# Patient Record
Sex: MALE | Race: BLACK OR AFRICAN AMERICAN | Employment: OTHER | URBAN - METROPOLITAN AREA
[De-identification: names, ages, dates, MRNs, and addresses within clinical notes are randomized per-mention and may not be internally consistent; named-entity substitution may affect disease eponyms.]

---

## 2017-01-22 ENCOUNTER — TELEPHONE (OUTPATIENT)
Dept: INTERNAL MEDICINE CLINIC | Facility: CLINIC | Age: 42
End: 2017-01-22

## 2017-01-22 RX ORDER — TOBRAMYCIN AND DEXAMETHASONE 3; 1 MG/ML; MG/ML
2 SUSPENSION/ DROPS OPHTHALMIC 4 TIMES DAILY
Qty: 2.5 ML | Refills: 0 | Status: SHIPPED | OUTPATIENT
Start: 2017-01-22 | End: 2017-06-09 | Stop reason: ALTCHOICE

## 2017-01-22 NOTE — TELEPHONE ENCOUNTER
The patient called because he has developed \"pink eye\". He was prescribed TobraDex ophthalmic and sent to the patient's pharmacy. The risks, benefits and side effects of treatment  plan  were dicussed with the patient and the patient verbalized [place]

## 2017-06-03 ENCOUNTER — HOSPITAL ENCOUNTER (OUTPATIENT)
Age: 42
Discharge: HOME | End: 2017-06-03
Attending: EMERGENCY MEDICINE
Payer: COMMERCIAL

## 2017-06-03 VITALS
WEIGHT: 195 LBS | HEIGHT: 74 IN | OXYGEN SATURATION: 99 % | DIASTOLIC BLOOD PRESSURE: 80 MMHG | HEART RATE: 106 BPM | SYSTOLIC BLOOD PRESSURE: 118 MMHG | BODY MASS INDEX: 25.03 KG/M2 | RESPIRATION RATE: 16 BRPM | TEMPERATURE: 98 F

## 2017-06-03 DIAGNOSIS — L50.9 URTICARIA: Primary | ICD-10-CM

## 2017-06-03 PROCEDURE — 99213 OFFICE O/P EST LOW 20 MIN: CPT

## 2017-06-03 PROCEDURE — 99214 OFFICE O/P EST MOD 30 MIN: CPT

## 2017-06-03 RX ORDER — PREDNISONE 20 MG/1
40 TABLET ORAL DAILY
Qty: 10 TABLET | Refills: 0 | Status: SHIPPED | OUTPATIENT
Start: 2017-06-03 | End: 2017-06-08

## 2017-06-03 NOTE — ED INITIAL ASSESSMENT (HPI)
Rash on chest and stomach that started today 30 minutes ago. Took 1 benadryl at home. Itchy and burning. Unable to identify any new environmental factors. Denies any CP and SOB.

## 2017-06-03 NOTE — ED PROVIDER NOTES
Patient Seen in: [facility] [place]    History   Patient presents with:  Rash Skin Problem (integumentary)    Stated Complaint: Rash    HPI    Patient is a 22-year-old male who presents with rash that started immediately prior t every other day for the last 4-5 years. Review of Systems    Positive for stated complaint: Rash  Other systems are as noted in HPI. Constitutional and vital signs reviewed. All other systems reviewed and negative except as noted above.     P 10 tablet Refills: 0

## 2017-06-04 ENCOUNTER — HOSPITAL ENCOUNTER (EMERGENCY)
Facility: HOSPITAL | Age: 42
Discharge: HOME | End: 2017-06-04
Attending: EMERGENCY MEDICINE
Payer: COMMERCIAL

## 2017-06-04 VITALS
HEIGHT: 74 IN | WEIGHT: 195 LBS | RESPIRATION RATE: 20 BRPM | TEMPERATURE: 98 F | HEART RATE: 101 BPM | SYSTOLIC BLOOD PRESSURE: 170 MMHG | OXYGEN SATURATION: 99 % | DIASTOLIC BLOOD PRESSURE: 75 MMHG | BODY MASS INDEX: 25.03 KG/M2

## 2017-06-04 DIAGNOSIS — L23.9 ALLERGIC CONTACT DERMATITIS, UNSPECIFIED TRIGGER: Primary | ICD-10-CM

## 2017-06-04 PROCEDURE — 99283 EMERGENCY DEPT VISIT LOW MDM: CPT

## 2017-06-04 RX ORDER — HYDROXYZINE HYDROCHLORIDE 25 MG/1
TABLET, FILM COATED ORAL EVERY 4 HOURS PRN
Qty: 20 TABLET | Refills: 0 | Status: SHIPPED | OUTPATIENT
Start: 2017-06-04 | End: 2017-07-04

## 2017-06-04 NOTE — ED PROVIDER NOTES
Patient Seen in: [facility] AND [facility] Emergency Department    History   Patient presents with:   Allergic Rxn Allergies (immune)    Stated Complaint: rash allergic reaction    HPI    Patient is a 28-year-old man with a history of pancreatitis he complains of drinks or equivalent, 6 Cans of beer per week       Comment: Denies any ETOH use since discharge on                 05/11. Previously 1/2 pint of liquor                 every other day for the last 4-5 years.        Review of Systems    Positive for stated There is a red raised pruritic rash on his anterior abdomen and chest.  It does cross the midline. There is no vesicles. Psychiatric: Normal mood and affect. Behavior is normal. Judgment and thought content normal.   Nursing note and vitals reviewed.

## 2017-06-06 ENCOUNTER — TELEPHONE (OUTPATIENT)
Dept: INTERNAL MEDICINE CLINIC | Facility: CLINIC | Age: 42
End: 2017-06-06

## 2017-06-06 ENCOUNTER — APPOINTMENT (OUTPATIENT)
Dept: CT IMAGING | Facility: HOSPITAL | Age: 42
End: 2017-06-06
Attending: EMERGENCY MEDICINE
Payer: COMMERCIAL

## 2017-06-06 PROCEDURE — 74177 CT ABD & PELVIS W/CONTRAST: CPT | Performed by: EMERGENCY MEDICINE

## 2017-06-06 NOTE — TELEPHONE ENCOUNTER
Spouse would like to inform Dr. Gerri Fuller that pt is currently in [address] [place] [address] and she cancelled appt for pt for today.

## 2017-06-06 NOTE — ED PROVIDER NOTES
Patient Seen in: [facility] AND [facility] Emergency Department    History   Patient presents with:  Abdomen/Flank Pain (GI/)    Stated Complaint: abdominal pain     HPI    43year old male, with a history of alcohol induced chronic pancreatitis, known pancrea by mouth every morning before breakfast.         Family History   Problem Relation Age of Onset   • Hypertension Mother    • Hypertension Father          Smoking Status: Current Every Day Smoker        Packs/Day: 0.50  Years: 20        Types: Cigarettes no drainage rash crosses the midline) noted. Nursing note and vitals reviewed.            ED Course     Labs Reviewed   BASIC METABOLIC PANEL (8) - Abnormal; Notable for the following:     Calculated Osmolality 297 (*)     GFR, Non- 54 (*) immediate care center on Friday and it did not help.   He is requesting a topical medication  He will be given Garima Tristan for follow-up and understands to avoid ETOH- will contact Dr Duarte Matias tomorrow for fu      Disposition and Plan     Clinical Impression:

## 2017-06-09 ENCOUNTER — OFFICE VISIT (OUTPATIENT)
Dept: INTERNAL MEDICINE CLINIC | Facility: CLINIC | Age: 42
End: 2017-06-09

## 2017-06-09 VITALS
BODY MASS INDEX: 25.67 KG/M2 | HEIGHT: 74 IN | WEIGHT: 200 LBS | SYSTOLIC BLOOD PRESSURE: 99 MMHG | DIASTOLIC BLOOD PRESSURE: 68 MMHG | HEART RATE: 101 BPM

## 2017-06-09 DIAGNOSIS — L50.9 URTICARIA: Primary | ICD-10-CM

## 2017-06-09 PROCEDURE — 99214 OFFICE O/P EST MOD 30 MIN: CPT | Performed by: INTERNAL MEDICINE

## 2017-06-09 PROCEDURE — 99212 OFFICE O/P EST SF 10 MIN: CPT | Performed by: INTERNAL MEDICINE

## 2017-06-09 RX ORDER — CLOBETASOL PROPIONATE 0.05 MG/G
GEL TOPICAL
Qty: 30 G | Refills: 2 | Status: SHIPPED | OUTPATIENT
Start: 2017-06-09 | End: 2017-08-08

## 2017-06-09 NOTE — PROGRESS NOTES
HPI:    Patient ID: Jenny Huber is a 43year old male. 6/4/17 [address] [place] [address] ED for allergic contact dermatitis. Discharged with hydroxyzine 25 MG, prednisone oral steroid and triamcinolone acetonide 0.1% external ointment.  Pt took his dog for a walk at the fo Previously 1/2 pint of liquor                 every other day for the last 4-5 years. Current Outpatient Prescriptions:  Clobetasol Propionate 0.05 % External Gel Apply to affected area twice a day.  Disp: 30 g Rfl: 2   HYDROcodone-acetaminoph clobetasol propionate 0.05 % external gel. - Follow up with dermatology. No orders of the defined types were placed in this encounter.        Meds This Visit:  Signed Prescriptions Disp Refills    Clobetasol Propionate 0.05 % External Gel 30 g 2      S

## 2017-07-06 ENCOUNTER — APPOINTMENT (OUTPATIENT)
Dept: CT IMAGING | Facility: HOSPITAL | Age: 42
End: 2017-07-06
Attending: EMERGENCY MEDICINE
Payer: COMMERCIAL

## 2017-07-06 ENCOUNTER — APPOINTMENT (OUTPATIENT)
Dept: GENERAL RADIOLOGY | Facility: HOSPITAL | Age: 42
End: 2017-07-06
Payer: COMMERCIAL

## 2017-07-06 ENCOUNTER — HOSPITAL ENCOUNTER (EMERGENCY)
Facility: HOSPITAL | Age: 42
Discharge: HOME | End: 2017-07-06
Payer: COMMERCIAL

## 2017-07-06 VITALS
HEIGHT: 75 IN | HEART RATE: 88 BPM | TEMPERATURE: 97 F | BODY MASS INDEX: 24.87 KG/M2 | WEIGHT: 200 LBS | SYSTOLIC BLOOD PRESSURE: 133 MMHG | OXYGEN SATURATION: 100 % | RESPIRATION RATE: 14 BRPM | DIASTOLIC BLOOD PRESSURE: 78 MMHG

## 2017-07-06 DIAGNOSIS — R10.10 UPPER ABDOMINAL PAIN: Primary | ICD-10-CM

## 2017-07-06 LAB
ALBUMIN SERPL BCP-MCNC: 4 G/DL (ref 3.5–4.8)
ALP SERPL-CCNC: 45 U/L (ref 32–100)
ALT SERPL-CCNC: 30 U/L (ref 17–63)
ANION GAP SERPL CALC-SCNC: 10 MMOL/L (ref 0–18)
AST SERPL-CCNC: 44 U/L (ref 15–41)
BASOPHILS # BLD: 0 K/UL (ref 0–0.2)
BASOPHILS NFR BLD: 1 %
BILIRUB DIRECT SERPL-MCNC: 0.2 MG/DL (ref 0–0.2)
BILIRUB SERPL-MCNC: 1 MG/DL (ref 0.3–1.2)
BUN SERPL-MCNC: 17 MG/DL (ref 8–20)
BUN/CREAT SERPL: 15 (ref 10–20)
CALCIUM SERPL-MCNC: 9.4 MG/DL (ref 8.5–10.5)
CHLORIDE SERPL-SCNC: 102 MMOL/L (ref 95–110)
CO2 SERPL-SCNC: 26 MMOL/L (ref 22–32)
CREAT SERPL-MCNC: 1.13 MG/DL (ref 0.5–1.5)
EOSINOPHIL # BLD: 0.1 K/UL (ref 0–0.7)
EOSINOPHIL NFR BLD: 2 %
ERYTHROCYTE [DISTWIDTH] IN BLOOD BY AUTOMATED COUNT: 14.2 % (ref 11–15)
GLUCOSE SERPL-MCNC: 98 MG/DL (ref 70–99)
HCT VFR BLD AUTO: 41.1 % (ref 41–52)
HGB BLD-MCNC: 14.3 G/DL (ref 13.5–17.5)
LIPASE SERPL-CCNC: 21 U/L (ref 22–51)
LYMPHOCYTES # BLD: 2 K/UL (ref 1–4)
LYMPHOCYTES NFR BLD: 40 %
MCH RBC QN AUTO: 35.3 PG (ref 27–32)
MCHC RBC AUTO-ENTMCNC: 34.7 G/DL (ref 32–37)
MCV RBC AUTO: 101.8 FL (ref 80–100)
MONOCYTES # BLD: 0.4 K/UL (ref 0–1)
MONOCYTES NFR BLD: 8 %
NEUTROPHILS # BLD AUTO: 2.5 K/UL (ref 1.8–7.7)
NEUTROPHILS NFR BLD: 50 %
OSMOLALITY UR CALC.SUM OF ELEC: 288 MOSM/KG (ref 275–295)
PLATELET # BLD AUTO: 292 K/UL (ref 140–400)
PMV BLD AUTO: 6.8 FL (ref 7.4–10.3)
POTASSIUM SERPL-SCNC: 4.5 MMOL/L (ref 3.3–5.1)
PROT SERPL-MCNC: 6.7 G/DL (ref 5.9–8.4)
RBC # BLD AUTO: 4.04 M/UL (ref 4.5–5.9)
SODIUM SERPL-SCNC: 138 MMOL/L (ref 136–144)
TROPONIN I SERPL-MCNC: 0 NG/ML (ref ?–0.03)
WBC # BLD AUTO: 4.9 K/UL (ref 4–11)

## 2017-07-06 PROCEDURE — 84484 ASSAY OF TROPONIN QUANT: CPT

## 2017-07-06 PROCEDURE — 96361 HYDRATE IV INFUSION ADD-ON: CPT

## 2017-07-06 PROCEDURE — 96376 TX/PRO/DX INJ SAME DRUG ADON: CPT

## 2017-07-06 PROCEDURE — 85025 COMPLETE CBC W/AUTO DIFF WBC: CPT

## 2017-07-06 PROCEDURE — 96375 TX/PRO/DX INJ NEW DRUG ADDON: CPT

## 2017-07-06 PROCEDURE — 93005 ELECTROCARDIOGRAM TRACING: CPT

## 2017-07-06 PROCEDURE — 93010 ELECTROCARDIOGRAM REPORT: CPT | Performed by: INTERNAL MEDICINE

## 2017-07-06 PROCEDURE — 83690 ASSAY OF LIPASE: CPT | Performed by: EMERGENCY MEDICINE

## 2017-07-06 PROCEDURE — 80076 HEPATIC FUNCTION PANEL: CPT | Performed by: EMERGENCY MEDICINE

## 2017-07-06 PROCEDURE — 71020 XR CHEST PA + LAT CHEST (CPT=71020): CPT

## 2017-07-06 PROCEDURE — 80048 BASIC METABOLIC PNL TOTAL CA: CPT

## 2017-07-06 PROCEDURE — 96374 THER/PROPH/DIAG INJ IV PUSH: CPT

## 2017-07-06 PROCEDURE — 74160 CT ABDOMEN W/CONTRAST: CPT | Performed by: EMERGENCY MEDICINE

## 2017-07-06 PROCEDURE — 71260 CT THORAX DX C+: CPT | Performed by: EMERGENCY MEDICINE

## 2017-07-06 PROCEDURE — 99285 EMERGENCY DEPT VISIT HI MDM: CPT

## 2017-07-06 RX ORDER — ONDANSETRON 2 MG/ML
4 INJECTION INTRAMUSCULAR; INTRAVENOUS ONCE
Status: COMPLETED | OUTPATIENT
Start: 2017-07-06 | End: 2017-07-06

## 2017-07-06 RX ORDER — HYDROCODONE BITARTRATE AND ACETAMINOPHEN 5; 325 MG/1; MG/1
1 TABLET ORAL EVERY 8 HOURS PRN
Qty: 9 TABLET | Refills: 0 | Status: SHIPPED | OUTPATIENT
Start: 2017-07-06 | End: 2017-07-09

## 2017-07-06 RX ORDER — HYDROMORPHONE HYDROCHLORIDE 1 MG/ML
0.5 INJECTION, SOLUTION INTRAMUSCULAR; INTRAVENOUS; SUBCUTANEOUS ONCE
Status: COMPLETED | OUTPATIENT
Start: 2017-07-06 | End: 2017-07-06

## 2017-07-06 NOTE — ED NOTES
Jose Daniel assumed from triage alert and interactive. Ambulates with steady gait. States acute onset epigastric burning this am. Hector SOB/diaphoresis/nausea/vomiting.  Respirations even nonlabored

## 2017-07-06 NOTE — ED NOTES
Discharged home with plan to follow up with PCP/GI as indicated. Alert and interactive. Hemodynamically stable. Agrees with pain management plan.  Ambulates to exit with steady gait

## 2017-07-06 NOTE — ED PROVIDER NOTES
Signout taken with dispo pending labs/CT - same nonacute and as noted below. Re-examination with soft/nonperitonitic abdomen. Patient evaluated by [address] [place] [address] Dr. Claribel Mark and in agreement for DC home with close PCP/GI followup.     Ct Chest+abdomen (all C pneumothorax CHEST WALL: Normal.  No axillary mass or enlarged adenopathy. LIVER:   14 x 11 mm cyst or hamartoma lateral segment left hepatic lobe image 140 series 3. Subcentimeter cyst or hamartoma right hepatic lobe image 144 series 3.  Smaller area low possibility of reflux with bronchospasm and chest pain is raised. Correlate clinically. 4. Scattered hepatic nodules appear probable small cyst or hamartomas as seen on prior study. 5. Lobular lung nodule left upper lobe as seen on prior studies.  Probable Total 9.4 8.5 - 10.5 mg/dL   BUN/CREA Ratio 15.0 10.0 - 20.0   Anion Gap 10 0 - 18 mmol/L   Calculated Osmolality 288 275 - 295 mOsm/kg   GFR, Non-African American >60 >=60   GFR, -American >60 >=60   -TROPONIN I, 0 HOUR   Collection Time: 07/06/17

## 2017-07-13 NOTE — ED PROVIDER NOTES
Patient Seen in: [facility] AND [facility] Emergency Department    History   Patient presents with:  Chest Pain Angina (cardiovascular)    Stated Complaint: Chest and lower back pain    HPI    Pt presents to ED with upper abdominal pain which radiates into chest except as noted above. PSFH elements reviewed from today and agreed except as otherwise stated in HPI.     Physical Exam   ED Triage Vitals [07/06/17 1240]  BP: 152/89  Pulse: 109  Resp: 20  Temp: (!) 97 °F (36.1 °C)  Temp src: Temporal  SpO2: 100 %  O2 ---------                               -----------         ------                     CBC W/ DIFFERENTIAL[[id number]]          Abnormal            Final result                 Please view results for these tests on the individual orders.    RAINBOW DRAW B

## 2017-07-18 ENCOUNTER — TELEPHONE (OUTPATIENT)
Dept: INTERNAL MEDICINE CLINIC | Facility: CLINIC | Age: 42
End: 2017-07-18

## 2017-07-18 NOTE — TELEPHONE ENCOUNTER
Actions Requested: requesting hydrocodone and zofran  Problem: abdominal pain  Onset and Timing: today  Associated Symptoms: no other symptoms. Aggravating by:    Alleviated by:   Triage Note: Spouse called indicated that patient went to [facility] ER today

## 2017-07-19 PROBLEM — R73.9 HYPERGLYCEMIA: Status: ACTIVE | Noted: 2017-07-19

## 2017-07-19 PROBLEM — R10.9 INTRACTABLE ABDOMINAL PAIN: Status: ACTIVE | Noted: 2017-07-19

## 2017-07-19 PROBLEM — E83.42 HYPOMAGNESEMIA: Status: ACTIVE | Noted: 2017-07-19

## 2017-07-19 PROBLEM — R11.2 NAUSEA AND VOMITING IN ADULT: Status: ACTIVE | Noted: 2017-07-19

## 2017-07-19 PROBLEM — E87.6 HYPOKALEMIA: Status: ACTIVE | Noted: 2017-07-19

## 2017-07-19 RX ORDER — ONDANSETRON 4 MG/1
4 TABLET, FILM COATED ORAL EVERY 8 HOURS PRN
Qty: 30 TABLET | Refills: 0 | Status: SHIPPED | OUTPATIENT
Start: 2017-07-19 | End: 2017-07-20

## 2017-07-19 RX ORDER — HYDROCODONE BITARTRATE AND ACETAMINOPHEN 5; 325 MG/1; MG/1
1 TABLET ORAL EVERY 8 HOURS PRN
Qty: 45 TABLET | Refills: 0 | Status: SHIPPED | OUTPATIENT
Start: 2017-07-19 | End: 2017-07-20

## 2017-07-19 NOTE — H&P
[facility] Patient Status:  Emergency    2/3/1975 MRN [id number]   Location [address] Attending Mamadou [facility] Day # 0 PCP Ed Hennessy MD     Date: Informant Patient Reported? Taking? Clobetasol Propionate 0.05 % External Gel   No No   Sig: Apply to affected area twice a day.    PROPRANOLOL HCL 10 MG Oral Tab   No No   Sig: TAKE 1 TABLET BY MOUTH TWICE A DAY   Pantoprazole Sodium 40 MG Oral Tab EC Speech:  Oriented, speech clear and coherent. Psychiatric:  Cooperative, appropriate mood & affect.       Laboratory Data:     Lab Results  Component Value Date   WBC 8.3 07/18/2017   HGB 13.9 07/18/2017   HCT 40.3 07/18/2017    07/18/2017   ROCHELLE

## 2017-07-19 NOTE — PROGRESS NOTES
[facility]    Progress Note    [facility] Patient Status:  Inpatient    2/3/1975 MRN [id number]   Location [facility] 4W/SW/SE Attending Ac Monteiro MD   Hosp Day # 0 PCP Zev Treadwell MD       Subjective:   Samantha Melo mg, Intravenous, Q2H PRN **OR** HYDROmorphone HCl PF (DILAUDID) 1 MG/ML injection 1 mg, 1 mg, Intravenous, Q2H PRN  •  potassium chloride 40 mEq in sodium chloride 0.9 % 250 mL IVPB, 40 mEq, Intravenous, Once    Assessment and Plan:     Acute recurrent pan

## 2017-07-19 NOTE — TELEPHONE ENCOUNTER
Prescriptions for this patient has been printed at [facility]. I notify the patient that him or his spouse can come to [place] to  prescriptions.

## 2017-07-19 NOTE — ED NOTES
Patient holding abd in pain. Patient asking for pain medication. Patient stated that he was seen at [facility] ED this morning and given pepcid, dilaudid, and zofran for pancreatitis and then discharged.  Patient did not bring the discharge papers from [facility]

## 2017-07-19 NOTE — TELEPHONE ENCOUNTER
Advised Spouse of Dr. Molly Anaya note. Spouse verbalized understanding and stated that patient was in so much pain yesterday that went to [address] [place] [address] yesterday and was admitted.        Date:  7/18/2017  Date of Admission:  7/18/2017  Reason for Admission      Codes

## 2017-07-19 NOTE — ED INITIAL ASSESSMENT (HPI)
Pt at work- started having pain yesterday to abd- \"I think my pancreas is inflammed. \" Went to [facility] and stated he was sent home this morning.

## 2017-07-19 NOTE — PLAN OF CARE
DISCHARGE PLANNING    • Discharge to home or other facility with appropriate resources Not Progressing        PAIN - ADULT    • Verbalizes/displays adequate comfort level or patient's stated pain goal Not Progressing        Pt alert and oriented, received

## 2017-07-19 NOTE — ED PROVIDER NOTES
Patient Seen in: [facility] AND [facility] Emergency Department    History   Patient presents with:  Abdomen/Flank Pain (GI/)    Stated Complaint: inflammed pancreas    HPI    55-year-old male presents for complaint of pancreatitis.   He was seen at [facility] on               05/11. Previously 1/2 pint of liquor every               other day for the last 4-5 years. Review of Systems   Constitutional: Negative. HENT: Negative. Eyes: Negative. Respiratory: Negative. Cardiovascular: Negative. Psychiatric: He has a normal mood and affect. His speech is normal and behavior is normal.   Nursing note and vitals reviewed.             ED Course     Labs Reviewed   BASIC METABOLIC PANEL (8) - Abnormal; Notable for the following:        Result Value Impression:  Alcohol-induced chronic pancreatitis ([facility] [address]  (primary encounter diagnosis)  Intractable abdominal pain  Nausea and vomiting in adult  Hypokalemia  Hypomagnesemia    Disposition:  Admit    Follow-up:  No follow-up provider specified.     Tomás Javier

## 2017-07-21 ENCOUNTER — OFFICE VISIT (OUTPATIENT)
Dept: INTERNAL MEDICINE CLINIC | Facility: CLINIC | Age: 42
End: 2017-07-21

## 2017-07-21 VITALS
WEIGHT: 201 LBS | HEART RATE: 102 BPM | DIASTOLIC BLOOD PRESSURE: 87 MMHG | BODY MASS INDEX: 25.8 KG/M2 | HEIGHT: 74 IN | SYSTOLIC BLOOD PRESSURE: 121 MMHG

## 2017-07-21 DIAGNOSIS — K85.90 ACUTE ON CHRONIC PANCREATITIS (HCC): Primary | ICD-10-CM

## 2017-07-21 DIAGNOSIS — F10.10 ALCOHOL ABUSE: ICD-10-CM

## 2017-07-21 DIAGNOSIS — K86.1 ACUTE ON CHRONIC PANCREATITIS (HCC): Primary | ICD-10-CM

## 2017-07-21 PROCEDURE — 99214 OFFICE O/P EST MOD 30 MIN: CPT | Performed by: INTERNAL MEDICINE

## 2017-07-21 PROCEDURE — 99212 OFFICE O/P EST SF 10 MIN: CPT | Performed by: INTERNAL MEDICINE

## 2017-07-21 NOTE — PROGRESS NOTES
HPI:    Patient ID: Elder Maravilla is a 43year old male. HPI     Chronic Pancreatitis  Pt presents to clinic today for ER follow up. Pt presented to [address] [place] [address] ED on 7/18/2017 for alcohol induced chronic pancreatitis.  He complained of severe epigastric pain acid 1 MG Oral Tab Take 1 tablet (1 mg total) by mouth daily. Disp: 30 tablet Rfl: 0   Thiamine HCl 100 MG Oral Tab Take 1 tablet (100 mg total) by mouth daily.  Disp: 30 tablet Rfl: 0   Clobetasol Propionate 0.05 % External Gel Apply to affected area twice abuse  -Pt has hx of chronic alcohol abuse. No orders of the defined types were placed in this encounter.       Meds This Visit:  No prescriptions requested or ordered in this encounter    Imaging & Referrals:  None       ID#[id number]  By signing my name Kimber Jack

## 2017-07-22 ENCOUNTER — HOSPITAL ENCOUNTER (INPATIENT)
Facility: HOSPITAL | Age: 42
LOS: 3 days | Discharge: HOME | DRG: 439 | End: 2017-07-25
Attending: EMERGENCY MEDICINE | Admitting: HOSPITALIST
Payer: COMMERCIAL

## 2017-07-22 DIAGNOSIS — R10.10 UPPER ABDOMINAL PAIN: Primary | ICD-10-CM

## 2017-07-22 DIAGNOSIS — K85.90 ACUTE PANCREATITIS, UNSPECIFIED COMPLICATION STATUS, UNSPECIFIED PANCREATITIS TYPE: ICD-10-CM

## 2017-07-22 LAB
ALBUMIN SERPL BCP-MCNC: 3.7 G/DL (ref 3.5–4.8)
ALBUMIN/GLOB SERPL: 1.3 {RATIO} (ref 1–2)
ALP SERPL-CCNC: 44 U/L (ref 32–100)
ALT SERPL-CCNC: 18 U/L (ref 17–63)
ANION GAP SERPL CALC-SCNC: 7 MMOL/L (ref 0–18)
AST SERPL-CCNC: 21 U/L (ref 15–41)
BACTERIA UR QL AUTO: NEGATIVE /HPF
BASOPHILS # BLD: 0 K/UL (ref 0–0.2)
BASOPHILS NFR BLD: 0 %
BILIRUB SERPL-MCNC: 0.5 MG/DL (ref 0.3–1.2)
BILIRUB UR QL: NEGATIVE
BUN SERPL-MCNC: 11 MG/DL (ref 8–20)
BUN/CREAT SERPL: 9 (ref 10–20)
CALCIUM SERPL-MCNC: 8.9 MG/DL (ref 8.5–10.5)
CHLORIDE SERPL-SCNC: 103 MMOL/L (ref 95–110)
CLARITY UR: CLEAR
CO2 SERPL-SCNC: 28 MMOL/L (ref 22–32)
COLOR UR: YELLOW
CREAT SERPL-MCNC: 1.22 MG/DL (ref 0.5–1.5)
EOSINOPHIL # BLD: 0 K/UL (ref 0–0.7)
EOSINOPHIL NFR BLD: 1 %
ERYTHROCYTE [DISTWIDTH] IN BLOOD BY AUTOMATED COUNT: 13.7 % (ref 11–15)
ETHANOL SERPL-MCNC: 0 MG/DL
GLOBULIN PLAS-MCNC: 2.8 G/DL (ref 2.5–3.7)
GLUCOSE SERPL-MCNC: 117 MG/DL (ref 70–99)
GLUCOSE UR-MCNC: NEGATIVE MG/DL
HCT VFR BLD AUTO: 39.1 % (ref 41–52)
HGB BLD-MCNC: 13.6 G/DL (ref 13.5–17.5)
HGB UR QL STRIP.AUTO: NEGATIVE
KETONES UR-MCNC: NEGATIVE MG/DL
LEUKOCYTE ESTERASE UR QL STRIP.AUTO: NEGATIVE
LIPASE SERPL-CCNC: 1324 U/L (ref 22–51)
LYMPHOCYTES # BLD: 1.5 K/UL (ref 1–4)
LYMPHOCYTES NFR BLD: 18 %
MCH RBC QN AUTO: 35.1 PG (ref 27–32)
MCHC RBC AUTO-ENTMCNC: 34.8 G/DL (ref 32–37)
MCV RBC AUTO: 100.8 FL (ref 80–100)
MONOCYTES # BLD: 0.7 K/UL (ref 0–1)
MONOCYTES NFR BLD: 9 %
NEUTROPHILS # BLD AUTO: 5.8 K/UL (ref 1.8–7.7)
NEUTROPHILS NFR BLD: 72 %
NITRITE UR QL STRIP.AUTO: NEGATIVE
OSMOLALITY UR CALC.SUM OF ELEC: 286 MOSM/KG (ref 275–295)
PH UR: 7 [PH] (ref 5–8)
PLATELET # BLD AUTO: 275 K/UL (ref 140–400)
PMV BLD AUTO: 6.9 FL (ref 7.4–10.3)
POTASSIUM SERPL-SCNC: 3.6 MMOL/L (ref 3.3–5.1)
PROT SERPL-MCNC: 6.5 G/DL (ref 5.9–8.4)
PROT UR-MCNC: NEGATIVE MG/DL
RBC # BLD AUTO: 3.88 M/UL (ref 4.5–5.9)
RBC #/AREA URNS AUTO: 0 /HPF
SODIUM SERPL-SCNC: 138 MMOL/L (ref 136–144)
SP GR UR STRIP: 1.01 (ref 1–1.03)
UROBILINOGEN UR STRIP-ACNC: <2
VIT C UR-MCNC: NEGATIVE MG/DL
WBC # BLD AUTO: 8.1 K/UL (ref 4–11)
WBC #/AREA URNS AUTO: <1 /HPF

## 2017-07-22 PROCEDURE — 99223 1ST HOSP IP/OBS HIGH 75: CPT | Performed by: HOSPITALIST

## 2017-07-22 RX ORDER — HEPARIN SODIUM 5000 [USP'U]/ML
5000 INJECTION, SOLUTION INTRAVENOUS; SUBCUTANEOUS EVERY 8 HOURS SCHEDULED
Status: DISCONTINUED | OUTPATIENT
Start: 2017-07-22 | End: 2017-07-25

## 2017-07-22 RX ORDER — DIPHENHYDRAMINE HYDROCHLORIDE 50 MG/ML
25 INJECTION INTRAMUSCULAR; INTRAVENOUS EVERY 4 HOURS PRN
Status: DISCONTINUED | OUTPATIENT
Start: 2017-07-22 | End: 2017-07-25

## 2017-07-22 RX ORDER — HYDROMORPHONE HYDROCHLORIDE 1 MG/ML
1 INJECTION, SOLUTION INTRAMUSCULAR; INTRAVENOUS; SUBCUTANEOUS ONCE
Status: COMPLETED | OUTPATIENT
Start: 2017-07-22 | End: 2017-07-22

## 2017-07-22 RX ORDER — ONDANSETRON 2 MG/ML
4 INJECTION INTRAMUSCULAR; INTRAVENOUS ONCE
Status: COMPLETED | OUTPATIENT
Start: 2017-07-22 | End: 2017-07-22

## 2017-07-22 RX ORDER — SODIUM CHLORIDE 9 MG/ML
INJECTION, SOLUTION INTRAVENOUS
Status: DISPENSED
Start: 2017-07-22 | End: 2017-07-23

## 2017-07-22 RX ORDER — SODIUM CHLORIDE 0.9 % (FLUSH) 0.9 %
3 SYRINGE (ML) INJECTION AS NEEDED
Status: DISCONTINUED | OUTPATIENT
Start: 2017-07-22 | End: 2017-07-25

## 2017-07-22 RX ORDER — SODIUM CHLORIDE 9 MG/ML
INJECTION, SOLUTION INTRAVENOUS CONTINUOUS
Status: DISCONTINUED | OUTPATIENT
Start: 2017-07-22 | End: 2017-07-22

## 2017-07-22 RX ORDER — SODIUM CHLORIDE, SODIUM LACTATE, POTASSIUM CHLORIDE, CALCIUM CHLORIDE 600; 310; 30; 20 MG/100ML; MG/100ML; MG/100ML; MG/100ML
INJECTION, SOLUTION INTRAVENOUS CONTINUOUS
Status: DISCONTINUED | OUTPATIENT
Start: 2017-07-22 | End: 2017-07-24

## 2017-07-22 RX ORDER — SODIUM CHLORIDE 9 MG/ML
INJECTION, SOLUTION INTRAVENOUS
Status: COMPLETED
Start: 2017-07-22 | End: 2017-07-22

## 2017-07-22 RX ORDER — METOCLOPRAMIDE HYDROCHLORIDE 5 MG/ML
10 INJECTION INTRAMUSCULAR; INTRAVENOUS ONCE
Status: COMPLETED | OUTPATIENT
Start: 2017-07-22 | End: 2017-07-22

## 2017-07-22 RX ORDER — HYDROMORPHONE HYDROCHLORIDE 1 MG/ML
0.5 INJECTION, SOLUTION INTRAMUSCULAR; INTRAVENOUS; SUBCUTANEOUS ONCE
Status: COMPLETED | OUTPATIENT
Start: 2017-07-22 | End: 2017-07-22

## 2017-07-22 RX ORDER — DIPHENHYDRAMINE HYDROCHLORIDE 50 MG/ML
25 INJECTION INTRAMUSCULAR; INTRAVENOUS ONCE
Status: COMPLETED | OUTPATIENT
Start: 2017-07-22 | End: 2017-07-22

## 2017-07-22 RX ORDER — ONDANSETRON 2 MG/ML
4 INJECTION INTRAMUSCULAR; INTRAVENOUS EVERY 6 HOURS PRN
Status: DISCONTINUED | OUTPATIENT
Start: 2017-07-22 | End: 2017-07-25

## 2017-07-22 RX ORDER — ACETAMINOPHEN 325 MG/1
650 TABLET ORAL EVERY 6 HOURS PRN
Status: DISCONTINUED | OUTPATIENT
Start: 2017-07-22 | End: 2017-07-25

## 2017-07-22 RX ORDER — HYDROMORPHONE HYDROCHLORIDE 1 MG/ML
INJECTION, SOLUTION INTRAMUSCULAR; INTRAVENOUS; SUBCUTANEOUS
Status: COMPLETED
Start: 2017-07-22 | End: 2017-07-22

## 2017-07-22 RX ORDER — HYDROMORPHONE HYDROCHLORIDE 1 MG/ML
1 INJECTION, SOLUTION INTRAMUSCULAR; INTRAVENOUS; SUBCUTANEOUS EVERY 2 HOUR PRN
Status: DISCONTINUED | OUTPATIENT
Start: 2017-07-22 | End: 2017-07-22

## 2017-07-22 RX ORDER — SODIUM CHLORIDE 9 MG/ML
INJECTION, SOLUTION INTRAVENOUS ONCE
Status: COMPLETED | OUTPATIENT
Start: 2017-07-22 | End: 2017-07-22

## 2017-07-22 RX ORDER — HYDROMORPHONE HYDROCHLORIDE 1 MG/ML
1 INJECTION, SOLUTION INTRAMUSCULAR; INTRAVENOUS; SUBCUTANEOUS
Status: DISCONTINUED | OUTPATIENT
Start: 2017-07-22 | End: 2017-07-25

## 2017-07-22 NOTE — CONSULTS
Gastroenterology consultation note    Reason for consultation:  Abdominal pain, recurrent pancreatitis      History of present illness: The patient is a 43year old male who is seen at the bedside today along with his wife.   The patient has a long-standin days before that. The patient unfortunately continues to smoke as well.         Past Medical History:   Diagnosis Date   • Alcohol abuse    • Back problem    • High blood pressure    • Microcytosis    • Pancreatic cyst    • Pancreatitis    • Pancreatitis, Hypertension Mother    • Hypertension Father        A comprehensive 10 point review of systems was completed. Pertinent positives and negatives noted in the the HPI.         Physical examination:  GEN:  Pleasant well-developed well-nourished who appears qu CT images of the chest and abdomen were obtained with intravenous contrast material.  Automated exposure control for dose reduction was used. Adjustment of the mA and/or kV was done based on the patient's size.  Use of iterative reconstruction Duodenum unremarkable. PANCREAS:                Masslike lesion at the pancreatic head, image 185 series 3 measuring approximately 27 x 23 mm. Mild dilatation pancreatic duct.  Pancreas otherwise, unremarkable  ADRENALS:                No mass or enlargem thickening also stable. No new lung findings or mass. 6. Bilateral mosaic attenuation consistent with air trapping and small airways disease. 7. Areas of wall thickening and luminal narrowing involving the colon which probably reflect underdistention.  Ar morning.

## 2017-07-22 NOTE — H&P
[facility] Patient Status:  Inpatient    2/3/1975 MRN [id number]   Location [facility] 4W/SW/SE Attending Nelda Love, [facility] Day # 0 PCP Radha Garcia MD     Date:  2017  Date Allergies/Medications: Allergies: No Known Allergies    Prescriptions Prior to Admission:  folic acid 1 MG Oral Tab Take 1 tablet (1 mg total) by mouth daily. Thiamine HCl 100 MG Oral Tab Take 1 tablet (100 mg total) by mouth daily.    PROPRANOLOL H Mucosa normal. No drainage or sinus tenderness.   Throat: lips, mucosa, and tongue normal; teeth and gums normal  Neck: no adenopathy, no carotid bruit, no JVD, supple, symmetrical, trachea midline and thyroid not enlarged, symmetric, no tenderness/mass/nod ivf's, dilaudid pca, hold on repeat imaging. This was done in July 6. Pt also with h/o pseudocyst that was biopsied in the past.   - cont LR at 200 per hour   - cont PCA   - cont zofran prn   - refrain from ETOH     2.  ETOH abuse.   - SW consult   - cont f

## 2017-07-22 NOTE — SPIRITUAL CARE NOTE
Patient's mother and patient's wife were present in room. They seemed very worried about patient due to patients intense pain.

## 2017-07-22 NOTE — PROGRESS NOTES
7:10 reexamined patient abd pain 3/10. Reviewed results discussed dispo, patient would like to try and go home. He has long hx of recurrent pancreatitis.   Will give additional IVF and pain meds and re-eval.  Family at bedside, included in disposition erica

## 2017-07-22 NOTE — SPIRITUAL CARE NOTE
Patient was in a lot of pain, patient requested prayers. Patient's mother and his wife were present.

## 2017-07-22 NOTE — ED INITIAL ASSESSMENT (HPI)
Pt presents to the ED for the c/o epigastric pain and lower back pain.  States he has a hx of pancreatitis

## 2017-07-22 NOTE — ED PROVIDER NOTES
Patient Seen in: [facility] AND [facility] Emergency Department    History   Patient presents with:  Abdomen/Flank Pain (GI/)    Stated Complaint: \"inflamed pancreatitis\"     HPI    42 yo M with PMH alcoholic/chronic/recurrent pancreatitis c/b pancreatic pseud DAY   Pantoprazole Sodium 40 MG Oral Tab EC,  Take 40 mg by mouth every morning before breakfast.         Family History   Problem Relation Age of Onset   • Hypertension Mother    • Hypertension Father        Smoking status: Current Every Day Smoker Abnormal; Notable for the following:        Result Value    Glucose 117 (*)     BUN/CREA Ratio 9.0 (*)     All other components within normal limits   CBC W/ DIFFERENTIAL - Abnormal; Notable for the following:     RBC 3.88 (*)     HCT 39.1 (*)     .

## 2017-07-23 LAB
ALBUMIN SERPL BCP-MCNC: 3.5 G/DL (ref 3.5–4.8)
ALBUMIN/GLOB SERPL: 1.4 {RATIO} (ref 1–2)
ALP SERPL-CCNC: 43 U/L (ref 32–100)
ALT SERPL-CCNC: 15 U/L (ref 17–63)
ANION GAP SERPL CALC-SCNC: 6 MMOL/L (ref 0–18)
AST SERPL-CCNC: 17 U/L (ref 15–41)
BASOPHILS # BLD: 0 K/UL (ref 0–0.2)
BASOPHILS NFR BLD: 0 %
BILIRUB SERPL-MCNC: 0.4 MG/DL (ref 0.3–1.2)
BUN SERPL-MCNC: 6 MG/DL (ref 8–20)
BUN/CREAT SERPL: 6.1 (ref 10–20)
CALCIUM SERPL-MCNC: 8.8 MG/DL (ref 8.5–10.5)
CHLORIDE SERPL-SCNC: 107 MMOL/L (ref 95–110)
CO2 SERPL-SCNC: 26 MMOL/L (ref 22–32)
CREAT SERPL-MCNC: 0.99 MG/DL (ref 0.5–1.5)
EOSINOPHIL # BLD: 0 K/UL (ref 0–0.7)
EOSINOPHIL NFR BLD: 1 %
ERYTHROCYTE [DISTWIDTH] IN BLOOD BY AUTOMATED COUNT: 14.1 % (ref 11–15)
GLOBULIN PLAS-MCNC: 2.5 G/DL (ref 2.5–3.7)
GLUCOSE SERPL-MCNC: 112 MG/DL (ref 70–99)
HCT VFR BLD AUTO: 37.2 % (ref 41–52)
HGB BLD-MCNC: 12.7 G/DL (ref 13.5–17.5)
LIPASE SERPL-CCNC: 137 U/L (ref 22–51)
LYMPHOCYTES # BLD: 1.3 K/UL (ref 1–4)
LYMPHOCYTES NFR BLD: 33 %
MAGNESIUM SERPL-MCNC: 2 MG/DL (ref 1.8–2.5)
MCH RBC QN AUTO: 34.8 PG (ref 27–32)
MCHC RBC AUTO-ENTMCNC: 34 G/DL (ref 32–37)
MCV RBC AUTO: 102.2 FL (ref 80–100)
MONOCYTES # BLD: 0.5 K/UL (ref 0–1)
MONOCYTES NFR BLD: 13 %
NEUTROPHILS # BLD AUTO: 2.1 K/UL (ref 1.8–7.7)
NEUTROPHILS NFR BLD: 53 %
OSMOLALITY UR CALC.SUM OF ELEC: 286 MOSM/KG (ref 275–295)
PLATELET # BLD AUTO: 251 K/UL (ref 140–400)
PMV BLD AUTO: 7 FL (ref 7.4–10.3)
POTASSIUM SERPL-SCNC: 4.1 MMOL/L (ref 3.3–5.1)
POTASSIUM SERPL-SCNC: 4.1 MMOL/L (ref 3.3–5.1)
PROT SERPL-MCNC: 6 G/DL (ref 5.9–8.4)
RBC # BLD AUTO: 3.64 M/UL (ref 4.5–5.9)
SODIUM SERPL-SCNC: 139 MMOL/L (ref 136–144)
WBC # BLD AUTO: 4 K/UL (ref 4–11)

## 2017-07-23 PROCEDURE — 99233 SBSQ HOSP IP/OBS HIGH 50: CPT | Performed by: HOSPITALIST

## 2017-07-23 RX ORDER — PROPRANOLOL HYDROCHLORIDE 10 MG/1
10 TABLET ORAL 2 TIMES DAILY
Status: DISCONTINUED | OUTPATIENT
Start: 2017-07-23 | End: 2017-07-25

## 2017-07-23 NOTE — PROGRESS NOTES
[facility]    Progress Note    Tg Daniel Patient Status:  Inpatient    2/3/1975 MRN [id number]   Location [facility] 4W/SW/SE Attending Fifi Lawrence MD   Hosp Day # 1 PCP Caridad Gallego MD       Subjective:   Paris Simmons Electronically signed on 07/22/2017 at 14:18 by Bela Olvera. Assessment and Plan:       1 Abd pain acute 2/2 pancreatitis acute in nature. - apprec GI consult. Cont ivf's, dilaudid pca, hold on repeat imaging. This was done in July 6.  Pt also with arabella

## 2017-07-23 NOTE — PROGRESS NOTES
[facility]     Gastroenterology Progress Note    Kathleen Mary Patient Status:  Inpatient    2/3/1975 MRN [id number]   Location [facility] 4W/SW/SE Attending Park Draper MD   Hosp Day # 1 PCP Adi Carranza MD       A regular rate and rhythm   Lung- Clear to auscultation bilaterally  Abdomen-Non-distended. Bowel sounds are present. There is mild tenderness to palpation in the mid abdomen without peritoneal signs. There are no masses appreciated.   Liver and spleen are

## 2017-07-24 LAB
ANION GAP SERPL CALC-SCNC: 4 MMOL/L (ref 0–18)
BASOPHILS # BLD: 0 K/UL (ref 0–0.2)
BASOPHILS NFR BLD: 0 %
BUN SERPL-MCNC: 6 MG/DL (ref 8–20)
BUN/CREAT SERPL: 5.3 (ref 10–20)
CALCIUM SERPL-MCNC: 9.1 MG/DL (ref 8.5–10.5)
CHLORIDE SERPL-SCNC: 102 MMOL/L (ref 95–110)
CO2 SERPL-SCNC: 32 MMOL/L (ref 22–32)
CREAT SERPL-MCNC: 1.14 MG/DL (ref 0.5–1.5)
EOSINOPHIL # BLD: 0.1 K/UL (ref 0–0.7)
EOSINOPHIL NFR BLD: 1 %
ERYTHROCYTE [DISTWIDTH] IN BLOOD BY AUTOMATED COUNT: 13.7 % (ref 11–15)
GLUCOSE SERPL-MCNC: 105 MG/DL (ref 70–99)
HCT VFR BLD AUTO: 35.9 % (ref 41–52)
HGB BLD-MCNC: 12.4 G/DL (ref 13.5–17.5)
LIPASE SERPL-CCNC: 87 U/L (ref 22–51)
LYMPHOCYTES # BLD: 1.5 K/UL (ref 1–4)
LYMPHOCYTES NFR BLD: 36 %
MCH RBC QN AUTO: 35.1 PG (ref 27–32)
MCHC RBC AUTO-ENTMCNC: 34.6 G/DL (ref 32–37)
MCV RBC AUTO: 101.4 FL (ref 80–100)
MONOCYTES # BLD: 0.5 K/UL (ref 0–1)
MONOCYTES NFR BLD: 12 %
NEUTROPHILS # BLD AUTO: 2.1 K/UL (ref 1.8–7.7)
NEUTROPHILS NFR BLD: 51 %
OSMOLALITY UR CALC.SUM OF ELEC: 284 MOSM/KG (ref 275–295)
PLATELET # BLD AUTO: 276 K/UL (ref 140–400)
PMV BLD AUTO: 7 FL (ref 7.4–10.3)
POTASSIUM SERPL-SCNC: 4.8 MMOL/L (ref 3.3–5.1)
RBC # BLD AUTO: 3.54 M/UL (ref 4.5–5.9)
SODIUM SERPL-SCNC: 138 MMOL/L (ref 136–144)
WBC # BLD AUTO: 4.1 K/UL (ref 4–11)

## 2017-07-24 PROCEDURE — 99239 HOSP IP/OBS DSCHRG MGMT >30: CPT | Performed by: HOSPITALIST

## 2017-07-24 RX ORDER — LISINOPRIL 5 MG/1
5 TABLET ORAL DAILY
Qty: 30 TABLET | Refills: 0 | Status: SHIPPED | OUTPATIENT
Start: 2017-07-24 | End: 2017-09-15

## 2017-07-24 RX ORDER — LISINOPRIL 5 MG/1
5 TABLET ORAL DAILY
Status: DISCONTINUED | OUTPATIENT
Start: 2017-07-24 | End: 2017-07-25

## 2017-07-24 RX ORDER — SODIUM CHLORIDE, SODIUM LACTATE, POTASSIUM CHLORIDE, CALCIUM CHLORIDE 600; 310; 30; 20 MG/100ML; MG/100ML; MG/100ML; MG/100ML
INJECTION, SOLUTION INTRAVENOUS CONTINUOUS
Status: DISCONTINUED | OUTPATIENT
Start: 2017-07-24 | End: 2017-07-25

## 2017-07-24 NOTE — DISCHARGE SUMMARY
[facility]    Discharge Summary    Kirti Stevens Patient Status:  Inpatient    2/3/1975 MRN [id number]   Location [facility] 4W/SW/SE Attending Steven Landaverde, [address] [facility] Day # 2 PCP Cari Mehta MD     Date of Admission:  Huong Bloom is a(n) 43year old male. Pt is a 44 yo male with h/o ETOH abuse who presents with abdominal pain. He was recently discharged from [facility] after an admit at [facility] where he was diagnosed with pancreatitis.  His lipase was in the 1000' 60 tablet  Refills:  0     Thiamine HCl 100 MG Tabs      Take 1 tablet (100 mg total) by mouth daily.    Quantity:  30 tablet  Refills:  0     triamcinolone acetonide 0.1 % Oint  Commonly known as:  KENALOG      Apply 1 Application topically 2 (two) times

## 2017-07-25 ENCOUNTER — TELEPHONE (OUTPATIENT)
Dept: GASTROENTEROLOGY | Facility: CLINIC | Age: 42
End: 2017-07-25

## 2017-07-25 VITALS
WEIGHT: 203.5 LBS | HEART RATE: 71 BPM | BODY MASS INDEX: 25.3 KG/M2 | TEMPERATURE: 99 F | SYSTOLIC BLOOD PRESSURE: 161 MMHG | HEIGHT: 75 IN | DIASTOLIC BLOOD PRESSURE: 92 MMHG | RESPIRATION RATE: 16 BRPM | OXYGEN SATURATION: 100 %

## 2017-07-25 LAB
ANION GAP SERPL CALC-SCNC: 7 MMOL/L (ref 0–18)
BASOPHILS # BLD: 0 K/UL (ref 0–0.2)
BASOPHILS NFR BLD: 1 %
BUN SERPL-MCNC: 5 MG/DL (ref 8–20)
BUN/CREAT SERPL: 4.2 (ref 10–20)
CALCIUM SERPL-MCNC: 9.5 MG/DL (ref 8.5–10.5)
CHLORIDE SERPL-SCNC: 97 MMOL/L (ref 95–110)
CO2 SERPL-SCNC: 32 MMOL/L (ref 22–32)
CREAT SERPL-MCNC: 1.19 MG/DL (ref 0.5–1.5)
EOSINOPHIL # BLD: 0 K/UL (ref 0–0.7)
EOSINOPHIL NFR BLD: 1 %
ERYTHROCYTE [DISTWIDTH] IN BLOOD BY AUTOMATED COUNT: 13.8 % (ref 11–15)
GLUCOSE SERPL-MCNC: 104 MG/DL (ref 70–99)
HCT VFR BLD AUTO: 38.3 % (ref 41–52)
HGB BLD-MCNC: 13.2 G/DL (ref 13.5–17.5)
LIPASE SERPL-CCNC: 47 U/L (ref 22–51)
LYMPHOCYTES # BLD: 1.5 K/UL (ref 1–4)
LYMPHOCYTES NFR BLD: 37 %
MCH RBC QN AUTO: 34.9 PG (ref 27–32)
MCHC RBC AUTO-ENTMCNC: 34.5 G/DL (ref 32–37)
MCV RBC AUTO: 101.3 FL (ref 80–100)
MONOCYTES # BLD: 0.4 K/UL (ref 0–1)
MONOCYTES NFR BLD: 10 %
NEUTROPHILS # BLD AUTO: 2.1 K/UL (ref 1.8–7.7)
NEUTROPHILS NFR BLD: 51 %
OSMOLALITY UR CALC.SUM OF ELEC: 280 MOSM/KG (ref 275–295)
PLATELET # BLD AUTO: 301 K/UL (ref 140–400)
PMV BLD AUTO: 7.1 FL (ref 7.4–10.3)
POTASSIUM SERPL-SCNC: 4.1 MMOL/L (ref 3.3–5.1)
RBC # BLD AUTO: 3.78 M/UL (ref 4.5–5.9)
SODIUM SERPL-SCNC: 136 MMOL/L (ref 136–144)
WBC # BLD AUTO: 4 K/UL (ref 4–11)

## 2017-07-25 PROCEDURE — 99232 SBSQ HOSP IP/OBS MODERATE 35: CPT | Performed by: PHYSICIAN ASSISTANT

## 2017-07-25 NOTE — PLAN OF CARE
Problem: PAIN - ADULT  Goal: Verbalizes/displays adequate comfort level or patient's stated pain goal  INTERVENTIONS:  - Encourage pt to monitor pain and request assistance  - Assess pain using appropriate pain scale  - Administer analgesics based on type coordinating discharge planning if the patient needs post-hospital services based on physician/LIP order or complex needs related to functional status, cognitive ability or social support system   Outcome: Progressing      Problem: Patient/Family Goals  Go

## 2017-07-25 NOTE — PLAN OF CARE
DISCHARGE PLANNING    • Discharge to home or other facility with appropriate resources Progressing        PAIN - ADULT    • Verbalizes/displays adequate comfort level or patient's stated pain goal Not Progressing        Patient Centered Care    • Patient p

## 2017-07-25 NOTE — PROGRESS NOTES
[facility]     Gastroenterology Progress Note    [facility] Patient Status:  Inpatient    2/3/1975 MRN [id number]   Location [facility] 4W/SW/SE Attending Gillian Kurtz, [address] [facility] Day # 3 PCP Zev Treadwell MD --> 137 --> 87 --> normalized today. #Recurrent acute on chronic pancreatitis: likely 2/2 continued ETOH and tobacco use with possible relation to pseudocyst. Reassuring lipase down-trending with a benign physical exam today.    #ETOH use  #Tobacco use

## 2017-07-25 NOTE — PLAN OF CARE
Patient Centered Care    • Patient preferences are identified and integrated in the patient's plan of care Adequate for Discharge        Patient/Family Goals    • Patient/Family Long Term Goal Adequate for Discharge    • Patient/Family Short Term Goal Adeq

## 2017-07-25 NOTE — DIETARY NOTE
ADULT NUTRITION INITIAL ASSESSMENT    Pt is at moderate nutrition risk. Pt does not meet malnutrition criteria.       RECOMMENDATIONS TO MD: CPM     NUTRITION DIAGNOSIS/PROBLEM:  Inadequate protein energy intake related to decreased ability to consume suff 92.3 kg (203 lb 8 oz)  07/21/17 : 91.2 kg (201 lb)  07/19/17 : 91.5 kg (201 lb 11.2 oz)  07/06/17 : 90.7 kg (200 lb)  06/09/17 : 90.7 kg (200 lb)  06/06/17 : 88.5 kg (195 lb)  06/04/17 : 88.5 kg (195 lb)  06/03/17 : 88.5 kg (195 lb)  11/16/16 : 89.2 kg (19

## 2017-07-25 NOTE — TELEPHONE ENCOUNTER
Please reserve 8:00 AM or 8:30 AM appointment on 8/8 for this patient for f/u of pancreatitis. Please the patient tomorrow with these times - he is following up with Dr. Lenore Carrera this Friday, I believe. He should be d/c from the hospital today/tonight.

## 2017-07-26 ENCOUNTER — TELEPHONE (OUTPATIENT)
Dept: FAMILY MEDICINE CLINIC | Facility: CLINIC | Age: 42
End: 2017-07-26

## 2017-07-26 ENCOUNTER — TELEPHONE (OUTPATIENT)
Dept: INTERNAL MEDICINE UNIT | Facility: HOSPITAL | Age: 42
End: 2017-07-26

## 2017-07-26 NOTE — TELEPHONE ENCOUNTER
Pt did go in for a ER follow up visit on 7/21 with Dr. Eagle All - although, it looks like pt went back to the ER on 7/22. .does he need a f/u ER visit again?

## 2017-07-26 NOTE — TELEPHONE ENCOUNTER
Pt discharged from [facility] on 7/25/17 . Please call to schedule follow up with Primary Care Physician.    Thanks

## 2017-07-26 NOTE — TELEPHONE ENCOUNTER
Pt did go in for a ER follow up visit on 7/21 with Dr. Kyaw Soto - although, it looks like pt went back to the ER on 7/22. .does he need a f/u ER visit again?

## 2017-07-26 NOTE — TELEPHONE ENCOUNTER
Pt wife is calling state pt want to know if he can have a return back to work note for Monday   Requesting a call back

## 2017-07-26 NOTE — TELEPHONE ENCOUNTER
EG pls advise. Patient would like to be seen today for an ER follow up. Only remaining slots are MD Approval.  Next opening is Friday. [id number] Carmel Moreno to book in MD only or wait until Friday?

## 2017-07-26 NOTE — TELEPHONE ENCOUNTER
Thank you for looking into that! Pt contacted and is wondering if it is at all possible to be added onto the schedule for today with Dr. Leonila Almonte? Pt states he can be there anytime today and within 20 minutes, pt only wanting to see Dr. Leonila Almonte.   Pt was informed f

## 2017-07-28 ENCOUNTER — OFFICE VISIT (OUTPATIENT)
Dept: INTERNAL MEDICINE CLINIC | Facility: CLINIC | Age: 42
End: 2017-07-28

## 2017-07-28 VITALS
WEIGHT: 195.63 LBS | DIASTOLIC BLOOD PRESSURE: 73 MMHG | HEART RATE: 123 BPM | SYSTOLIC BLOOD PRESSURE: 111 MMHG | TEMPERATURE: 99 F | BODY MASS INDEX: 24 KG/M2

## 2017-07-28 DIAGNOSIS — K86.1 CHRONIC PANCREATITIS, UNSPECIFIED PANCREATITIS TYPE (HCC): Primary | ICD-10-CM

## 2017-07-28 PROCEDURE — 99212 OFFICE O/P EST SF 10 MIN: CPT | Performed by: INTERNAL MEDICINE

## 2017-07-28 PROCEDURE — 99214 OFFICE O/P EST MOD 30 MIN: CPT | Performed by: INTERNAL MEDICINE

## 2017-07-28 NOTE — PROGRESS NOTES
HPI:    Patient ID: Venessa Rivers is a 43year old male. HPI   Pancreatitis  Patient presents to the clinic for a follow up after he was discharged with Upper abdominal pain and Acute pancreatitis.  His problem list included:  Acute pancreatitis,   Alc Outpatient Prescriptions:  lisinopril 5 MG Oral Tab Take 1 tablet (5 mg total) by mouth daily. Disp: 30 tablet Rfl: 0   Ondansetron HCl (ZOFRAN) 4 mg tablet Take 1 tablet (4 mg total) by mouth every 8 (eight) hours as needed for Nausea.  Disp: 30 tablet Rfl pancreatitis, unspecified pancreatitis type [facility]  (primary encounter diagnosis)  - Patient presented to the clinic for a follow up after his   - On exam, patient was unremarkable  - Referral for  GASTRO - INTERNAL    No orders of the defined types were pl

## 2017-08-01 NOTE — TELEPHONE ENCOUNTER
Pt contacted and he accepted appt with [facility] on 8/8/17 @ 08:30am, directions provided to the [facility], and told to arrive 15 mins earlier.

## 2017-08-03 NOTE — DISCHARGE SUMMARY
[facility]    Discharge Summary    Krystian Goes Patient Status:  Inpatient    2/3/1975 MRN [id number]   Location [facility] 4W/SW/SE Attending No att. providers found   Hosp Day # 1 PCP Merly Corrales MD           Date of Admi every 8 (eight) hours as needed for Nausea. Quantity:  30 tablet  Refills:  0     Thiamine HCl 100 MG Tabs      Take 1 tablet (100 mg total) by mouth daily.    Quantity:  30 tablet  Refills:  0        CONTINUE taking these medications      Instructions Pr

## 2017-08-08 ENCOUNTER — TELEPHONE (OUTPATIENT)
Dept: GASTROENTEROLOGY | Facility: CLINIC | Age: 42
End: 2017-08-08

## 2017-08-08 ENCOUNTER — APPOINTMENT (OUTPATIENT)
Dept: LAB | Facility: HOSPITAL | Age: 42
End: 2017-08-08
Attending: PHYSICIAN ASSISTANT
Payer: COMMERCIAL

## 2017-08-08 ENCOUNTER — OFFICE VISIT (OUTPATIENT)
Dept: GASTROENTEROLOGY | Facility: CLINIC | Age: 42
End: 2017-08-08

## 2017-08-08 VITALS
SYSTOLIC BLOOD PRESSURE: 125 MMHG | WEIGHT: 196 LBS | BODY MASS INDEX: 25.15 KG/M2 | DIASTOLIC BLOOD PRESSURE: 87 MMHG | HEART RATE: 105 BPM | HEIGHT: 74 IN

## 2017-08-08 DIAGNOSIS — Z87.19 HISTORY OF ACUTE PANCREATITIS: Primary | ICD-10-CM

## 2017-08-08 DIAGNOSIS — Z87.19 HISTORY OF ACUTE PANCREATITIS: ICD-10-CM

## 2017-08-08 DIAGNOSIS — K86.9 PANCREATIC LESION: ICD-10-CM

## 2017-08-08 LAB — LIPASE SERPL-CCNC: 22 U/L (ref 22–51)

## 2017-08-08 PROCEDURE — 36415 COLL VENOUS BLD VENIPUNCTURE: CPT

## 2017-08-08 PROCEDURE — 83690 ASSAY OF LIPASE: CPT

## 2017-08-08 PROCEDURE — 99214 OFFICE O/P EST MOD 30 MIN: CPT | Performed by: PHYSICIAN ASSISTANT

## 2017-08-08 PROCEDURE — 99212 OFFICE O/P EST SF 10 MIN: CPT | Performed by: PHYSICIAN ASSISTANT

## 2017-08-08 RX ORDER — HYDROCODONE BITARTRATE AND ACETAMINOPHEN 5; 325 MG/1; MG/1
TABLET ORAL
Refills: 0 | COMMUNITY
Start: 2017-07-27 | End: 2017-09-15 | Stop reason: ALTCHOICE

## 2017-08-08 NOTE — PATIENT INSTRUCTIONS
1. Lab done today (lipase)    2. Schedule an endoscopic ultrasound (EUS) with Dr. Michelle Baldwin with MAC sedation.  - Continue all medications for the procedure. 3. No alcohol. 4. Stop smoking.

## 2017-08-08 NOTE — TELEPHONE ENCOUNTER
Discussed normal lipase with patient - he will follow up with PCP for back pain. [place]  I advised that although normal, if it continues to pain him and he feels as if his pancreatitis is insidious in onset, I can repeat the Lipas

## 2017-08-08 NOTE — TELEPHONE ENCOUNTER
Scheduled for:  EUS [id number]  Provider Name: Dr. Charmaine Garcia  Date:  8/14/17  Location:  [facility]  Sedation:  MAC  Time:  6760 (pt is aware to arrive at 1130)   Prep:  Nothing to eat after midnight and nothing to drink after 0800 the day of the procedure  Meds/Aller

## 2017-08-08 NOTE — PROCEDURES
[facility] Follow-up Visit    Karla presently   - No NSAIDs/no daily ASA  - Lives with family   - Occupation:     History, Medications, Allergies, ROS:      Past Medical History:   Diagnosis Date   • Alcohol abuse    • Back problem    • High blood pressure    • Microcytosis    • Pancreat vision   RESPIRATORY:  negative for  shortness of breath  CARDIOVASCULAR:  negative for  chest pain  GASTROINTESTINAL:  see HPI  GENITOURINARY:  negative for dysuria and hematuria   SKIN:  negative for  rash  ENDOCRINE:  negative for cold intolerance and h Reports at least 2 dozen episodes of pancreatitis in the last 3-4 years. No ETOH since July 2017 per patient, still smoking 0.5 ppd. Defers ETOH counseling or tobacco cessation counseling presently.      Doing well since d/c, however recurrent back pain tod Visit:    Orders Placed This Encounter      Lipase    Meds This Visit:    No prescriptions requested or ordered in this encounter       Imaging & Referrals:  None     CC: Dr. Dorie Licona.  Triny Fajardo PA-C  8/8/2017

## 2017-08-10 ENCOUNTER — TELEPHONE (OUTPATIENT)
Dept: GASTROENTEROLOGY | Facility: CLINIC | Age: 42
End: 2017-08-10

## 2017-08-10 ENCOUNTER — TELEPHONE (OUTPATIENT)
Dept: ADMINISTRATIVE | Age: 42
End: 2017-08-10

## 2017-08-10 NOTE — TELEPHONE ENCOUNTER
GI RNs/schedulers -can we please a one-week reminder to check to see if Dr. Susan Alonso has been credentialed with the insurance that this patient currently has. Let us go ahead and cancel his procedure for now.   We will keep the orders for when Dr. Julieta Palomares

## 2017-08-10 NOTE — TELEPHONE ENCOUNTER
Quinn Spence for pt and spouse pending in [place]. Pt is requesting 1-2 days per month of intermittent time off for 6 months for pancreatitis. Spouse is also requesting 1-2 days per month to take pt to office visits and care for pt.  Do you

## 2017-08-10 NOTE — TELEPHONE ENCOUNTER
Dr. Luis Montenegro I schedule this patient with you for an EUS on 8/14 but since this patient has [employer] PPO I am unable to schedule this with you at this time.      Please advise if you would like for me to reschedule this procedure until you become eligible or i

## 2017-08-11 NOTE — TELEPHONE ENCOUNTER
I called this patient LMTCB to reschedule procedure in the next 2-3 weeks per Dr. Charlie Hernandez request. Please transfer to [facility] in GI

## 2017-08-11 NOTE — TELEPHONE ENCOUNTER
This procedure was canceled. I notified Tyler Kunz in Endo to CANCEL today. I contacted the patient and rescheduled. Please see TE 8/10/17 under Dr. Chandni Sorensen.

## 2017-08-11 NOTE — TELEPHONE ENCOUNTER
Rescheduled for:  EUS 96539  Provider Name: Dr. Hector Manuel  Date:    From-8/14/17  To-8/28/17  Location:  [facility]  Sedation:  MAC  Time:  6194 (pt is aware to arrive at 1130)   Prep:  Nothing to eat after midnight and nothing to drink after 0800 the day of the

## 2017-08-14 ENCOUNTER — TELEPHONE (OUTPATIENT)
Dept: ADMINISTRATIVE | Age: 42
End: 2017-08-14

## 2017-08-14 NOTE — TELEPHONE ENCOUNTER
Good morning Dr. Cem Londono pending in [place] for Pt and spouse. May we increase their intermittent time off to 1-4 days a month for flare-ups due to the pancreatitis and for office visits? Please advise.     Thank you,  Leonila Mike

## 2017-08-15 NOTE — TELEPHONE ENCOUNTER
Since this CT was at the beginning of July and I still am not sure when I'm going to be able to schedule [employer]BS, it is fine to have him schedule with another provider. You can see if Dr. Tyson Benites in [facility] soon for EUS.     Thanks,    Dr. Luis Montenegro

## 2017-08-16 NOTE — TELEPHONE ENCOUNTER
GI/RN--    Please see below notes from [address] to continue with referral to another physician to preform EUS. Thank you. He is schedule on 8/28/17 with Ettie Board that has not been canceled as of yet. Thank you.

## 2017-08-16 NOTE — TELEPHONE ENCOUNTER
Please let me know if I need to generate a referral for the patient or contact the provider(s) to assist in this patient being scheduled for an EUS.  I am unsure if the patient will first need a consult with the provider or if they will be able to directly

## 2017-08-17 RX ORDER — FOLIC ACID 1 MG/1
TABLET ORAL
Qty: 30 TABLET | Refills: 2 | Status: SHIPPED | OUTPATIENT
Start: 2017-08-17 | End: 2018-05-16 | Stop reason: ALTCHOICE

## 2017-08-17 NOTE — TELEPHONE ENCOUNTER
Sarah, please let me know if patient needs EUS yet. Prior Te notes procedure with Susan Alonso cancelled. We are happy to assist and see patient if needed.

## 2017-08-17 NOTE — TELEPHONE ENCOUNTER
Contacted Dr. Kervin Garsia office and routed message through [facility]. Please assist pt in scheduling EUS w/ Dr. Brian Cassette for hx of pancreatitis and prancreatic lesion (see message from Dr. Pierre Torres below). Thank you.

## 2017-08-23 RX ORDER — LISINOPRIL 5 MG/1
TABLET ORAL
Qty: 30 TABLET | Refills: 0 | OUTPATIENT
Start: 2017-08-23

## 2017-08-23 NOTE — PAYOR COMM NOTE
--------------  DISCHARGE REVIEW    Payor: [facility]  Subscriber #:  [id number]  Authorization Number: N/A    Admit date: 7/19/17  Admit time:  0107  Discharge Date: 7/20/2017  5:57 PM     Admitting Physician: Carly Saavedra MD  Attending P for signs of withdrawal  Counseled on cessation     Hypokalemia  Improving  On electrolyte protocol     Hypomagnesemia  Improving      Tobacco abuse  Patient counseled encouraged to quit, offered nicotine patch     Prophylaxis  Subcutaneous heparin       I 8/3/2017 11:46 AM   Attribution Key     RZ. 1 - Elsa Arnold MD on 8/3/2017 11:44 AM   RZ. 2 - Elsa Arnold MD on 8/3/2017 11:45 AM                    REVIEWER COMMENTS--------------  ADMISSION REVIEW     Payor: [address] PPO  Subscriber ELECTROCARDIOGRAM, COMPLETE      Comment: scanned to media tab  7/2016: UPPER GI ENDOSCOPY,BIOPSY    Medications :   Clobetasol Propionate 0.05 % External Gel,  Apply to affected area twice a day.    triamcinolone acetonide 0.1 % External Ointment,  Apply 1 190.5 cm (6' 3\")   Wt 90.7 kg   SpO2 100%   BMI 25.00 kg/m²          Physical Exam   Constitutional: He is oriented to person, place, and time. He appears well-developed and well-nourished. HENT:   Head: Normocephalic and atraumatic.    Eyes: EOM are nor -----------         ------                     CBC W/ DIFFERENTIAL[[id number]]          Abnormal            Final result                 Please view results for these tests on the individual orders.    RAINBOW DRAW BLUE   RAINBOW TANESHA Status:  Emergency    2/3/1975 MRN [id number]   Location [address] [facility] Attending Venkata Raymond[address] Day # 0 PCP Lenore Rouse MD     Date:  2017  Date of Admission:  2017    Chief Complaint:  Patient pres Clobetasol Propionate 0.05 % External Gel   No No   Sig: Apply to affected area twice a day.    PROPRANOLOL HCL 10 MG Oral Tab   No No   Sig: TAKE 1 TABLET BY MOUTH TWICE A DAY   Pantoprazole Sodium 40 MG Oral Tab EC   Yes No   Sig: Take 40 mg by mouth ev clear and coherent. Psychiatric:  Cooperative, appropriate mood & affect.       Laboratory Data:     Lab Results  Component Value Date   WBC 8.3 07/18/2017   HGB 13.9 07/18/2017   HCT 40.3 07/18/2017    07/18/2017   CREATSERUM 1.24 07/18/2017   BUN on 7/19/2017 12:00 AM   SA. 3 - Abebe Trevino MD on 7/19/2017  5:39 AM   SA. 4 - Abebe Trevino MD on 7/19/2017 12:08 AM                  MEDICATIONS ADMINISTERED IN LAST 1 DAY:      REVIEWER COMMENTS:     OTHER:

## 2017-08-23 NOTE — PAYOR COMM NOTE
--------------  DISCHARGE REVIEW    Payor: [address] [facility]  Subscriber #:  [id number]  Authorization Number: N/A    Admit date: 7/22/17  Admit time:  0705  Discharge Date: 7/25/2017  5:50 PM     Admitting Physician: DO Lance Palafox Nausea and vomiting in adult     Hypokalemia     Hypomagnesemia     Upper abdominal pain     Acute pancreatitis, unspecified complication status, unspecified pancreatitis type      Reason for Admission: abd pain    Physical Exam:   General appearance: aler known as:  TEMOVATE      Apply to affected area twice a day. Quantity:  30 g  Refills:  2     folic acid 1 MG Tabs  Commonly known as:  FOLVITE      Take 1 tablet (1 mg total) by mouth daily.    Quantity:  30 tablet  Refills:  0     Ondansetron HCl 4 mg t (none) Author Type:  Physician    Filed:  7/22/2017  6:50 AM Date of Service:  7/22/2017  5:21 AM Status:  Signed    :  Duarte Appiah MD (Physician)         Patient Seen in: [facility] Emergency Department    History   Patient presents with: day.   triamcinolone acetonide 0.1 % External Ointment,  Apply 1 Application topically 2 (two) times daily.    PROPRANOLOL HCL 10 MG Oral Tab,  TAKE 1 TABLET BY MOUTH TWICE A DAY   Pantoprazole Sodium 40 MG Oral Tab EC,  Take 40 mg by mouth every morning be distally with 5/5 strength. Skin: Skin is warm. Psychiatric: Cooperative. Nursing note and vitals reviewed. ED Course[AA. 1]     Labs Reviewed   COMP METABOLIC PANEL (14) - Abnormal; Notable for the following:        Result Value    Glucose 117 ( Prescribed:  Current Discharge Medication List[AA. 1]            Signed by Gladys Christy MD on 7/22/2017  6:50 AM   Attribution Key     AA. 1 - Gladys Christy MD on 7/22/2017  5:21 AM   AA. 2 - Gladys Christy MD on 7/22/2017  5:42 AM   AA. 3 - Dunia Kennedy hypertension      Past Surgical History:  12/26/2013: ELECTROCARDIOGRAM, COMPLETE      Comment: scanned to media tab  7/2016: UPPER GI ENDOSCOPY,BIOPSY  Family History   Problem Relation Age of Onset   • Hypertension Mother    • Hypertension Father      So except for back pain  Neurological: negative for dizziness and headaches  Behavioral/Psych: negative for depression  Allergic/Immunologic: negative    Physical Exam:   Vital Signs:  Blood pressure 150/88, pulse 102, temperature 98.4 °F (36.9 °C), temperatu TSH 1.44 02/27/2013   LIP 1,324 (H) 07/22/2017   DDIMER 0.22 08/06/2012   MG 1.7 (L) 07/20/2017   PHOS 4.3 11/05/2016   TROP 0.00 07/06/2017   ETOH 0 07/22/2017           Ekg 12-lead    Result Date: 7/22/2017  ECG Report  Interpretation  ----------------

## 2017-08-24 ENCOUNTER — TELEPHONE (OUTPATIENT)
Dept: GASTROENTEROLOGY | Facility: CLINIC | Age: 42
End: 2017-08-24

## 2017-08-24 DIAGNOSIS — Z87.19 HISTORY OF PANCREATITIS: Primary | ICD-10-CM

## 2017-08-24 DIAGNOSIS — K86.9 PANCREATIC LESION: ICD-10-CM

## 2017-08-24 NOTE — TELEPHONE ENCOUNTER
You 1 minute ago (3:25 PM)      Discussed with Tammy, I can see him in clinic next week and will schedule for EUS the week RA returns. Orders placed. Please let me know if any issues.

## 2017-08-24 NOTE — TELEPHONE ENCOUNTER
Closed in error    See below, patients wife is calling. Also, when I spoke with this patient he was stating he was having a pancreatic issues and may possibly need to go to the ED if he can not see someone.

## 2017-08-24 NOTE — TELEPHONE ENCOUNTER
Pt states he is having a pancreatitis flare up and is requesting to speak to RN or PB.  Please Call Thank You

## 2017-08-24 NOTE — TELEPHONE ENCOUNTER
Noted, [place] will cancel the pt's procedure with Dr. Wilfredo Costello. Capo Martinez, please assist with scheduling patient with Dr. Kait Flores, thank you!

## 2017-08-24 NOTE — TELEPHONE ENCOUNTER
Per Dr. Anjana Paul cancel Monday appointment - please have patient seen by Dr. Dominga Tang. Thank you Piter Cummings.

## 2017-08-24 NOTE — TELEPHONE ENCOUNTER
Dr. Beba Garcia:      ---Please see notation below and please advise.  Thank you.       TE 8/15/17 9:03 AM   Note      Since this CT was at the beginning of July and I still am not sure when I'm going to be able to schedule [employer]BS, it is fine to have him schedule

## 2017-08-24 NOTE — TELEPHONE ENCOUNTER
Discussed with Tammy, I can see him in clinic next week and will schedule for EUS the week RA returns. Orders placed.

## 2017-08-24 NOTE — TELEPHONE ENCOUNTER
TRANSFER TO RN!!    LMTCB- Left message if having severe pain, fever, chills- he should present to ED

## 2017-08-24 NOTE — TELEPHONE ENCOUNTER
Spoke with pt   Scheduled for OV and procedure  At OV please give pt schedule sheet and prep instructions   Future Appointments  Date Time Provider Lizandro Mack   8/28/2017 12:30 PM CARLITOS VALLE ECWMOGIPROC None   8/31/2017 8:40 AM Bridget Singh

## 2017-08-24 NOTE — TELEPHONE ENCOUNTER
Please assist pt in scheduling EUS w/ Dr. Ladan Ramirez for hx of pancreatitis and prancreatic lesion (see message from Dr. Brad Garces below). Thank you.

## 2017-08-24 NOTE — TELEPHONE ENCOUNTER
CANCEL for:  EUS [id number]    Provider Name: Dr. Sharyle Flatness  Date:  8/28/17  Location:  [facility]  Sedation:  MAC  Time:  1230  Prep:  Nothing to eat after midnight and nothing to drink after 0800 the day of the procedure  Meds/Allergies Reconciled?:    Diagnosis wit

## 2017-08-24 NOTE — TELEPHONE ENCOUNTER
Pt wife contacted and explained in detail the reason the test had to be rescheduled. I apologized for the inconvenience, and informed her that we have already reached out to Dr. Aramis Allen office to arrange the EUS for the pt.  I also reviewed that

## 2017-08-24 NOTE — TELEPHONE ENCOUNTER
Pts wife calling and would like to know if their is another physician that can take his ins? *Also asking what is the name of the procedure?  Pls call at:[phone] (work)

## 2017-08-24 NOTE — TELEPHONE ENCOUNTER
I spoke with the patient,  And he says his pain has persisted, still 4-5 ut of 10, similar to pain from prior episodes of pancreatitis. I advised him to go to the ED for evaluation.

## 2017-08-24 NOTE — TELEPHONE ENCOUNTER
Routed to hospital on-call MD    Pt contacted and he states that he began to have lower left side back pain, similar to his pancreatic episodes in the past.     Pain rated 4-5/10 and nauseous.   He took 2 zofran tablets 5 mins ago and the nausea is subsidin

## 2017-08-31 RX ORDER — LISINOPRIL 5 MG/1
TABLET ORAL
Qty: 30 TABLET | Refills: 0 | OUTPATIENT
Start: 2017-08-31

## 2017-09-13 ENCOUNTER — HOSPITAL ENCOUNTER (OUTPATIENT)
Facility: HOSPITAL | Age: 42
Setting detail: HOSPITAL OUTPATIENT SURGERY
Discharge: HOME | End: 2017-09-13
Attending: INTERNAL MEDICINE | Admitting: INTERNAL MEDICINE
Payer: COMMERCIAL

## 2017-09-13 ENCOUNTER — ANESTHESIA EVENT (OUTPATIENT)
Dept: ENDOSCOPY | Facility: HOSPITAL | Age: 42
End: 2017-09-13
Payer: COMMERCIAL

## 2017-09-13 ENCOUNTER — SURGERY (OUTPATIENT)
Age: 42
End: 2017-09-13

## 2017-09-13 ENCOUNTER — TELEPHONE (OUTPATIENT)
Dept: OTHER | Age: 42
End: 2017-09-13

## 2017-09-13 ENCOUNTER — ANESTHESIA (OUTPATIENT)
Dept: ENDOSCOPY | Facility: HOSPITAL | Age: 42
End: 2017-09-13
Payer: COMMERCIAL

## 2017-09-13 DIAGNOSIS — K86.9 PANCREATIC LESION: ICD-10-CM

## 2017-09-13 DIAGNOSIS — Z87.19 HISTORY OF PANCREATITIS: ICD-10-CM

## 2017-09-13 PROCEDURE — 88172 CYTP DX EVAL FNA 1ST EA SITE: CPT | Performed by: INTERNAL MEDICINE

## 2017-09-13 PROCEDURE — 0F9G3ZX DRAINAGE OF PANCREAS, PERCUTANEOUS APPROACH, DIAGNOSTIC: ICD-10-PCS | Performed by: INTERNAL MEDICINE

## 2017-09-13 PROCEDURE — 88305 TISSUE EXAM BY PATHOLOGIST: CPT | Performed by: INTERNAL MEDICINE

## 2017-09-13 PROCEDURE — 88173 CYTOPATH EVAL FNA REPORT: CPT | Performed by: INTERNAL MEDICINE

## 2017-09-13 RX ORDER — LIDOCAINE HYDROCHLORIDE 10 MG/ML
INJECTION, SOLUTION EPIDURAL; INFILTRATION; INTRACAUDAL; PERINEURAL AS NEEDED
Status: DISCONTINUED | OUTPATIENT
Start: 2017-09-13 | End: 2017-09-13 | Stop reason: SURG

## 2017-09-13 RX ORDER — NALOXONE HYDROCHLORIDE 0.4 MG/ML
80 INJECTION, SOLUTION INTRAMUSCULAR; INTRAVENOUS; SUBCUTANEOUS AS NEEDED
Status: DISCONTINUED | OUTPATIENT
Start: 2017-09-13 | End: 2017-09-13

## 2017-09-13 RX ORDER — SODIUM CHLORIDE, SODIUM LACTATE, POTASSIUM CHLORIDE, CALCIUM CHLORIDE 600; 310; 30; 20 MG/100ML; MG/100ML; MG/100ML; MG/100ML
INJECTION, SOLUTION INTRAVENOUS CONTINUOUS PRN
Status: DISCONTINUED | OUTPATIENT
Start: 2017-09-13 | End: 2017-09-13 | Stop reason: SURG

## 2017-09-13 RX ORDER — ONDANSETRON 4 MG/1
4 TABLET, FILM COATED ORAL EVERY 8 HOURS PRN
COMMUNITY
End: 2018-11-02

## 2017-09-13 RX ORDER — SODIUM CHLORIDE, SODIUM LACTATE, POTASSIUM CHLORIDE, CALCIUM CHLORIDE 600; 310; 30; 20 MG/100ML; MG/100ML; MG/100ML; MG/100ML
INJECTION, SOLUTION INTRAVENOUS CONTINUOUS
Status: DISCONTINUED | OUTPATIENT
Start: 2017-09-13 | End: 2017-09-13

## 2017-09-13 RX ADMIN — SODIUM CHLORIDE, SODIUM LACTATE, POTASSIUM CHLORIDE, CALCIUM CHLORIDE: 600; 310; 30; 20 INJECTION, SOLUTION INTRAVENOUS at 14:52:00

## 2017-09-13 RX ADMIN — SODIUM CHLORIDE, SODIUM LACTATE, POTASSIUM CHLORIDE, CALCIUM CHLORIDE: 600; 310; 30; 20 INJECTION, SOLUTION INTRAVENOUS at 14:33:00

## 2017-09-13 RX ADMIN — LIDOCAINE HYDROCHLORIDE 100 MG: 10 INJECTION, SOLUTION EPIDURAL; INFILTRATION; INTRACAUDAL; PERINEURAL at 14:33:00

## 2017-09-13 RX ADMIN — SODIUM CHLORIDE, SODIUM LACTATE, POTASSIUM CHLORIDE, CALCIUM CHLORIDE: 600; 310; 30; 20 INJECTION, SOLUTION INTRAVENOUS at 14:57:00

## 2017-09-13 NOTE — TELEPHONE ENCOUNTER
Pt's wife stts pt had endoscopy procedure today by DR. Munoz to aspirate a cyst from his pancreas. Pt was told to contact PCP for pain medication, Pain level 5. I advised if pain worsens he needs to go to the ER. Wife agreed.

## 2017-09-13 NOTE — ANESTHESIA PREPROCEDURE EVALUATION
Anesthesia PreOp Note    HPI:     Manuel Mosley is a 43year old male who presents for preoperative consultation requested by: Alberto Sainz MD    Date of Surgery: 9/13/2017    Procedure(s):  ENDOSCOPIC ULTRASOUND (EUS)  Indication: History of pancrea lisinopril 5 MG Oral Tab Take 1 tablet (5 mg total) by mouth daily. Disp: 30 tablet Rfl: 0 Taking   Thiamine HCl 100 MG Oral Tab Take 1 tablet (100 mg total) by mouth daily.  Disp: 30 tablet Rfl: 0 Taking   triamcinolone acetonide 0.1 % External Ointment Ap BUN 5 (L) 07/25/2017   CREATSERUM 1.19 07/25/2017    (H) 07/25/2017   CA 9.5 07/25/2017          Vital Signs: Body mass index is 23.75 kg/m². height is 1.905 m (6' 3\") and weight is 86.2 kg (190 lb).     09/13/17  1300   Weight: 86.2 kg (190 lb)

## 2017-09-13 NOTE — BRIEF OP NOTE
Pre-Operative Diagnosis: pancreatitis      Post-Operative Diagnosis: chronic pancreatitis      Procedure Performed:   Procedure(s):  ENDOSCOPIC ULTRASOUND WITH FINE NEEDLE ASPIRATION    Surgeon(s) and Role:     Stuart Woods MD - Primary    Assista

## 2017-09-13 NOTE — ANESTHESIA POSTPROCEDURE EVALUATION
Patient: Elder Maravilla    Procedure Summary     Date:  09/13/17 Room / Location:  [address] [place] [address] ENDOSCOPY 01 / [address] [place] [address] ENDOSCOPY    Anesthesia Start:  6936 Anesthesia Stop:  5392    Procedure:  ENDOSCOPIC ULTRASOUND (EUS) (N/A ) Diagnosis:       History of pancreatiti

## 2017-09-14 ENCOUNTER — TELEPHONE (OUTPATIENT)
Dept: INTERNAL MEDICINE CLINIC | Facility: CLINIC | Age: 42
End: 2017-09-14

## 2017-09-14 VITALS
SYSTOLIC BLOOD PRESSURE: 122 MMHG | BODY MASS INDEX: 23.62 KG/M2 | RESPIRATION RATE: 16 BRPM | HEIGHT: 75 IN | OXYGEN SATURATION: 99 % | HEART RATE: 77 BPM | DIASTOLIC BLOOD PRESSURE: 73 MMHG | WEIGHT: 190 LBS

## 2017-09-14 RX ORDER — TRAMADOL HYDROCHLORIDE 50 MG/1
50 TABLET ORAL EVERY 8 HOURS PRN
Qty: 30 TABLET | Refills: 0 | Status: SHIPPED | OUTPATIENT
Start: 2017-09-14 | End: 2018-05-16 | Stop reason: ALTCHOICE

## 2017-09-14 NOTE — TELEPHONE ENCOUNTER
I called the patient and told him that I would be in the office this afternoon up to 7:00 PM.  And he can stop to  medication or his wife for pain medication if needed. The risks, benefits and side effects of treatment  plan  were dicussed with the traci

## 2017-09-14 NOTE — OPERATIVE REPORT
[facility]    PATIENT'S NAME: Benedicto Velez   ATTENDING PHYSICIAN: Alfredo Plascencia MD   OPERATING PHYSICIAN: Alfredo Plascencia MD   PATIENT ACCOUNT#:   [id number]    LOCATION:  [address] 4 [facility] 10  MEDICAL RECORD #:   [id number] hyperechoic strands, lobularity, and a slightly prominent pancreatic duct. The duct becomes more dilated towards the body and neck of the pancreas, measuring approximately 5 mm in maximum diameter.   In the head of the pancreas, once again the chronic panc

## 2017-09-15 ENCOUNTER — OFFICE VISIT (OUTPATIENT)
Dept: INTERNAL MEDICINE CLINIC | Facility: CLINIC | Age: 42
End: 2017-09-15

## 2017-09-15 VITALS
TEMPERATURE: 98 F | BODY MASS INDEX: 25 KG/M2 | WEIGHT: 196.81 LBS | SYSTOLIC BLOOD PRESSURE: 152 MMHG | DIASTOLIC BLOOD PRESSURE: 88 MMHG

## 2017-09-15 DIAGNOSIS — I10 ESSENTIAL HYPERTENSION: ICD-10-CM

## 2017-09-15 DIAGNOSIS — K86.1 CHRONIC PANCREATITIS, UNSPECIFIED PANCREATITIS TYPE (HCC): Primary | ICD-10-CM

## 2017-09-15 PROCEDURE — 99212 OFFICE O/P EST SF 10 MIN: CPT | Performed by: INTERNAL MEDICINE

## 2017-09-15 PROCEDURE — 99214 OFFICE O/P EST MOD 30 MIN: CPT | Performed by: INTERNAL MEDICINE

## 2017-09-15 RX ORDER — LISINOPRIL 5 MG/1
5 TABLET ORAL DAILY
Qty: 30 TABLET | Refills: 6 | Status: SHIPPED | OUTPATIENT
Start: 2017-09-15 | End: 2018-09-18 | Stop reason: ALTCHOICE

## 2017-09-15 NOTE — PROGRESS NOTES
HPI:    Patient ID: Jenny Huber is a 43year old male. Chronic pancreatitis  history of acute pancreatitis secondary to alcohol abuse. He had a pseudocyst, which was aspirated in July of last year. The patient continued to drink.   His last drink was Rfl: 0   Ondansetron HCl (ZOFRAN) 4 mg tablet Take 4 mg by mouth every 8 (eight) hours as needed for Nausea.  Disp:  Rfl:    FOLIC ACID 1 MG Oral Tab TAKE 1 TABLET BY MOUTH DAILY Disp: 30 tablet Rfl: 2   lisinopril 5 MG Oral Tab Take 1 tablet (5 mg total) b lisinopril 5 MG   with significant improvement and was instructed to continue medication as prescribed.    (K86.1) Chronic pancreatitis, unspecified pancreatitis type ([facility]  (primary encounter diagnosis)  Pt arrives today for his follow visit from the ER.

## 2017-10-25 ENCOUNTER — TELEPHONE (OUTPATIENT)
Dept: OTHER | Age: 42
End: 2017-10-25

## 2017-10-25 ENCOUNTER — APPOINTMENT (OUTPATIENT)
Dept: CT IMAGING | Facility: HOSPITAL | Age: 42
End: 2017-10-25
Attending: EMERGENCY MEDICINE
Payer: COMMERCIAL

## 2017-10-25 PROCEDURE — 74177 CT ABD & PELVIS W/CONTRAST: CPT | Performed by: EMERGENCY MEDICINE

## 2017-10-25 NOTE — ED INITIAL ASSESSMENT (HPI)
Pt c/o ruq abdominal pain since 0400 today. Nausea/vomiting. Hx pancreatitis. Pt states it feels like pancreatitis. Denies fever.

## 2017-10-25 NOTE — TELEPHONE ENCOUNTER
AGREE IWTH ADVIS3 PT NEEDS TO BE SEEN  BY ANY DOCTOR AT THE  CLINIC  BEFORE NAY TX  CAN BE PRESCRIBED, IF SYMPTOM ACUTE SHOULD BE  EVLAUTED  IN ER , PLEASE TRY TO SPEAK TO PT NOT WIFE, PANCREATITIS  CAN BE LIFE  THREATENING , THIS IS  YOUNG MEN WE  SHOULD

## 2017-10-25 NOTE — TELEPHONE ENCOUNTER
Pt's wife called requesting Pain med for her  pancreatitis flare up-advised Dr Duarte Matias no longer practice at [facility]-Offered appt to be evaluated  Pt Declined/decided to go to ER -S/S abdominal  pain 6/10,nauseated No vomiting    Advised Rita Stewart

## 2017-10-26 NOTE — TELEPHONE ENCOUNTER
Pt's wife returned the call, pt is at work, she is on hippa. Pt's name and  confirmed. She states that the pt went to ER, was given medication. He still has pain but is feeling better.  Advised f/u appt, she agrees but states that with EG no longer with

## 2017-10-30 NOTE — ED PROVIDER NOTES
Patient Seen in: [facility] Emergency Department    History   Patient presents with:  Abdomen/Flank Pain (GI/)    Stated Complaint: possible pancreatitis     HPI    43year old male with pmh alcohol abuse, htn, pancreatitis, htn who presents wit PSFH elements reviewed from today and agreed except as otherwise stated in HPI.     Physical Exam   ED Triage Vitals  BP: 144/91 [10/25/17 1004]  Pulse: 98 [10/25/17 1004]  Resp: 18 [10/25/17 1004]  Temp: 97.9 °F (36.6 °C) [10/25/17 1004]  Temp src: Oral [1 CBC W/ DIFFERENTIAL - Abnormal; Notable for the following:     RBC 4.30 (*)     .3 (*)     MCH 34.3 (*)     MPV 6.5 (*)     All other components within normal limits   HEPATIC FUNCTION PANEL (7) - Normal   ETHYL ALCOHOL - Normal   CBC WITH DIFFERENT CONCLUSION:  1. Nonspecific mass, pseudomass, or pseudocyst of the uncinate process of the pancreas, not significantly changed from previous. This may represent sequela of chronic pancreatitis, but is not well characterized.  Consider followup nonemergent M

## 2017-12-17 ENCOUNTER — APPOINTMENT (OUTPATIENT)
Dept: GENERAL RADIOLOGY | Facility: HOSPITAL | Age: 42
End: 2017-12-17
Attending: EMERGENCY MEDICINE
Payer: COMMERCIAL

## 2017-12-17 ENCOUNTER — HOSPITAL ENCOUNTER (EMERGENCY)
Facility: HOSPITAL | Age: 42
Discharge: HOME | End: 2017-12-17
Attending: EMERGENCY MEDICINE
Payer: COMMERCIAL

## 2017-12-17 VITALS
SYSTOLIC BLOOD PRESSURE: 153 MMHG | HEIGHT: 75 IN | RESPIRATION RATE: 18 BRPM | DIASTOLIC BLOOD PRESSURE: 104 MMHG | OXYGEN SATURATION: 99 % | TEMPERATURE: 99 F | HEART RATE: 108 BPM | WEIGHT: 193 LBS | BODY MASS INDEX: 24 KG/M2

## 2017-12-17 DIAGNOSIS — R10.13 ABDOMINAL PAIN, EPIGASTRIC: Primary | ICD-10-CM

## 2017-12-17 PROCEDURE — 71020 XR CHEST PA + LAT CHEST (CPT=71020): CPT | Performed by: EMERGENCY MEDICINE

## 2017-12-17 PROCEDURE — 96374 THER/PROPH/DIAG INJ IV PUSH: CPT

## 2017-12-17 PROCEDURE — 80076 HEPATIC FUNCTION PANEL: CPT | Performed by: EMERGENCY MEDICINE

## 2017-12-17 PROCEDURE — 83690 ASSAY OF LIPASE: CPT | Performed by: EMERGENCY MEDICINE

## 2017-12-17 PROCEDURE — 99285 EMERGENCY DEPT VISIT HI MDM: CPT

## 2017-12-17 PROCEDURE — 93005 ELECTROCARDIOGRAM TRACING: CPT

## 2017-12-17 PROCEDURE — 96376 TX/PRO/DX INJ SAME DRUG ADON: CPT

## 2017-12-17 PROCEDURE — 80048 BASIC METABOLIC PNL TOTAL CA: CPT | Performed by: EMERGENCY MEDICINE

## 2017-12-17 PROCEDURE — 93010 ELECTROCARDIOGRAM REPORT: CPT | Performed by: EMERGENCY MEDICINE

## 2017-12-17 PROCEDURE — 84484 ASSAY OF TROPONIN QUANT: CPT | Performed by: EMERGENCY MEDICINE

## 2017-12-17 PROCEDURE — 85025 COMPLETE CBC W/AUTO DIFF WBC: CPT | Performed by: EMERGENCY MEDICINE

## 2017-12-17 PROCEDURE — 96361 HYDRATE IV INFUSION ADD-ON: CPT

## 2017-12-17 PROCEDURE — 96375 TX/PRO/DX INJ NEW DRUG ADDON: CPT

## 2017-12-17 RX ORDER — ONDANSETRON 4 MG/1
4 TABLET, ORALLY DISINTEGRATING ORAL EVERY 4 HOURS PRN
Qty: 10 TABLET | Refills: 0 | Status: SHIPPED | OUTPATIENT
Start: 2017-12-17 | End: 2017-12-24

## 2017-12-17 RX ORDER — MORPHINE SULFATE 4 MG/ML
INJECTION, SOLUTION INTRAMUSCULAR; INTRAVENOUS
Status: COMPLETED
Start: 2017-12-17 | End: 2017-12-17

## 2017-12-17 RX ORDER — HYDROCODONE BITARTRATE AND ACETAMINOPHEN 5; 325 MG/1; MG/1
1-2 TABLET ORAL EVERY 4 HOURS PRN
Qty: 20 TABLET | Refills: 0 | Status: SHIPPED | OUTPATIENT
Start: 2017-12-17 | End: 2017-12-24

## 2017-12-17 RX ORDER — MAGNESIUM HYDROXIDE/ALUMINUM HYDROXICE/SIMETHICONE 120; 1200; 1200 MG/30ML; MG/30ML; MG/30ML
30 SUSPENSION ORAL ONCE
Status: COMPLETED | OUTPATIENT
Start: 2017-12-17 | End: 2017-12-17

## 2017-12-17 RX ORDER — DIPHENHYDRAMINE HYDROCHLORIDE 50 MG/ML
INJECTION INTRAMUSCULAR; INTRAVENOUS
Status: COMPLETED
Start: 2017-12-17 | End: 2017-12-17

## 2017-12-17 RX ORDER — ONDANSETRON 2 MG/ML
4 INJECTION INTRAMUSCULAR; INTRAVENOUS ONCE
Status: COMPLETED | OUTPATIENT
Start: 2017-12-17 | End: 2017-12-17

## 2017-12-17 RX ORDER — ONDANSETRON 2 MG/ML
INJECTION INTRAMUSCULAR; INTRAVENOUS
Status: COMPLETED
Start: 2017-12-17 | End: 2017-12-17

## 2017-12-17 RX ORDER — DIPHENHYDRAMINE HYDROCHLORIDE 50 MG/ML
25 INJECTION INTRAMUSCULAR; INTRAVENOUS ONCE
Status: COMPLETED | OUTPATIENT
Start: 2017-12-17 | End: 2017-12-17

## 2017-12-17 RX ORDER — HYDROCODONE BITARTRATE AND ACETAMINOPHEN 5; 325 MG/1; MG/1
2 TABLET ORAL ONCE
Status: COMPLETED | OUTPATIENT
Start: 2017-12-17 | End: 2017-12-17

## 2017-12-17 RX ORDER — MORPHINE SULFATE 4 MG/ML
4 INJECTION, SOLUTION INTRAMUSCULAR; INTRAVENOUS ONCE
Status: COMPLETED | OUTPATIENT
Start: 2017-12-17 | End: 2017-12-17

## 2017-12-17 RX ORDER — MORPHINE SULFATE 4 MG/ML
8 INJECTION, SOLUTION INTRAMUSCULAR; INTRAVENOUS ONCE
Status: COMPLETED | OUTPATIENT
Start: 2017-12-17 | End: 2017-12-17

## 2017-12-17 NOTE — ED NOTES
Pt on the pulse ox and still at 10/10 pain morphine was verified and dose is 8mg by dr Shannon Aguilar.  Pt is 100% on room at and 99 pulse at this time

## 2017-12-17 NOTE — ED PROVIDER NOTES
Patient Seen in: [facility] Emergency Department    History   Patient presents with:  Abdomen/Flank Pain (GI/)    Stated Complaint: pancreatitis    HPI    The patient is a 49-year-old male with a history of alcoholic pancreatitis who presents wi kg/m²         Physical Exam   Constitutional: He is oriented to person, place, and time. He appears well-developed and well-nourished. HENT:   Head: Normocephalic and atraumatic.    Mouth/Throat: Oropharynx is clear and moist.   Eyes: Conjunctivae and EOM Abnormality         Status                     ---------                               -----------         ------                     CBC W/ DIFFERENTIAL[[id number]]          Abnormal            Final result                 Please view results for the (four) hours as needed for Pain. Qty: 20 tablet Refills: 0    ondansetron 4 MG Oral Tablet Dispersible  Take 1 tablet (4 mg total) by mouth every 4 (four) hours as needed for Nausea.   Qty: 10 tablet Refills: 0

## 2017-12-21 ENCOUNTER — LAB ENCOUNTER (OUTPATIENT)
Dept: LAB | Age: 42
End: 2017-12-21
Attending: INTERNAL MEDICINE
Payer: COMMERCIAL

## 2017-12-21 DIAGNOSIS — K86.0 ALCOHOL-INDUCED CHRONIC PANCREATITIS (HCC): ICD-10-CM

## 2017-12-21 PROCEDURE — 85025 COMPLETE CBC W/AUTO DIFF WBC: CPT

## 2017-12-21 PROCEDURE — 83690 ASSAY OF LIPASE: CPT

## 2017-12-21 PROCEDURE — 82150 ASSAY OF AMYLASE: CPT

## 2017-12-21 PROCEDURE — 80053 COMPREHEN METABOLIC PANEL: CPT

## 2017-12-21 PROCEDURE — 36415 COLL VENOUS BLD VENIPUNCTURE: CPT

## 2017-12-21 NOTE — PROGRESS NOTES
HPI:    Patient ID: Vicky Carrasco is a 43year old male. HPI     Epigastric Abdominal Pain  Pt presents to the clinic for a follow-up after recent visit to [address] [place] [address] ED on 12/17/2017. He was discharged with epigastric abdominal pain.  He was discharged on HY media tab  7/2016: UPPER GI ENDOSCOPY,BIOPSY   Family History   Problem Relation Age of Onset   • Hypertension Mother    • Hypertension Father       Smoking status: Current Every Day Smoker                                                   Packs/day: 0.50 are normal.   Neck: Normal range of motion. Cardiovascular: Normal rate, regular rhythm, normal heart sounds and intact distal pulses. Exam reveals no gallop and no friction rub. No murmur heard.   Pulmonary/Chest: Effort normal and breath sounds norm tenderness.  -Please see pertinent results from [facility] ED visit above  -Ordered COMP METABOLIC PANEL (14), AMYLASE, LIPASE, CBC WITH DIFFERENTIAL WITH PLATELET  -f/u in 6 weeks for continued patient care    (F51.01) Primary insomnia  -Acute  -Prescribed TraZO

## 2018-05-16 ENCOUNTER — OFFICE VISIT (OUTPATIENT)
Dept: INTERNAL MEDICINE CLINIC | Facility: CLINIC | Age: 43
End: 2018-05-16

## 2018-05-16 ENCOUNTER — APPOINTMENT (OUTPATIENT)
Dept: LAB | Age: 43
End: 2018-05-16
Attending: INTERNAL MEDICINE
Payer: COMMERCIAL

## 2018-05-16 VITALS
HEART RATE: 90 BPM | DIASTOLIC BLOOD PRESSURE: 86 MMHG | SYSTOLIC BLOOD PRESSURE: 136 MMHG | BODY MASS INDEX: 26 KG/M2 | WEIGHT: 202.88 LBS

## 2018-05-16 DIAGNOSIS — M54.32 SCIATICA OF LEFT SIDE: Primary | ICD-10-CM

## 2018-05-16 DIAGNOSIS — N52.9 ERECTILE DYSFUNCTION, UNSPECIFIED ERECTILE DYSFUNCTION TYPE: ICD-10-CM

## 2018-05-16 DIAGNOSIS — Z12.5 PROSTATE CANCER SCREENING: ICD-10-CM

## 2018-05-16 DIAGNOSIS — I10 ESSENTIAL HYPERTENSION: ICD-10-CM

## 2018-05-16 PROBLEM — K85.90 ACUTE PANCREATITIS, UNSPECIFIED COMPLICATION STATUS, UNSPECIFIED PANCREATITIS TYPE: Status: RESOLVED | Noted: 2017-07-22 | Resolved: 2018-05-16

## 2018-05-16 PROBLEM — E87.6 HYPOKALEMIA: Status: RESOLVED | Noted: 2017-07-19 | Resolved: 2018-05-16

## 2018-05-16 PROBLEM — K85.90 ACUTE PANCREATITIS, UNSPECIFIED COMPLICATION STATUS, UNSPECIFIED PANCREATITIS TYPE (HCC): Status: RESOLVED | Noted: 2017-07-22 | Resolved: 2018-05-16

## 2018-05-16 PROBLEM — R73.9 HYPERGLYCEMIA: Status: RESOLVED | Noted: 2017-07-19 | Resolved: 2018-05-16

## 2018-05-16 PROBLEM — E83.42 HYPOMAGNESEMIA: Status: RESOLVED | Noted: 2017-07-19 | Resolved: 2018-05-16

## 2018-05-16 PROBLEM — R11.2 NAUSEA AND VOMITING IN ADULT: Status: RESOLVED | Noted: 2017-07-19 | Resolved: 2018-05-16

## 2018-05-16 PROCEDURE — 99212 OFFICE O/P EST SF 10 MIN: CPT | Performed by: INTERNAL MEDICINE

## 2018-05-16 PROCEDURE — 99214 OFFICE O/P EST MOD 30 MIN: CPT | Performed by: INTERNAL MEDICINE

## 2018-05-16 PROCEDURE — 36415 COLL VENOUS BLD VENIPUNCTURE: CPT

## 2018-05-16 RX ORDER — CYCLOBENZAPRINE HCL 10 MG
10 TABLET ORAL NIGHTLY
Qty: 14 TABLET | Refills: 0 | Status: SHIPPED | OUTPATIENT
Start: 2018-05-16 | End: 2018-09-18 | Stop reason: ALTCHOICE

## 2018-05-16 RX ORDER — SILDENAFIL 50 MG/1
50 TABLET, FILM COATED ORAL
Qty: 8 TABLET | Refills: 0 | Status: SHIPPED | OUTPATIENT
Start: 2018-05-16 | End: 2018-06-04

## 2018-05-16 NOTE — PROGRESS NOTES
HPI:    Patient ID: Elias Garrett is a 37year old male. Low Back Pain   This is a new problem. The current episode started more than 1 month ago. The problem occurs constantly. The problem is unchanged. The pain is present in the lumbar spine.  The qu Negative for tingling, syncope, weakness and numbness. Current Outpatient Prescriptions:  TraZODone HCl 50 MG Oral Tab Take 1 tablet (50 mg total) by mouth nightly.  Disp: 90 tablet Rfl: 1   lisinopril 5 MG Oral Tab Take 1 tablet (5 mg total) by Rectal exam shows no fissure, no mass, no tenderness, anal tone normal and guaiac negative stool. Prostate is enlarged. Prostate is not tender. Right testis shows no mass, no swelling and no tenderness. Right testis is descended.  Left testis shows no mass, We will check psa . No orders of the defined types were placed in this encounter.       Meds This Visit:  No prescriptions requested or ordered in this encounter    Imaging & Referrals:  None       YQ#[id number]

## 2018-06-05 NOTE — TELEPHONE ENCOUNTER
please advise as does not meet RN protocol for refill criteria    viagra LR 5/16/18      for Genitourinary Medications:  Refill Protocol Appointment Criteria  · Appointment scheduled in the past 6 months or in the next 3 months  Recent Outpatient Visits

## 2018-06-06 RX ORDER — SILDENAFIL 50 MG/1
50 TABLET, FILM COATED ORAL
Qty: 8 TABLET | Refills: 0 | Status: SHIPPED | OUTPATIENT
Start: 2018-06-06 | End: 2018-08-07

## 2018-07-14 ENCOUNTER — HOSPITAL ENCOUNTER (EMERGENCY)
Facility: HOSPITAL | Age: 43
Discharge: HOME | End: 2018-07-14
Attending: EMERGENCY MEDICINE
Payer: COMMERCIAL

## 2018-07-14 VITALS
WEIGHT: 202.81 LBS | TEMPERATURE: 97 F | BODY MASS INDEX: 26 KG/M2 | HEART RATE: 106 BPM | SYSTOLIC BLOOD PRESSURE: 154 MMHG | RESPIRATION RATE: 20 BRPM | DIASTOLIC BLOOD PRESSURE: 77 MMHG | OXYGEN SATURATION: 100 %

## 2018-07-14 DIAGNOSIS — K04.7 DENTAL INFECTION: Primary | ICD-10-CM

## 2018-07-14 LAB — GLUCOSE BLD-MCNC: 90 MG/DL (ref 65–99)

## 2018-07-14 PROCEDURE — 82962 GLUCOSE BLOOD TEST: CPT

## 2018-07-14 PROCEDURE — 99283 EMERGENCY DEPT VISIT LOW MDM: CPT

## 2018-07-14 RX ORDER — HYDROCODONE BITARTRATE AND ACETAMINOPHEN 5; 325 MG/1; MG/1
1-2 TABLET ORAL EVERY 4 HOURS PRN
Qty: 10 TABLET | Refills: 0 | Status: SHIPPED | OUTPATIENT
Start: 2018-07-14 | End: 2018-07-21

## 2018-07-14 RX ORDER — AMOXICILLIN 500 MG/1
500 TABLET, FILM COATED ORAL 3 TIMES DAILY
Qty: 30 TABLET | Refills: 0 | Status: SHIPPED | OUTPATIENT
Start: 2018-07-14 | End: 2018-07-24

## 2018-07-15 NOTE — ED PROVIDER NOTES
Patient Seen in: [facility] Emergency Department    History   Patient presents with:  Cellulitis (integumentary, infectious)    Stated Complaint: facial swelling    HPI    26-year-old male comes in the hospital with a complaint of having swelling by 26.04 kg/m²         Physical Exam    HEENT : NCAT, EOMI, PEERL, throat clear, neck supple, no JVD, trachea midline, No LAD, edema is noted to the right upper cheek with tenderness on the right gingival margin without pointing or obvious abscess noted with

## 2018-08-07 RX ORDER — SILDENAFIL 50 MG/1
50 TABLET, FILM COATED ORAL
Qty: 8 TABLET | Refills: 0 | Status: SHIPPED | OUTPATIENT
Start: 2018-08-07 | End: 2018-09-09

## 2018-08-08 NOTE — TELEPHONE ENCOUNTER
Refill Protocol Appointment Criteria  · Appointment scheduled in the past 12 months or in the next 3 months  Recent Outpatient Visits            2 months ago Sciatica of left side    Edis Johnson MD    Office

## 2018-09-11 RX ORDER — SILDENAFIL 50 MG/1
50 TABLET, FILM COATED ORAL
Qty: 8 TABLET | Refills: 0 | Status: SHIPPED | OUTPATIENT
Start: 2018-09-11 | End: 2018-11-16

## 2018-09-11 NOTE — TELEPHONE ENCOUNTER
Please advise in regards to refill request. Thank You    Refill Protocol Appointment Criteria  · Appointment scheduled in the past 12 months or in the next 3 months  Recent Outpatient Visits            3 months ago Sciatica of left side    [facility],

## 2018-09-18 ENCOUNTER — NURSE ONLY (OUTPATIENT)
Dept: INTERNAL MEDICINE CLINIC | Facility: CLINIC | Age: 43
End: 2018-09-18
Payer: COMMERCIAL

## 2018-09-18 VITALS
TEMPERATURE: 97 F | WEIGHT: 206 LBS | DIASTOLIC BLOOD PRESSURE: 77 MMHG | SYSTOLIC BLOOD PRESSURE: 132 MMHG | HEIGHT: 75 IN | RESPIRATION RATE: 12 BRPM | HEART RATE: 112 BPM | BODY MASS INDEX: 25.61 KG/M2

## 2018-09-18 DIAGNOSIS — Z23 NEED FOR VACCINATION: Primary | ICD-10-CM

## 2018-09-18 PROCEDURE — 90471 IMMUNIZATION ADMIN: CPT | Performed by: INTERNAL MEDICINE

## 2018-09-18 PROCEDURE — 90686 IIV4 VACC NO PRSV 0.5 ML IM: CPT | Performed by: INTERNAL MEDICINE

## 2018-10-31 ENCOUNTER — HOSPITAL ENCOUNTER (EMERGENCY)
Facility: HOSPITAL | Age: 43
Discharge: HOME | End: 2018-10-31
Attending: EMERGENCY MEDICINE
Payer: COMMERCIAL

## 2018-10-31 VITALS
RESPIRATION RATE: 15 BRPM | SYSTOLIC BLOOD PRESSURE: 115 MMHG | TEMPERATURE: 98 F | DIASTOLIC BLOOD PRESSURE: 80 MMHG | WEIGHT: 200 LBS | BODY MASS INDEX: 24.87 KG/M2 | HEART RATE: 92 BPM | OXYGEN SATURATION: 99 % | HEIGHT: 75 IN

## 2018-10-31 DIAGNOSIS — R10.13 EPIGASTRIC PAIN: Primary | ICD-10-CM

## 2018-10-31 PROCEDURE — 96375 TX/PRO/DX INJ NEW DRUG ADDON: CPT

## 2018-10-31 PROCEDURE — 96374 THER/PROPH/DIAG INJ IV PUSH: CPT

## 2018-10-31 PROCEDURE — 85025 COMPLETE CBC W/AUTO DIFF WBC: CPT | Performed by: EMERGENCY MEDICINE

## 2018-10-31 PROCEDURE — 80048 BASIC METABOLIC PNL TOTAL CA: CPT | Performed by: EMERGENCY MEDICINE

## 2018-10-31 PROCEDURE — 83690 ASSAY OF LIPASE: CPT | Performed by: EMERGENCY MEDICINE

## 2018-10-31 PROCEDURE — 99284 EMERGENCY DEPT VISIT MOD MDM: CPT

## 2018-10-31 PROCEDURE — 80320 DRUG SCREEN QUANTALCOHOLS: CPT | Performed by: EMERGENCY MEDICINE

## 2018-10-31 PROCEDURE — C9113 INJ PANTOPRAZOLE SODIUM, VIA: HCPCS | Performed by: EMERGENCY MEDICINE

## 2018-10-31 PROCEDURE — A4216 STERILE WATER/SALINE, 10 ML: HCPCS | Performed by: EMERGENCY MEDICINE

## 2018-10-31 PROCEDURE — 96361 HYDRATE IV INFUSION ADD-ON: CPT

## 2018-10-31 PROCEDURE — 80076 HEPATIC FUNCTION PANEL: CPT | Performed by: EMERGENCY MEDICINE

## 2018-10-31 RX ORDER — TRAMADOL HYDROCHLORIDE 50 MG/1
50 TABLET ORAL EVERY 8 HOURS PRN
Qty: 15 TABLET | Refills: 0 | Status: SHIPPED | OUTPATIENT
Start: 2018-10-31 | End: 2018-11-05

## 2018-10-31 RX ORDER — ONDANSETRON 2 MG/ML
4 INJECTION INTRAMUSCULAR; INTRAVENOUS ONCE
Status: COMPLETED | OUTPATIENT
Start: 2018-10-31 | End: 2018-10-31

## 2018-10-31 RX ORDER — METOCLOPRAMIDE HYDROCHLORIDE 5 MG/ML
10 INJECTION INTRAMUSCULAR; INTRAVENOUS ONCE
Status: COMPLETED | OUTPATIENT
Start: 2018-10-31 | End: 2018-10-31

## 2018-10-31 RX ORDER — MORPHINE SULFATE 4 MG/ML
2 INJECTION, SOLUTION INTRAMUSCULAR; INTRAVENOUS ONCE
Status: COMPLETED | OUTPATIENT
Start: 2018-10-31 | End: 2018-10-31

## 2018-10-31 RX ORDER — TRAMADOL HYDROCHLORIDE 50 MG/1
50 TABLET ORAL ONCE
Status: COMPLETED | OUTPATIENT
Start: 2018-10-31 | End: 2018-10-31

## 2018-10-31 RX ORDER — PANTOPRAZOLE SODIUM 40 MG/1
40 TABLET, DELAYED RELEASE ORAL DAILY
Qty: 30 TABLET | Refills: 0 | Status: SHIPPED | OUTPATIENT
Start: 2018-10-31 | End: 2018-11-02

## 2018-10-31 RX ORDER — ONDANSETRON 4 MG/1
4 TABLET, ORALLY DISINTEGRATING ORAL EVERY 8 HOURS PRN
Qty: 15 TABLET | Refills: 0 | Status: SHIPPED | OUTPATIENT
Start: 2018-10-31 | End: 2018-11-05

## 2018-11-01 NOTE — ED NOTES
Pt provided with discharge instructions. Verbalized understanding for plan of care at home and follow up. All questions/concerns addressed prior to discharge. Iv removed, catheter intact.  Pt ambulated out of er with steady gait

## 2018-11-01 NOTE — ED INITIAL ASSESSMENT (HPI)
Patient presents with \"indigestion and abdominal pain\" since this afternoon. Patient states he \"has food poisoning or pancreatitis flare up. \"

## 2018-11-01 NOTE — ED NOTES
Pt c/o epigastric pain and vomiting x10hr. Pt states that he does have hx of pancreatitis. Pt denies diarrhea, fevers. Pt also noted some slight sob. pts work of breathing is easy and unlabored, skin color is appropriate. Will continue to monitor.

## 2018-11-01 NOTE — ED PROVIDER NOTES
Patient Seen in: [facility] AND [facility] Emergency Department    History   Patient presents with:  Abdomen/Flank Pain (GI/)  Nausea/Vomiting/Diarrhea (gastrointestinal)    Stated Complaint: Abdo pain     HPI    36 yo M with PMH HTN, alcohol abuse c/b alcoholic Types: Cigarettes      Smokeless tobacco: Never Used    Alcohol use: No      Alcohol/week: 3.6 oz      Types: 6 Cans of beer per week      Comment: Denies any ETOH use since discharge on 05/11.  Previously 1/2 pint of liquor every other day for the last 4-5 within normal limits   HEPATIC FUNCTION PANEL (7) - Normal   LIPASE - Normal   ETHYL ALCOHOL - Normal   CBC WITH DIFFERENTIAL WITH PLATELET    Narrative: The following orders were created for panel order CBC WITH DIFFERENTIAL WITH PLATELET.   Procedure MG Oral Tab EC  Take 1 tablet (40 mg total) by mouth daily. , Print Script, Disp-30 tablet, R-0    ondansetron 4 MG Oral Tablet Dispersible  Take 1 tablet (4 mg total) by mouth every 8 (eight) hours as needed for Nausea., Normal, Disp-15 tablet, R-0    !! -

## 2018-11-02 ENCOUNTER — APPOINTMENT (OUTPATIENT)
Dept: CT IMAGING | Facility: HOSPITAL | Age: 43
End: 2018-11-02
Attending: PHYSICIAN ASSISTANT
Payer: COMMERCIAL

## 2018-11-02 ENCOUNTER — APPOINTMENT (OUTPATIENT)
Dept: GENERAL RADIOLOGY | Facility: HOSPITAL | Age: 43
End: 2018-11-02
Attending: PHYSICIAN ASSISTANT
Payer: COMMERCIAL

## 2018-11-02 ENCOUNTER — HOSPITAL ENCOUNTER (EMERGENCY)
Facility: HOSPITAL | Age: 43
Discharge: LEFT BEFORE DISCHARGE | End: 2018-11-02
Attending: PHYSICIAN ASSISTANT
Payer: COMMERCIAL

## 2018-11-02 VITALS
OXYGEN SATURATION: 99 % | SYSTOLIC BLOOD PRESSURE: 151 MMHG | DIASTOLIC BLOOD PRESSURE: 88 MMHG | HEART RATE: 92 BPM | BODY MASS INDEX: 25 KG/M2 | WEIGHT: 199.94 LBS | RESPIRATION RATE: 11 BRPM | TEMPERATURE: 98 F

## 2018-11-02 DIAGNOSIS — K85.90 ACUTE ON CHRONIC PANCREATITIS (HCC): Primary | ICD-10-CM

## 2018-11-02 DIAGNOSIS — K86.1 ACUTE ON CHRONIC PANCREATITIS (HCC): Primary | ICD-10-CM

## 2018-11-02 PROCEDURE — 74177 CT ABD & PELVIS W/CONTRAST: CPT | Performed by: PHYSICIAN ASSISTANT

## 2018-11-02 PROCEDURE — 71046 X-RAY EXAM CHEST 2 VIEWS: CPT | Performed by: PHYSICIAN ASSISTANT

## 2018-11-02 PROCEDURE — 99222 1ST HOSP IP/OBS MODERATE 55: CPT | Performed by: HOSPITALIST

## 2018-11-02 RX ORDER — ONDANSETRON 2 MG/ML
4 INJECTION INTRAMUSCULAR; INTRAVENOUS EVERY 6 HOURS PRN
Status: CANCELLED | OUTPATIENT
Start: 2018-11-02

## 2018-11-02 RX ORDER — MORPHINE SULFATE 4 MG/ML
1 INJECTION, SOLUTION INTRAMUSCULAR; INTRAVENOUS EVERY 2 HOUR PRN
Status: CANCELLED | OUTPATIENT
Start: 2018-11-02

## 2018-11-02 RX ORDER — MORPHINE SULFATE 4 MG/ML
2 INJECTION, SOLUTION INTRAMUSCULAR; INTRAVENOUS ONCE
Status: COMPLETED | OUTPATIENT
Start: 2018-11-02 | End: 2018-11-02

## 2018-11-02 RX ORDER — MORPHINE SULFATE 4 MG/ML
2 INJECTION, SOLUTION INTRAMUSCULAR; INTRAVENOUS EVERY 2 HOUR PRN
Status: CANCELLED | OUTPATIENT
Start: 2018-11-02

## 2018-11-02 RX ORDER — SODIUM CHLORIDE, SODIUM LACTATE, POTASSIUM CHLORIDE, CALCIUM CHLORIDE 600; 310; 30; 20 MG/100ML; MG/100ML; MG/100ML; MG/100ML
INJECTION, SOLUTION INTRAVENOUS CONTINUOUS
Status: CANCELLED | OUTPATIENT
Start: 2018-11-02

## 2018-11-02 RX ORDER — MORPHINE SULFATE 4 MG/ML
4 INJECTION, SOLUTION INTRAMUSCULAR; INTRAVENOUS EVERY 2 HOUR PRN
Status: CANCELLED | OUTPATIENT
Start: 2018-11-02

## 2018-11-02 RX ORDER — ONDANSETRON 2 MG/ML
4 INJECTION INTRAMUSCULAR; INTRAVENOUS ONCE
Status: COMPLETED | OUTPATIENT
Start: 2018-11-02 | End: 2018-11-02

## 2018-11-02 RX ORDER — SODIUM CHLORIDE 0.9 % (FLUSH) 0.9 %
3 SYRINGE (ML) INJECTION AS NEEDED
Status: CANCELLED | OUTPATIENT
Start: 2018-11-02

## 2018-11-02 RX ORDER — ONDANSETRON 4 MG/1
4 TABLET, ORALLY DISINTEGRATING ORAL EVERY 6 HOURS PRN
Qty: 10 TABLET | Refills: 0 | Status: SHIPPED | OUTPATIENT
Start: 2018-11-02 | End: 2018-11-05

## 2018-11-02 RX ORDER — NICOTINE 21 MG/24HR
1 PATCH, TRANSDERMAL 24 HOURS TRANSDERMAL DAILY
Status: CANCELLED | OUTPATIENT
Start: 2018-11-02

## 2018-11-02 NOTE — ED PROVIDER NOTES
Patient Seen in: [facility] AND [facility] Emergency Department    History   Patient presents with:  Abdomen/Flank Pain (GI/)    Stated Complaint: abdominal pain    HPI    Gordo Kelly is a 37year old male who presents with chief complaint of upper abdomin needed for Nausea. SILDENAFIL CITRATE 50 MG Oral Tab,  TAKE 1 TABLET (50 MG TOTAL) BY MOUTH DAILY AS NEEDED FOR ERECTILE DYSFUNCTION.    Pantoprazole Sodium 40 MG Oral Tab EC,  Take 40 mg by mouth every morning before breakfast.         Family History   P or rhonchi. There is no stridor. Air entry is equal.  Cardiovascular: Regular rate and rhythm, no murmurs, gallops, rubs. Capillary refill is brisk. No extremity edema. Gastrointestinal: Positive tenderness to palpation in epigastrium.   Abdomen soft, LAVENDER TALL (BNP)     EKG    Rate, intervals and axes as noted on EKG Report.   Rate: 89  Rhythm: Sinus Rhythm - Short GA syndrome  Reading: No STEMI           MDM             Radiology Interpretation:  Xr Chest Pa + Lat Chest (cpt=71046)    Result Date: and patient seen by Dr. Pawan Carmona. Patient case discussed with and patient seen by Dr. Bharath Ugarte. Okay to admit. Patient case discussed with Dr. Alivia David at 2113. Will serve as consult.     The patient was informed of their elevated blood pressure reading these medications    !! ondansetron 4 MG Oral Tablet Dispersible  Take 1 tablet (4 mg total) by mouth every 6 (six) hours as needed for Nausea. Qty: 10 tablet Refills: 0    !! - Potential duplicate medications found. Please discuss with provider.

## 2018-11-02 NOTE — ED INITIAL ASSESSMENT (HPI)
Patient here with c/o luq abdominal pain radiating into chest and left upper back.  States he has hx of pancreatitis and was seen a few days ago for the same but labs were normal.

## 2018-11-02 NOTE — H&P
[facility]    PATIENT'S NAME: Benedicto Velez   ATTENDING PHYSICIAN: Kamarn Trotter MD   PATIENT ACCOUNT#:   [id number]    LOCATION:  [place] [address]  MEDICAL RECORD #:   [id number]       YOB: 1975  ADMISSION DATE:       11/02/2 back.  The patient said food bothers him. He had not eaten in 2 days. Nausea and vomiting. Other 12-point review of systems negative. PHYSICAL EXAMINATION:    GENERAL:  Alert. Oriented to time, place, and person. Moderate distress.   VITAL SIGNS:

## 2018-11-16 RX ORDER — SILDENAFIL 50 MG/1
50 TABLET, FILM COATED ORAL
Qty: 8 TABLET | Refills: 0 | Status: SHIPPED | OUTPATIENT
Start: 2018-11-16 | End: 2019-01-09

## 2018-11-17 NOTE — TELEPHONE ENCOUNTER
Refill Protocol Appointment Criteria  · Appointment scheduled in the past 12 months or in the next 3 months  Recent Outpatient Visits            1 month ago Need for vaccination    [address]    Nurse Only    6 months ago [facility]

## 2019-01-09 RX ORDER — SILDENAFIL 50 MG/1
50 TABLET, FILM COATED ORAL
Qty: 8 TABLET | Refills: 0 | Status: SHIPPED | OUTPATIENT
Start: 2019-01-09 | End: 2019-02-23

## 2019-02-24 RX ORDER — SILDENAFIL 50 MG/1
50 TABLET, FILM COATED ORAL
Qty: 8 TABLET | Refills: 0 | Status: SHIPPED | OUTPATIENT
Start: 2019-02-24 | End: 2019-04-01

## 2019-04-01 NOTE — TELEPHONE ENCOUNTER
Please advise in regards to refill request. Thank You  Refill Protocol Appointment Criteria  · Appointment scheduled in the past 12 months or in the next 3 months  Recent Outpatient Visits            6 months ago Need for vaccination    [address] [facility]

## 2019-04-02 RX ORDER — SILDENAFIL 50 MG/1
50 TABLET, FILM COATED ORAL
Qty: 8 TABLET | Refills: 0 | Status: SHIPPED | OUTPATIENT
Start: 2019-04-02 | End: 2019-05-13

## 2019-04-22 NOTE — TELEPHONE ENCOUNTER
Medication pending for review. If approved needs to be called in or faxed by on-site staff.     Last RX: 10/31/18  LOV: 5/16/18

## 2019-04-23 RX ORDER — TRAMADOL HYDROCHLORIDE 50 MG/1
50 TABLET ORAL EVERY 8 HOURS PRN
Qty: 90 TABLET | Refills: 0 | OUTPATIENT
Start: 2019-04-23 | End: 2019-04-28

## 2019-04-23 NOTE — TELEPHONE ENCOUNTER
Wife calling and was inform of Dr. Bulmaro Marie message below. Wife asked that I call pt and inform him he needs a Ov.  Pt was called and he will see  today at 1 pm.Thanks

## 2019-04-23 NOTE — ED NOTES
/93 WHITNEY Cuenca notified  Pt states his pain is unrelieved rating 8/10. WHITNEY Gauthier notified with new orders to administer dilaudid IV. Ag Vallejo

## 2019-04-23 NOTE — PROGRESS NOTES
HPI:    Patient ID: Zackary Gu is a 40year old male. Abdominal Pain   This is a recurrent (pt admitted at [place] [address] for acute on chronic pancreatitits after drinkiing alcohol) problem.  The current episode started in the past 7 days (4 d appearance. He does not appear ill. No distress. HENT:   Right Ear: External ear normal.   Left Ear: External ear normal.   Nose: Nose normal.   Mouth/Throat: Oropharynx is clear and moist. No oropharyngeal exudate.    Eyes: Conjunctivae are normal. Right so.         No orders of the defined types were placed in this encounter.       Meds This Visit:  Requested Prescriptions      No prescriptions requested or ordered in this encounter       Imaging & Referrals:  None       VX#[id number]

## 2019-04-23 NOTE — TELEPHONE ENCOUNTER
Spoke with wife Erica Pierce ( and FABY verified) to f/u on Tramadol request.    Please respond to pool: EM IM ADO LPN/CMA    Office staff, please phone in once Rx approved

## 2019-05-13 RX ORDER — SILDENAFIL 50 MG/1
50 TABLET, FILM COATED ORAL
Qty: 8 TABLET | Refills: 0 | Status: SHIPPED | OUTPATIENT
Start: 2019-05-13 | End: 2019-08-01

## 2019-08-01 RX ORDER — SILDENAFIL 50 MG/1
50 TABLET, FILM COATED ORAL
Qty: 8 TABLET | Refills: 0 | Status: SHIPPED | OUTPATIENT
Start: 2019-08-01 | End: 2019-09-21

## 2019-08-02 NOTE — TELEPHONE ENCOUNTER
Refill passed per [facility], [facility] protocol.   Refill Protocol Appointment Criteria  · Appointment scheduled in the past 12 months or in the next 3 months  Recent Outpatient Visits            3 months ago [facility]    Francisco

## 2019-09-22 RX ORDER — SILDENAFIL 50 MG/1
TABLET, FILM COATED ORAL
Qty: 8 TABLET | Refills: 0 | Status: SHIPPED | OUTPATIENT
Start: 2019-09-22 | End: 2019-11-14

## 2019-09-22 NOTE — TELEPHONE ENCOUNTER
Refill passed per [facility], [facility] protocol.     Requested Prescriptions   Pending Prescriptions Disp Refills   • SILDENAFIL CITRATE 50 MG Oral Tab [Pharmacy Med Name: SILDENAFIL 50 MG TABLET] 8 tablet 0     Sig: TAKE 1 TABLET BY MOUTH DAILY AS NEEDED FOR ER

## 2019-10-15 ENCOUNTER — OFFICE VISIT (OUTPATIENT)
Dept: INTERNAL MEDICINE CLINIC | Facility: CLINIC | Age: 44
End: 2019-10-15
Payer: COMMERCIAL

## 2019-10-15 ENCOUNTER — NURSE TRIAGE (OUTPATIENT)
Dept: INTERNAL MEDICINE CLINIC | Facility: CLINIC | Age: 44
End: 2019-10-15

## 2019-10-15 VITALS
TEMPERATURE: 98 F | BODY MASS INDEX: 25 KG/M2 | WEIGHT: 200 LBS | SYSTOLIC BLOOD PRESSURE: 134 MMHG | DIASTOLIC BLOOD PRESSURE: 80 MMHG | HEART RATE: 106 BPM

## 2019-10-15 DIAGNOSIS — Z87.19 HISTORY OF PANCREATITIS: ICD-10-CM

## 2019-10-15 DIAGNOSIS — R11.0 NAUSEA: Primary | ICD-10-CM

## 2019-10-15 PROCEDURE — 99214 OFFICE O/P EST MOD 30 MIN: CPT | Performed by: INTERNAL MEDICINE

## 2019-10-15 RX ORDER — ONDANSETRON 4 MG/1
4 TABLET, ORALLY DISINTEGRATING ORAL EVERY 8 HOURS PRN
Qty: 20 TABLET | Refills: 0 | Status: SHIPPED | OUTPATIENT
Start: 2019-10-15 | End: 2020-04-17

## 2019-10-15 RX ORDER — PANTOPRAZOLE SODIUM 40 MG/1
40 TABLET, DELAYED RELEASE ORAL
Qty: 30 TABLET | Refills: 0 | Status: SHIPPED | OUTPATIENT
Start: 2019-10-15 | End: 2019-11-06

## 2019-10-15 NOTE — TELEPHONE ENCOUNTER
Action Requested: Summary for Provider     []  Critical Lab, Recommendations Needed  [x] Need Additional Advice  [x]   FYI    []   Need Orders  [x] Need Medications Sent to Pharmacy  []  Other     SUMMARY: Patient calling stating his stomach is upset follo

## 2019-10-15 NOTE — PROGRESS NOTES
HPI:    Patient ID: Larrie Kayser is a 40year old male. Nausea   This is a new problem. The current episode started yesterday. The problem occurs intermittently. The problem has been waxing and waning. Associated symptoms include nausea.  Pertinent ne and moist. No oropharyngeal exudate. Eyes: Conjunctivae are normal. Right eye exhibits no discharge. Left eye exhibits no discharge. No scleral icterus. Neck: Normal range of motion. Neck supple. No JVD present. No thyromegaly present.    Cardiovascular

## 2019-10-15 NOTE — TELEPHONE ENCOUNTER
Spoke with pt and MD message below given. Pt verb understanding  Pt agrees to schedule OV today with Dr Markel Vance at 3:30 pm.  Pt added to schedule.

## 2019-11-07 RX ORDER — PANTOPRAZOLE SODIUM 40 MG/1
TABLET, DELAYED RELEASE ORAL
Qty: 90 TABLET | Refills: 1 | Status: SHIPPED | OUTPATIENT
Start: 2019-11-07 | End: 2019-12-10

## 2019-11-08 NOTE — TELEPHONE ENCOUNTER
Refill passed per [facility], [facility] protocol.   Refill Protocol Appointment Criteria  · Appointment scheduled in the past 12 months or in the next 3 months  Recent Outpatient Visits            3 weeks ago Nausea    [address], [address] [facility]

## 2019-11-15 RX ORDER — SILDENAFIL 50 MG/1
TABLET, FILM COATED ORAL
Qty: 8 TABLET | Refills: 1 | Status: SHIPPED | OUTPATIENT
Start: 2019-11-15 | End: 2020-02-26

## 2019-11-15 NOTE — TELEPHONE ENCOUNTER
Refill passed per [facility], [facility] protocol.     Refill Protocol Appointment Criteria  · Appointment scheduled in the past 12 months or in the next 3 months  Recent Outpatient Visits            1 month ago Nausea    [facility], [facility], [address], [address] [place]

## 2019-12-07 RX ORDER — PANTOPRAZOLE SODIUM 40 MG/1
TABLET, DELAYED RELEASE ORAL
Qty: 90 TABLET | Refills: 1 | OUTPATIENT
Start: 2019-12-07

## 2019-12-10 RX ORDER — PANTOPRAZOLE SODIUM 40 MG/1
TABLET, DELAYED RELEASE ORAL
Qty: 90 TABLET | Refills: 1 | Status: SHIPPED
Start: 2019-12-10 | End: 2020-04-01

## 2019-12-11 NOTE — PROGRESS NOTES
HPI:    Patient ID: Larrie Kayser is a 40year old male. Patient presented today for follow-up post hospital follow-up. He was admitted last week at [facility] for another episode of acute pancreatitis.   He has had history of recurren improvement. There are no compliance problems. There is no history of angina, kidney disease, CAD/MI, CVA, heart failure or PVD. There is no history of chronic renal disease or a hypertension causing med.        Review of Systems   Constitutional: Negative moist. No oropharyngeal exudate. Eyes: Pupils are equal, round, and reactive to light. Conjunctivae and EOM are normal. Right eye exhibits no discharge. Left eye exhibits no discharge. No scleral icterus. Neck: Normal range of motion. Neck supple.  No J He was instructed to go to ER if he ever gets any chest pains. Now, we will hold off treatment for his ED until cardiac evaluation is done and completed.  (R00.2) Palpitations  Plan: ELECTROCARDIOGRAM, COMPLETE, CARDIO - INTERNAL        See above.     (J33

## 2020-01-12 RX ORDER — TRAMADOL HYDROCHLORIDE 50 MG/1
50 TABLET ORAL EVERY 6 HOURS PRN
Qty: 30 TABLET | Refills: 0 | OUTPATIENT
Start: 2020-01-12

## 2020-02-02 ENCOUNTER — TELEPHONE (OUTPATIENT)
Dept: INTERNAL MEDICINE CLINIC | Facility: CLINIC | Age: 45
End: 2020-02-02

## 2020-02-03 NOTE — TELEPHONE ENCOUNTER
Pls call pt; I Had advised him to see cardiologist last month for abnormal ekg.  Did he already see cardiologist?

## 2020-02-26 RX ORDER — SILDENAFIL 50 MG/1
TABLET, FILM COATED ORAL
Qty: 8 TABLET | Refills: 1 | Status: SHIPPED | OUTPATIENT
Start: 2020-02-26 | End: 2020-06-26

## 2020-02-27 NOTE — TELEPHONE ENCOUNTER
Refill passed per [facility], [facility] protocol.   Refill Protocol Appointment Criteria  · Appointment scheduled in the past 12 months or in the next 3 months  Recent Outpatient Visits            1 month ago Chronic calcific pancreatitis ([facility]    Arnav Pickard

## 2020-03-06 ENCOUNTER — OFFICE VISIT (OUTPATIENT)
Dept: CARDIOLOGY CLINIC | Facility: CLINIC | Age: 45
End: 2020-03-06
Payer: COMMERCIAL

## 2020-03-06 VITALS
RESPIRATION RATE: 18 BRPM | WEIGHT: 198 LBS | BODY MASS INDEX: 24.62 KG/M2 | DIASTOLIC BLOOD PRESSURE: 82 MMHG | HEART RATE: 96 BPM | SYSTOLIC BLOOD PRESSURE: 123 MMHG | HEIGHT: 75 IN

## 2020-03-06 DIAGNOSIS — R94.31 ABNORMAL ELECTROCARDIOGRAM (ECG) (EKG): ICD-10-CM

## 2020-03-06 DIAGNOSIS — I10 ESSENTIAL HYPERTENSION: Primary | ICD-10-CM

## 2020-03-06 DIAGNOSIS — R07.9 CHEST PAIN AT REST: ICD-10-CM

## 2020-03-06 PROCEDURE — 99204 OFFICE O/P NEW MOD 45 MIN: CPT | Performed by: INTERNAL MEDICINE

## 2020-03-06 NOTE — PATIENT INSTRUCTIONS
Continue current medications. 2D echocardiogram and nuclear stress test within the next few days. Follow-up with me in 4 to 6 weeks or sooner if needed.

## 2020-03-06 NOTE — PROGRESS NOTES
Aristides Bronson is a 39year old male. HPI:   Patient is here for a follow new patient appointment. He was recently in hospital at [facility] with episode of alcohol induced pancreatitis. An EKG done at that time showed ST depression in lateral leads. 10        Types: Cigarettes      Smokeless tobacco: Never Used    Alcohol use:  Yes      Alcohol/week: 6.0 standard drinks      Types: 6 Cans of beer per week      Comment: SOCIALLY    Drug use: Yes      Frequency: 1.0 times per week      Types: Cannabis

## 2020-03-17 ENCOUNTER — HOSPITAL ENCOUNTER (OUTPATIENT)
Dept: CV DIAGNOSTICS | Facility: HOSPITAL | Age: 45
Discharge: HOME | End: 2020-03-17
Attending: INTERNAL MEDICINE
Payer: COMMERCIAL

## 2020-03-17 ENCOUNTER — HOSPITAL ENCOUNTER (OUTPATIENT)
Dept: NUCLEAR MEDICINE | Facility: HOSPITAL | Age: 45
Discharge: HOME | End: 2020-03-17
Attending: INTERNAL MEDICINE
Payer: COMMERCIAL

## 2020-03-17 DIAGNOSIS — I10 ESSENTIAL HYPERTENSION: ICD-10-CM

## 2020-03-17 DIAGNOSIS — R94.31 ABNORMAL ELECTROCARDIOGRAM (ECG) (EKG): ICD-10-CM

## 2020-03-17 PROCEDURE — 93018 CV STRESS TEST I&R ONLY: CPT | Performed by: INTERNAL MEDICINE

## 2020-03-17 PROCEDURE — 93016 CV STRESS TEST SUPVJ ONLY: CPT | Performed by: INTERNAL MEDICINE

## 2020-03-17 PROCEDURE — 78452 HT MUSCLE IMAGE SPECT MULT: CPT | Performed by: INTERNAL MEDICINE

## 2020-03-17 PROCEDURE — 93017 CV STRESS TEST TRACING ONLY: CPT | Performed by: INTERNAL MEDICINE

## 2020-03-17 NOTE — IMAGING NOTE
DR Zhang De La Rosa is involved in a cardiac cath. Intervention at present and informed me to give the nuc. read to the nuclear radiologist. Will inform Dr Kim Leon of results when available.

## 2020-03-17 NOTE — IMAGING NOTE
Nuc. Results in chart per Dr Kael Abrams. [facility] APN stated pt can be discharged and to follow up with Dr Ashleigh Rodriguez. IV d/c'd. No complaints of chest discomfort.

## 2020-03-17 NOTE — IMAGING NOTE
Wet read requested due to additional ST changes from baseline EKG. Pt denied any c/o chest pain. IV patent at [facility] rate and will hold pt in holding area until nuclear results are ready. Will inform Dr Nicole Mandujano of nuclear results when available.

## 2020-03-19 ENCOUNTER — TELEPHONE (OUTPATIENT)
Dept: CARDIOLOGY CLINIC | Facility: CLINIC | Age: 45
End: 2020-03-19

## 2020-03-19 NOTE — TELEPHONE ENCOUNTER
Patient returned call and states he still has some daily chest pain at rest. Comes and goes no SOB or diaphoresis noted. Will review for f/u timing as prior recommendation was if asymptomatic.

## 2020-03-19 NOTE — TELEPHONE ENCOUNTER
Carlos Moulton, APRN  [facility] Clinical Staff             Stress test showed normal perfusion images but significant ST changes, make sure he is not having CP if ok then f/u as scheduled in April

## 2020-03-20 ENCOUNTER — TELEPHONE (OUTPATIENT)
Dept: CARDIOLOGY CLINIC | Facility: CLINIC | Age: 45
End: 2020-03-20

## 2020-03-20 DIAGNOSIS — I10 ESSENTIAL HYPERTENSION: Primary | ICD-10-CM

## 2020-03-20 DIAGNOSIS — R94.31 ABNORMAL ELECTROCARDIOGRAM (ECG) (EKG): ICD-10-CM

## 2020-03-20 RX ORDER — METOPROLOL TARTRATE 50 MG/1
50 TABLET, FILM COATED ORAL 2 TIMES DAILY
Qty: 2 TABLET | Refills: 0 | Status: SHIPPED | OUTPATIENT
Start: 2020-03-20 | End: 2020-03-21

## 2020-03-20 NOTE — TELEPHONE ENCOUNTER
Discussed the results of the stress test with the patient. He continues to have chest pain which is worse with stress. His stress test showed normal nuclear portion but abnormal EKG.   Since he continues to have chest pain would like to proceed with CT co

## 2020-04-02 RX ORDER — ZOLPIDEM TARTRATE 5 MG/1
5 TABLET ORAL NIGHTLY PRN
Qty: 15 TABLET | Refills: 0 | Status: SHIPPED | OUTPATIENT
Start: 2020-04-02 | End: 2020-04-30

## 2020-04-02 RX ORDER — TRAMADOL HYDROCHLORIDE 50 MG/1
50 TABLET ORAL EVERY 6 HOURS PRN
Qty: 30 TABLET | Refills: 0 | OUTPATIENT
Start: 2020-04-02

## 2020-04-02 RX ORDER — PANTOPRAZOLE SODIUM 40 MG/1
40 TABLET, DELAYED RELEASE ORAL
Qty: 90 TABLET | Refills: 0 | Status: SHIPPED | OUTPATIENT
Start: 2020-04-02 | End: 2020-05-28

## 2020-04-13 ENCOUNTER — HOSPITAL ENCOUNTER (OUTPATIENT)
Dept: CT IMAGING | Facility: HOSPITAL | Age: 45
Discharge: HOME | End: 2020-04-13
Attending: INTERNAL MEDICINE
Payer: COMMERCIAL

## 2020-04-13 VITALS
SYSTOLIC BLOOD PRESSURE: 138 MMHG | HEART RATE: 66 BPM | DIASTOLIC BLOOD PRESSURE: 85 MMHG | OXYGEN SATURATION: 99 % | RESPIRATION RATE: 12 BRPM

## 2020-04-13 DIAGNOSIS — I10 ESSENTIAL HYPERTENSION: ICD-10-CM

## 2020-04-13 DIAGNOSIS — R94.31 ABNORMAL ELECTROCARDIOGRAM (ECG) (EKG): ICD-10-CM

## 2020-04-13 PROCEDURE — 82565 ASSAY OF CREATININE: CPT

## 2020-04-13 PROCEDURE — 75574 CT ANGIO HRT W/3D IMAGE: CPT | Performed by: INTERNAL MEDICINE

## 2020-04-13 RX ORDER — NITROGLYCERIN 0.4 MG/1
TABLET SUBLINGUAL
Status: COMPLETED
Start: 2020-04-13 | End: 2020-04-13

## 2020-04-13 RX ORDER — METOPROLOL TARTRATE 5 MG/5ML
INJECTION INTRAVENOUS
Status: COMPLETED
Start: 2020-04-13 | End: 2020-04-13

## 2020-04-13 RX ORDER — DILTIAZEM HYDROCHLORIDE 5 MG/ML
INJECTION INTRAVENOUS
Status: COMPLETED
Start: 2020-04-13 | End: 2020-04-13

## 2020-04-13 RX ORDER — DILTIAZEM HYDROCHLORIDE 5 MG/ML
INJECTION INTRAVENOUS
Status: DISCONTINUED
Start: 2020-04-13 | End: 2020-04-14

## 2020-04-13 RX ADMIN — DILTIAZEM HYDROCHLORIDE 5 MG: 5 INJECTION INTRAVENOUS at 15:35:00

## 2020-04-13 RX ADMIN — DILTIAZEM HYDROCHLORIDE 5 MG: 5 INJECTION INTRAVENOUS at 14:58:00

## 2020-04-13 RX ADMIN — DILTIAZEM HYDROCHLORIDE 5 MG: 5 INJECTION INTRAVENOUS at 15:12:00

## 2020-04-13 RX ADMIN — DILTIAZEM HYDROCHLORIDE 5 MG: 5 INJECTION INTRAVENOUS at 15:21:00

## 2020-04-13 RX ADMIN — DILTIAZEM HYDROCHLORIDE 5 MG: 5 INJECTION INTRAVENOUS at 14:53:00

## 2020-04-13 RX ADMIN — METOPROLOL TARTRATE 5 MG: 5 INJECTION INTRAVENOUS at 14:22:00

## 2020-04-13 RX ADMIN — METOPROLOL TARTRATE 5 MG: 5 INJECTION INTRAVENOUS at 14:27:00

## 2020-04-13 RX ADMIN — METOPROLOL TARTRATE 5 MG: 5 INJECTION INTRAVENOUS at 14:16:00

## 2020-04-13 RX ADMIN — METOPROLOL TARTRATE 5 MG: 5 INJECTION INTRAVENOUS at 14:32:00

## 2020-04-13 RX ADMIN — DILTIAZEM HYDROCHLORIDE 5 MG: 5 INJECTION INTRAVENOUS at 14:37:00

## 2020-04-13 RX ADMIN — DILTIAZEM HYDROCHLORIDE 5 MG: 5 INJECTION INTRAVENOUS at 14:48:00

## 2020-04-13 RX ADMIN — DILTIAZEM HYDROCHLORIDE 5 MG: 5 INJECTION INTRAVENOUS at 15:28:00

## 2020-04-13 RX ADMIN — DILTIAZEM HYDROCHLORIDE 5 MG: 5 INJECTION INTRAVENOUS at 14:42:00

## 2020-04-13 RX ADMIN — NITROGLYCERIN 0.4 MG: 0.4 TABLET SUBLINGUAL at 15:43:00

## 2020-04-13 NOTE — IMAGING NOTE
Received pt to CT 4 GE at 1410. Pt verified name, , and allergies. Pt denies use of long acting nitrates, levitra, cialis, viagra, and imdur in past 4 days. Heart rate control protocol exhausted. Call placed to Dr. Eliu Briseno.  Per Dr. Clovis Bass

## 2020-04-16 ENCOUNTER — TELEPHONE (OUTPATIENT)
Dept: INTERNAL MEDICINE CLINIC | Facility: CLINIC | Age: 45
End: 2020-04-16

## 2020-04-16 DIAGNOSIS — R91.1 PULMONARY NODULE: Primary | ICD-10-CM

## 2020-04-16 NOTE — TELEPHONE ENCOUNTER
pls call pt; he had ct scan orderd by cardiologist. There was incidental finding of lung nodule on the left upper lobe about  2.1cm in size; radiologist thinks this is granuloma which is basically as scar however I do want an opinion from our pulmonologist

## 2020-04-17 RX ORDER — TRAMADOL HYDROCHLORIDE 50 MG/1
50 TABLET ORAL EVERY 6 HOURS PRN
Qty: 20 TABLET | Refills: 0 | Status: SHIPPED | OUTPATIENT
Start: 2020-04-17 | End: 2020-04-30

## 2020-04-17 RX ORDER — ONDANSETRON 4 MG/1
4 TABLET, ORALLY DISINTEGRATING ORAL EVERY 8 HOURS PRN
Qty: 20 TABLET | Refills: 0 | Status: SHIPPED | OUTPATIENT
Start: 2020-04-17 | End: 2020-09-15

## 2020-04-17 NOTE — TELEPHONE ENCOUNTER
I had reviewed his chart; I dont see any colonoscopy before. His tests done before were all upper endoscopy (for stomach/doudenum) and also for pancreas.   Since he is already 38 y/o and has blood in stools, he needs to ffup with his  gastro Dr Sheyla Rolon and

## 2020-04-17 NOTE — TELEPHONE ENCOUNTER
Spoke to patient. He states that the Tramadol is used for the flare-ups of his pancreatitis that he occasionally has. He states that due to virus outbreak, he is trying to stay out of the ER if he has a pancreatitis flare-up because he is concerned about fito the virus if he goes to the hospital. 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

## 2020-04-17 NOTE — TELEPHONE ENCOUNTER
Patient was called and given Dr. Fifi Conroy note, recommendation and plan of care.  Patient verbalized understanding and agreed to plan of care    Patient will follow up with Dr. Rosa Santoyo

## 2020-04-17 NOTE — TELEPHONE ENCOUNTER
Triage RN, Please call patient with Dr. Mireles Body abnormal CT scan results related to nodule found. Thank you.

## 2020-04-17 NOTE — TELEPHONE ENCOUNTER
Spoke to patient about results from provider. Patient verbalized understanding, agreed to plan of care and states he saw results on MyChart. Dr. Franklin Ramirez office number, [phone], given to patient.      Patient then mentioned that he had a bowel movement

## 2020-05-01 ENCOUNTER — TELEPHONE (OUTPATIENT)
Dept: INTERNAL MEDICINE CLINIC | Facility: CLINIC | Age: 45
End: 2020-05-01

## 2020-05-01 PROBLEM — R07.9 CHEST PAIN AT REST: Status: RESOLVED | Noted: 2020-03-06 | Resolved: 2020-05-01

## 2020-05-01 RX ORDER — TRAMADOL HYDROCHLORIDE 50 MG/1
50 TABLET ORAL EVERY 6 HOURS PRN
Qty: 20 TABLET | Refills: 0 | Status: SHIPPED | OUTPATIENT
Start: 2020-05-01 | End: 2020-05-28

## 2020-05-01 RX ORDER — ZOLPIDEM TARTRATE 5 MG/1
5 TABLET ORAL NIGHTLY PRN
Qty: 15 TABLET | Refills: 0 | Status: SHIPPED | OUTPATIENT
Start: 2020-05-01 | End: 2020-05-28

## 2020-05-01 NOTE — TELEPHONE ENCOUNTER
Pt called back and was informed of Dr. Shawanda Velasquez message below and he wanted to see Robby Singlteon today. Appt was made for today.   No fever cough or been exposed to anyone that he know has tested postive for covid -19    Future Appointments   Date Time Pr

## 2020-05-01 NOTE — PROGRESS NOTES
HPI:    Patient ID: Ten Vicente is a 39year old male. Abdominal Pain   This is a recurrent (pt wiht hx of calcific chronic pancreatitiis) problem. The current episode started more than 1 year ago.  The onset quality is gradual. The problem occurs in Medication Sig Dispense Refill   • zolpidem 5 MG Oral Tab Take 1 tablet (5 mg total) by mouth nightly as needed for Sleep. 15 tablet 0   • traMADol HCl 50 MG Oral Tab Take 1 tablet (50 mg total) by mouth every 6 (six) hours as needed for Pain.  20 tablet motion. General: No edema. Lymphadenopathy:     He has no cervical adenopathy. Neurological: He is alert and oriented to person, place, and time. Skin: Skin is warm and dry. No rash noted. He is not diaphoretic.               ASSESSMENT/PLAN:

## 2020-05-01 NOTE — TELEPHONE ENCOUNTER
Patient wife calling to check status  of med refills from yesterday for Zolpiem and Tramadol     Pends are listed as pending

## 2020-05-01 NOTE — TELEPHONE ENCOUNTER
miryam aguirre lpt; needs ffup ov in clinic  For continuation of refill for his tramadol. I did refill meds as requested for now.

## 2020-05-29 RX ORDER — TRAMADOL HYDROCHLORIDE 50 MG/1
50 TABLET ORAL EVERY 6 HOURS PRN
Qty: 20 TABLET | Refills: 0 | Status: SHIPPED | OUTPATIENT
Start: 2020-05-29 | End: 2020-07-06

## 2020-05-29 RX ORDER — LISINOPRIL 10 MG/1
10 TABLET ORAL
Qty: 90 TABLET | Refills: 1 | OUTPATIENT
Start: 2020-05-29

## 2020-05-29 RX ORDER — TRAMADOL HYDROCHLORIDE 50 MG/1
50 TABLET ORAL EVERY 6 HOURS PRN
Qty: 20 TABLET | Refills: 0 | OUTPATIENT
Start: 2020-05-29

## 2020-05-29 RX ORDER — ZOLPIDEM TARTRATE 5 MG/1
5 TABLET ORAL NIGHTLY PRN
Qty: 15 TABLET | Refills: 0 | OUTPATIENT
Start: 2020-05-29

## 2020-05-29 RX ORDER — PANTOPRAZOLE SODIUM 40 MG/1
40 TABLET, DELAYED RELEASE ORAL
Qty: 90 TABLET | Refills: 0 | Status: SHIPPED | OUTPATIENT
Start: 2020-05-29 | End: 2020-08-11

## 2020-05-29 RX ORDER — LISINOPRIL 10 MG/1
10 TABLET ORAL
Qty: 90 TABLET | Refills: 1 | Status: SHIPPED | OUTPATIENT
Start: 2020-05-29 | End: 2020-06-03

## 2020-05-29 RX ORDER — ZOLPIDEM TARTRATE 5 MG/1
5 TABLET ORAL NIGHTLY PRN
Qty: 15 TABLET | Refills: 0 | Status: SHIPPED | OUTPATIENT
Start: 2020-05-29 | End: 2020-06-26

## 2020-06-04 RX ORDER — LISINOPRIL 10 MG/1
10 TABLET ORAL
Qty: 90 TABLET | Refills: 1 | Status: SHIPPED | OUTPATIENT
Start: 2020-06-04 | End: 2020-07-13

## 2020-06-23 ENCOUNTER — HOSPITAL ENCOUNTER (OUTPATIENT)
Dept: CV DIAGNOSTICS | Age: 45
Discharge: HOME | End: 2020-06-23
Attending: INTERNAL MEDICINE
Payer: COMMERCIAL

## 2020-06-23 DIAGNOSIS — I10 ESSENTIAL HYPERTENSION: ICD-10-CM

## 2020-06-23 DIAGNOSIS — R94.31 ABNORMAL ELECTROCARDIOGRAM (ECG) (EKG): ICD-10-CM

## 2020-06-23 PROCEDURE — 93306 TTE W/DOPPLER COMPLETE: CPT | Performed by: INTERNAL MEDICINE

## 2020-06-26 RX ORDER — TRAMADOL HYDROCHLORIDE 50 MG/1
50 TABLET ORAL EVERY 6 HOURS PRN
Qty: 20 TABLET | Refills: 0 | Status: CANCELLED | OUTPATIENT
Start: 2020-06-26

## 2020-07-01 RX ORDER — SILDENAFIL 50 MG/1
50 TABLET, FILM COATED ORAL
Qty: 8 TABLET | Refills: 1 | OUTPATIENT
Start: 2020-07-01

## 2020-07-01 RX ORDER — TRAMADOL HYDROCHLORIDE 50 MG/1
50 TABLET ORAL EVERY 6 HOURS PRN
Qty: 20 TABLET | Refills: 0 | OUTPATIENT
Start: 2020-07-01

## 2020-07-01 RX ORDER — ZOLPIDEM TARTRATE 5 MG/1
5 TABLET ORAL NIGHTLY PRN
Qty: 15 TABLET | Refills: 0 | OUTPATIENT
Start: 2020-07-01

## 2020-07-01 RX ORDER — ZOLPIDEM TARTRATE 5 MG/1
5 TABLET ORAL NIGHTLY PRN
Qty: 15 TABLET | Refills: 0 | Status: SHIPPED | OUTPATIENT
Start: 2020-07-01 | End: 2020-07-22

## 2020-07-01 RX ORDER — SILDENAFIL 50 MG/1
50 TABLET, FILM COATED ORAL
Qty: 8 TABLET | Refills: 1 | Status: SHIPPED | OUTPATIENT
Start: 2020-07-01 | End: 2021-01-21

## 2020-07-01 NOTE — TELEPHONE ENCOUNTER
pls call; I had advised  Him 2 mos ago to see pulmonologist for pulmonary nodule incidentally found on his CTA of chest done by cardiologist. I had referred him to DR Jaimie Grande and I dont see any consult. pls tell pt he needs to do this consultation. Also he had been getting norco pain med from other doctors as seen in [facility] narcotic prescription site. I cant refill his tramadol anymore.  I did refill his ambien and sildenafil

## 2020-07-02 NOTE — TELEPHONE ENCOUNTER
Dr. Abraham Suh pt states he was at [facility] in [facility] about 2-3 weeks ago for his pancreatitis and was given pain meds there. Informed him that his tramadol was not filled and he understood. records requested.

## 2020-07-06 ENCOUNTER — TELEPHONE (OUTPATIENT)
Dept: INTERNAL MEDICINE CLINIC | Facility: CLINIC | Age: 45
End: 2020-07-06

## 2020-07-07 RX ORDER — TRAMADOL HYDROCHLORIDE 50 MG/1
50 TABLET ORAL EVERY 6 HOURS PRN
Qty: 20 TABLET | Refills: 0 | Status: SHIPPED | OUTPATIENT
Start: 2020-07-07 | End: 2020-07-21

## 2020-07-09 ENCOUNTER — TELEPHONE (OUTPATIENT)
Dept: CARDIOLOGY CLINIC | Facility: CLINIC | Age: 45
End: 2020-07-09

## 2020-07-09 NOTE — TELEPHONE ENCOUNTER
Notes recorded by Martin Colorado RN on 7/9/2020 at 2:29 PM CDT  S/w beth, Identifiers given, relayed echo was review and in stable.  Discussed bp control and he is able to check his bp and send us some reading.  See TE 7/9.  ------    Notes recorded

## 2020-07-13 RX ORDER — LISINOPRIL 10 MG/1
TABLET ORAL
Qty: 90 TABLET | Refills: 1 | Status: SHIPPED | OUTPATIENT
Start: 2020-07-13 | End: 2020-08-22

## 2020-07-21 NOTE — TELEPHONE ENCOUNTER
Patient wife  calling again , asking about status of refill as noted below     Med pended for your review / approval      Thank you

## 2020-07-22 RX ORDER — TRAMADOL HYDROCHLORIDE 50 MG/1
50 TABLET ORAL EVERY 6 HOURS PRN
Qty: 20 TABLET | Refills: 0 | Status: SHIPPED | OUTPATIENT
Start: 2020-07-22 | End: 2020-08-11

## 2020-07-22 RX ORDER — ZOLPIDEM TARTRATE 5 MG/1
5 TABLET ORAL NIGHTLY PRN
Qty: 15 TABLET | Refills: 0 | Status: SHIPPED | OUTPATIENT
Start: 2020-07-22 | End: 2020-08-11

## 2020-08-12 RX ORDER — TRAMADOL HYDROCHLORIDE 50 MG/1
50 TABLET ORAL EVERY 6 HOURS PRN
Qty: 20 TABLET | Refills: 0 | Status: SHIPPED | OUTPATIENT
Start: 2020-08-12 | End: 2020-08-25

## 2020-08-12 RX ORDER — ZOLPIDEM TARTRATE 5 MG/1
5 TABLET ORAL NIGHTLY PRN
Qty: 15 TABLET | Refills: 0 | Status: SHIPPED | OUTPATIENT
Start: 2020-08-12 | End: 2020-08-29 | Stop reason: ALTCHOICE

## 2020-08-12 RX ORDER — PANTOPRAZOLE SODIUM 40 MG/1
40 TABLET, DELAYED RELEASE ORAL
Qty: 90 TABLET | Refills: 0 | Status: SHIPPED | OUTPATIENT
Start: 2020-08-12 | End: 2020-08-17

## 2020-08-17 ENCOUNTER — OFFICE VISIT (OUTPATIENT)
Dept: INTERNAL MEDICINE CLINIC | Facility: CLINIC | Age: 45
End: 2020-08-17
Payer: COMMERCIAL

## 2020-08-17 VITALS
WEIGHT: 185 LBS | HEIGHT: 74 IN | SYSTOLIC BLOOD PRESSURE: 120 MMHG | DIASTOLIC BLOOD PRESSURE: 71 MMHG | TEMPERATURE: 98 F | BODY MASS INDEX: 23.74 KG/M2 | HEART RATE: 96 BPM

## 2020-08-17 DIAGNOSIS — Z00.00 ANNUAL PHYSICAL EXAM: Primary | ICD-10-CM

## 2020-08-17 DIAGNOSIS — K86.0 ALCOHOL-INDUCED CHRONIC PANCREATITIS (HCC): ICD-10-CM

## 2020-08-17 DIAGNOSIS — Z12.5 PROSTATE CANCER SCREENING: ICD-10-CM

## 2020-08-17 DIAGNOSIS — R91.1 PULMONARY NODULE: ICD-10-CM

## 2020-08-17 DIAGNOSIS — I10 ESSENTIAL HYPERTENSION: ICD-10-CM

## 2020-08-17 PROCEDURE — 3008F BODY MASS INDEX DOCD: CPT | Performed by: INTERNAL MEDICINE

## 2020-08-17 PROCEDURE — 3078F DIAST BP <80 MM HG: CPT | Performed by: INTERNAL MEDICINE

## 2020-08-17 PROCEDURE — 3074F SYST BP LT 130 MM HG: CPT | Performed by: INTERNAL MEDICINE

## 2020-08-17 PROCEDURE — 99396 PREV VISIT EST AGE 40-64: CPT | Performed by: INTERNAL MEDICINE

## 2020-08-17 RX ORDER — PANCRELIPASE 10000; 34000; 55000 [USP'U]/1; [USP'U]/1; [USP'U]/1
1 CAPSULE, DELAYED RELEASE ORAL 3 TIMES DAILY
COMMUNITY
Start: 2020-06-17 | End: 2020-08-28

## 2020-08-17 RX ORDER — PANTOPRAZOLE SODIUM 40 MG/1
40 TABLET, DELAYED RELEASE ORAL
Qty: 90 TABLET | Refills: 1 | Status: SHIPPED | OUTPATIENT
Start: 2020-08-17 | End: 2020-12-28

## 2020-08-17 NOTE — PROGRESS NOTES
HPI:    Patient ID: Patricia Modi is a 39year old male. Patient presents today for his annual physical.      Review of Systems   Constitutional: Negative. HENT: Positive for congestion. Eyes: Negative. Respiratory: Negative.           No hemo Conjunctivae and EOM are normal. Right eye exhibits no discharge. Left eye exhibits no discharge. No scleral icterus. Neck: Normal range of motion. Neck supple. No JVD present. No thyromegaly present.    Cardiovascular: Normal rate, regular rhythm, normal ff his pancreatic cyst and was told then to get his CD of last ct scan done in [facility] so he can review. I told pt to do so and ffp again with his GI.  Abstinence from alcohol again advised.     (Z12.5) Prostate cancer screening  Plan: check ps

## 2020-08-20 ENCOUNTER — HOSPITAL ENCOUNTER (EMERGENCY)
Facility: HOSPITAL | Age: 45
Discharge: HOME | End: 2020-08-20
Attending: EMERGENCY MEDICINE
Payer: COMMERCIAL

## 2020-08-20 ENCOUNTER — APPOINTMENT (OUTPATIENT)
Dept: CT IMAGING | Facility: HOSPITAL | Age: 45
End: 2020-08-20
Attending: EMERGENCY MEDICINE
Payer: COMMERCIAL

## 2020-08-20 VITALS
DIASTOLIC BLOOD PRESSURE: 90 MMHG | HEIGHT: 75 IN | RESPIRATION RATE: 18 BRPM | SYSTOLIC BLOOD PRESSURE: 189 MMHG | OXYGEN SATURATION: 98 % | HEART RATE: 78 BPM | BODY MASS INDEX: 23.5 KG/M2 | WEIGHT: 189 LBS | TEMPERATURE: 98 F

## 2020-08-20 DIAGNOSIS — K86.1 CHRONIC PANCREATITIS, UNSPECIFIED PANCREATITIS TYPE (HCC): Primary | ICD-10-CM

## 2020-08-20 LAB
ALBUMIN SERPL-MCNC: 3.9 G/DL (ref 3.4–5)
ALP LIVER SERPL-CCNC: 64 U/L (ref 45–117)
ALT SERPL-CCNC: 17 U/L (ref 16–61)
ANION GAP SERPL CALC-SCNC: 4 MMOL/L (ref 0–18)
AST SERPL-CCNC: 8 U/L (ref 15–37)
BASOPHILS # BLD AUTO: 0.03 X10(3) UL (ref 0–0.2)
BASOPHILS NFR BLD AUTO: 0.6 %
BILIRUB DIRECT SERPL-MCNC: 0.1 MG/DL (ref 0–0.2)
BILIRUB SERPL-MCNC: 0.3 MG/DL (ref 0.1–2)
BILIRUB UR QL: NEGATIVE
BUN BLD-MCNC: 12 MG/DL (ref 7–18)
BUN/CREAT SERPL: 9.6 (ref 10–20)
CALCIUM BLD-MCNC: 9.2 MG/DL (ref 8.5–10.1)
CHLORIDE SERPL-SCNC: 104 MMOL/L (ref 98–112)
CLARITY UR: CLEAR
CO2 SERPL-SCNC: 32 MMOL/L (ref 21–32)
COLOR UR: YELLOW
CREAT BLD-MCNC: 1.25 MG/DL (ref 0.7–1.3)
DEPRECATED RDW RBC AUTO: 44.9 FL (ref 35.1–46.3)
EOSINOPHIL # BLD AUTO: 0.04 X10(3) UL (ref 0–0.7)
EOSINOPHIL NFR BLD AUTO: 0.8 %
ERYTHROCYTE [DISTWIDTH] IN BLOOD BY AUTOMATED COUNT: 12.6 % (ref 11–15)
GLUCOSE BLD-MCNC: 93 MG/DL (ref 70–99)
GLUCOSE UR-MCNC: NEGATIVE MG/DL
HCT VFR BLD AUTO: 40.7 % (ref 39–53)
HGB BLD-MCNC: 14.4 G/DL (ref 13–17.5)
HGB UR QL STRIP.AUTO: NEGATIVE
IMM GRANULOCYTES # BLD AUTO: 0.01 X10(3) UL (ref 0–1)
IMM GRANULOCYTES NFR BLD: 0.2 %
KETONES UR-MCNC: NEGATIVE MG/DL
LEUKOCYTE ESTERASE UR QL STRIP.AUTO: NEGATIVE
LIPASE SERPL-CCNC: 145 U/L (ref 73–393)
LYMPHOCYTES # BLD AUTO: 1.91 X10(3) UL (ref 1–4)
LYMPHOCYTES NFR BLD AUTO: 37.7 %
M PROTEIN MFR SERPL ELPH: 7.3 G/DL (ref 6.4–8.2)
MCH RBC QN AUTO: 34.5 PG (ref 26–34)
MCHC RBC AUTO-ENTMCNC: 35.4 G/DL (ref 31–37)
MCV RBC AUTO: 97.6 FL (ref 80–100)
MONOCYTES # BLD AUTO: 0.35 X10(3) UL (ref 0.1–1)
MONOCYTES NFR BLD AUTO: 6.9 %
NEUTROPHILS # BLD AUTO: 2.72 X10 (3) UL (ref 1.5–7.7)
NEUTROPHILS # BLD AUTO: 2.72 X10(3) UL (ref 1.5–7.7)
NEUTROPHILS NFR BLD AUTO: 53.8 %
NITRITE UR QL STRIP.AUTO: NEGATIVE
OSMOLALITY SERPL CALC.SUM OF ELEC: 289 MOSM/KG (ref 275–295)
PH UR: 6 [PH] (ref 5–8)
PLATELET # BLD AUTO: 355 10(3)UL (ref 150–450)
POTASSIUM SERPL-SCNC: 3.1 MMOL/L (ref 3.5–5.1)
PROT UR-MCNC: NEGATIVE MG/DL
RBC # BLD AUTO: 4.17 X10(6)UL (ref 4.3–5.7)
SODIUM SERPL-SCNC: 140 MMOL/L (ref 136–145)
SP GR UR STRIP: 1.01 (ref 1–1.03)
UROBILINOGEN UR STRIP-ACNC: <2
WBC # BLD AUTO: 5.1 X10(3) UL (ref 4–11)

## 2020-08-20 PROCEDURE — 80076 HEPATIC FUNCTION PANEL: CPT | Performed by: EMERGENCY MEDICINE

## 2020-08-20 PROCEDURE — 85025 COMPLETE CBC W/AUTO DIFF WBC: CPT | Performed by: EMERGENCY MEDICINE

## 2020-08-20 PROCEDURE — 96375 TX/PRO/DX INJ NEW DRUG ADDON: CPT

## 2020-08-20 PROCEDURE — 96361 HYDRATE IV INFUSION ADD-ON: CPT

## 2020-08-20 PROCEDURE — 83690 ASSAY OF LIPASE: CPT | Performed by: EMERGENCY MEDICINE

## 2020-08-20 PROCEDURE — 74177 CT ABD & PELVIS W/CONTRAST: CPT | Performed by: EMERGENCY MEDICINE

## 2020-08-20 PROCEDURE — 99284 EMERGENCY DEPT VISIT MOD MDM: CPT

## 2020-08-20 PROCEDURE — 96374 THER/PROPH/DIAG INJ IV PUSH: CPT

## 2020-08-20 PROCEDURE — 80048 BASIC METABOLIC PNL TOTAL CA: CPT | Performed by: EMERGENCY MEDICINE

## 2020-08-20 PROCEDURE — 81003 URINALYSIS AUTO W/O SCOPE: CPT | Performed by: EMERGENCY MEDICINE

## 2020-08-20 RX ORDER — HYDROCODONE BITARTRATE AND ACETAMINOPHEN 5; 325 MG/1; MG/1
1-2 TABLET ORAL EVERY 6 HOURS PRN
Qty: 15 TABLET | Refills: 0 | Status: SHIPPED | OUTPATIENT
Start: 2020-08-20 | End: 2020-08-29

## 2020-08-20 RX ORDER — ONDANSETRON 2 MG/ML
4 INJECTION INTRAMUSCULAR; INTRAVENOUS ONCE
Status: COMPLETED | OUTPATIENT
Start: 2020-08-20 | End: 2020-08-20

## 2020-08-20 RX ORDER — MORPHINE SULFATE 4 MG/ML
8 INJECTION, SOLUTION INTRAMUSCULAR; INTRAVENOUS ONCE
Status: COMPLETED | OUTPATIENT
Start: 2020-08-20 | End: 2020-08-20

## 2020-08-20 RX ORDER — MORPHINE SULFATE 4 MG/ML
4 INJECTION, SOLUTION INTRAMUSCULAR; INTRAVENOUS ONCE
Status: COMPLETED | OUTPATIENT
Start: 2020-08-20 | End: 2020-08-20

## 2020-08-21 NOTE — ED PROVIDER NOTES
Patient Seen in: [facility] AND [facility] Emergency Department    History   Patient presents with:  Abdomen/Flank Pain      HPI    The patient presents to the ED complaining of upper abdominal pain for the past 5 days consistent with past pancreatitis symptoms. beer per week        Comment: SOCIALLY      Drug use: Yes        Frequency: 1.0 times per week        Types: Cannabis        Comment: occasionaly    Other Topics      Concerns:        Caffeine Concern: No      ROS  Pertinent Positives: Abdominal pain  All Nursing note and vitals reviewed.       ED Course        Labs Reviewed   BASIC METABOLIC PANEL (8) - Abnormal; Notable for the following components:       Result Value    Potassium 3.1 (*)     BUN/CREA Ratio 9.6 (*)     All other components within normal acute to subacute necrotizing pancreatitis. 2. Uncomplicated colonic diverticulosis. 3. Stable chronic osteonecrosis in the left femoral head. No associated articular surface collapse for arthritic changes. 4. Small hepatic cysts.     Dictated by (CST): Br time with prompt follow-up on Monday for reevaluation. Patient understands return to ED if worse. Additional verbal instructions and return precautions were discussed with the patient and/or caregiver.       Condition upon leaving the department: Stable

## 2020-08-22 RX ORDER — LISINOPRIL 10 MG/1
TABLET ORAL
Qty: 30 TABLET | Refills: 5 | Status: SHIPPED | OUTPATIENT
Start: 2020-08-22 | End: 2020-08-29 | Stop reason: DRUGHIGH

## 2020-08-24 ENCOUNTER — LAB ENCOUNTER (OUTPATIENT)
Dept: LAB | Age: 45
End: 2020-08-24
Attending: INTERNAL MEDICINE
Payer: COMMERCIAL

## 2020-08-24 DIAGNOSIS — Z00.00 ANNUAL PHYSICAL EXAM: ICD-10-CM

## 2020-08-24 LAB
ALBUMIN SERPL-MCNC: 3.5 G/DL (ref 3.4–5)
ALBUMIN/GLOB SERPL: 1.1 {RATIO} (ref 1–2)
ALP LIVER SERPL-CCNC: 53 U/L (ref 45–117)
ALT SERPL-CCNC: 15 U/L (ref 16–61)
ANION GAP SERPL CALC-SCNC: 3 MMOL/L (ref 0–18)
AST SERPL-CCNC: 6 U/L (ref 15–37)
BASOPHILS # BLD AUTO: 0.01 X10(3) UL (ref 0–0.2)
BASOPHILS NFR BLD AUTO: 0.2 %
BILIRUB SERPL-MCNC: 0.2 MG/DL (ref 0.1–2)
BUN BLD-MCNC: 15 MG/DL (ref 7–18)
BUN/CREAT SERPL: 12.4 (ref 10–20)
CALCIUM BLD-MCNC: 9.2 MG/DL (ref 8.5–10.1)
CHLORIDE SERPL-SCNC: 109 MMOL/L (ref 98–112)
CHOLEST SMN-MCNC: 193 MG/DL (ref ?–200)
CO2 SERPL-SCNC: 31 MMOL/L (ref 21–32)
COMPLEXED PSA SERPL-MCNC: 3.96 NG/ML (ref ?–4)
CREAT BLD-MCNC: 1.21 MG/DL (ref 0.7–1.3)
DEPRECATED RDW RBC AUTO: 48.4 FL (ref 35.1–46.3)
EOSINOPHIL # BLD AUTO: 0.06 X10(3) UL (ref 0–0.7)
EOSINOPHIL NFR BLD AUTO: 1.5 %
ERYTHROCYTE [DISTWIDTH] IN BLOOD BY AUTOMATED COUNT: 13.1 % (ref 11–15)
GLOBULIN PLAS-MCNC: 3.1 G/DL (ref 2.8–4.4)
GLUCOSE BLD-MCNC: 119 MG/DL (ref 70–99)
HCT VFR BLD AUTO: 38.1 % (ref 39–53)
HDLC SERPL-MCNC: 48 MG/DL (ref 40–59)
HGB BLD-MCNC: 12.9 G/DL (ref 13–17.5)
IMM GRANULOCYTES # BLD AUTO: 0.01 X10(3) UL (ref 0–1)
IMM GRANULOCYTES NFR BLD: 0.2 %
LDLC SERPL CALC-MCNC: 129 MG/DL (ref ?–100)
LYMPHOCYTES # BLD AUTO: 1.8 X10(3) UL (ref 1–4)
LYMPHOCYTES NFR BLD AUTO: 44 %
M PROTEIN MFR SERPL ELPH: 6.6 G/DL (ref 6.4–8.2)
MCH RBC QN AUTO: 33.9 PG (ref 26–34)
MCHC RBC AUTO-ENTMCNC: 33.9 G/DL (ref 31–37)
MCV RBC AUTO: 100 FL (ref 80–100)
MONOCYTES # BLD AUTO: 0.33 X10(3) UL (ref 0.1–1)
MONOCYTES NFR BLD AUTO: 8.1 %
NEUTROPHILS # BLD AUTO: 1.88 X10 (3) UL (ref 1.5–7.7)
NEUTROPHILS # BLD AUTO: 1.88 X10(3) UL (ref 1.5–7.7)
NEUTROPHILS NFR BLD AUTO: 46 %
NONHDLC SERPL-MCNC: 145 MG/DL (ref ?–130)
OSMOLALITY SERPL CALC.SUM OF ELEC: 298 MOSM/KG (ref 275–295)
PATIENT FASTING Y/N/NP: YES
PATIENT FASTING Y/N/NP: YES
PLATELET # BLD AUTO: 315 10(3)UL (ref 150–450)
POTASSIUM SERPL-SCNC: 4.2 MMOL/L (ref 3.5–5.1)
RBC # BLD AUTO: 3.81 X10(6)UL (ref 4.3–5.7)
SODIUM SERPL-SCNC: 143 MMOL/L (ref 136–145)
TRIGL SERPL-MCNC: 82 MG/DL (ref 30–149)
VLDLC SERPL CALC-MCNC: 16 MG/DL (ref 0–30)
WBC # BLD AUTO: 4.1 X10(3) UL (ref 4–11)

## 2020-08-24 PROCEDURE — 80053 COMPREHEN METABOLIC PANEL: CPT

## 2020-08-24 PROCEDURE — 80061 LIPID PANEL: CPT

## 2020-08-24 PROCEDURE — 36415 COLL VENOUS BLD VENIPUNCTURE: CPT

## 2020-08-24 PROCEDURE — 85025 COMPLETE CBC W/AUTO DIFF WBC: CPT

## 2020-08-25 ENCOUNTER — HOSPITAL ENCOUNTER (EMERGENCY)
Facility: HOSPITAL | Age: 45
Discharge: LEFT BEFORE DISCHARGE | End: 2020-08-25
Attending: EMERGENCY MEDICINE
Payer: COMMERCIAL

## 2020-08-25 VITALS
WEIGHT: 189 LBS | OXYGEN SATURATION: 100 % | RESPIRATION RATE: 20 BRPM | TEMPERATURE: 98 F | BODY MASS INDEX: 23.5 KG/M2 | DIASTOLIC BLOOD PRESSURE: 101 MMHG | HEART RATE: 74 BPM | HEIGHT: 75 IN | SYSTOLIC BLOOD PRESSURE: 176 MMHG

## 2020-08-25 DIAGNOSIS — K85.91 ACUTE NECROTIZING PANCREATITIS: Primary | ICD-10-CM

## 2020-08-25 LAB
ALBUMIN SERPL-MCNC: 3.4 G/DL (ref 3.4–5)
ALP LIVER SERPL-CCNC: 54 U/L (ref 45–117)
ALT SERPL-CCNC: 16 U/L (ref 16–61)
ANION GAP SERPL CALC-SCNC: 2 MMOL/L (ref 0–18)
AST SERPL-CCNC: 8 U/L (ref 15–37)
BASOPHILS # BLD AUTO: 0.03 X10(3) UL (ref 0–0.2)
BASOPHILS NFR BLD AUTO: 0.7 %
BILIRUB DIRECT SERPL-MCNC: <0.1 MG/DL (ref 0–0.2)
BILIRUB SERPL-MCNC: 0.3 MG/DL (ref 0.1–2)
BILIRUB UR QL: NEGATIVE
BUN BLD-MCNC: 15 MG/DL (ref 7–18)
BUN/CREAT SERPL: 13.8 (ref 10–20)
CALCIUM BLD-MCNC: 8.8 MG/DL (ref 8.5–10.1)
CHLORIDE SERPL-SCNC: 108 MMOL/L (ref 98–112)
CLARITY UR: CLEAR
CO2 SERPL-SCNC: 31 MMOL/L (ref 21–32)
COLOR UR: YELLOW
CREAT BLD-MCNC: 1.09 MG/DL (ref 0.7–1.3)
DEPRECATED RDW RBC AUTO: 47.1 FL (ref 35.1–46.3)
EOSINOPHIL # BLD AUTO: 0.07 X10(3) UL (ref 0–0.7)
EOSINOPHIL NFR BLD AUTO: 1.6 %
ERYTHROCYTE [DISTWIDTH] IN BLOOD BY AUTOMATED COUNT: 13 % (ref 11–15)
GLUCOSE BLD-MCNC: 98 MG/DL (ref 70–99)
GLUCOSE UR-MCNC: NEGATIVE MG/DL
HCT VFR BLD AUTO: 36.8 % (ref 39–53)
HGB BLD-MCNC: 12.7 G/DL (ref 13–17.5)
HGB UR QL STRIP.AUTO: NEGATIVE
IMM GRANULOCYTES # BLD AUTO: 0.01 X10(3) UL (ref 0–1)
IMM GRANULOCYTES NFR BLD: 0.2 %
KETONES UR-MCNC: NEGATIVE MG/DL
LEUKOCYTE ESTERASE UR QL STRIP.AUTO: NEGATIVE
LIPASE SERPL-CCNC: 210 U/L (ref 73–393)
LYMPHOCYTES # BLD AUTO: 1.86 X10(3) UL (ref 1–4)
LYMPHOCYTES NFR BLD AUTO: 42.2 %
M PROTEIN MFR SERPL ELPH: 6.6 G/DL (ref 6.4–8.2)
MCH RBC QN AUTO: 34.2 PG (ref 26–34)
MCHC RBC AUTO-ENTMCNC: 34.5 G/DL (ref 31–37)
MCV RBC AUTO: 99.2 FL (ref 80–100)
MONOCYTES # BLD AUTO: 0.31 X10(3) UL (ref 0.1–1)
MONOCYTES NFR BLD AUTO: 7 %
NEUTROPHILS # BLD AUTO: 2.13 X10 (3) UL (ref 1.5–7.7)
NEUTROPHILS # BLD AUTO: 2.13 X10(3) UL (ref 1.5–7.7)
NEUTROPHILS NFR BLD AUTO: 48.3 %
NITRITE UR QL STRIP.AUTO: NEGATIVE
OSMOLALITY SERPL CALC.SUM OF ELEC: 293 MOSM/KG (ref 275–295)
PH UR: 7 [PH] (ref 5–8)
PLATELET # BLD AUTO: 284 10(3)UL (ref 150–450)
POTASSIUM SERPL-SCNC: 3.9 MMOL/L (ref 3.5–5.1)
PROT UR-MCNC: NEGATIVE MG/DL
RBC # BLD AUTO: 3.71 X10(6)UL (ref 4.3–5.7)
SARS-COV-2 RNA RESP QL NAA+PROBE: NOT DETECTED
SODIUM SERPL-SCNC: 141 MMOL/L (ref 136–145)
SP GR UR STRIP: 1.01 (ref 1–1.03)
UROBILINOGEN UR STRIP-ACNC: <2
WBC # BLD AUTO: 4.4 X10(3) UL (ref 4–11)

## 2020-08-25 PROCEDURE — 83690 ASSAY OF LIPASE: CPT | Performed by: EMERGENCY MEDICINE

## 2020-08-25 PROCEDURE — 85025 COMPLETE CBC W/AUTO DIFF WBC: CPT | Performed by: EMERGENCY MEDICINE

## 2020-08-25 PROCEDURE — 80048 BASIC METABOLIC PNL TOTAL CA: CPT | Performed by: EMERGENCY MEDICINE

## 2020-08-25 PROCEDURE — 99284 EMERGENCY DEPT VISIT MOD MDM: CPT

## 2020-08-25 PROCEDURE — 80076 HEPATIC FUNCTION PANEL: CPT | Performed by: EMERGENCY MEDICINE

## 2020-08-25 PROCEDURE — 81003 URINALYSIS AUTO W/O SCOPE: CPT | Performed by: EMERGENCY MEDICINE

## 2020-08-25 PROCEDURE — 96374 THER/PROPH/DIAG INJ IV PUSH: CPT

## 2020-08-25 PROCEDURE — 96361 HYDRATE IV INFUSION ADD-ON: CPT

## 2020-08-25 PROCEDURE — 96375 TX/PRO/DX INJ NEW DRUG ADDON: CPT

## 2020-08-25 RX ORDER — SODIUM CHLORIDE 0.9 % (FLUSH) 0.9 %
3 SYRINGE (ML) INJECTION AS NEEDED
Status: CANCELLED | OUTPATIENT
Start: 2020-08-25

## 2020-08-25 RX ORDER — SODIUM CHLORIDE, SODIUM LACTATE, POTASSIUM CHLORIDE, CALCIUM CHLORIDE 600; 310; 30; 20 MG/100ML; MG/100ML; MG/100ML; MG/100ML
INJECTION, SOLUTION INTRAVENOUS CONTINUOUS
Status: CANCELLED | OUTPATIENT
Start: 2020-08-25

## 2020-08-25 RX ORDER — MORPHINE SULFATE 4 MG/ML
2 INJECTION, SOLUTION INTRAMUSCULAR; INTRAVENOUS EVERY 2 HOUR PRN
Status: CANCELLED | OUTPATIENT
Start: 2020-08-25

## 2020-08-25 RX ORDER — ONDANSETRON 2 MG/ML
4 INJECTION INTRAMUSCULAR; INTRAVENOUS ONCE
Status: COMPLETED | OUTPATIENT
Start: 2020-08-25 | End: 2020-08-25

## 2020-08-25 RX ORDER — METOCLOPRAMIDE HYDROCHLORIDE 5 MG/ML
10 INJECTION INTRAMUSCULAR; INTRAVENOUS EVERY 8 HOURS PRN
Status: CANCELLED | OUTPATIENT
Start: 2020-08-25

## 2020-08-25 RX ORDER — HYDRALAZINE HYDROCHLORIDE 20 MG/ML
20 INJECTION INTRAMUSCULAR; INTRAVENOUS EVERY 6 HOURS PRN
Status: CANCELLED | OUTPATIENT
Start: 2020-08-25

## 2020-08-25 RX ORDER — ONDANSETRON 2 MG/ML
4 INJECTION INTRAMUSCULAR; INTRAVENOUS EVERY 6 HOURS PRN
Status: CANCELLED | OUTPATIENT
Start: 2020-08-25

## 2020-08-25 RX ORDER — MORPHINE SULFATE 4 MG/ML
4 INJECTION, SOLUTION INTRAMUSCULAR; INTRAVENOUS ONCE
Status: COMPLETED | OUTPATIENT
Start: 2020-08-25 | End: 2020-08-25

## 2020-08-25 RX ORDER — MORPHINE SULFATE 4 MG/ML
4 INJECTION, SOLUTION INTRAMUSCULAR; INTRAVENOUS EVERY 2 HOUR PRN
Status: CANCELLED | OUTPATIENT
Start: 2020-08-25

## 2020-08-25 RX ORDER — MORPHINE SULFATE 4 MG/ML
1 INJECTION, SOLUTION INTRAMUSCULAR; INTRAVENOUS EVERY 2 HOUR PRN
Status: CANCELLED | OUTPATIENT
Start: 2020-08-25

## 2020-08-25 NOTE — ED NOTES
Patient has made the decision to leave the emergency department against medical advice. Dr Mariam Saldaña aware and spoke with patient and wife at bedside. Patient and his wife are aware of risks and advised that he may return for further evaluation or treatment.

## 2020-08-25 NOTE — ED NOTES
ER MD Jaime Potts aware of patient's blood pressure, no new orders. Pt ambulatory in room with steady gait.

## 2020-08-25 NOTE — TELEPHONE ENCOUNTER
Spouse calling to check on refill request; informed of 3 business day refill policy. Mentions pt was seen in ER earlier today for pain but they did not send a refill of tramadol. Asking if Dr Daron Mccall can refill ASAP.     Last Rx= 8/12/20 #20

## 2020-08-25 NOTE — ED NOTES
Orders for admission, patient is aware of plan and ready to go upstairs. Any questions, please call ED RN Susannah Vleasco  at extension [phone]. Awaiting bed and RN assignment.

## 2020-08-25 NOTE — H&P
[facility]    PATIENT'S NAME: Lee Ann Gary   ATTENDING PHYSICIAN: Florin Muhammad MD   PATIENT ACCOUNT#:   [id number]    LOCATION:  [place] [address]  MEDICAL RECORD #:   [id number]       YOB: 1975  ADMISSION DATE:       08/25/2 HISTORY:  History of alcohol abuse but he has not had a drink since March 2020 per his report. He denies any drug use. He smokes 1 pack a day. Lives with his family. REVIEW OF SYSTEMS:  Epigastric pain radiating to the back.   The patient said pain ha MD  d: 08/25/2020 11:47:13  t: 08/25/2020 11:55:23  [facility] [id number]  TONY/

## 2020-08-25 NOTE — ED INITIAL ASSESSMENT (HPI)
Pt came back to ED for nausea and mid abdominal pain related to his chronic pancreatitis. Reports having CT scan done here last week. Denies V/D, fever. RR even and nonlabored, speaking in full sentences, ambulatory with steady gait.  Took zofran PTA this m

## 2020-08-25 NOTE — ED PROVIDER NOTES
Patient Seen in: [facility] AND [facility] Emergency Department      History   Patient presents with:  Abdomen/Flank Pain    Stated Complaint: pancreatitis    HPI    Patient presents the emergency department complaining of severe upper abdominal pain consistent above.    Physical Exam     ED Triage Vitals [08/25/20 1001]   BP (!) 166/91   Pulse 82   Resp 16   Temp 98.3 °F (36.8 °C)   Temp src Oral   SpO2 99 %   O2 Device None (Room air)       Current:BP (!) 176/101   Pulse 74   Temp 98.3 °F (36.8 °C) (Oral)   Res Narrative: The following orders were created for panel order CBC WITH DIFFERENTIAL WITH PLATELET.   Procedure                               Abnormality         Status                     ---------                               -----------         ------ [id number] units Oral Cap DR Particles  Take 1 capsule (10,000 Units total) by mouth 3 (three) times daily with meals. Qty: 90 capsule Refills: 0    !! - Potential duplicate medications found. Please discuss with provider.

## 2020-08-26 RX ORDER — TRAMADOL HYDROCHLORIDE 50 MG/1
50 TABLET ORAL EVERY 6 HOURS PRN
Qty: 20 TABLET | Refills: 0 | Status: SHIPPED | OUTPATIENT
Start: 2020-08-26 | End: 2020-10-23 | Stop reason: ALTCHOICE

## 2020-08-26 RX ORDER — TRAMADOL HYDROCHLORIDE 50 MG/1
50 TABLET ORAL EVERY 6 HOURS PRN
Qty: 20 TABLET | Refills: 0 | OUTPATIENT
Start: 2020-08-26

## 2020-08-29 ENCOUNTER — OFFICE VISIT (OUTPATIENT)
Dept: FAMILY MEDICINE CLINIC | Facility: CLINIC | Age: 45
End: 2020-08-29
Payer: COMMERCIAL

## 2020-08-29 ENCOUNTER — TELEPHONE (OUTPATIENT)
Dept: INTERNAL MEDICINE CLINIC | Facility: CLINIC | Age: 45
End: 2020-08-29

## 2020-08-29 VITALS
BODY MASS INDEX: 22.91 KG/M2 | DIASTOLIC BLOOD PRESSURE: 82 MMHG | HEIGHT: 74 IN | RESPIRATION RATE: 18 BRPM | HEART RATE: 77 BPM | SYSTOLIC BLOOD PRESSURE: 160 MMHG | WEIGHT: 178.5 LBS

## 2020-08-29 DIAGNOSIS — K85.91 ACUTE NECROTIZING PANCREATITIS: Primary | ICD-10-CM

## 2020-08-29 PROCEDURE — 3008F BODY MASS INDEX DOCD: CPT | Performed by: PHYSICIAN ASSISTANT

## 2020-08-29 PROCEDURE — 99213 OFFICE O/P EST LOW 20 MIN: CPT | Performed by: PHYSICIAN ASSISTANT

## 2020-08-29 PROCEDURE — 3079F DIAST BP 80-89 MM HG: CPT | Performed by: PHYSICIAN ASSISTANT

## 2020-08-29 PROCEDURE — 3077F SYST BP >= 140 MM HG: CPT | Performed by: PHYSICIAN ASSISTANT

## 2020-08-29 RX ORDER — HYDROCODONE BITARTRATE AND ACETAMINOPHEN 5; 325 MG/1; MG/1
1-2 TABLET ORAL EVERY 6 HOURS PRN
Qty: 30 TABLET | Refills: 0 | Status: SHIPPED | OUTPATIENT
Start: 2020-08-29 | End: 2020-10-16

## 2020-08-29 RX ORDER — LISINOPRIL 40 MG/1
40 TABLET ORAL DAILY
Qty: 90 TABLET | Refills: 1 | COMMUNITY
Start: 2020-08-29 | End: 2020-10-06

## 2020-08-29 NOTE — ASSESSMENT & PLAN NOTE
Start Norco 5/325 mg PO Q6 as needed for pain. Side effects discussed with patient.  Advise patient follow up with GI.

## 2020-08-29 NOTE — TELEPHONE ENCOUNTER
Patient calling with complaint of abdominal pain above umbilicus that radiates to his back. Pain rated an 8/10. Patient was in the emergency room on 8/25/20 for acute necrotizing pancreatitis.    Patient seen Dr. Meghan Howell yesterday per wife and will need

## 2020-08-29 NOTE — PROGRESS NOTES
HPI:   HPI  39year-old male is here in the office for follow up post ER due to acute pancreatitis. Patient complaints of epigastric pain radiates to the back, the pain is at a severity of 8/10.  He has been taking Tramadol  4-6 tablets per day without impr High blood pressure    • Microcytosis    • Pancreatic cyst    • Pancreatitis    • Pancreatitis, alcoholic, acute    • Pulmonary nodule 2013   • Tobacco use disorder    • Unspecified essential hypertension        Past Surgical History:     Past Surgical His file        Active member of club or organization: Not on file        Attends meetings of clubs or organizations: Not on file        Relationship status: Not on file      Intimate partner violence:        Fear of current or ex partner: Not on file        E External ear normal.   Left Ear: External ear normal.   Nose: Nose normal.   Eyes: Conjunctivae are normal. Right eye exhibits no discharge. Left eye exhibits no discharge.    Cardiovascular: Normal rate, regular rhythm, S1 normal, S2 normal and normal hear

## 2020-09-05 NOTE — ED PROVIDER NOTES
Patient Seen in: [facility] AND [facility] Emergency Department      History   Patient presents with:  Abdomen/Flank Pain    Stated Complaint: pain r/t pancreas, sent by PCP    HPI    66-year-old male with past medical history significant for chronic pancreatiti All other systems reviewed and negative except as noted above.     Physical Exam     ED Triage Vitals [09/05/20 1300]   /88   Pulse 91   Resp 18   Temp 98.4 °F (36.9 °C)   Temp src Oral   SpO2 100 %   O2 Device None (Room air)       Current: LIPASE   RAINBOW DRAW BLUE   RAINBOW DRAW LAVENDER   RAINBOW DRAW LIGHT GREEN   RAINBOW DRAW GOLD                   MDM     Will sign out to the afternoon shift to follow-up results of diagnostic work-up and disposition patient.               Disposition

## 2020-09-05 NOTE — ED INITIAL ASSESSMENT (HPI)
Pt c/o mid abdominal pain starting today. Hx of pancreatitis, pt states it feels the same as before.

## 2020-09-05 NOTE — ED PROVIDER NOTES
Patient blood work within normal limits. Patient states that he will follow-up with his scheduled appointment on September 18 for the gastric surgeon at [facility]. Patient given GI cocktail prior to discharge.   Patient given a prescription for N

## 2020-09-06 NOTE — CM/SW NOTE
Pt's wife Kathy Davenport calling states patient is scheduled for surgery on 9/18 with [facility] he was referred to by Dr. Isela Colon.  Wife states patient continues to have pain and is wanting ERCM to assist them in getting sooner appointment with Keegan YOU - advised

## 2020-09-07 ENCOUNTER — TELEPHONE (OUTPATIENT)
Dept: INTERNAL MEDICINE CLINIC | Facility: CLINIC | Age: 45
End: 2020-09-07

## 2020-09-07 DIAGNOSIS — R97.20 PSA ELEVATION: Primary | ICD-10-CM

## 2020-09-09 NOTE — PROGRESS NOTES
HPI:    Patient ID: Coby Farrell is a 39year old male. Abdominal Pain   This is a chronic (pt here today for ffup regarding abdominal pain due to his chronic alcoholic pancreatitis) problem. The current episode started more than 1 month ago.  The ons lisinopril 40 MG Oral Tab Take 1 tablet (40 mg total) by mouth daily. 90 tablet 1   • pancrelipase, Lip-Prot-Amyl, (ZENPEP) 05125-50554 units Oral Cap DR Particles Take 1 capsule (10,000 Units total) by mouth 3 (three) times daily with meals.  90 capsule 0 rales.   Abdominal: Soft. Normal appearance and bowel sounds are normal. He exhibits no distension and no mass. There is tenderness in the epigastric area.  There is no rigidity, no rebound, no guarding, no tenderness at McBurney's point and negative Rowdy December

## 2020-09-16 RX ORDER — ONDANSETRON 4 MG/1
4 TABLET, ORALLY DISINTEGRATING ORAL EVERY 8 HOURS PRN
Qty: 20 TABLET | Refills: 0 | Status: SHIPPED | OUTPATIENT
Start: 2020-09-16 | End: 2020-10-19

## 2020-09-16 RX ORDER — HYDROCODONE BITARTRATE AND ACETAMINOPHEN 10; 325 MG/1; MG/1
1 TABLET ORAL EVERY 6 HOURS PRN
Qty: 20 TABLET | Refills: 0 | Status: SHIPPED | OUTPATIENT
Start: 2020-09-16 | End: 2020-09-21

## 2020-09-17 RX ORDER — HYDROCODONE BITARTRATE AND ACETAMINOPHEN 10; 325 MG/1; MG/1
1 TABLET ORAL EVERY 6 HOURS PRN
Qty: 20 TABLET | Refills: 0 | OUTPATIENT
Start: 2020-09-17

## 2020-09-17 RX ORDER — ONDANSETRON 4 MG/1
4 TABLET, ORALLY DISINTEGRATING ORAL EVERY 8 HOURS PRN
Qty: 20 TABLET | Refills: 0 | OUTPATIENT
Start: 2020-09-17

## 2020-09-22 RX ORDER — HYDROCODONE BITARTRATE AND ACETAMINOPHEN 10; 325 MG/1; MG/1
1 TABLET ORAL EVERY 6 HOURS PRN
Qty: 20 TABLET | Refills: 0 | OUTPATIENT
Start: 2020-09-22

## 2020-09-22 RX ORDER — HYDROCODONE BITARTRATE AND ACETAMINOPHEN 10; 325 MG/1; MG/1
1 TABLET ORAL EVERY 6 HOURS PRN
Qty: 20 TABLET | Refills: 0 | Status: SHIPPED | OUTPATIENT
Start: 2020-09-22 | End: 2020-10-01

## 2020-09-22 NOTE — TELEPHONE ENCOUNTER
Spoke with the patient's wife who reports the patient is requesting a refill on the hydrocodone. Wife is requesting enough supply to cover the patient until Thursday 9/24/20. Wife reports the patient will be having the \"Traci Procedure\" on his pancreas at [facility] and it will be performed by a Dr. Yariel Olson. Message routed to provider for review.

## 2020-09-22 NOTE — TELEPHONE ENCOUNTER
Spoke with pt's wife, NARINDER verified. Pt's wife wants to f/u on pt rx ref for norco.  pls advise, thanks in advance.

## 2020-10-02 RX ORDER — HYDROCODONE BITARTRATE AND ACETAMINOPHEN 5; 325 MG/1; MG/1
1-2 TABLET ORAL EVERY 6 HOURS PRN
Qty: 30 TABLET | Refills: 0 | OUTPATIENT
Start: 2020-10-02

## 2020-10-03 RX ORDER — TRAMADOL HYDROCHLORIDE 50 MG/1
50 TABLET ORAL EVERY 6 HOURS PRN
Qty: 20 TABLET | Refills: 0 | OUTPATIENT
Start: 2020-10-03

## 2020-10-03 RX ORDER — HYDROCODONE BITARTRATE AND ACETAMINOPHEN 10; 325 MG/1; MG/1
1 TABLET ORAL EVERY 6 HOURS PRN
Qty: 20 TABLET | Refills: 0 | Status: SHIPPED | OUTPATIENT
Start: 2020-10-03 | End: 2020-10-23 | Stop reason: DRUGHIGH

## 2020-10-03 RX ORDER — ZOLPIDEM TARTRATE 10 MG/1
10 TABLET ORAL NIGHTLY PRN
Qty: 30 TABLET | Refills: 0 | Status: SHIPPED | OUTPATIENT
Start: 2020-10-03 | End: 2020-10-23 | Stop reason: ALTCHOICE

## 2020-10-03 RX ORDER — TRAMADOL HYDROCHLORIDE 50 MG/1
50 TABLET ORAL EVERY 6 HOURS PRN
Qty: 20 TABLET | Refills: 0 | Status: CANCELLED | OUTPATIENT
Start: 2020-10-03

## 2020-10-03 RX ORDER — HYDROCODONE BITARTRATE AND ACETAMINOPHEN 10; 325 MG/1; MG/1
1 TABLET ORAL EVERY 6 HOURS PRN
Qty: 20 TABLET | Refills: 0 | Status: CANCELLED | OUTPATIENT
Start: 2020-10-03

## 2020-10-03 RX ORDER — ZOLPIDEM TARTRATE 10 MG/1
10 TABLET ORAL NIGHTLY PRN
Qty: 30 TABLET | Refills: 0 | Status: CANCELLED | OUTPATIENT
Start: 2020-10-03 | End: 2021-09-28

## 2020-10-03 RX ORDER — ZOLPIDEM TARTRATE 10 MG/1
10 TABLET ORAL NIGHTLY PRN
Qty: 30 TABLET | Refills: 0 | OUTPATIENT
Start: 2020-10-03 | End: 2021-09-28

## 2020-10-03 NOTE — TELEPHONE ENCOUNTER
pls call pt; he was supposed to see pancreas surgeon last 2 weeeks for definitive surgery for his chronic pancreatitis/cyst. Did he see the specialist and when is the surgery?  If not, will need to refer him to pain clnic for chronic pain management   Didn'

## 2020-10-05 RX ORDER — TRAMADOL HYDROCHLORIDE 50 MG/1
50 TABLET ORAL EVERY 6 HOURS PRN
Qty: 20 TABLET | Refills: 0 | OUTPATIENT
Start: 2020-10-05

## 2020-10-05 RX ORDER — HYDROCODONE BITARTRATE AND ACETAMINOPHEN 5; 325 MG/1; MG/1
1-2 TABLET ORAL EVERY 6 HOURS PRN
Qty: 30 TABLET | Refills: 0 | OUTPATIENT
Start: 2020-10-05

## 2020-10-05 RX ORDER — ZOLPIDEM TARTRATE 10 MG/1
10 TABLET ORAL NIGHTLY PRN
Qty: 30 TABLET | Refills: 0 | OUTPATIENT
Start: 2020-10-05 | End: 2021-09-30

## 2020-10-05 RX ORDER — HYDROCODONE BITARTRATE AND ACETAMINOPHEN 10; 325 MG/1; MG/1
1 TABLET ORAL EVERY 6 HOURS PRN
Qty: 20 TABLET | Refills: 0 | OUTPATIENT
Start: 2020-10-05

## 2020-10-06 NOTE — TELEPHONE ENCOUNTER
norco and zoldipem was just refilled on 10/3/20   I denied tramadol since this was already stopped before and cant take both norco and tramadol

## 2020-10-06 NOTE — TELEPHONE ENCOUNTER
The patient was informed with understanding. He had the surgery already on 9/26/2020 and will have the staples out this Friday.   He also has an appointment to see you today 10/6/2020

## 2020-10-12 NOTE — PROGRESS NOTES
HPI:    Patient ID: Elder Maravilla is a 39year old male. Patient present today for posthospital follow up.  He was admitted at [facility]  For Natalie and Y lateral pancreaticojejunostomy with pancreatic head resection for chronic pancreatitis/pancreatic cyst 9/8/2020 ) 30 tablet 0   • traMADol HCl 50 MG Oral Tab Take 1 tablet (50 mg total) by mouth every 6 (six) hours as needed for Pain.  (Patient not taking: Reported on 9/8/2020 ) 20 tablet 0   • Sildenafil Citrate 50 MG Oral Tab Take 1 tablet (50 mg total) by tablet (40 mg total) by mouth daily.        Imaging & Referrals:  None       JY#[id number]

## 2020-10-17 RX ORDER — HYDROCODONE BITARTRATE AND ACETAMINOPHEN 5; 325 MG/1; MG/1
1 TABLET ORAL EVERY 6 HOURS PRN
Qty: 20 TABLET | Refills: 0 | Status: SHIPPED | OUTPATIENT
Start: 2020-10-17 | End: 2020-10-30

## 2020-10-20 RX ORDER — ONDANSETRON 4 MG/1
4 TABLET, ORALLY DISINTEGRATING ORAL EVERY 8 HOURS PRN
Qty: 20 TABLET | Refills: 0 | Status: SHIPPED | OUTPATIENT
Start: 2020-10-20 | End: 2021-03-24

## 2020-10-22 ENCOUNTER — NURSE TRIAGE (OUTPATIENT)
Dept: INTERNAL MEDICINE CLINIC | Facility: CLINIC | Age: 45
End: 2020-10-22

## 2020-10-22 ENCOUNTER — PATIENT MESSAGE (OUTPATIENT)
Dept: INTERNAL MEDICINE CLINIC | Facility: CLINIC | Age: 45
End: 2020-10-22

## 2020-10-23 ENCOUNTER — OFFICE VISIT (OUTPATIENT)
Dept: FAMILY MEDICINE CLINIC | Facility: CLINIC | Age: 45
End: 2020-10-23
Payer: COMMERCIAL

## 2020-10-23 VITALS
HEART RATE: 93 BPM | HEIGHT: 74 IN | SYSTOLIC BLOOD PRESSURE: 144 MMHG | BODY MASS INDEX: 21.69 KG/M2 | DIASTOLIC BLOOD PRESSURE: 78 MMHG | WEIGHT: 169 LBS

## 2020-10-23 DIAGNOSIS — M62.830 SPASM OF BACK MUSCLES: Primary | ICD-10-CM

## 2020-10-23 DIAGNOSIS — R10.10 PAIN OF UPPER ABDOMEN: ICD-10-CM

## 2020-10-23 PROCEDURE — 3078F DIAST BP <80 MM HG: CPT | Performed by: PHYSICIAN ASSISTANT

## 2020-10-23 PROCEDURE — 99213 OFFICE O/P EST LOW 20 MIN: CPT | Performed by: PHYSICIAN ASSISTANT

## 2020-10-23 PROCEDURE — 3008F BODY MASS INDEX DOCD: CPT | Performed by: PHYSICIAN ASSISTANT

## 2020-10-23 PROCEDURE — 3077F SYST BP >= 140 MM HG: CPT | Performed by: PHYSICIAN ASSISTANT

## 2020-10-23 RX ORDER — NAPROXEN 500 MG/1
500 TABLET ORAL 2 TIMES DAILY WITH MEALS
Qty: 60 TABLET | Refills: 0 | Status: SHIPPED | OUTPATIENT
Start: 2020-10-23 | End: 2021-01-10 | Stop reason: ALTCHOICE

## 2020-10-23 RX ORDER — SIMETHICONE 80 MG
80 TABLET,CHEWABLE ORAL 4 TIMES DAILY PRN
COMMUNITY
Start: 2020-09-29 | End: 2021-02-16 | Stop reason: ALTCHOICE

## 2020-10-23 RX ORDER — SENNA PLUS 8.6 MG/1
8.6 TABLET ORAL 2 TIMES DAILY
COMMUNITY
Start: 2020-09-29 | End: 2021-08-02 | Stop reason: ALTCHOICE

## 2020-10-23 RX ORDER — CYCLOBENZAPRINE HCL 10 MG
10 TABLET ORAL 3 TIMES DAILY
Qty: 30 TABLET | Refills: 0 | Status: SHIPPED | OUTPATIENT
Start: 2020-10-23 | End: 2020-11-12

## 2020-10-23 RX ORDER — PANCRELIPASE LIPASE, PANCRELIPASE PROTEASE, PANCRELIPASE AMYLASE 20000; 63000; 84000 [USP'U]/1; [USP'U]/1; [USP'U]/1
CAPSULE, DELAYED RELEASE ORAL
COMMUNITY
Start: 2020-09-29 | End: 2021-02-18

## 2020-10-23 RX ORDER — PSEUDOEPHEDRINE HCL 30 MG
100 TABLET ORAL 2 TIMES DAILY
COMMUNITY
Start: 2020-09-29 | End: 2021-02-16 | Stop reason: ALTCHOICE

## 2020-10-23 NOTE — TELEPHONE ENCOUNTER
From: Jono Davis  To: Nani Meza MD  Sent: 10/22/2020 7:46 PM CDT  Subject: Other    I would like to schedule an appointment for 10/23/2020 anytime after 11am. The online system will only give me the next available date of 10/28.     This is

## 2020-10-23 NOTE — TELEPHONE ENCOUNTER
Scheduled today 1 pm [facility] OV. Advised wear mask, at door screen, care partner policy; he verbalized understanding. Back pain middle, on/off spasm rated 7-8 since Sept pancreas surgery.   Reason for Disposition  • Age > 48 and no history of pr

## 2020-10-27 ENCOUNTER — OFFICE VISIT (OUTPATIENT)
Dept: SURGERY | Facility: CLINIC | Age: 45
End: 2020-10-27
Payer: COMMERCIAL

## 2020-10-27 VITALS
TEMPERATURE: 98 F | BODY MASS INDEX: 21.69 KG/M2 | HEART RATE: 112 BPM | HEIGHT: 74 IN | WEIGHT: 169 LBS | DIASTOLIC BLOOD PRESSURE: 80 MMHG | SYSTOLIC BLOOD PRESSURE: 147 MMHG | RESPIRATION RATE: 16 BRPM

## 2020-10-27 DIAGNOSIS — N40.1 BENIGN PROSTATIC HYPERPLASIA WITH LOWER URINARY TRACT SYMPTOMS, SYMPTOM DETAILS UNSPECIFIED: ICD-10-CM

## 2020-10-27 DIAGNOSIS — R97.20 ELEVATED PSA: Primary | ICD-10-CM

## 2020-10-27 PROCEDURE — 3008F BODY MASS INDEX DOCD: CPT | Performed by: UROLOGY

## 2020-10-27 PROCEDURE — 3077F SYST BP >= 140 MM HG: CPT | Performed by: UROLOGY

## 2020-10-27 PROCEDURE — 99244 OFF/OP CNSLTJ NEW/EST MOD 40: CPT | Performed by: UROLOGY

## 2020-10-27 PROCEDURE — 3079F DIAST BP 80-89 MM HG: CPT | Performed by: UROLOGY

## 2020-10-27 NOTE — PROGRESS NOTES
SUBJECTIVE:  Tammi Nascimento is a 39year old male who presents for a consultation at the request of, and a copy of this note will be sent to, Dr. Roscoe Milton, for evaluation of  benign prostatic hyperplasia and elevated PSA.  He states that the pr tightness, wheezing, cough, shortness of breath,  sputum production,chest pain or pleurisy. CARDIOVASCULAR:  Negative for pain or chest discomfort, dizziness or fainting, palpitations, leg swelling, nocturia, or claudication.   GASTROINTESTINAL:  Negative patient and wife who accompanies him today. Recommended a phone visit to be done in 1 month with a repeat PSA 1 to 2 days prior to the visit.   The patient was told and he clearly understands that if the PSA continues to be elevated at that time a prostate

## 2020-10-30 DIAGNOSIS — M62.830 SPASM OF BACK MUSCLES: ICD-10-CM

## 2020-10-31 RX ORDER — HYDROCODONE BITARTRATE AND ACETAMINOPHEN 5; 325 MG/1; MG/1
1 TABLET ORAL EVERY 6 HOURS PRN
Qty: 20 TABLET | Refills: 0 | Status: SHIPPED | OUTPATIENT
Start: 2020-10-31 | End: 2020-11-10

## 2020-11-05 ENCOUNTER — TELEPHONE (OUTPATIENT)
Dept: SURGERY | Facility: CLINIC | Age: 45
End: 2020-11-05

## 2020-11-05 DIAGNOSIS — R97.20 ELEVATED PSA: Primary | ICD-10-CM

## 2020-11-05 NOTE — TELEPHONE ENCOUNTER
Spouse calling states needs order for blood work for pt please advise     Asking for call once in system

## 2020-11-09 NOTE — TELEPHONE ENCOUNTER
Patient had office visit on 10/27/20 and Dr Samira Bloom asked that he have another psa done and schedule a virtual visit after to discuss

## 2020-11-11 ENCOUNTER — OFFICE VISIT (OUTPATIENT)
Dept: PAIN CLINIC | Facility: CLINIC | Age: 45
End: 2020-11-11
Payer: COMMERCIAL

## 2020-11-11 ENCOUNTER — TELEPHONE (OUTPATIENT)
Dept: PAIN CLINIC | Facility: CLINIC | Age: 45
End: 2020-11-11

## 2020-11-11 VITALS
BODY MASS INDEX: 21 KG/M2 | SYSTOLIC BLOOD PRESSURE: 150 MMHG | HEART RATE: 100 BPM | DIASTOLIC BLOOD PRESSURE: 80 MMHG | WEIGHT: 163 LBS

## 2020-11-11 DIAGNOSIS — G89.29 CHRONIC SCAPULAR PAIN: Primary | ICD-10-CM

## 2020-11-11 DIAGNOSIS — M89.8X1 CHRONIC SCAPULAR PAIN: Primary | ICD-10-CM

## 2020-11-11 DIAGNOSIS — K86.0 ALCOHOL-INDUCED CHRONIC PANCREATITIS (HCC): ICD-10-CM

## 2020-11-11 PROCEDURE — 3079F DIAST BP 80-89 MM HG: CPT | Performed by: ANESTHESIOLOGY

## 2020-11-11 PROCEDURE — 3077F SYST BP >= 140 MM HG: CPT | Performed by: ANESTHESIOLOGY

## 2020-11-11 PROCEDURE — 99072 ADDL SUPL MATRL&STAF TM PHE: CPT | Performed by: ANESTHESIOLOGY

## 2020-11-11 PROCEDURE — 99203 OFFICE O/P NEW LOW 30 MIN: CPT | Performed by: ANESTHESIOLOGY

## 2020-11-11 RX ORDER — HYDROCODONE BITARTRATE AND ACETAMINOPHEN 5; 325 MG/1; MG/1
1 TABLET ORAL EVERY 6 HOURS PRN
Qty: 20 TABLET | Refills: 0 | Status: SHIPPED | OUTPATIENT
Start: 2020-11-11 | End: 2020-11-19

## 2020-11-11 RX ORDER — HYDROCODONE BITARTRATE AND ACETAMINOPHEN 5; 325 MG/1; MG/1
1 TABLET ORAL EVERY 6 HOURS PRN
Qty: 20 TABLET | Refills: 0 | OUTPATIENT
Start: 2020-11-11

## 2020-11-11 NOTE — H&P
Name: Manuel Mosley   : 2/3/1975   DOS: 2020     Chief complaint: Right scapular pain    History of present illness:  Manuel Mosley is a 39year old male with a history of chronic pancreatitis due to alcohol, who recently had a lateral pancrea 8 (eight) hours as needed for Nausea. 20 tablet 0   • cyclobenzaprine 10 MG Oral Tab Take 10 mg by mouth. • pregabalin 75 MG Oral Cap Take 75 mg by mouth 2 (two) times daily. • lisinopril 40 MG Oral Tab Take 1 tablet (40 mg total) by mouth daily.  9 lymphadenopathy.    Motor Examination:    (R)   (L)  Deltoid:       5    5  Biceps:       5    5  Triceps:      5    5  Wrist Extension:     5    5  Wrist Flexsion:                 5    5  Finger Extension:     5    5  Finger Flexsion:      5    5    Cardio news.  We also discussed pain medications. His primary complaint today is along the scapular border, and disrupted sleep. He wakes up to 3 times per evening to urinate. This is not a pain issue but of frequent urination issue.   Advised him to follow-up

## 2020-11-11 NOTE — TELEPHONE ENCOUNTER
Pt continues to take norco for pain inspite of his pancreatic surgery for his chronic pancreatitis. Would recommend ffup with GI Dr Laura Perea and also referral to [facility] pain clinic for chronic pain management.

## 2020-11-11 NOTE — TELEPHONE ENCOUNTER
Medical clearance needed- No    Implanted cardiac device/SCS/PNS: NA    Pt seen in OV today by Dr. Ocampo Blinks and recommended for scapular TPI (X 1). Please begin PA process for procedure(s).      Laterality: Right  Level(s): scapula    Pt informed callback will

## 2020-11-11 NOTE — PROGRESS NOTES
HPI:    Patient ID: Gray Walton is a 39year old male.     HPI    Review of Systems         Current Outpatient Medications   Medication Sig Dispense Refill   • HYDROcodone-acetaminophen 5-325 MG Oral Tab Take 1 tablet by mouth every 6 (six) hours as nee Date Pain Began: 10/1/20           Work Related:   No        Receiving Work Comp/Disability:   No    Numeric Rating Scale:  Pain at Present:  7                                                                                                            (No P

## 2020-11-11 NOTE — PROGRESS NOTES
Patient here to be evaluated for pain in upper abdomen and and upper right side of back going on for the past one month. Patient had pancreatic surgery in Sep and has been having pain since then.

## 2020-11-12 NOTE — TELEPHONE ENCOUNTER
Prior authorization request completed for: Right Scapular TPI in Office   Authorization #No Prior Authorization/Nor Pre Determination is Required   -No Exclusions or Restrictions   Spoke with Amelia Enriquez at [facility] [place] [address]  Reference #:[id number]

## 2020-11-13 NOTE — TELEPHONE ENCOUNTER
Spoke with Malika Islaselor, pts wife, advised of insurance approval to proceed with injections and is agreeable to scheduling. Patient does not need to hold any medications prior to procedure.  Torey verbalized understanding of instructions, no further needs at Ibuprofen (Motrin, Advil, Vicoprofen), Naproxen (Naprosyn, Aleve), Piroxcam (Feldene), Meloxicam (Mobic)--(Cervical procedures will require 3 days), Oxaprozin (Daypro), Diclofenac (Voltaren), Indomethacin (Indocin), Etodolac (Lodine), Nabumetone (Relafen

## 2020-11-14 DIAGNOSIS — M62.830 SPASM OF BACK MUSCLES: ICD-10-CM

## 2020-11-14 RX ORDER — NAPROXEN 500 MG/1
TABLET ORAL
Qty: 60 TABLET | Refills: 0 | OUTPATIENT
Start: 2020-11-14

## 2020-11-17 RX ORDER — HYDROCODONE BITARTRATE AND ACETAMINOPHEN 5; 325 MG/1; MG/1
1 TABLET ORAL EVERY 6 HOURS PRN
Qty: 20 TABLET | Refills: 0 | Status: CANCELLED | OUTPATIENT
Start: 2020-11-17

## 2020-11-18 RX ORDER — HYDROCODONE BITARTRATE AND ACETAMINOPHEN 5; 325 MG/1; MG/1
1 TABLET ORAL EVERY 6 HOURS PRN
Qty: 20 TABLET | Refills: 0 | Status: CANCELLED | OUTPATIENT
Start: 2020-11-18

## 2020-11-19 ENCOUNTER — TELEPHONE (OUTPATIENT)
Dept: INTERNAL MEDICINE CLINIC | Facility: CLINIC | Age: 45
End: 2020-11-19

## 2020-11-19 RX ORDER — HYDROCODONE BITARTRATE AND ACETAMINOPHEN 5; 325 MG/1; MG/1
1 TABLET ORAL EVERY 6 HOURS PRN
Qty: 20 TABLET | Refills: 0 | Status: SHIPPED | OUTPATIENT
Start: 2020-11-19 | End: 2020-12-09

## 2020-11-19 NOTE — TELEPHONE ENCOUNTER
Spouse calling, present with patient, reports patient is currently on Norco for back pain and spasms. Patient is currently seeing the pain specialist, has appt with them on Mon.  Patient has run out of his Norco and is requesting refill for few tabs until h

## 2020-11-20 RX ORDER — CYCLOBENZAPRINE HCL 10 MG
10 TABLET ORAL 3 TIMES DAILY
Qty: 30 TABLET | Refills: 0 | OUTPATIENT
Start: 2020-11-20 | End: 2020-12-10

## 2020-11-23 ENCOUNTER — OFFICE VISIT (OUTPATIENT)
Dept: PAIN CLINIC | Facility: CLINIC | Age: 45
End: 2020-11-23
Payer: COMMERCIAL

## 2020-11-23 VITALS
HEART RATE: 64 BPM | SYSTOLIC BLOOD PRESSURE: 128 MMHG | RESPIRATION RATE: 16 BRPM | HEIGHT: 75 IN | DIASTOLIC BLOOD PRESSURE: 84 MMHG | BODY MASS INDEX: 21.76 KG/M2 | WEIGHT: 175 LBS

## 2020-11-23 DIAGNOSIS — G89.29 CHRONIC SCAPULAR PAIN: Primary | ICD-10-CM

## 2020-11-23 DIAGNOSIS — M89.8X1 CHRONIC SCAPULAR PAIN: Primary | ICD-10-CM

## 2020-11-23 PROCEDURE — 3008F BODY MASS INDEX DOCD: CPT | Performed by: ANESTHESIOLOGY

## 2020-11-23 PROCEDURE — 3079F DIAST BP 80-89 MM HG: CPT | Performed by: ANESTHESIOLOGY

## 2020-11-23 PROCEDURE — 3074F SYST BP LT 130 MM HG: CPT | Performed by: ANESTHESIOLOGY

## 2020-11-23 PROCEDURE — S0020 INJECTION, BUPIVICAINE HYDRO: HCPCS | Performed by: ANESTHESIOLOGY

## 2020-11-23 PROCEDURE — 20552 NJX 1/MLT TRIGGER POINT 1/2: CPT | Performed by: ANESTHESIOLOGY

## 2020-11-23 RX ORDER — BUPIVACAINE HYDROCHLORIDE 5 MG/ML
9 INJECTION, SOLUTION EPIDURAL; INTRACAUDAL ONCE
Status: COMPLETED | OUTPATIENT
Start: 2020-11-23 | End: 2020-11-23

## 2020-11-23 RX ORDER — METHYLPREDNISOLONE ACETATE 40 MG/ML
40 INJECTION, SUSPENSION INTRA-ARTICULAR; INTRALESIONAL; INTRAMUSCULAR; SOFT TISSUE ONCE
Status: COMPLETED | OUTPATIENT
Start: 2020-11-23 | End: 2020-11-23

## 2020-11-23 NOTE — PROGRESS NOTES
Timeout completed prior to procedure @ 0900. Participants present for timeout:  Dr. Jena Mead, and patient.

## 2020-11-23 NOTE — PROCEDURES
Date of procedure: November 23, 2020    Preop diagnosis: Chronic scapular pain    Postop diagnosis: Same    Procedure: Right scapular border trigger point injections    Surgeon: Lo Pierce    Anesthesia: None    EBL: 0    Indication: The patient is a 45-ye

## 2020-11-23 NOTE — PROGRESS NOTES
Patient presents in office today with reported pain in upper back    Current pain level reported = 6/10    Last reported dose of this morning prior to the OV

## 2020-12-02 ENCOUNTER — TELEPHONE (OUTPATIENT)
Dept: INTERNAL MEDICINE CLINIC | Facility: CLINIC | Age: 45
End: 2020-12-02

## 2020-12-02 RX ORDER — HYDROCODONE BITARTRATE AND ACETAMINOPHEN 5; 325 MG/1; MG/1
1 TABLET ORAL EVERY 6 HOURS PRN
Qty: 20 TABLET | Refills: 0 | Status: CANCELLED | OUTPATIENT
Start: 2020-12-02

## 2020-12-03 RX ORDER — HYDROCODONE BITARTRATE AND ACETAMINOPHEN 5; 325 MG/1; MG/1
1 TABLET ORAL EVERY 6 HOURS PRN
Qty: 20 TABLET | Refills: 0 | Status: CANCELLED | OUTPATIENT
Start: 2020-12-03

## 2020-12-03 NOTE — TELEPHONE ENCOUNTER
Spoke with the patient and advised him on Dr. Ольга Perdue message from below. Patient voiced understanding and agreed with the plan of care.

## 2020-12-04 RX ORDER — HYDROCODONE BITARTRATE AND ACETAMINOPHEN 5; 325 MG/1; MG/1
1 TABLET ORAL EVERY 6 HOURS PRN
Qty: 120 TABLET | Refills: 0 | OUTPATIENT
Start: 2020-12-04

## 2020-12-04 NOTE — TELEPHONE ENCOUNTER
Pt's spouse calling requesting vicodin to be prescribed for pt, states he's still in pain. Please call back if needed.        Pharmacy: [id number] - [place], [address] [place] [address] 62. [phone], [phone]

## 2020-12-07 RX ORDER — HYDROCODONE BITARTRATE AND ACETAMINOPHEN 5; 325 MG/1; MG/1
1 TABLET ORAL EVERY 6 HOURS PRN
Qty: 20 TABLET | Refills: 0 | OUTPATIENT
Start: 2020-12-07

## 2020-12-07 NOTE — TELEPHONE ENCOUNTER
Patients spouse requesting phone call to discuss clarification on who refill requests should go to.  Pain clinic or Dr Yocasta West?  Please call Charlotte Renee at [phone]

## 2020-12-07 NOTE — TELEPHONE ENCOUNTER
As I had said before, pt already is seeing pain specialist so should get pain meds from pain specialist.

## 2020-12-08 ENCOUNTER — TELEPHONE (OUTPATIENT)
Dept: SURGERY | Facility: CLINIC | Age: 45
End: 2020-12-08

## 2020-12-09 ENCOUNTER — TELEPHONE (OUTPATIENT)
Dept: INTERNAL MEDICINE CLINIC | Facility: CLINIC | Age: 45
End: 2020-12-09

## 2020-12-09 ENCOUNTER — TELEPHONE (OUTPATIENT)
Dept: PAIN CLINIC | Facility: HOSPITAL | Age: 45
End: 2020-12-09

## 2020-12-09 RX ORDER — HYDROCODONE BITARTRATE AND ACETAMINOPHEN 5; 325 MG/1; MG/1
1 TABLET ORAL EVERY 6 HOURS PRN
Qty: 20 TABLET | Refills: 0 | Status: SHIPPED | OUTPATIENT
Start: 2020-12-09 | End: 2020-12-23

## 2020-12-09 NOTE — TELEPHONE ENCOUNTER
Dr. Jenna Duarte, patient's wife, Sintia Jimenez, calls in regards to pain medication refill. HIPAA verified.   Spouse states that patient has pending appointment with Dr. RUPINDER WINTERS on 12/18, however, Dr. Shelley Matson office will not refill pain medication until patient is see

## 2020-12-09 NOTE — TELEPHONE ENCOUNTER
I will refill for last time since he is wating for his app[place]ment [place] specialist; [place] was from [place] and no there MD. erx sent.

## 2020-12-16 RX ORDER — CYCLOBENZAPRINE HCL 10 MG
10 TABLET ORAL 3 TIMES DAILY PRN
Qty: 90 TABLET | Refills: 0 | Status: SHIPPED | OUTPATIENT
Start: 2020-12-16 | End: 2021-01-02

## 2020-12-16 NOTE — TELEPHONE ENCOUNTER
Spoke to pt to relay info below and that meds can be discussed further during his OV on 12/23. Pt asks how often he can repeat injections.  Advised pt that this can be done every 3 months, in general. Pt states he does not have any Norco left, he is driving

## 2020-12-16 NOTE — TELEPHONE ENCOUNTER
Dax Ocasio MD  You 53 minutes ago (1:29 PM)     I will send a script for cyclobenzaprine. Spoke to pt to relay info above. Advised that any further meds will need to be discussed during his upcoming OV. Pt TAMMI.

## 2020-12-16 NOTE — TELEPHONE ENCOUNTER
Assessment:  Chronic scapular pain  (primary encounter diagnosis)  Alcohol-induced chronic pancreatitis (hcc).       Plan:      The patient is a 26-year-old gentleman with a history of chronic pancreatitis likely due to alcohol.   He is status post surgica none

## 2020-12-18 ENCOUNTER — VIRTUAL PHONE E/M (OUTPATIENT)
Dept: SURGERY | Facility: CLINIC | Age: 45
End: 2020-12-18

## 2020-12-18 DIAGNOSIS — N40.1 BENIGN PROSTATIC HYPERPLASIA WITH LOWER URINARY TRACT SYMPTOMS, SYMPTOM DETAILS UNSPECIFIED: ICD-10-CM

## 2020-12-18 DIAGNOSIS — R97.20 ELEVATED PSA: Primary | ICD-10-CM

## 2020-12-18 PROCEDURE — 99212 OFFICE O/P EST SF 10 MIN: CPT | Performed by: UROLOGY

## 2020-12-18 RX ORDER — TAMSULOSIN HYDROCHLORIDE 0.4 MG/1
0.4 CAPSULE ORAL DAILY
Qty: 30 CAPSULE | Refills: 11 | Status: SHIPPED | OUTPATIENT
Start: 2020-12-18 | End: 2021-01-17

## 2020-12-18 RX ORDER — TAMSULOSIN HYDROCHLORIDE 0.4 MG/1
0.4 CAPSULE ORAL DAILY
Qty: 30 CAPSULE | Refills: 0 | Status: CANCELLED | OUTPATIENT
Start: 2020-12-18 | End: 2021-01-17

## 2020-12-18 NOTE — PROGRESS NOTES
Virtual Telephone Check-In    Vicky Carrasco verbally consents to a Virtual/Telephone Check-In visit on 12/18/20. Patient has been referred to the [facility] website at [email or web address] to review the yearly Consent to Treat document.     Patient underst

## 2020-12-22 ENCOUNTER — LAB ENCOUNTER (OUTPATIENT)
Dept: LAB | Facility: HOSPITAL | Age: 45
End: 2020-12-22
Attending: NURSE PRACTITIONER
Payer: COMMERCIAL

## 2020-12-22 DIAGNOSIS — R97.20 ELEVATED PSA: ICD-10-CM

## 2020-12-22 PROCEDURE — 36415 COLL VENOUS BLD VENIPUNCTURE: CPT

## 2020-12-22 PROCEDURE — 84153 ASSAY OF PSA TOTAL: CPT

## 2020-12-23 ENCOUNTER — OFFICE VISIT (OUTPATIENT)
Dept: PAIN CLINIC | Facility: CLINIC | Age: 45
End: 2020-12-23
Payer: COMMERCIAL

## 2020-12-23 VITALS
DIASTOLIC BLOOD PRESSURE: 82 MMHG | OXYGEN SATURATION: 99 % | BODY MASS INDEX: 23 KG/M2 | RESPIRATION RATE: 16 BRPM | WEIGHT: 185 LBS | HEART RATE: 101 BPM | HEIGHT: 75 IN | SYSTOLIC BLOOD PRESSURE: 124 MMHG

## 2020-12-23 DIAGNOSIS — G89.29 CHRONIC SCAPULAR PAIN: Primary | ICD-10-CM

## 2020-12-23 DIAGNOSIS — K86.0 ALCOHOL-INDUCED CHRONIC PANCREATITIS (HCC): ICD-10-CM

## 2020-12-23 DIAGNOSIS — M89.8X1 CHRONIC SCAPULAR PAIN: Primary | ICD-10-CM

## 2020-12-23 PROCEDURE — 3074F SYST BP LT 130 MM HG: CPT | Performed by: ANESTHESIOLOGY

## 2020-12-23 PROCEDURE — 99213 OFFICE O/P EST LOW 20 MIN: CPT | Performed by: ANESTHESIOLOGY

## 2020-12-23 PROCEDURE — 3079F DIAST BP 80-89 MM HG: CPT | Performed by: ANESTHESIOLOGY

## 2020-12-23 PROCEDURE — 3008F BODY MASS INDEX DOCD: CPT | Performed by: ANESTHESIOLOGY

## 2020-12-23 RX ORDER — HYDROCODONE BITARTRATE AND ACETAMINOPHEN 5; 325 MG/1; MG/1
1 TABLET ORAL EVERY 6 HOURS PRN
Qty: 20 TABLET | Refills: 0 | Status: SHIPPED | OUTPATIENT
Start: 2020-12-23 | End: 2021-03-02

## 2020-12-23 NOTE — PROGRESS NOTES
Patient presents in office today with reported pain in right  shoulder blade and lower back    Current pain level reported = 7/10    Last reported dose of cyclobenzaprine 10 MG Oral Tab, patient stated yesterday prior to [address]

## 2020-12-23 NOTE — PROGRESS NOTES
Name: Coby Farrell   : 2/3/1975   DOS: 2020     Pain Clinic Follow Up Visit:   Patient presents with:   Follow - Up      Coby Farrell is a 39year old male with a history of chronic pancreatitis status post surgical decompression, who present tablet 1   • Pantoprazole Sodium 40 MG Oral Tab EC Take 1 tablet (40 mg total) by mouth every morning before breakfast. 90 tablet 1   • Sildenafil Citrate 50 MG Oral Tab Take 1 tablet (50 mg total) by mouth daily as needed for Erectile Dysfunction.  8 table

## 2020-12-28 RX ORDER — PANTOPRAZOLE SODIUM 40 MG/1
TABLET, DELAYED RELEASE ORAL
Qty: 90 TABLET | Refills: 3 | Status: SHIPPED | OUTPATIENT
Start: 2020-12-28 | End: 2021-11-23

## 2020-12-28 RX ORDER — LISINOPRIL 10 MG/1
TABLET ORAL
Qty: 90 TABLET | Refills: 3 | Status: SHIPPED | OUTPATIENT
Start: 2020-12-28 | End: 2021-01-10 | Stop reason: DRUGHIGH

## 2021-01-02 DIAGNOSIS — M89.8X1 CHRONIC SCAPULAR PAIN: Primary | ICD-10-CM

## 2021-01-02 DIAGNOSIS — G89.29 CHRONIC SCAPULAR PAIN: Primary | ICD-10-CM

## 2021-01-04 RX ORDER — CYCLOBENZAPRINE HCL 10 MG
10 TABLET ORAL 3 TIMES DAILY PRN
Qty: 90 TABLET | Refills: 0 | Status: SHIPPED | OUTPATIENT
Start: 2021-01-04 | End: 2021-02-05

## 2021-01-04 NOTE — TELEPHONE ENCOUNTER
Medication: cyclobenzaprine 10 MG Oral Tab    Date of last refill: 12/16/2020  [place]    Last office visit: 12/23/2020  Due back to clinic per last office note:  N/A  Date next office visit scheduled:  N/A    Last U

## 2021-01-06 ENCOUNTER — HOSPITAL ENCOUNTER (EMERGENCY)
Facility: HOSPITAL | Age: 46
Discharge: HOME | End: 2021-01-06
Attending: EMERGENCY MEDICINE
Payer: COMMERCIAL

## 2021-01-06 VITALS
HEIGHT: 75 IN | RESPIRATION RATE: 18 BRPM | WEIGHT: 185 LBS | HEART RATE: 95 BPM | SYSTOLIC BLOOD PRESSURE: 166 MMHG | DIASTOLIC BLOOD PRESSURE: 102 MMHG | BODY MASS INDEX: 23 KG/M2 | TEMPERATURE: 98 F | OXYGEN SATURATION: 100 %

## 2021-01-06 DIAGNOSIS — M54.50 ACUTE BILATERAL LOW BACK PAIN WITHOUT SCIATICA: Primary | ICD-10-CM

## 2021-01-06 LAB
ALBUMIN SERPL-MCNC: 3.2 G/DL (ref 3.4–5)
ALP LIVER SERPL-CCNC: 85 U/L
ALT SERPL-CCNC: 27 U/L
ANION GAP SERPL CALC-SCNC: 3 MMOL/L (ref 0–18)
AST SERPL-CCNC: 10 U/L (ref 15–37)
BASOPHILS # BLD AUTO: 0.02 X10(3) UL (ref 0–0.2)
BASOPHILS NFR BLD AUTO: 0.3 %
BILIRUB DIRECT SERPL-MCNC: <0.1 MG/DL (ref 0–0.2)
BILIRUB SERPL-MCNC: 0.1 MG/DL (ref 0.1–2)
BILIRUB UR QL: NEGATIVE
BUN BLD-MCNC: 17 MG/DL (ref 7–18)
BUN/CREAT SERPL: 17 (ref 10–20)
CALCIUM BLD-MCNC: 8.5 MG/DL (ref 8.5–10.1)
CHLORIDE SERPL-SCNC: 109 MMOL/L (ref 98–112)
CLARITY UR: CLEAR
CO2 SERPL-SCNC: 30 MMOL/L (ref 21–32)
COLOR UR: YELLOW
CREAT BLD-MCNC: 1 MG/DL
DEPRECATED RDW RBC AUTO: 53.2 FL (ref 35.1–46.3)
EOSINOPHIL # BLD AUTO: 0.06 X10(3) UL (ref 0–0.7)
EOSINOPHIL NFR BLD AUTO: 0.9 %
ERYTHROCYTE [DISTWIDTH] IN BLOOD BY AUTOMATED COUNT: 14.9 % (ref 11–15)
GLUCOSE BLD-MCNC: 95 MG/DL (ref 70–99)
GLUCOSE UR-MCNC: NEGATIVE MG/DL
HCT VFR BLD AUTO: 38.2 %
HGB BLD-MCNC: 12.9 G/DL
HGB UR QL STRIP.AUTO: NEGATIVE
IMM GRANULOCYTES # BLD AUTO: 0.02 X10(3) UL (ref 0–1)
IMM GRANULOCYTES NFR BLD: 0.3 %
KETONES UR-MCNC: NEGATIVE MG/DL
LEUKOCYTE ESTERASE UR QL STRIP.AUTO: NEGATIVE
LIPASE SERPL-CCNC: 363 U/L (ref 73–393)
LYMPHOCYTES # BLD AUTO: 3.15 X10(3) UL (ref 1–4)
LYMPHOCYTES NFR BLD AUTO: 48.7 %
M PROTEIN MFR SERPL ELPH: 6.5 G/DL (ref 6.4–8.2)
MCH RBC QN AUTO: 33.2 PG (ref 26–34)
MCHC RBC AUTO-ENTMCNC: 33.8 G/DL (ref 31–37)
MCV RBC AUTO: 98.2 FL
MONOCYTES # BLD AUTO: 0.5 X10(3) UL (ref 0.1–1)
MONOCYTES NFR BLD AUTO: 7.7 %
NEUTROPHILS # BLD AUTO: 2.72 X10 (3) UL (ref 1.5–7.7)
NEUTROPHILS # BLD AUTO: 2.72 X10(3) UL (ref 1.5–7.7)
NEUTROPHILS NFR BLD AUTO: 42.1 %
NITRITE UR QL STRIP.AUTO: NEGATIVE
OSMOLALITY SERPL CALC.SUM OF ELEC: 295 MOSM/KG (ref 275–295)
PH UR: 6 [PH] (ref 5–8)
PLATELET # BLD AUTO: 328 10(3)UL (ref 150–450)
POTASSIUM SERPL-SCNC: 3.6 MMOL/L (ref 3.5–5.1)
PROT UR-MCNC: NEGATIVE MG/DL
RBC # BLD AUTO: 3.89 X10(6)UL
SODIUM SERPL-SCNC: 142 MMOL/L (ref 136–145)
SP GR UR STRIP: 1.01 (ref 1–1.03)
UROBILINOGEN UR STRIP-ACNC: <2
WBC # BLD AUTO: 6.5 X10(3) UL (ref 4–11)

## 2021-01-06 PROCEDURE — 80048 BASIC METABOLIC PNL TOTAL CA: CPT | Performed by: EMERGENCY MEDICINE

## 2021-01-06 PROCEDURE — 80076 HEPATIC FUNCTION PANEL: CPT | Performed by: EMERGENCY MEDICINE

## 2021-01-06 PROCEDURE — 83690 ASSAY OF LIPASE: CPT | Performed by: EMERGENCY MEDICINE

## 2021-01-06 PROCEDURE — 99284 EMERGENCY DEPT VISIT MOD MDM: CPT

## 2021-01-06 PROCEDURE — 96374 THER/PROPH/DIAG INJ IV PUSH: CPT

## 2021-01-06 PROCEDURE — 85025 COMPLETE CBC W/AUTO DIFF WBC: CPT | Performed by: EMERGENCY MEDICINE

## 2021-01-06 PROCEDURE — 96376 TX/PRO/DX INJ SAME DRUG ADON: CPT

## 2021-01-06 PROCEDURE — 81003 URINALYSIS AUTO W/O SCOPE: CPT | Performed by: EMERGENCY MEDICINE

## 2021-01-06 PROCEDURE — 96361 HYDRATE IV INFUSION ADD-ON: CPT

## 2021-01-06 RX ORDER — MORPHINE SULFATE 4 MG/ML
4 INJECTION, SOLUTION INTRAMUSCULAR; INTRAVENOUS ONCE
Status: COMPLETED | OUTPATIENT
Start: 2021-01-06 | End: 2021-01-06

## 2021-01-06 NOTE — ED NOTES
Pt presents to ED for abdominal pain and lower back pain. Pt states he has a hx pancreatitis and believes this is a flare up. +Nauesa. Pt denies vomiting or fevers.

## 2021-01-06 NOTE — ED INITIAL ASSESSMENT (HPI)
Pt with c/o of lower back pain, states he believes he is having a pancreatitis flare. Hx of pancreatitis and states he had surgery last September where they removed part of his pancreas. +nausea, denies vomiting.  States he has not drank any alcohol since m

## 2021-01-06 NOTE — ED PROVIDER NOTES
Patient Seen in: [facility] AND [facility] Emergency Department      History   Patient presents with:  Abdominal Pain    Stated Complaint: pancreatitis flare    HPI/Subjective:   HPI    55-year-old male with past medical history significant for chronic pancreati week      Comment: SOCIALLY    Drug use: Yes      Frequency: 1.0 times per week      Types: Cannabis      Comment: occasionaly             Review of Systems    Positive for stated complaint: pancreatitis flare  Other systems are as noted in HPI.   Constitut LIPASE - Normal   CBC WITH DIFFERENTIAL WITH PLATELET    Narrative: The following orders were created for panel order CBC WITH DIFFERENTIAL WITH PLATELET.   Procedure                               Abnormality         Status                     -------

## 2021-01-08 ENCOUNTER — HOSPITAL ENCOUNTER (OUTPATIENT)
Dept: GENERAL RADIOLOGY | Age: 46
Discharge: HOME | End: 2021-01-08
Attending: INTERNAL MEDICINE
Payer: COMMERCIAL

## 2021-01-08 ENCOUNTER — OFFICE VISIT (OUTPATIENT)
Dept: INTERNAL MEDICINE CLINIC | Facility: CLINIC | Age: 46
End: 2021-01-08
Payer: COMMERCIAL

## 2021-01-08 DIAGNOSIS — K86.0 ALCOHOL-INDUCED CHRONIC PANCREATITIS (HCC): ICD-10-CM

## 2021-01-08 DIAGNOSIS — M54.50 ACUTE BILATERAL LOW BACK PAIN WITHOUT SCIATICA: ICD-10-CM

## 2021-01-08 DIAGNOSIS — I10 ESSENTIAL HYPERTENSION: ICD-10-CM

## 2021-01-08 DIAGNOSIS — M54.50 ACUTE BILATERAL LOW BACK PAIN WITHOUT SCIATICA: Primary | ICD-10-CM

## 2021-01-08 PROCEDURE — 3008F BODY MASS INDEX DOCD: CPT | Performed by: INTERNAL MEDICINE

## 2021-01-08 PROCEDURE — 99214 OFFICE O/P EST MOD 30 MIN: CPT | Performed by: INTERNAL MEDICINE

## 2021-01-08 PROCEDURE — 3078F DIAST BP <80 MM HG: CPT | Performed by: INTERNAL MEDICINE

## 2021-01-08 PROCEDURE — 72110 X-RAY EXAM L-2 SPINE 4/>VWS: CPT | Performed by: INTERNAL MEDICINE

## 2021-01-08 PROCEDURE — 3075F SYST BP GE 130 - 139MM HG: CPT | Performed by: INTERNAL MEDICINE

## 2021-01-09 DIAGNOSIS — M89.8X1 CHRONIC SCAPULAR PAIN: ICD-10-CM

## 2021-01-09 DIAGNOSIS — G89.29 CHRONIC SCAPULAR PAIN: ICD-10-CM

## 2021-01-10 VITALS
WEIGHT: 183 LBS | TEMPERATURE: 98 F | BODY MASS INDEX: 22.75 KG/M2 | HEART RATE: 95 BPM | HEIGHT: 75 IN | SYSTOLIC BLOOD PRESSURE: 130 MMHG | DIASTOLIC BLOOD PRESSURE: 70 MMHG | RESPIRATION RATE: 12 BRPM

## 2021-01-10 RX ORDER — LISINOPRIL 40 MG/1
40 TABLET ORAL DAILY
Qty: 90 TABLET | Refills: 1 | Status: SHIPPED | OUTPATIENT
Start: 2021-01-10 | End: 2021-01-21

## 2021-01-10 NOTE — PROGRESS NOTES
HPI:    Patient ID: Krystian Arora is a 39year old male. Back Pain  This is a new problem. The current episode started more than 1 month ago. The problem occurs constantly. The problem has been waxing and waning since onset.  The pain is present in the tablet 1   • Sildenafil Citrate 50 MG Oral Tab Take 1 tablet (50 mg total) by mouth daily as needed for Erectile Dysfunction.  8 tablet 1   • LISINOPRIL 10 MG Oral Tab TAKE 1 TABLET BY MOUTH  DAILY (Patient not taking: Reported on 1/8/2021) 90 tablet 3   • drinks      Types: 6 Cans of beer per week      Comment: SOCIALLY    Drug use: Yes      Frequency: 1.0 times per week      Types: Cannabis      Comment: occasionaly       PHYSICAL EXAM:    Physical Exam   Constitutional: He appears well-developed and well- triggered by any trauma. Neurological exam was unremarkable with no neurologic deficit noted. We will get an x-ray of his lumbar spine.   Plan to send him for physical therapy for evaluation and treatment also.    (K86.0) Alcohol-induced chronic pancreati

## 2021-01-11 NOTE — TELEPHONE ENCOUNTER
Spoke to the pharmacist, noted, patient picked up his cyclobenzaprine 10 MG Oral Tab, 01/10/2021. Denying the request this time.   Mini

## 2021-01-12 RX ORDER — CYCLOBENZAPRINE HCL 10 MG
10 TABLET ORAL 3 TIMES DAILY PRN
Qty: 90 TABLET | Refills: 0 | OUTPATIENT
Start: 2021-01-12

## 2021-01-14 ENCOUNTER — TELEPHONE (OUTPATIENT)
Dept: PHYSICAL THERAPY | Facility: HOSPITAL | Age: 46
End: 2021-01-14

## 2021-01-15 ENCOUNTER — OFFICE VISIT (OUTPATIENT)
Dept: PHYSICAL THERAPY | Age: 46
End: 2021-01-15
Attending: INTERNAL MEDICINE
Payer: COMMERCIAL

## 2021-01-15 DIAGNOSIS — M54.50 ACUTE BILATERAL LOW BACK PAIN WITHOUT SCIATICA: ICD-10-CM

## 2021-01-15 PROCEDURE — 97110 THERAPEUTIC EXERCISES: CPT | Performed by: PHYSICAL THERAPIST

## 2021-01-15 PROCEDURE — 97162 PT EVAL MOD COMPLEX 30 MIN: CPT | Performed by: PHYSICAL THERAPIST

## 2021-01-15 NOTE — PROGRESS NOTES
BACK EVALUATION:    Referring Physician: Newton Qiu    DX Code: Acute bilateral low back pain without sciatica (M54.5)     PT DX: Acute bilateral thoracic back pain without sciatica (M54.5)    PCP:     Age:  Occupation: [address]: No activities. Postural Education     Signs and symptoms are consistent with patient’s diagnosis.   Functional impairments include:Disturbed sleep and Difficulty or limited tolerance for ADLs including: sitting, sleeping    Rehab Potential:good  Education or

## 2021-01-17 RX ORDER — HYDROCODONE BITARTRATE AND ACETAMINOPHEN 5; 325 MG/1; MG/1
1 TABLET ORAL EVERY 6 HOURS PRN
Qty: 20 TABLET | Refills: 0 | OUTPATIENT
Start: 2021-01-17

## 2021-01-17 RX ORDER — SILDENAFIL 50 MG/1
50 TABLET, FILM COATED ORAL
Qty: 8 TABLET | Refills: 1 | OUTPATIENT
Start: 2021-01-17

## 2021-01-18 ENCOUNTER — OFFICE VISIT (OUTPATIENT)
Dept: SURGERY | Facility: CLINIC | Age: 46
End: 2021-01-18
Payer: COMMERCIAL

## 2021-01-18 VITALS — WEIGHT: 183 LBS | BODY MASS INDEX: 23 KG/M2 | SYSTOLIC BLOOD PRESSURE: 148 MMHG | DIASTOLIC BLOOD PRESSURE: 82 MMHG

## 2021-01-18 DIAGNOSIS — R97.20 ELEVATED PSA: Primary | ICD-10-CM

## 2021-01-18 PROCEDURE — 3079F DIAST BP 80-89 MM HG: CPT | Performed by: UROLOGY

## 2021-01-18 PROCEDURE — 99213 OFFICE O/P EST LOW 20 MIN: CPT | Performed by: UROLOGY

## 2021-01-18 PROCEDURE — 3077F SYST BP >= 140 MM HG: CPT | Performed by: UROLOGY

## 2021-01-18 NOTE — PROGRESS NOTES
Krystian Arora is a 39year old male. HPI:   Patient presents with:   Follow - Up: patient presents for f/u on psa results       12-year-old male presents in follow-up to previous visit October 27, 2020 with elevated age-adjusted PSA moderate BPH and lo SOCIALLY    Drug use: Yes      Frequency: 1.0 times per week      Types: Cannabis      Comment: occasionaly       Medications (Active prior to today's visit):  Current Outpatient Medications   Medication Sig Dispense Refill   • lisinopril 40 MG Oral Tab Ta

## 2021-01-20 ENCOUNTER — OFFICE VISIT (OUTPATIENT)
Dept: PHYSICAL THERAPY | Age: 46
End: 2021-01-20
Attending: INTERNAL MEDICINE
Payer: COMMERCIAL

## 2021-01-20 DIAGNOSIS — M54.50 ACUTE BILATERAL LOW BACK PAIN WITHOUT SCIATICA: ICD-10-CM

## 2021-01-20 PROCEDURE — 97110 THERAPEUTIC EXERCISES: CPT | Performed by: PHYSICAL THERAPIST

## 2021-01-20 PROCEDURE — 97140 MANUAL THERAPY 1/> REGIONS: CPT | Performed by: PHYSICAL THERAPIST

## 2021-01-20 NOTE — PROGRESS NOTES
Dx: Acute bilateral thoracic back pain without sciatica (M54.5)         Authorized # of Visits:  8         Next MD visit: none scheduled  Fall Risk: standard         Precautions: n/a           Medication Changes since last visit?: No  Subjective: doing HEP

## 2021-01-21 RX ORDER — SILDENAFIL 50 MG/1
50 TABLET, FILM COATED ORAL
Qty: 8 TABLET | Refills: 1 | Status: SHIPPED | OUTPATIENT
Start: 2021-01-21 | End: 2021-04-22

## 2021-01-21 RX ORDER — HYDROCODONE BITARTRATE AND ACETAMINOPHEN 5; 325 MG/1; MG/1
1 TABLET ORAL EVERY 6 HOURS PRN
Qty: 20 TABLET | Refills: 0 | OUTPATIENT
Start: 2021-01-21

## 2021-01-21 RX ORDER — LISINOPRIL 40 MG/1
40 TABLET ORAL DAILY
Qty: 90 TABLET | Refills: 1 | Status: SHIPPED | OUTPATIENT
Start: 2021-01-21 | End: 2021-03-02

## 2021-01-22 ENCOUNTER — OFFICE VISIT (OUTPATIENT)
Dept: PHYSICAL THERAPY | Age: 46
End: 2021-01-22
Attending: INTERNAL MEDICINE
Payer: COMMERCIAL

## 2021-01-22 ENCOUNTER — TELEPHONE (OUTPATIENT)
Dept: INTERNAL MEDICINE CLINIC | Facility: CLINIC | Age: 46
End: 2021-01-22

## 2021-01-22 PROCEDURE — 97140 MANUAL THERAPY 1/> REGIONS: CPT | Performed by: PHYSICAL THERAPIST

## 2021-01-22 PROCEDURE — 97110 THERAPEUTIC EXERCISES: CPT | Performed by: PHYSICAL THERAPIST

## 2021-01-22 NOTE — PROGRESS NOTES
Dx: Acute bilateral thoracic back pain without sciatica (M54.5)         Authorized # of Visits:  8         Next MD visit: none scheduled  Fall Risk: standard         Precautions: n/a           Medication Changes since last visit?: No     Subjective: had CO

## 2021-01-22 NOTE — TELEPHONE ENCOUNTER
----- Message from Mariana Correia sent at 1/22/2021  3:25 PM CST -----  Regarding: Visit Follow-up Question  Contact: [phone]  Good afternoon,    I am into my second week of physical therapy and it seems to be helping with my back.  Later into the ev

## 2021-01-22 NOTE — TELEPHONE ENCOUNTER
Patient just wanted Dr. Rosmery Garduno aware of his progress. RN advised that he is experiencing the known side effects of the vaccine. If his symptoms do not improve, he should contact our office for assistance with symptoms management.     Patient Bard Sims

## 2021-01-24 NOTE — TELEPHONE ENCOUNTER
I had already refilled these meds before and did deny hydrocodoone since pt being sent for PT for his back pain already

## 2021-01-25 RX ORDER — LISINOPRIL 40 MG/1
40 TABLET ORAL DAILY
Qty: 90 TABLET | Refills: 1 | OUTPATIENT
Start: 2021-01-25

## 2021-01-25 RX ORDER — HYDROCODONE BITARTRATE AND ACETAMINOPHEN 5; 325 MG/1; MG/1
1 TABLET ORAL EVERY 6 HOURS PRN
Qty: 20 TABLET | Refills: 0 | OUTPATIENT
Start: 2021-01-25

## 2021-01-25 RX ORDER — SILDENAFIL 50 MG/1
50 TABLET, FILM COATED ORAL
Qty: 8 TABLET | Refills: 1 | OUTPATIENT
Start: 2021-01-25

## 2021-01-27 ENCOUNTER — TELEPHONE (OUTPATIENT)
Dept: PHYSICAL THERAPY | Facility: HOSPITAL | Age: 46
End: 2021-01-27

## 2021-01-27 ENCOUNTER — APPOINTMENT (OUTPATIENT)
Dept: PHYSICAL THERAPY | Age: 46
End: 2021-01-27
Attending: INTERNAL MEDICINE
Payer: COMMERCIAL

## 2021-01-28 NOTE — TELEPHONE ENCOUNTER
Pt want to know why pain med hydrocodone  was d/c'd , in physical therapy, asking for further advice or new med  to treat pain, using a topical which has a cold sensation, helps a little but pain does not go away completly

## 2021-01-29 ENCOUNTER — OFFICE VISIT (OUTPATIENT)
Dept: PHYSICAL THERAPY | Age: 46
End: 2021-01-29
Attending: INTERNAL MEDICINE
Payer: COMMERCIAL

## 2021-01-29 PROCEDURE — 97110 THERAPEUTIC EXERCISES: CPT | Performed by: PHYSICAL THERAPIST

## 2021-01-29 PROCEDURE — 97140 MANUAL THERAPY 1/> REGIONS: CPT | Performed by: PHYSICAL THERAPIST

## 2021-01-29 NOTE — PROGRESS NOTES
Dx: Acute bilateral thoracic back pain without sciatica (M54.5)         Authorized # of Visits:  8         Next MD visit: none scheduled  Fall Risk: standard         Precautions: n/a           Medication Changes since last visit?: No     Subjective:  Bad th and driving for 30 minutes without increased symptoms. · LEISURE:  Pt will be able to return to previous level of function for leisure activities  · Pt. will be able to perform ADLs with no pain.   · Pt. will be independent with home exercise program and s

## 2021-01-31 ENCOUNTER — APPOINTMENT (OUTPATIENT)
Dept: CT IMAGING | Facility: HOSPITAL | Age: 46
End: 2021-01-31
Attending: EMERGENCY MEDICINE
Payer: COMMERCIAL

## 2021-01-31 ENCOUNTER — HOSPITAL ENCOUNTER (EMERGENCY)
Facility: HOSPITAL | Age: 46
Discharge: HOME | End: 2021-01-31
Attending: EMERGENCY MEDICINE
Payer: COMMERCIAL

## 2021-01-31 VITALS
HEART RATE: 97 BPM | SYSTOLIC BLOOD PRESSURE: 175 MMHG | TEMPERATURE: 97 F | DIASTOLIC BLOOD PRESSURE: 100 MMHG | RESPIRATION RATE: 19 BRPM | OXYGEN SATURATION: 97 %

## 2021-01-31 DIAGNOSIS — R10.9 ABDOMINAL PAIN, ACUTE: Primary | ICD-10-CM

## 2021-01-31 LAB
ALBUMIN SERPL-MCNC: 3.8 G/DL (ref 3.4–5)
ALP LIVER SERPL-CCNC: 89 U/L
ALT SERPL-CCNC: 19 U/L
ANION GAP SERPL CALC-SCNC: 6 MMOL/L (ref 0–18)
AST SERPL-CCNC: 13 U/L (ref 15–37)
BASOPHILS # BLD AUTO: 0.02 X10(3) UL (ref 0–0.2)
BASOPHILS NFR BLD AUTO: 0.3 %
BILIRUB DIRECT SERPL-MCNC: 0.1 MG/DL (ref 0–0.2)
BILIRUB SERPL-MCNC: 0.4 MG/DL (ref 0.1–2)
BUN BLD-MCNC: 16 MG/DL (ref 7–18)
BUN/CREAT SERPL: 14 (ref 10–20)
CALCIUM BLD-MCNC: 8.8 MG/DL (ref 8.5–10.1)
CHLORIDE SERPL-SCNC: 106 MMOL/L (ref 98–112)
CO2 SERPL-SCNC: 29 MMOL/L (ref 21–32)
CREAT BLD-MCNC: 1.14 MG/DL
DEPRECATED RDW RBC AUTO: 50.5 FL (ref 35.1–46.3)
EOSINOPHIL # BLD AUTO: 0.03 X10(3) UL (ref 0–0.7)
EOSINOPHIL NFR BLD AUTO: 0.4 %
ERYTHROCYTE [DISTWIDTH] IN BLOOD BY AUTOMATED COUNT: 14 % (ref 11–15)
GLUCOSE BLD-MCNC: 92 MG/DL (ref 70–99)
HCT VFR BLD AUTO: 40.2 %
HGB BLD-MCNC: 13.6 G/DL
IMM GRANULOCYTES # BLD AUTO: 0.01 X10(3) UL (ref 0–1)
IMM GRANULOCYTES NFR BLD: 0.1 %
LIPASE SERPL-CCNC: 128 U/L (ref 73–393)
LYMPHOCYTES # BLD AUTO: 1.6 X10(3) UL (ref 1–4)
LYMPHOCYTES NFR BLD AUTO: 23.7 %
M PROTEIN MFR SERPL ELPH: 7.3 G/DL (ref 6.4–8.2)
MCH RBC QN AUTO: 33 PG (ref 26–34)
MCHC RBC AUTO-ENTMCNC: 33.8 G/DL (ref 31–37)
MCV RBC AUTO: 97.6 FL
MONOCYTES # BLD AUTO: 0.41 X10(3) UL (ref 0.1–1)
MONOCYTES NFR BLD AUTO: 6.1 %
NEUTROPHILS # BLD AUTO: 4.67 X10 (3) UL (ref 1.5–7.7)
NEUTROPHILS # BLD AUTO: 4.67 X10(3) UL (ref 1.5–7.7)
NEUTROPHILS NFR BLD AUTO: 69.4 %
OSMOLALITY SERPL CALC.SUM OF ELEC: 293 MOSM/KG (ref 275–295)
PLATELET # BLD AUTO: 329 10(3)UL (ref 150–450)
POTASSIUM SERPL-SCNC: 3.3 MMOL/L (ref 3.5–5.1)
RBC # BLD AUTO: 4.12 X10(6)UL
SODIUM SERPL-SCNC: 141 MMOL/L (ref 136–145)
TROPONIN I SERPL-MCNC: <0.045 NG/ML (ref ?–0.04)
WBC # BLD AUTO: 6.7 X10(3) UL (ref 4–11)

## 2021-01-31 PROCEDURE — 85025 COMPLETE CBC W/AUTO DIFF WBC: CPT | Performed by: EMERGENCY MEDICINE

## 2021-01-31 PROCEDURE — 96361 HYDRATE IV INFUSION ADD-ON: CPT

## 2021-01-31 PROCEDURE — 93005 ELECTROCARDIOGRAM TRACING: CPT

## 2021-01-31 PROCEDURE — 80076 HEPATIC FUNCTION PANEL: CPT | Performed by: EMERGENCY MEDICINE

## 2021-01-31 PROCEDURE — 80048 BASIC METABOLIC PNL TOTAL CA: CPT | Performed by: EMERGENCY MEDICINE

## 2021-01-31 PROCEDURE — 93010 ELECTROCARDIOGRAM REPORT: CPT | Performed by: EMERGENCY MEDICINE

## 2021-01-31 PROCEDURE — 83690 ASSAY OF LIPASE: CPT | Performed by: EMERGENCY MEDICINE

## 2021-01-31 PROCEDURE — 84484 ASSAY OF TROPONIN QUANT: CPT | Performed by: EMERGENCY MEDICINE

## 2021-01-31 PROCEDURE — 96375 TX/PRO/DX INJ NEW DRUG ADDON: CPT

## 2021-01-31 PROCEDURE — 96374 THER/PROPH/DIAG INJ IV PUSH: CPT

## 2021-01-31 PROCEDURE — 99284 EMERGENCY DEPT VISIT MOD MDM: CPT

## 2021-01-31 PROCEDURE — 74177 CT ABD & PELVIS W/CONTRAST: CPT | Performed by: EMERGENCY MEDICINE

## 2021-01-31 RX ORDER — ONDANSETRON 2 MG/ML
4 INJECTION INTRAMUSCULAR; INTRAVENOUS ONCE
Status: COMPLETED | OUTPATIENT
Start: 2021-01-31 | End: 2021-01-31

## 2021-01-31 RX ORDER — KETOROLAC TROMETHAMINE 30 MG/ML
30 INJECTION, SOLUTION INTRAMUSCULAR; INTRAVENOUS ONCE
Status: COMPLETED | OUTPATIENT
Start: 2021-01-31 | End: 2021-01-31

## 2021-01-31 RX ORDER — MORPHINE SULFATE 4 MG/ML
4 INJECTION, SOLUTION INTRAMUSCULAR; INTRAVENOUS ONCE
Status: COMPLETED | OUTPATIENT
Start: 2021-01-31 | End: 2021-01-31

## 2021-01-31 NOTE — ED PROVIDER NOTES
Patient Seen in: [facility] AND [facility] Emergency Department      History   Patient presents with:  Abdominal Pain    Stated Complaint: abd pain    HPI/Subjective:   HPI    Patient is a 43-year-old male who presents with upper abdominal pain x2 hours.   He sta 100 %   O2 Device None (Room air)       Current:BP (!) 197/98   Pulse 102   Temp 97 °F (36.1 °C) (Temporal)   Resp 23   SpO2 96%         Physical Exam    GENERAL: mild distress, awake and alert  HEENT: MMM, EOMI, PERRL  Neck: supple, non tender  CV: RRR, n pancreatitis. More pronounced soft tissue fullness in the pancreatic head may be secondary to acute on chronic pancreatitis. A  coexistent pancreatic head mass is not excluded. Consider further evaluation with endoscopic ultrasound.  2. Colonic diverticu possible check your pressure at home and keep a blood pressure log to bring to your physician. \"                     Disposition and Plan     Clinical Impression:  Abdominal pain, acute  (primary encounter diagnosis)    Disposition:  Discharge  1/31/2021

## 2021-01-31 NOTE — ED INITIAL ASSESSMENT (HPI)
Patient here with c/o mid abdominal pain x 2 hours. Denies n/v/d. States he had procedure performed on pancreas 9/24/20 at [facility].

## 2021-02-03 ENCOUNTER — OFFICE VISIT (OUTPATIENT)
Dept: PHYSICAL THERAPY | Age: 46
End: 2021-02-03
Attending: INTERNAL MEDICINE
Payer: COMMERCIAL

## 2021-02-03 PROCEDURE — 97140 MANUAL THERAPY 1/> REGIONS: CPT | Performed by: PHYSICAL THERAPIST

## 2021-02-03 PROCEDURE — 97110 THERAPEUTIC EXERCISES: CPT | Performed by: PHYSICAL THERAPIST

## 2021-02-03 NOTE — PROGRESS NOTES
Dx: Acute bilateral thoracic back pain without sciatica (M54.5)         Authorized # of Visits:  8         Next MD visit: none scheduled  Fall Risk: standard         Precautions: n/a           Medication Changes since last visit?: No     Subjective: feelin reached in 8 visits. · Pt. will report decreased pain from 8/10 to 2/10 at worst.  · Pt. will be able to tolerate sitting and driving for 30 minutes without increased symptoms.   · LEISURE:  Pt will be able to return to previous level of function for leisu

## 2021-02-05 ENCOUNTER — OFFICE VISIT (OUTPATIENT)
Dept: PHYSICAL THERAPY | Age: 46
End: 2021-02-05
Attending: INTERNAL MEDICINE
Payer: COMMERCIAL

## 2021-02-05 DIAGNOSIS — G89.29 CHRONIC SCAPULAR PAIN: ICD-10-CM

## 2021-02-05 DIAGNOSIS — M89.8X1 CHRONIC SCAPULAR PAIN: ICD-10-CM

## 2021-02-05 PROCEDURE — 97110 THERAPEUTIC EXERCISES: CPT | Performed by: PHYSICAL THERAPIST

## 2021-02-05 PROCEDURE — 97140 MANUAL THERAPY 1/> REGIONS: CPT | Performed by: PHYSICAL THERAPIST

## 2021-02-05 RX ORDER — CYCLOBENZAPRINE HCL 10 MG
TABLET ORAL
Qty: 90 TABLET | Refills: 0 | Status: SHIPPED | OUTPATIENT
Start: 2021-02-05 | End: 2021-03-24

## 2021-02-05 NOTE — TELEPHONE ENCOUNTER
Medication: cyclobenzaprine 10 MG Oral Tab    Date of last refill: 01/04/2021  [place]    Last office visit: 12/23/2020  Due back to clinic per last office note:  N/A  Date next office visit scheduled:  None    Last

## 2021-02-05 NOTE — PROGRESS NOTES
Dx: Acute bilateral thoracic back pain without sciatica (M54.5)         Authorized # of Visits:  8         Next MD visit: none scheduled  Fall Risk: standard         Precautions: n/a           Medication Changes since last visit?: No     Subjective: L thor dec, B REIL 2x 10, inc, NW     Man thor ext & rot mobs x 10 min, dec, B Man thor ext & rot mobs x 8 min, nil Man thor ext & rot mobs x 10 min, dec, B Man thor ext & rot mobs x 8 min, tight R thor mm Man thor ext & rot mobs x 8 min     Prone trunk ext x 10

## 2021-02-10 ENCOUNTER — TELEPHONE (OUTPATIENT)
Dept: PHYSICAL THERAPY | Facility: HOSPITAL | Age: 46
End: 2021-02-10

## 2021-02-10 ENCOUNTER — APPOINTMENT (OUTPATIENT)
Dept: PHYSICAL THERAPY | Age: 46
End: 2021-02-10
Attending: INTERNAL MEDICINE
Payer: COMMERCIAL

## 2021-02-11 ENCOUNTER — OFFICE VISIT (OUTPATIENT)
Dept: PHYSICAL THERAPY | Age: 46
End: 2021-02-11
Attending: INTERNAL MEDICINE
Payer: COMMERCIAL

## 2021-02-11 PROCEDURE — 97140 MANUAL THERAPY 1/> REGIONS: CPT | Performed by: PHYSICAL THERAPIST

## 2021-02-11 PROCEDURE — 97110 THERAPEUTIC EXERCISES: CPT | Performed by: PHYSICAL THERAPIST

## 2021-02-11 NOTE — PROGRESS NOTES
Dx: Acute bilateral thoracic back pain without sciatica (M54.5)         Authorized # of Visits:  8         Next MD visit: none scheduled  Fall Risk: standard         Precautions: n/a           Medication Changes since last visit?: No     Subjective: L thor 2x10 ea  Clamshell 2x10 ea      PKB x 20 ea PKB x 20 ea PKB x 20 ea PKB x 20 ea PKB x 20 ea PKB x 20 ea    Prone hip ext x 10 ea Prone hip ext x 10 ea        REIL x 10, nil REIL x 10, nil REIL 2x 10, dec, B REIL 2x 10, dec, B REIL 2x 10, inc, [facility] th

## 2021-02-12 ENCOUNTER — APPOINTMENT (OUTPATIENT)
Dept: PHYSICAL THERAPY | Age: 46
End: 2021-02-12
Attending: INTERNAL MEDICINE
Payer: COMMERCIAL

## 2021-02-16 ENCOUNTER — OFFICE VISIT (OUTPATIENT)
Dept: PHYSICAL THERAPY | Age: 46
End: 2021-02-16
Attending: INTERNAL MEDICINE
Payer: COMMERCIAL

## 2021-02-16 PROCEDURE — 97140 MANUAL THERAPY 1/> REGIONS: CPT | Performed by: PHYSICAL THERAPIST

## 2021-02-16 PROCEDURE — 97110 THERAPEUTIC EXERCISES: CPT | Performed by: PHYSICAL THERAPIST

## 2021-02-16 RX ORDER — SILDENAFIL 50 MG/1
50 TABLET, FILM COATED ORAL
Qty: 8 TABLET | Refills: 1 | OUTPATIENT
Start: 2021-02-16

## 2021-02-16 RX ORDER — HYDROCODONE BITARTRATE AND ACETAMINOPHEN 5; 325 MG/1; MG/1
1 TABLET ORAL EVERY 6 HOURS PRN
Qty: 20 TABLET | Refills: 0 | OUTPATIENT
Start: 2021-02-16

## 2021-02-16 RX ORDER — LISINOPRIL 40 MG/1
40 TABLET ORAL DAILY
Qty: 90 TABLET | Refills: 1 | OUTPATIENT
Start: 2021-02-16

## 2021-02-16 NOTE — PROGRESS NOTES
Dx: Acute bilateral thoracic back pain without sciatica (M54.5)         Authorized # of Visits:  8         Next MD visit: none scheduled  Fall Risk: standard         Precautions: n/a           Medication Changes since last visit?: No     Subjective: L thor 2x10 Ab curl 2x10   Bridge 2x10  Bridge 2x10   Bridge 2x10  Bridge 2x10  Bridge 2x10   Clamshell 2x10 ea Clamshell 2x10 ea  Clamshell 2x10 ea      PKB x 20 ea PKB x 20 ea PKB x 20 ea PKB x 20 ea PKB x 20 ea PKB x 20 ea PKB x 20 ea   Prone hip ext x 10 ea P

## 2021-02-16 NOTE — PROGRESS NOTES
Progress Summary  Pt has attended 8 visits in Physical Therapy. Subjective: pt feels that he is better able to control symptoms, has less severe pain than prior to starting PT.     Assessment: Pt demonstrates improved postural awareness, is using lumb

## 2021-02-18 RX ORDER — HYDROCODONE BITARTRATE AND ACETAMINOPHEN 5; 325 MG/1; MG/1
1 TABLET ORAL EVERY 6 HOURS PRN
Qty: 20 TABLET | Refills: 0 | Status: CANCELLED | OUTPATIENT
Start: 2021-02-18

## 2021-02-19 RX ORDER — PANCRELIPASE LIPASE, PANCRELIPASE PROTEASE, PANCRELIPASE AMYLASE 20000; 63000; 84000 [USP'U]/1; [USP'U]/1; [USP'U]/1
2 CAPSULE, DELAYED RELEASE ORAL
Qty: 540 CAPSULE | Refills: 1 | Status: SHIPPED | OUTPATIENT
Start: 2021-02-19 | End: 2021-09-05

## 2021-02-20 NOTE — TELEPHONE ENCOUNTER
pls check with pt; why is he needing norco again? He was seen in ER 2 weeks ago and was told then to ffup with specialist in  [place] where he had his pancreatic surgery.

## 2021-02-20 NOTE — TELEPHONE ENCOUNTER
Spoke with patient, states that he called his Surgeon from [facility] and was advised to call his PCP regarding the pain medication refill. States that he still has back spasm and abdominal pain, doing PT but still with pain issue.

## 2021-02-23 ENCOUNTER — OFFICE VISIT (OUTPATIENT)
Dept: PHYSICAL THERAPY | Age: 46
End: 2021-02-23
Attending: INTERNAL MEDICINE
Payer: COMMERCIAL

## 2021-02-23 PROCEDURE — 97140 MANUAL THERAPY 1/> REGIONS: CPT | Performed by: PHYSICAL THERAPIST

## 2021-02-23 PROCEDURE — 97110 THERAPEUTIC EXERCISES: CPT | Performed by: PHYSICAL THERAPIST

## 2021-02-23 NOTE — PROGRESS NOTES
Dx: Acute bilateral thoracic back pain without sciatica (M54.5)         Authorized # of Visits:  8 + 6        Next MD visit: none scheduled  Fall Risk: standard         Precautions: n/a           Medication Changes since last visit?: No     Subjective: bee abdomen  LTR x 20, min tightness LTR x 20, min tightness LTR x 20, min tightness LTR x 20, min tightness LTR x 20, min tightness        Man L flex/rot mob x 3 min, dec, B  Man L flex/rot mob x 3 min, dec, B -        Ab curl 1x10 Ab curl 2x10 Ab curl 2x10 A per MD order.     Skilled Services: TE, MT    Charges: TE2, MT1       Total Timed Treatment: 47 min  Total Treatment Time: 50 min

## 2021-02-25 ENCOUNTER — APPOINTMENT (OUTPATIENT)
Dept: PHYSICAL THERAPY | Age: 46
End: 2021-02-25
Attending: INTERNAL MEDICINE
Payer: COMMERCIAL

## 2021-02-25 ENCOUNTER — TELEPHONE (OUTPATIENT)
Dept: PHYSICAL THERAPY | Facility: HOSPITAL | Age: 46
End: 2021-02-25

## 2021-03-02 ENCOUNTER — OFFICE VISIT (OUTPATIENT)
Dept: PHYSICAL THERAPY | Age: 46
End: 2021-03-02
Attending: INTERNAL MEDICINE
Payer: COMMERCIAL

## 2021-03-02 PROCEDURE — 97140 MANUAL THERAPY 1/> REGIONS: CPT | Performed by: PHYSICAL THERAPIST

## 2021-03-02 PROCEDURE — 97110 THERAPEUTIC EXERCISES: CPT | Performed by: PHYSICAL THERAPIST

## 2021-03-02 RX ORDER — HYDROCODONE BITARTRATE AND ACETAMINOPHEN 5; 325 MG/1; MG/1
1 TABLET ORAL EVERY 6 HOURS PRN
Qty: 20 TABLET | Refills: 0 | Status: CANCELLED | OUTPATIENT
Start: 2021-03-02

## 2021-03-02 RX ORDER — LISINOPRIL 40 MG/1
40 TABLET ORAL DAILY
Qty: 90 TABLET | Refills: 1 | Status: SHIPPED | OUTPATIENT
Start: 2021-03-02 | End: 2021-12-10

## 2021-03-02 NOTE — PROGRESS NOTES
Dx: Acute bilateral thoracic back pain without sciatica (M54.5)         Authorized # of Visits:  8 + 6        Next MD visit: none scheduled  Fall Risk: standard         Precautions: n/a           Medication Changes since last visit?: No     Subjective: bee distraction mob x 2 min, dec, B - Man distraction mob x 2 min, dec, B Man distraction mob x 2 min, dec, B Man distraction mob x 2 min, dec, B Man distraction mob x 2 min, dec, B    LTR x 30, pulls on abdomen LTR x 20, pulls on abdomen  LTR x 20, min tightn level of function for leisure activities - partially met, walking dog as weather allows. · Pt. will be able to perform ADLs with no pain. - progressing  · Pt. will be independent with home exercise program and self management.  - progressing    Plan: pt to

## 2021-03-02 NOTE — TELEPHONE ENCOUNTER
Patient advised refills for Lisinopril 40mg available at [facility]. Requesting refill for Norco. Duplicate msg, see feedPackhart msg 3/2/21.

## 2021-03-04 ENCOUNTER — TELEPHONE (OUTPATIENT)
Dept: PHYSICAL THERAPY | Facility: HOSPITAL | Age: 46
End: 2021-03-04

## 2021-03-04 ENCOUNTER — APPOINTMENT (OUTPATIENT)
Dept: PHYSICAL THERAPY | Age: 46
End: 2021-03-04
Attending: INTERNAL MEDICINE
Payer: COMMERCIAL

## 2021-03-09 ENCOUNTER — OFFICE VISIT (OUTPATIENT)
Dept: PHYSICAL THERAPY | Age: 46
End: 2021-03-09
Attending: INTERNAL MEDICINE
Payer: COMMERCIAL

## 2021-03-09 PROCEDURE — 97140 MANUAL THERAPY 1/> REGIONS: CPT | Performed by: PHYSICAL THERAPIST

## 2021-03-09 PROCEDURE — 97110 THERAPEUTIC EXERCISES: CPT | Performed by: PHYSICAL THERAPIST

## 2021-03-09 NOTE — PROGRESS NOTES
Dx: Acute bilateral thoracic back pain without sciatica (M54.5)         Authorized # of Visits:  8 + 6        Next MD visit: none scheduled  Fall Risk: standard         Precautions: n/a           Medication Changes since last visit?: No     Subjective: bee Bridge 2x10  Bridge 2x10 Bridge 2x10 Bridge 2x10 Bridge 2x10     Clamshell 2x10 ea           PKB x 20 ea PKB x 20 ea PKB x 20 ea PKB x 20 ea PKB x 20 ea PKB x 20 ea PKB x 20 ea        Prone hip ext 2x 10 ea  Prone hip ext 2x 10 ea Prone hip ext 2x 10 ea

## 2021-03-11 ENCOUNTER — OFFICE VISIT (OUTPATIENT)
Dept: PHYSICAL THERAPY | Age: 46
End: 2021-03-11
Attending: INTERNAL MEDICINE
Payer: COMMERCIAL

## 2021-03-11 PROCEDURE — 97140 MANUAL THERAPY 1/> REGIONS: CPT | Performed by: PHYSICAL THERAPIST

## 2021-03-11 PROCEDURE — 97110 THERAPEUTIC EXERCISES: CPT | Performed by: PHYSICAL THERAPIST

## 2021-03-11 NOTE — PROGRESS NOTES
Dx: Acute bilateral thoracic back pain without sciatica (M54.5)         Authorized # of Visits:  8 + 6        Next MD visit: none scheduled  Fall Risk: standard         Precautions: n/a           Medication Changes since last visit?: No     Subjective: fel flex/rot mob x 3 min, dec, B  Man L flex/rot mob x 3 min, dec, B -  Supine bridge x 20 Supine bridge x 20     Ab curl 1x10 Ab curl 2x10 Ab curl 2x10 Ab curl 2x10 Ab curl 2x10 Ab curl 2x10 Ab curl 2x10    Bridge 2x10  Bridge 2x10  Bridge 2x10 Bridge 2x10 Br 42 min  Total Treatment Time: 47 min

## 2021-03-24 DIAGNOSIS — G89.29 CHRONIC SCAPULAR PAIN: ICD-10-CM

## 2021-03-24 DIAGNOSIS — M89.8X1 CHRONIC SCAPULAR PAIN: ICD-10-CM

## 2021-03-24 RX ORDER — CYCLOBENZAPRINE HCL 10 MG
10 TABLET ORAL 3 TIMES DAILY PRN
Qty: 90 TABLET | Refills: 0 | Status: SHIPPED | OUTPATIENT
Start: 2021-03-24 | End: 2021-04-22

## 2021-03-24 RX ORDER — ONDANSETRON 4 MG/1
4 TABLET, ORALLY DISINTEGRATING ORAL EVERY 8 HOURS PRN
Qty: 20 TABLET | Refills: 0 | Status: SHIPPED | OUTPATIENT
Start: 2021-03-24 | End: 2021-07-19

## 2021-03-24 NOTE — TELEPHONE ENCOUNTER
Medication:   CYCLOBENZAPRINE 10 MG Oral Tab 90 tablet         Date of last refill: 2/5/21      Last office visit: 12/23/20  Due back to clinic per last office note:  na  Date next office visit scheduled:  none          Last OV note recommendation:   JOSE

## 2021-04-22 DIAGNOSIS — M89.8X1 CHRONIC SCAPULAR PAIN: ICD-10-CM

## 2021-04-22 DIAGNOSIS — G89.29 CHRONIC SCAPULAR PAIN: ICD-10-CM

## 2021-04-22 RX ORDER — CYCLOBENZAPRINE HCL 10 MG
10 TABLET ORAL 3 TIMES DAILY PRN
Qty: 90 TABLET | Refills: 0 | Status: SHIPPED | OUTPATIENT
Start: 2021-04-22 | End: 2021-08-02 | Stop reason: ALTCHOICE

## 2021-04-22 NOTE — TELEPHONE ENCOUNTER
Medication: cyclobenzaprine 10 MG Oral Tab    Date of last refill: 03/24/21    Last office visit: 12/23/20  Due back to clinic per last office note:  NA  Date next office visit scheduled:  none      Last OV note recommendation:   ASSESSMENT AND PLAN:   Chr

## 2021-04-23 RX ORDER — SILDENAFIL 50 MG/1
50 TABLET, FILM COATED ORAL
Qty: 8 TABLET | Refills: 1 | Status: SHIPPED | OUTPATIENT
Start: 2021-04-23 | End: 2021-08-28

## 2021-04-28 NOTE — LETTER
[address] [address]   Date:   8/2/2021     Name:   Charu Haddad    YOB: 1975   MRN:   [id number]       WHERE IS YOUR PAIN NOW? Tolu the areas on your body where you feel the described sensations.   Us
FAMILY HISTORY:  FH: heart disease, Mother; ?MI @age 57

## 2021-05-05 ENCOUNTER — APPOINTMENT (OUTPATIENT)
Dept: GENERAL RADIOLOGY | Facility: HOSPITAL | Age: 46
End: 2021-05-05
Attending: EMERGENCY MEDICINE
Payer: COMMERCIAL

## 2021-05-05 ENCOUNTER — HOSPITAL ENCOUNTER (EMERGENCY)
Facility: HOSPITAL | Age: 46
Discharge: HOME | End: 2021-05-05
Attending: EMERGENCY MEDICINE
Payer: COMMERCIAL

## 2021-05-05 VITALS
HEART RATE: 98 BPM | RESPIRATION RATE: 13 BRPM | SYSTOLIC BLOOD PRESSURE: 165 MMHG | TEMPERATURE: 98 F | DIASTOLIC BLOOD PRESSURE: 98 MMHG | WEIGHT: 190 LBS | OXYGEN SATURATION: 100 % | BODY MASS INDEX: 24 KG/M2

## 2021-05-05 DIAGNOSIS — R07.9 ACUTE CHEST PAIN: Primary | ICD-10-CM

## 2021-05-05 PROCEDURE — 84484 ASSAY OF TROPONIN QUANT: CPT | Performed by: EMERGENCY MEDICINE

## 2021-05-05 PROCEDURE — 71045 X-RAY EXAM CHEST 1 VIEW: CPT | Performed by: EMERGENCY MEDICINE

## 2021-05-05 PROCEDURE — 96374 THER/PROPH/DIAG INJ IV PUSH: CPT

## 2021-05-05 PROCEDURE — 80076 HEPATIC FUNCTION PANEL: CPT | Performed by: EMERGENCY MEDICINE

## 2021-05-05 PROCEDURE — 83690 ASSAY OF LIPASE: CPT | Performed by: EMERGENCY MEDICINE

## 2021-05-05 PROCEDURE — 99285 EMERGENCY DEPT VISIT HI MDM: CPT

## 2021-05-05 PROCEDURE — 93010 ELECTROCARDIOGRAM REPORT: CPT | Performed by: EMERGENCY MEDICINE

## 2021-05-05 PROCEDURE — 83735 ASSAY OF MAGNESIUM: CPT | Performed by: EMERGENCY MEDICINE

## 2021-05-05 PROCEDURE — 80048 BASIC METABOLIC PNL TOTAL CA: CPT | Performed by: EMERGENCY MEDICINE

## 2021-05-05 PROCEDURE — 93005 ELECTROCARDIOGRAM TRACING: CPT

## 2021-05-05 PROCEDURE — 85379 FIBRIN DEGRADATION QUANT: CPT | Performed by: EMERGENCY MEDICINE

## 2021-05-05 PROCEDURE — 85025 COMPLETE CBC W/AUTO DIFF WBC: CPT | Performed by: EMERGENCY MEDICINE

## 2021-05-05 RX ORDER — MORPHINE SULFATE 4 MG/ML
4 INJECTION, SOLUTION INTRAMUSCULAR; INTRAVENOUS ONCE
Status: COMPLETED | OUTPATIENT
Start: 2021-05-05 | End: 2021-05-05

## 2021-05-05 NOTE — CM/SW NOTE
Patient to have outpatient nuclear stress test in am.    Nuclear stress test department will call patient in am for the time on 5/6    Patient informed

## 2021-05-05 NOTE — ED PROVIDER NOTES
Patient Seen in: [facility] AND [facility] Emergency Department      History   Patient presents with:  Chest Pain Angina    Stated Complaint: cp sob    HPI/Subjective:   HPI    68-year-old male presents for evaluation for chest pain.   Pain is a tightness sensati stated complaint: cp sob  Other systems are as noted in HPI. Constitutional and vital signs reviewed. All other systems reviewed and negative except as noted above.     Physical Exam     ED Triage Vitals   BP 05/05/21 1133 (!) 167/82   Pulse 05/05/21 Course     Labs Reviewed   BASIC METABOLIC PANEL (8) - Abnormal; Notable for the following components:       Result Value    Calculated Osmolality 296 (*)     All other components within normal limits   HEPATIC FUNCTION PANEL (7) - Abnormal; Notable for th MDM    CXR unremarkable. CBC and BMP are unremarkable. Zero and two hour troponin are within normal limits. Zero and two hour EKGs are without acute ischemic changes. Dimer normal, I do not suspect any PE. Abdominal exam is benign.   Lipase normal.  S 5/05/2021 at 12:34 PM            I personally reviewed all radiology images. Medical Record Review: I personally reviewed available prior medical records for any recent pertinent discharge summaries, testing, and procedures, and reviewed those reports.

## 2021-05-06 ENCOUNTER — HOSPITAL ENCOUNTER (OUTPATIENT)
Dept: NUCLEAR MEDICINE | Facility: HOSPITAL | Age: 46
Discharge: HOME | End: 2021-05-06
Attending: EMERGENCY MEDICINE
Payer: COMMERCIAL

## 2021-05-06 ENCOUNTER — HOSPITAL ENCOUNTER (OUTPATIENT)
Dept: CV DIAGNOSTICS | Facility: HOSPITAL | Age: 46
Discharge: HOME | End: 2021-05-06
Attending: EMERGENCY MEDICINE
Payer: COMMERCIAL

## 2021-05-06 DIAGNOSIS — R07.9 ACUTE CHEST PAIN: ICD-10-CM

## 2021-05-06 PROCEDURE — 93016 CV STRESS TEST SUPVJ ONLY: CPT | Performed by: EMERGENCY MEDICINE

## 2021-05-06 PROCEDURE — 93017 CV STRESS TEST TRACING ONLY: CPT | Performed by: EMERGENCY MEDICINE

## 2021-05-06 PROCEDURE — 93018 CV STRESS TEST I&R ONLY: CPT | Performed by: EMERGENCY MEDICINE

## 2021-05-06 PROCEDURE — 78452 HT MUSCLE IMAGE SPECT MULT: CPT | Performed by: EMERGENCY MEDICINE

## 2021-05-11 RX ORDER — HYDROCODONE BITARTRATE AND ACETAMINOPHEN 5; 325 MG/1; MG/1
1 TABLET ORAL EVERY 6 HOURS PRN
Qty: 20 TABLET | Refills: 0 | Status: CANCELLED | OUTPATIENT
Start: 2021-05-11

## 2021-05-13 RX ORDER — HYDROCODONE BITARTRATE AND ACETAMINOPHEN 5; 325 MG/1; MG/1
1 TABLET ORAL EVERY 6 HOURS PRN
Qty: 20 TABLET | Refills: 0 | OUTPATIENT
Start: 2021-05-13

## 2021-05-19 RX ORDER — HYDROCODONE BITARTRATE AND ACETAMINOPHEN 5; 325 MG/1; MG/1
1 TABLET ORAL EVERY 6 HOURS PRN
Qty: 20 TABLET | Refills: 0 | OUTPATIENT
Start: 2021-05-19

## 2021-05-22 RX ORDER — HYDROCODONE BITARTRATE AND ACETAMINOPHEN 5; 325 MG/1; MG/1
1 TABLET ORAL EVERY 6 HOURS PRN
Qty: 20 TABLET | Refills: 0 | Status: CANCELLED | OUTPATIENT
Start: 2021-05-22

## 2021-05-28 ENCOUNTER — HOSPITAL ENCOUNTER (OUTPATIENT)
Dept: GENERAL RADIOLOGY | Age: 46
Discharge: HOME | End: 2021-05-28
Attending: INTERNAL MEDICINE
Payer: COMMERCIAL

## 2021-05-28 ENCOUNTER — OFFICE VISIT (OUTPATIENT)
Dept: INTERNAL MEDICINE CLINIC | Facility: CLINIC | Age: 46
End: 2021-05-28
Payer: COMMERCIAL

## 2021-05-28 VITALS
HEIGHT: 75 IN | TEMPERATURE: 96 F | BODY MASS INDEX: 22.38 KG/M2 | DIASTOLIC BLOOD PRESSURE: 76 MMHG | SYSTOLIC BLOOD PRESSURE: 126 MMHG | RESPIRATION RATE: 12 BRPM | WEIGHT: 180 LBS | HEART RATE: 95 BPM

## 2021-05-28 DIAGNOSIS — M54.6 CHRONIC MIDLINE THORACIC BACK PAIN: ICD-10-CM

## 2021-05-28 DIAGNOSIS — M54.50 CHRONIC MIDLINE LOW BACK PAIN WITHOUT SCIATICA: ICD-10-CM

## 2021-05-28 DIAGNOSIS — G89.29 CHRONIC MIDLINE THORACIC BACK PAIN: ICD-10-CM

## 2021-05-28 DIAGNOSIS — G89.29 CHRONIC MIDLINE THORACIC BACK PAIN: Primary | ICD-10-CM

## 2021-05-28 DIAGNOSIS — M54.6 CHRONIC MIDLINE THORACIC BACK PAIN: Primary | ICD-10-CM

## 2021-05-28 DIAGNOSIS — G89.29 CHRONIC MIDLINE LOW BACK PAIN WITHOUT SCIATICA: ICD-10-CM

## 2021-05-28 PROCEDURE — 99214 OFFICE O/P EST MOD 30 MIN: CPT | Performed by: INTERNAL MEDICINE

## 2021-05-28 PROCEDURE — 72070 X-RAY EXAM THORAC SPINE 2VWS: CPT | Performed by: INTERNAL MEDICINE

## 2021-05-28 PROCEDURE — 3078F DIAST BP <80 MM HG: CPT | Performed by: INTERNAL MEDICINE

## 2021-05-28 PROCEDURE — 3008F BODY MASS INDEX DOCD: CPT | Performed by: INTERNAL MEDICINE

## 2021-05-28 PROCEDURE — 72072 X-RAY EXAM THORAC SPINE 3VWS: CPT | Performed by: INTERNAL MEDICINE

## 2021-05-28 PROCEDURE — 3074F SYST BP LT 130 MM HG: CPT | Performed by: INTERNAL MEDICINE

## 2021-05-28 RX ORDER — TAMSULOSIN HYDROCHLORIDE 0.4 MG/1
0.4 CAPSULE ORAL
COMMUNITY
Start: 2021-04-28 | End: 2021-12-23

## 2021-05-28 RX ORDER — HYDROCODONE BITARTRATE AND ACETAMINOPHEN 5; 325 MG/1; MG/1
1 TABLET ORAL EVERY 8 HOURS PRN
Qty: 20 TABLET | Refills: 0 | Status: SHIPPED | OUTPATIENT
Start: 2021-05-28 | End: 2021-07-19

## 2021-06-11 ENCOUNTER — HOSPITAL ENCOUNTER (EMERGENCY)
Facility: HOSPITAL | Age: 46
Discharge: HOME | End: 2021-06-11
Attending: EMERGENCY MEDICINE
Payer: COMMERCIAL

## 2021-06-11 VITALS
RESPIRATION RATE: 16 BRPM | BODY MASS INDEX: 23 KG/M2 | OXYGEN SATURATION: 97 % | HEART RATE: 95 BPM | SYSTOLIC BLOOD PRESSURE: 185 MMHG | DIASTOLIC BLOOD PRESSURE: 101 MMHG | WEIGHT: 185 LBS | TEMPERATURE: 99 F

## 2021-06-11 DIAGNOSIS — R10.9 ABDOMINAL PAIN OF UNKNOWN ETIOLOGY: Primary | ICD-10-CM

## 2021-06-11 PROCEDURE — 96375 TX/PRO/DX INJ NEW DRUG ADDON: CPT

## 2021-06-11 PROCEDURE — 80048 BASIC METABOLIC PNL TOTAL CA: CPT | Performed by: EMERGENCY MEDICINE

## 2021-06-11 PROCEDURE — 80076 HEPATIC FUNCTION PANEL: CPT | Performed by: EMERGENCY MEDICINE

## 2021-06-11 PROCEDURE — 83690 ASSAY OF LIPASE: CPT | Performed by: EMERGENCY MEDICINE

## 2021-06-11 PROCEDURE — 85025 COMPLETE CBC W/AUTO DIFF WBC: CPT | Performed by: EMERGENCY MEDICINE

## 2021-06-11 PROCEDURE — 99284 EMERGENCY DEPT VISIT MOD MDM: CPT

## 2021-06-11 PROCEDURE — 96361 HYDRATE IV INFUSION ADD-ON: CPT

## 2021-06-11 PROCEDURE — 96374 THER/PROPH/DIAG INJ IV PUSH: CPT

## 2021-06-11 RX ORDER — ONDANSETRON 4 MG/1
4 TABLET, ORALLY DISINTEGRATING ORAL EVERY 4 HOURS PRN
Qty: 10 TABLET | Refills: 0 | Status: SHIPPED | OUTPATIENT
Start: 2021-06-11 | End: 2021-06-18

## 2021-06-11 RX ORDER — HYDROCODONE BITARTRATE AND ACETAMINOPHEN 5; 325 MG/1; MG/1
1 TABLET ORAL EVERY 6 HOURS PRN
Qty: 10 TABLET | Refills: 0 | Status: SHIPPED | OUTPATIENT
Start: 2021-06-11 | End: 2021-06-18

## 2021-06-11 RX ORDER — MORPHINE SULFATE 4 MG/ML
4 INJECTION, SOLUTION INTRAMUSCULAR; INTRAVENOUS ONCE
Status: COMPLETED | OUTPATIENT
Start: 2021-06-11 | End: 2021-06-11

## 2021-06-11 RX ORDER — KETOROLAC TROMETHAMINE 30 MG/ML
30 INJECTION, SOLUTION INTRAMUSCULAR; INTRAVENOUS ONCE
Status: COMPLETED | OUTPATIENT
Start: 2021-06-11 | End: 2021-06-11

## 2021-06-11 NOTE — ED INITIAL ASSESSMENT (HPI)
Patient states he recently stopped taking the isotretinoin 6th and believes it has caused him to have a pancreatitis flare up. +abd pain to upper abd and back, +nasuea. Denies alcohol use.

## 2021-06-11 NOTE — ED PROVIDER NOTES
Patient Seen in: [facility] AND [facility] Emergency Department      History   Patient presents with:  Abdominal Pain    Stated Complaint: abd pain     HPI/Subjective:   HPI  20-year-old male with history of alcoholism, currently sober, pancreatitis, hypertensio Musculoskeletal: Negative for back pain. Skin: Negative for rash. Neurological: Negative for dizziness. Psychiatric/Behavioral: Negative for suicidal ideas. All other systems reviewed and are negative.       Positive for stated complaint: abd pain 15.0 10.0 - 20.0    Calcium, Total 8.6 8.5 - 10.1 mg/dL    Calculated Osmolality 296 (H) 275 - 295 mOsm/kg    GFR, Non- 90 >=60    GFR, -American 104 >=60   Hepatic Function Panel (7)    Collection Time: 06/11/21  5:39 AM   Result Va minimally elevated, LFTs reassuring, electrolytes reassuring  - toradol ordered  - pt requesting something additional for pain control  - I have had a discussion with the patient/parent regarding CT scan.   We have discussed the risks and benefits of CT whi DECISION MAKING:  After obtaining the patient's history, performing the physical exam and reviewing the diagnostics, multiple initial diagnoses were considered based on the presenting problem including epigastric pain, pancreatitis, UTI, nephrolithiasis

## 2021-07-19 ENCOUNTER — PATIENT MESSAGE (OUTPATIENT)
Dept: INTERNAL MEDICINE CLINIC | Facility: CLINIC | Age: 46
End: 2021-07-19

## 2021-07-19 ENCOUNTER — OFFICE VISIT (OUTPATIENT)
Dept: INTERNAL MEDICINE CLINIC | Facility: CLINIC | Age: 46
End: 2021-07-19
Payer: COMMERCIAL

## 2021-07-19 ENCOUNTER — NURSE TRIAGE (OUTPATIENT)
Dept: INTERNAL MEDICINE CLINIC | Facility: CLINIC | Age: 46
End: 2021-07-19

## 2021-07-19 VITALS
DIASTOLIC BLOOD PRESSURE: 68 MMHG | HEART RATE: 95 BPM | SYSTOLIC BLOOD PRESSURE: 128 MMHG | RESPIRATION RATE: 12 BRPM | HEIGHT: 75 IN | WEIGHT: 187 LBS | BODY MASS INDEX: 23.25 KG/M2 | TEMPERATURE: 97 F

## 2021-07-19 DIAGNOSIS — M54.6 CHRONIC MIDLINE THORACIC BACK PAIN: Primary | ICD-10-CM

## 2021-07-19 DIAGNOSIS — G89.29 CHRONIC MIDLINE THORACIC BACK PAIN: Primary | ICD-10-CM

## 2021-07-19 PROCEDURE — 3074F SYST BP LT 130 MM HG: CPT | Performed by: INTERNAL MEDICINE

## 2021-07-19 PROCEDURE — 99213 OFFICE O/P EST LOW 20 MIN: CPT | Performed by: INTERNAL MEDICINE

## 2021-07-19 PROCEDURE — 3078F DIAST BP <80 MM HG: CPT | Performed by: INTERNAL MEDICINE

## 2021-07-19 PROCEDURE — 3008F BODY MASS INDEX DOCD: CPT | Performed by: INTERNAL MEDICINE

## 2021-07-19 RX ORDER — HYDROCODONE BITARTRATE AND ACETAMINOPHEN 5; 325 MG/1; MG/1
1 TABLET ORAL EVERY 8 HOURS PRN
Qty: 20 TABLET | Refills: 0 | OUTPATIENT
Start: 2021-07-19

## 2021-07-19 RX ORDER — ONDANSETRON 4 MG/1
4 TABLET, ORALLY DISINTEGRATING ORAL EVERY 8 HOURS PRN
Qty: 20 TABLET | Refills: 0 | Status: SHIPPED | OUTPATIENT
Start: 2021-07-19 | End: 2021-11-03

## 2021-07-19 RX ORDER — HYDROCODONE BITARTRATE AND ACETAMINOPHEN 5; 325 MG/1; MG/1
1 TABLET ORAL EVERY 8 HOURS PRN
Qty: 20 TABLET | Refills: 0 | Status: SHIPPED | OUTPATIENT
Start: 2021-07-19 | End: 2021-08-25

## 2021-07-19 RX ORDER — ONDANSETRON 4 MG/1
4 TABLET, ORALLY DISINTEGRATING ORAL EVERY 8 HOURS PRN
Qty: 20 TABLET | Refills: 0 | OUTPATIENT
Start: 2021-07-19

## 2021-07-19 NOTE — TELEPHONE ENCOUNTER
Action Requested: Summary for Provider     []  Critical Lab, Recommendations Needed  [] Need Additional Advice  []   FYI    []   Need Orders  [] Need Medications Sent to Pharmacy  []  Other     SUMMARY: Pt c/o chronic intermittent lower back pain.  He state

## 2021-07-19 NOTE — PROGRESS NOTES
HPI/Subjective:     Patient ID: Nguyen Walters is a 55year old male. Back Pain  This is a recurrent problem. The current episode started more than 1 month ago. The problem occurs intermittently. The problem has been waxing and waning since onset.  The mouth every 8 (eight) hours as needed for Pain. 20 tablet 0   • Isotretinoin 40 MG Oral Cap Take 1 capsule (40 mg total) by mouth 2 (two) times daily.  60 capsule 0   • cyclobenzaprine 10 MG Oral Tab Take 1 tablet (10 mg total) by mouth 3 (three) times jorge sounds. No murmur heard. Pulmonary:      Effort: Pulmonary effort is normal. No respiratory distress. Breath sounds: Normal breath sounds. No wheezing or rales. Abdominal:      General: Abdomen is flat.  Bowel sounds are normal. There is no diste

## 2021-07-19 NOTE — TELEPHONE ENCOUNTER
From: Elder Maravilla  To: Brigitte Montes MD  Sent: 7/19/2021 7:50 AM CDT  Subject: Visit Follow-up Question    Good morning,    I tried going into the system over the weekend to schedule a follow up visit before requesting refills.  I would prefer a

## 2021-07-19 NOTE — TELEPHONE ENCOUNTER
pls  Call pt and ask why he is asking refill for norco and zofran? Any ffup with his GI Dr Destin Randall for his chronic pancreatitis?

## 2021-07-25 ENCOUNTER — HOSPITAL ENCOUNTER (EMERGENCY)
Facility: HOSPITAL | Age: 46
Discharge: HOME | End: 2021-07-25
Attending: EMERGENCY MEDICINE
Payer: COMMERCIAL

## 2021-07-25 VITALS
SYSTOLIC BLOOD PRESSURE: 160 MMHG | WEIGHT: 187 LBS | DIASTOLIC BLOOD PRESSURE: 88 MMHG | HEART RATE: 84 BPM | OXYGEN SATURATION: 100 % | HEIGHT: 75 IN | TEMPERATURE: 97 F | BODY MASS INDEX: 23.25 KG/M2 | RESPIRATION RATE: 14 BRPM

## 2021-07-25 DIAGNOSIS — R10.13 EPIGASTRIC PAIN: Primary | ICD-10-CM

## 2021-07-25 LAB
ALBUMIN SERPL-MCNC: 3.3 G/DL (ref 3.4–5)
ALBUMIN/GLOB SERPL: 1.1 {RATIO} (ref 1–2)
ALP LIVER SERPL-CCNC: 62 U/L
ALT SERPL-CCNC: 21 U/L
ANION GAP SERPL CALC-SCNC: 7 MMOL/L (ref 0–18)
AST SERPL-CCNC: 10 U/L (ref 15–37)
BASOPHILS # BLD AUTO: 0.03 X10(3) UL (ref 0–0.2)
BASOPHILS NFR BLD AUTO: 0.4 %
BILIRUB SERPL-MCNC: 0.4 MG/DL (ref 0.1–2)
BUN BLD-MCNC: 18 MG/DL (ref 7–18)
BUN/CREAT SERPL: 17.1 (ref 10–20)
CALCIUM BLD-MCNC: 8.5 MG/DL (ref 8.5–10.1)
CHLORIDE SERPL-SCNC: 111 MMOL/L (ref 98–112)
CO2 SERPL-SCNC: 27 MMOL/L (ref 21–32)
CREAT BLD-MCNC: 1.05 MG/DL
DEPRECATED RDW RBC AUTO: 56.1 FL (ref 35.1–46.3)
EOSINOPHIL # BLD AUTO: 0.04 X10(3) UL (ref 0–0.7)
EOSINOPHIL NFR BLD AUTO: 0.6 %
ERYTHROCYTE [DISTWIDTH] IN BLOOD BY AUTOMATED COUNT: 15.1 % (ref 11–15)
GLOBULIN PLAS-MCNC: 3.1 G/DL (ref 2.8–4.4)
GLUCOSE BLD-MCNC: 123 MG/DL (ref 70–99)
HCT VFR BLD AUTO: 36.4 %
HGB BLD-MCNC: 12.1 G/DL
IMM GRANULOCYTES # BLD AUTO: 0.02 X10(3) UL (ref 0–1)
IMM GRANULOCYTES NFR BLD: 0.3 %
LIPASE SERPL-CCNC: 233 U/L (ref 73–393)
LYMPHOCYTES # BLD AUTO: 1.68 X10(3) UL (ref 1–4)
LYMPHOCYTES NFR BLD AUTO: 23.7 %
M PROTEIN MFR SERPL ELPH: 6.4 G/DL (ref 6.4–8.2)
MCH RBC QN AUTO: 33.4 PG (ref 26–34)
MCHC RBC AUTO-ENTMCNC: 33.2 G/DL (ref 31–37)
MCV RBC AUTO: 100.6 FL
MONOCYTES # BLD AUTO: 0.37 X10(3) UL (ref 0.1–1)
MONOCYTES NFR BLD AUTO: 5.2 %
NEUTROPHILS # BLD AUTO: 4.96 X10 (3) UL (ref 1.5–7.7)
NEUTROPHILS # BLD AUTO: 4.96 X10(3) UL (ref 1.5–7.7)
NEUTROPHILS NFR BLD AUTO: 69.8 %
OSMOLALITY SERPL CALC.SUM OF ELEC: 303 MOSM/KG (ref 275–295)
PLATELET # BLD AUTO: 316 10(3)UL (ref 150–450)
POTASSIUM SERPL-SCNC: 3.8 MMOL/L (ref 3.5–5.1)
RBC # BLD AUTO: 3.62 X10(6)UL
SODIUM SERPL-SCNC: 145 MMOL/L (ref 136–145)
WBC # BLD AUTO: 7.1 X10(3) UL (ref 4–11)

## 2021-07-25 PROCEDURE — 80053 COMPREHEN METABOLIC PANEL: CPT | Performed by: EMERGENCY MEDICINE

## 2021-07-25 PROCEDURE — 96361 HYDRATE IV INFUSION ADD-ON: CPT

## 2021-07-25 PROCEDURE — 83690 ASSAY OF LIPASE: CPT | Performed by: EMERGENCY MEDICINE

## 2021-07-25 PROCEDURE — 96375 TX/PRO/DX INJ NEW DRUG ADDON: CPT

## 2021-07-25 PROCEDURE — 99284 EMERGENCY DEPT VISIT MOD MDM: CPT

## 2021-07-25 PROCEDURE — 85025 COMPLETE CBC W/AUTO DIFF WBC: CPT | Performed by: EMERGENCY MEDICINE

## 2021-07-25 PROCEDURE — 32554 ASPIRATE PLEURA W/O IMAGING: CPT

## 2021-07-25 PROCEDURE — 96374 THER/PROPH/DIAG INJ IV PUSH: CPT

## 2021-07-25 RX ORDER — MORPHINE SULFATE 4 MG/ML
4 INJECTION, SOLUTION INTRAMUSCULAR; INTRAVENOUS ONCE
Status: COMPLETED | OUTPATIENT
Start: 2021-07-25 | End: 2021-07-25

## 2021-07-25 RX ORDER — ONDANSETRON 2 MG/ML
4 INJECTION INTRAMUSCULAR; INTRAVENOUS ONCE
Status: COMPLETED | OUTPATIENT
Start: 2021-07-25 | End: 2021-07-25

## 2021-07-25 NOTE — ED INITIAL ASSESSMENT (HPI)
History of pancreatitis. States he had 1 beer last night and has been having abd pain since.  Denies n/v/d.

## 2021-07-25 NOTE — ED PROVIDER NOTES
Patient Seen in: [facility] AND [facility] Emergency Department    History   Patient presents with:  Abdomen/Flank Pain    Stated Complaint: \"pancreatic flareup\"    HPI    Patient presents with concern of a pancreatic attack he reports.   On further questioning Dispersible,  Take 1 tablet (4 mg total) by mouth every 8 (eight) hours as needed for Nausea.    HYDROcodone-acetaminophen 5-325 MG Oral Tab,  Take 1 tablet by mouth every 8 (eight) hours as needed for Pain.   tamsulosin (FLOMAX) cap,  Take 0.4 mg by mouth well nourished adult lying in bed in no distress. Vital signs noted. Eye:  No scleral icterus. Eyelids appear normal, no lesions. Cardiovascular:  Normal S1 and S2, no murmur, regular, with good peripheral perfusion.   Respiratory:  Lungs clear to auscu CBC W/ DIFFERENTIAL - Abnormal; Notable for the following components:    RBC 3.62 (*)     HGB 12.1 (*)     HCT 36.4 (*)     .6 (*)     RDW-SD 56.1 (*)     RDW 15.1 (*)     All other components within normal limits   LIPASE - Normal   CBC WITH DIFF

## 2021-08-02 ENCOUNTER — OFFICE VISIT (OUTPATIENT)
Dept: NEUROLOGY | Facility: CLINIC | Age: 46
End: 2021-08-02
Payer: COMMERCIAL

## 2021-08-02 VITALS
HEART RATE: 82 BPM | OXYGEN SATURATION: 99 % | DIASTOLIC BLOOD PRESSURE: 86 MMHG | WEIGHT: 187 LBS | HEIGHT: 75 IN | SYSTOLIC BLOOD PRESSURE: 160 MMHG | BODY MASS INDEX: 23.25 KG/M2

## 2021-08-02 DIAGNOSIS — M89.8X1 CHRONIC SCAPULAR PAIN: Primary | ICD-10-CM

## 2021-08-02 DIAGNOSIS — G89.29 CHRONIC SCAPULAR PAIN: Primary | ICD-10-CM

## 2021-08-02 DIAGNOSIS — K86.0 ALCOHOL-INDUCED CHRONIC PANCREATITIS (HCC): ICD-10-CM

## 2021-08-02 PROCEDURE — 3077F SYST BP >= 140 MM HG: CPT | Performed by: PHYSICAL MEDICINE & REHABILITATION

## 2021-08-02 PROCEDURE — 3079F DIAST BP 80-89 MM HG: CPT | Performed by: PHYSICAL MEDICINE & REHABILITATION

## 2021-08-02 PROCEDURE — 99244 OFF/OP CNSLTJ NEW/EST MOD 40: CPT | Performed by: PHYSICAL MEDICINE & REHABILITATION

## 2021-08-02 PROCEDURE — 3008F BODY MASS INDEX DOCD: CPT | Performed by: PHYSICAL MEDICINE & REHABILITATION

## 2021-08-02 RX ORDER — CYCLOBENZAPRINE HCL 10 MG
10 TABLET ORAL NIGHTLY
Qty: 30 TABLET | Refills: 0 | Status: SHIPPED | OUTPATIENT
Start: 2021-08-02 | End: 2021-08-20

## 2021-08-02 NOTE — PROGRESS NOTES
[address]  Progress Note    CHIEF COMPLAINT:  Patient presents with:  Back Pain: New right handed pt comes with upper back pain. Had imaging, injections and PT states none helped.  Standing & driving make xiomara MEDICATIONS:   Current Outpatient Medications   Medication Sig Dispense Refill   • cyclobenzaprine 10 MG Oral Tab Take 1 tablet (10 mg total) by mouth nightly.  30 tablet 0   • Adapalene-Benzoyl Peroxide (EPIDUO FORTE) 0.3-2.5 % External Gel Apply 1 Applica Urinating: denies  Bladder Incontinence: denies  Pelvic Pain: denies  Painful Urination: denies   Musculoskeletal  Joint Stiffness: denies  Painful Joints: admits  Tailbone Pain: denies  Swollen Joints: denies   Peripheral Vascular  Swelling of Legs/Feet: history of chronic pancreatitis and operative intervention, he was seen in the urgent care on July 25 after admitting drinking 8 ounces of cognac. I recommended quitting alcohol completely and if necessary obtaining help for this including AA.   This was a

## 2021-08-18 ENCOUNTER — TELEPHONE (OUTPATIENT)
Dept: INTERNAL MEDICINE CLINIC | Facility: CLINIC | Age: 46
End: 2021-08-18

## 2021-08-18 ENCOUNTER — TELEPHONE (OUTPATIENT)
Dept: NEUROLOGY | Facility: CLINIC | Age: 46
End: 2021-08-18

## 2021-08-18 NOTE — TELEPHONE ENCOUNTER
Per Carmela, she would like to talk to Dr Analia Brown or nurses in regards to patient. No more info give. Per Carmela it is not an emergency.

## 2021-08-18 NOTE — TELEPHONE ENCOUNTER
Carmela from PT states she has seen patient 3 times and has been tachycardic each time at rest when sitting or standing with a rate of 120-125. When supine the heart rate is in the 90's. Blood pressure ranges from 347'G to 147'M systolic and 72'N - 28'F diastolic. States patient is asymptomatic and not aware of his tachycardia. Carmela denies palpitations or irregular rhythm. Carmela wanting to inform Dr. Lisandra Wynn and if has any questions, can call her back. She also sent a message re same to Dr. Arabella Kramer.

## 2021-08-18 NOTE — TELEPHONE ENCOUNTER
Pt needs to be seen in clinic for evaluation; I am already fully booked this week though so if we can have pt seen by other MD in [facility].

## 2021-08-18 NOTE — TELEPHONE ENCOUNTER
Jordon Casey- seen pt 3 times. Pt has a history of hypotension. BP range 625-266 systolic, 18-79 diastolic all positions on either arm. Presented w/ tachycardia range 121-125 sitting and standing at rest on either arm.  Pulse oximeter are manual. I don't see a

## 2021-08-19 NOTE — TELEPHONE ENCOUNTER
Dr. Gar Person you had an opening tomorrow. Appointment made. RN spoke with patient's wife initially. During call, wife 3-way called the patient. RN informed patient of provider's message below. Patient reports he did vomit once yesterday, before his physical therapy appointment. Denies other abdominal symptoms. Denies cough, fever, chills. Denies emesis today. RN advised if he experiences vomiting again, or headache, dizziness, chest pain, chest pressure, chest tightness, difficulty breathing, he should just go to ER. RN also advised him to bring his blood pressure machine from home to appointment, avoid caffeine, and practice relaxation techniques. He verbalizes understanding and is agreeable to instructions.        Future Appointments   Date Time Provider Lizandro Mack   8/20/2021 11:30 AM MD EDMUND Alanis

## 2021-08-19 NOTE — TELEPHONE ENCOUNTER
Symptoms noted. Pls tell him he should see Dr. Remy Abarca for his high heart rate.  Continuing PT should be fine - please tell judy sanchez

## 2021-08-19 NOTE — TELEPHONE ENCOUNTER
Call attempt. Left Voicemail to call back our office. Phone number provided with office hours. RN called patient's wife (on FABY). She states she is on her way home, will be home in about 10 minutes, asking RN to call back around that time. RN agreeable, advised her to drive safely, no rush.

## 2021-08-20 ENCOUNTER — LAB ENCOUNTER (OUTPATIENT)
Dept: LAB | Age: 46
End: 2021-08-20
Attending: INTERNAL MEDICINE
Payer: COMMERCIAL

## 2021-08-20 ENCOUNTER — OFFICE VISIT (OUTPATIENT)
Dept: INTERNAL MEDICINE CLINIC | Facility: CLINIC | Age: 46
End: 2021-08-20
Payer: COMMERCIAL

## 2021-08-20 VITALS
TEMPERATURE: 98 F | RESPIRATION RATE: 12 BRPM | WEIGHT: 182 LBS | DIASTOLIC BLOOD PRESSURE: 82 MMHG | HEART RATE: 101 BPM | SYSTOLIC BLOOD PRESSURE: 166 MMHG | HEIGHT: 75 IN | BODY MASS INDEX: 22.63 KG/M2

## 2021-08-20 DIAGNOSIS — R00.0 TACHYCARDIA: ICD-10-CM

## 2021-08-20 DIAGNOSIS — R00.0 TACHYCARDIA: Primary | ICD-10-CM

## 2021-08-20 DIAGNOSIS — I10 ESSENTIAL HYPERTENSION: ICD-10-CM

## 2021-08-20 DIAGNOSIS — R94.31 SHORTENED PR INTERVAL: ICD-10-CM

## 2021-08-20 LAB
ANION GAP SERPL CALC-SCNC: 2 MMOL/L (ref 0–18)
BASOPHILS # BLD AUTO: 0.02 X10(3) UL (ref 0–0.2)
BASOPHILS NFR BLD AUTO: 0.4 %
BUN BLD-MCNC: 14 MG/DL (ref 7–18)
BUN/CREAT SERPL: 13 (ref 10–20)
CALCIUM BLD-MCNC: 8.8 MG/DL (ref 8.5–10.1)
CHLORIDE SERPL-SCNC: 110 MMOL/L (ref 98–112)
CO2 SERPL-SCNC: 30 MMOL/L (ref 21–32)
CREAT BLD-MCNC: 1.08 MG/DL
DEPRECATED RDW RBC AUTO: 50.5 FL (ref 35.1–46.3)
EOSINOPHIL # BLD AUTO: 0.05 X10(3) UL (ref 0–0.7)
EOSINOPHIL NFR BLD AUTO: 0.9 %
ERYTHROCYTE [DISTWIDTH] IN BLOOD BY AUTOMATED COUNT: 13.8 % (ref 11–15)
GLUCOSE BLD-MCNC: 110 MG/DL (ref 70–99)
HCT VFR BLD AUTO: 39.7 %
HGB BLD-MCNC: 13.3 G/DL
IMM GRANULOCYTES # BLD AUTO: 0.01 X10(3) UL (ref 0–1)
IMM GRANULOCYTES NFR BLD: 0.2 %
LYMPHOCYTES # BLD AUTO: 1.72 X10(3) UL (ref 1–4)
LYMPHOCYTES NFR BLD AUTO: 32.1 %
MCH RBC QN AUTO: 33.4 PG (ref 26–34)
MCHC RBC AUTO-ENTMCNC: 33.5 G/DL (ref 31–37)
MCV RBC AUTO: 99.7 FL
MONOCYTES # BLD AUTO: 0.39 X10(3) UL (ref 0.1–1)
MONOCYTES NFR BLD AUTO: 7.3 %
NEUTROPHILS # BLD AUTO: 3.16 X10 (3) UL (ref 1.5–7.7)
NEUTROPHILS # BLD AUTO: 3.16 X10(3) UL (ref 1.5–7.7)
NEUTROPHILS NFR BLD AUTO: 59.1 %
OSMOLALITY SERPL CALC.SUM OF ELEC: 295 MOSM/KG (ref 275–295)
PATIENT FASTING Y/N/NP: NO
PLATELET # BLD AUTO: 322 10(3)UL (ref 150–450)
POTASSIUM SERPL-SCNC: 3.5 MMOL/L (ref 3.5–5.1)
RBC # BLD AUTO: 3.98 X10(6)UL
SODIUM SERPL-SCNC: 142 MMOL/L (ref 136–145)
WBC # BLD AUTO: 5.4 X10(3) UL (ref 4–11)

## 2021-08-20 PROCEDURE — 85025 COMPLETE CBC W/AUTO DIFF WBC: CPT

## 2021-08-20 PROCEDURE — 80048 BASIC METABOLIC PNL TOTAL CA: CPT

## 2021-08-20 PROCEDURE — 36415 COLL VENOUS BLD VENIPUNCTURE: CPT

## 2021-08-20 PROCEDURE — 3079F DIAST BP 80-89 MM HG: CPT | Performed by: INTERNAL MEDICINE

## 2021-08-20 PROCEDURE — 3077F SYST BP >= 140 MM HG: CPT | Performed by: INTERNAL MEDICINE

## 2021-08-20 PROCEDURE — 3008F BODY MASS INDEX DOCD: CPT | Performed by: INTERNAL MEDICINE

## 2021-08-20 PROCEDURE — 99214 OFFICE O/P EST MOD 30 MIN: CPT | Performed by: INTERNAL MEDICINE

## 2021-08-20 PROCEDURE — 93000 ELECTROCARDIOGRAM COMPLETE: CPT | Performed by: INTERNAL MEDICINE

## 2021-08-20 RX ORDER — AMLODIPINE BESYLATE 5 MG/1
5 TABLET ORAL DAILY
Qty: 90 TABLET | Refills: 1 | Status: SHIPPED | OUTPATIENT
Start: 2021-08-20 | End: 2021-12-03

## 2021-08-20 NOTE — PROGRESS NOTES
HPI/Subjective:     Patient ID: Maria Teresa Davis is a 55year old male. Patient presents today for evaluation regarding tachycardia.   His physical therapist called us yesterday inform us that patient has been noted to have tachycardia with heart rate in 10 MG Oral Tab Take 1 tablet (10 mg total) by mouth nightly. 30 tablet 0   • Adapalene-Benzoyl Peroxide (EPIDUO FORTE) 0.3-2.5 % External Gel Apply 1 Application topically nightly.  60 g 3   • Doxycycline Hyclate 100 MG Oral Tab Take one tab po every day wi Other (Other) Mother         copd   • Hypertension Father    • Other (Other) Brother         motorbke accident      Social History: Social History    Tobacco Use      Smoking status: Current Every Day Smoker        Packs/day: 0.50        Years: 20.00 ELECTROCARDIOGRAM, COMPLETE, CBC WITH         DIFFERENTIAL WITH PLATELET, BASIC METABOLIC         PANEL (8), CARDIO - INTERNAL        Patient was not tachycardic on exam today whether he was lying down or sitting up on exam.  We did get an EKG and showed a

## 2021-08-23 RX ORDER — CYCLOBENZAPRINE HCL 10 MG
10 TABLET ORAL NIGHTLY
Qty: 30 TABLET | Refills: 0 | Status: SHIPPED | OUTPATIENT
Start: 2021-08-23 | End: 2021-09-22

## 2021-08-23 NOTE — TELEPHONE ENCOUNTER
Medication request:    Quantity Refills Start End   cyclobenzaprine 10 MG Oral Tab         Take 1 tablet (10 mg total) by mouth nightly.   #30-R-0    LOV 08/02/21  NOV 09/13/21    [place] 08/02/21

## 2021-08-25 ENCOUNTER — PATIENT MESSAGE (OUTPATIENT)
Dept: INTERNAL MEDICINE CLINIC | Facility: CLINIC | Age: 46
End: 2021-08-25

## 2021-08-25 RX ORDER — HYDROCODONE BITARTRATE AND ACETAMINOPHEN 5; 325 MG/1; MG/1
1 TABLET ORAL EVERY 8 HOURS PRN
Qty: 20 TABLET | Refills: 0 | Status: CANCELLED | OUTPATIENT
Start: 2021-08-25

## 2021-08-25 RX ORDER — HYDROCODONE BITARTRATE AND ACETAMINOPHEN 5; 325 MG/1; MG/1
1 TABLET ORAL EVERY 8 HOURS PRN
Qty: 20 TABLET | Refills: 0 | Status: SHIPPED | OUTPATIENT
Start: 2021-08-25 | End: 2021-09-13

## 2021-08-25 NOTE — TELEPHONE ENCOUNTER
From: Kathleen Mary  To: Ghanshyam Duff MD  Sent: 8/25/2021 1:32 PM CDT  Subject: Prescription Question    Afternoon,    For the muscle relaxer, it can’t be filled until the 28th. Is it possible to prescribe the norco and cancel the muscle relaxer?

## 2021-08-25 NOTE — TELEPHONE ENCOUNTER
See my chart message and pended script. Review pended refill request as it does not fall under a protocol.     Last Rx: 7/19/21  LOV: 8/20/21

## 2021-08-28 ENCOUNTER — APPOINTMENT (OUTPATIENT)
Dept: GENERAL RADIOLOGY | Facility: HOSPITAL | Age: 46
End: 2021-08-28
Payer: COMMERCIAL

## 2021-08-28 ENCOUNTER — HOSPITAL ENCOUNTER (EMERGENCY)
Facility: HOSPITAL | Age: 46
Discharge: HOME | End: 2021-08-28
Payer: COMMERCIAL

## 2021-08-28 VITALS
WEIGHT: 185 LBS | BODY MASS INDEX: 23 KG/M2 | HEIGHT: 75 IN | DIASTOLIC BLOOD PRESSURE: 91 MMHG | RESPIRATION RATE: 12 BRPM | SYSTOLIC BLOOD PRESSURE: 179 MMHG | OXYGEN SATURATION: 99 % | HEART RATE: 89 BPM | TEMPERATURE: 99 F

## 2021-08-28 DIAGNOSIS — I10 PRIMARY HYPERTENSION: ICD-10-CM

## 2021-08-28 DIAGNOSIS — R07.89 CHEST PAIN, NON-CARDIAC: Primary | ICD-10-CM

## 2021-08-28 LAB
ALBUMIN SERPL-MCNC: 3.7 G/DL (ref 3.4–5)
ALP LIVER SERPL-CCNC: 69 U/L
ALT SERPL-CCNC: 19 U/L
ANION GAP SERPL CALC-SCNC: 3 MMOL/L (ref 0–18)
AST SERPL-CCNC: 13 U/L (ref 15–37)
BASOPHILS # BLD AUTO: 0.03 X10(3) UL (ref 0–0.2)
BASOPHILS NFR BLD AUTO: 0.5 %
BILIRUB DIRECT SERPL-MCNC: 0.1 MG/DL (ref 0–0.2)
BILIRUB SERPL-MCNC: 0.4 MG/DL (ref 0.1–2)
BUN BLD-MCNC: 13 MG/DL (ref 7–18)
BUN/CREAT SERPL: 14.4 (ref 10–20)
CALCIUM BLD-MCNC: 8.5 MG/DL (ref 8.5–10.1)
CHLORIDE SERPL-SCNC: 107 MMOL/L (ref 98–112)
CO2 SERPL-SCNC: 31 MMOL/L (ref 21–32)
CREAT BLD-MCNC: 0.9 MG/DL
DEPRECATED RDW RBC AUTO: 49.8 FL (ref 35.1–46.3)
EOSINOPHIL # BLD AUTO: 0.06 X10(3) UL (ref 0–0.7)
EOSINOPHIL NFR BLD AUTO: 1 %
ERYTHROCYTE [DISTWIDTH] IN BLOOD BY AUTOMATED COUNT: 13.4 % (ref 11–15)
GLUCOSE BLD-MCNC: 112 MG/DL (ref 70–99)
HCT VFR BLD AUTO: 40.9 %
HGB BLD-MCNC: 13.8 G/DL
IMM GRANULOCYTES # BLD AUTO: 0.01 X10(3) UL (ref 0–1)
IMM GRANULOCYTES NFR BLD: 0.2 %
LIPASE SERPL-CCNC: 355 U/L (ref 73–393)
LYMPHOCYTES # BLD AUTO: 1.94 X10(3) UL (ref 1–4)
LYMPHOCYTES NFR BLD AUTO: 32.7 %
M PROTEIN MFR SERPL ELPH: 7 G/DL (ref 6.4–8.2)
MCH RBC QN AUTO: 33.7 PG (ref 26–34)
MCHC RBC AUTO-ENTMCNC: 33.7 G/DL (ref 31–37)
MCV RBC AUTO: 99.8 FL
MONOCYTES # BLD AUTO: 0.37 X10(3) UL (ref 0.1–1)
MONOCYTES NFR BLD AUTO: 6.2 %
NEUTROPHILS # BLD AUTO: 3.53 X10 (3) UL (ref 1.5–7.7)
NEUTROPHILS # BLD AUTO: 3.53 X10(3) UL (ref 1.5–7.7)
NEUTROPHILS NFR BLD AUTO: 59.4 %
OSMOLALITY SERPL CALC.SUM OF ELEC: 293 MOSM/KG (ref 275–295)
PLATELET # BLD AUTO: 327 10(3)UL (ref 150–450)
POTASSIUM SERPL-SCNC: 3.5 MMOL/L (ref 3.5–5.1)
RBC # BLD AUTO: 4.1 X10(6)UL
SODIUM SERPL-SCNC: 141 MMOL/L (ref 136–145)
TROPONIN I SERPL-MCNC: <0.045 NG/ML (ref ?–0.04)
WBC # BLD AUTO: 5.9 X10(3) UL (ref 4–11)

## 2021-08-28 PROCEDURE — 80076 HEPATIC FUNCTION PANEL: CPT

## 2021-08-28 PROCEDURE — 80048 BASIC METABOLIC PNL TOTAL CA: CPT

## 2021-08-28 PROCEDURE — 99284 EMERGENCY DEPT VISIT MOD MDM: CPT

## 2021-08-28 PROCEDURE — 96375 TX/PRO/DX INJ NEW DRUG ADDON: CPT

## 2021-08-28 PROCEDURE — 85025 COMPLETE CBC W/AUTO DIFF WBC: CPT

## 2021-08-28 PROCEDURE — 93010 ELECTROCARDIOGRAM REPORT: CPT | Performed by: INTERNAL MEDICINE

## 2021-08-28 PROCEDURE — 93005 ELECTROCARDIOGRAM TRACING: CPT

## 2021-08-28 PROCEDURE — 84484 ASSAY OF TROPONIN QUANT: CPT

## 2021-08-28 PROCEDURE — 96374 THER/PROPH/DIAG INJ IV PUSH: CPT

## 2021-08-28 PROCEDURE — 71045 X-RAY EXAM CHEST 1 VIEW: CPT

## 2021-08-28 PROCEDURE — 83690 ASSAY OF LIPASE: CPT

## 2021-08-28 RX ORDER — LORAZEPAM 2 MG/ML
1 INJECTION INTRAMUSCULAR ONCE
Status: COMPLETED | OUTPATIENT
Start: 2021-08-28 | End: 2021-08-28

## 2021-08-28 RX ORDER — MORPHINE SULFATE 4 MG/ML
4 INJECTION, SOLUTION INTRAMUSCULAR; INTRAVENOUS ONCE
Status: COMPLETED | OUTPATIENT
Start: 2021-08-28 | End: 2021-08-28

## 2021-08-29 NOTE — ED PROVIDER NOTES
Patient Seen in: [facility] Emergency Department    History   Patient presents with:  Chest Pain      HPI    The patient presents to the ED complaining of left anterior chest pain starting 4 to 5 hours ago while at dinner.   He states pain has been Drug use: Yes        Frequency: 1.0 times per week        Types: Cannabis        Comment: occasionaly    Other Topics      Concerns:        Caffeine Concern: Yes        Exercise: No      ROS  Pertinent positives: Chest pain, nausea  All other organ system Chest wall: Tenderness present. Abdominal:      General: There is no distension. Palpations: Abdomen is soft. Musculoskeletal:         General: No deformity. Skin:     General: Skin is warm and dry.    Neurological:      Mental Status: He is NONSTAFF  YOB: 1975    Past Medical History (entered by Technologist):    Reason For Exam (entered by Technologist):  Chest pain, shortness of breath, and nausea. History of hypertension.   Other Notes (entered by Technologist): HARINDER 9   430 contribute to the complexity of this ED evaluation. ED Course: The patient presents to the ED with left anterior chest pain that is reproducible on exam.  Laboratory work-up without evidence for ACS or other acute abnormality.   Chest x-ray without acute

## 2021-08-29 NOTE — ED QUICK NOTES
Assumed care of pt from triage. Pt to the ed for complaints of left sided chest pain. No aggravating or relieving factors. Complaints of intermittent SOB at times. No acute distress noted. Safety precautions in place.

## 2021-08-29 NOTE — ED INITIAL ASSESSMENT (HPI)
Chest pain that began around 4 hours ago while at dinner. Low mid chest pain. Non-radiating. Pulsating feeling. +pain reproducible to palpation. +nausea +\"just a little bit of shortness of breath\"    Pt took zofran odt 30 mins pta with no relief.

## 2021-09-06 RX ORDER — PANCRELIPASE LIPASE, PANCRELIPASE PROTEASE, PANCRELIPASE AMYLASE 20000; 63000; 84000 [USP'U]/1; [USP'U]/1; [USP'U]/1
2 CAPSULE, DELAYED RELEASE ORAL
Qty: 540 CAPSULE | Refills: 1 | Status: SHIPPED | OUTPATIENT
Start: 2021-09-06 | End: 2021-09-15

## 2021-09-06 NOTE — TELEPHONE ENCOUNTER
Refill passed per [facility], [facility] protocol. Requested Prescriptions   Pending Prescriptions Disp Refills    ZENPEP 88009-90663 units Oral Cap DR Particles 540 capsule 1     Sig: Take 2 capsules by mouth 3 (three) times daily with meals.  take 1 capsule with first few bites of food and second capsule toward the end of meal        Gastrointestional Medication Protocol Passed - 9/5/2021  7:59 AM        Passed - Appointment in past 12 or next 3 months            Recent Outpatient Visits              1 week ago Acne vulgaris    Dermatology - [facility], [facility]    Office Visit    2 weeks ago Wesley Castañeda, [address], Manolo Monahan MD    Office Visit    1 month ago Chronic scapular pain    [facility] [place]    Office Visit    1 month ago Acne vulgaris    Dermatology - [facility], [address] Alexus Eagan, [employer]    Office Visit    1 month ago Chronic midline thoracic back pain    [employer], [facility], Erica Sanchez, Manolo Monahan MD    Office Visit          Future Appointments         Provider Department Appt Notes    In 1 week Rip Saldaña MD [facility], [facility]-Physiatry Back pain follow up    In 1 month Alexus Jacob PA-C Dermatology - [facility] [place] , [address] 2 month f/u

## 2021-09-13 NOTE — PROGRESS NOTES
[address]  Progress Note    CHIEF COMPLAINT:  Patient presents with:  Back Pain: LOV 8/2/21 pt comes in for f/u jodee pain. Has been doin PT which helped 80-85%. Denies any meds for pain. States pain is bad ba essential hypertension        SURGICAL HISTORY:  Past Surgical History:   Procedure Laterality Date   • EGD  07/20/2016   • ELECTROCARDIOGRAM, COMPLETE  12/26/2013    scanned to media tab   • UPPER GI ENDOSCOPY,BIOPSY  7/2016       SOCIAL HISTORY:   Social 1 capsule (40 mg total) by mouth 2 (two) times daily. 60 capsule 0   • lisinopril 40 MG Oral Tab Take 1 tablet (40 mg total) by mouth daily.  90 tablet 1   • PANTOPRAZOLE SODIUM 40 MG Oral Tab EC TAKE 1 TABLET BY MOUTH IN  THE MORNING BEFORE  BREAKFAST 90 t Alcohol-induced chronic pancreatitis ([facility] [address]  I recommend he see his GI specialist on a regular basis instead of the emergency room. He reassures me he quit alcohol.   This does not appear to be musculoskeletal but typical scapular referral from chronic panc

## 2021-09-14 RX ORDER — PANCRELIPASE LIPASE, PANCRELIPASE PROTEASE, PANCRELIPASE AMYLASE 20000; 63000; 84000 [USP'U]/1; [USP'U]/1; [USP'U]/1
2 CAPSULE, DELAYED RELEASE ORAL
Qty: 540 CAPSULE | Refills: 1 | OUTPATIENT
Start: 2021-09-14

## 2021-09-14 NOTE — TELEPHONE ENCOUNTER
From: Emily Dang  To: Lizbeth Trotter MD  Sent: 9/13/2021 12:21 PM CDT  Subject: GI    Good afternoon,    This morning was a follow up with DR Belkis Siddiqui. He is requesting that I follow up with my GI doctor. (Dr Rebecca Mcqueen) Reason being, my back pain may

## 2021-09-14 NOTE — TELEPHONE ENCOUNTER
I called the pharmacy who stated the patient insurance stated needs to go through [facility] or [facility] pharmacy. Left message to call back please transfer to triage. A adsquare message was also sent.

## 2021-09-15 RX ORDER — PANCRELIPASE LIPASE, PANCRELIPASE PROTEASE, PANCRELIPASE AMYLASE 20000; 63000; 84000 [USP'U]/1; [USP'U]/1; [USP'U]/1
2 CAPSULE, DELAYED RELEASE ORAL
Qty: 540 CAPSULE | Refills: 1 | Status: SHIPPED | OUTPATIENT
Start: 2021-09-15

## 2021-09-15 NOTE — TELEPHONE ENCOUNTER
Patient returned call and was advised of note below. Patient will call back again with appropriate pharmacy to send medication to.

## 2021-09-15 NOTE — TELEPHONE ENCOUNTER
Patient called back and sent a [address]. See [address]. RN verified pharmacy with patient. Prescription sent to this updated pharmacy.        Outpatient Medication Detail     Disp Refills Start End    ZENPEP 22185-33327 units Oral Cap DR Part

## 2021-10-11 RX ORDER — HYDROCODONE BITARTRATE AND ACETAMINOPHEN 5; 325 MG/1; MG/1
1 TABLET ORAL EVERY 8 HOURS PRN
Qty: 20 TABLET | Refills: 0 | Status: CANCELLED | OUTPATIENT
Start: 2021-10-11

## 2021-10-11 NOTE — TELEPHONE ENCOUNTER
Narcotic Refill Request    Medication request: HYDROcodone-acetaminophen 5-325 MG Oral Tab Take 1 tablet by mouth every 8 (eight) hours as needed for Pain.     LOV: 9/13/21  NOV: none scheduled     [place] 9/13/21 #20      Per  on 9/13/21

## 2021-11-03 RX ORDER — ONDANSETRON 4 MG/1
4 TABLET, ORALLY DISINTEGRATING ORAL EVERY 8 HOURS PRN
Qty: 20 TABLET | Refills: 1 | Status: SHIPPED | OUTPATIENT
Start: 2021-11-03

## 2021-11-03 NOTE — TELEPHONE ENCOUNTER
Refill passed per [address] protocol    Requested Prescriptions   Pending Prescriptions Disp Refills    ondansetron 4 MG Oral Tablet Dispersible 20 tablet 0     Sig: Take 1 tablet (4 mg total) by mouth every 8 (eight) hours as needed for Nausea.

## 2021-11-04 RX ORDER — HYDROCODONE BITARTRATE AND ACETAMINOPHEN 5; 325 MG/1; MG/1
1 TABLET ORAL EVERY 8 HOURS PRN
Qty: 20 TABLET | Refills: 0 | OUTPATIENT
Start: 2021-11-04

## 2021-11-05 RX ORDER — HYDROCODONE BITARTRATE AND ACETAMINOPHEN 5; 325 MG/1; MG/1
1 TABLET ORAL EVERY 8 HOURS PRN
Qty: 20 TABLET | Refills: 0 | OUTPATIENT
Start: 2021-11-05

## 2021-11-16 RX ORDER — HYDROCODONE BITARTRATE AND ACETAMINOPHEN 5; 325 MG/1; MG/1
1 TABLET ORAL EVERY 8 HOURS PRN
Qty: 20 TABLET | Refills: 0 | OUTPATIENT
Start: 2021-11-16

## 2021-11-16 NOTE — TELEPHONE ENCOUNTER
Left message [facility] Dr Argelia Parra will not be refilling narcotic requests. See LOV 9/13/21 for further information.

## 2021-11-23 RX ORDER — LISINOPRIL 10 MG/1
10 TABLET ORAL DAILY
Qty: 90 TABLET | Refills: 1 | Status: SHIPPED | OUTPATIENT
Start: 2021-11-23 | End: 2021-12-14 | Stop reason: DRUGHIGH

## 2021-11-23 RX ORDER — PANTOPRAZOLE SODIUM 40 MG/1
40 TABLET, DELAYED RELEASE ORAL
Qty: 90 TABLET | Refills: 1 | Status: SHIPPED | OUTPATIENT
Start: 2021-11-23 | End: 2022-05-12

## 2021-11-23 NOTE — TELEPHONE ENCOUNTER
Refill passed per [employer], [facility] protocol. Patient saw Dr. Shaina Jacques in August 2021.   Requested Prescriptions   Pending Prescriptions Disp Refills    LISINOPRIL 10 MG Oral Tab [Pharmacy Med Name: Lisinopril 10 MG Oral Tablet] 90 tablet 3     Sig: TAKE 1 TABLET BY MOUTH  DAILY        Hypertensive Medications Protocol Passed - 11/22/2021  9:44 PM        Passed - CMP or BMP in past 12 months        Passed - Appointment in past 6 or next 3 months        Passed - GFR  > 50     Lab Results   Component Value Date    GFRAA 118 08/28/2021                    PANTOPRAZOLE 40 MG Oral Tab EC [Pharmacy Med Name: Pantoprazole Sodium 40 MG Oral Tablet Delayed Release] 90 tablet 3     Sig: TAKE 1 TABLET BY MOUTH IN  THE MORNING BEFORE  BREAKFAST        Gastrointestional Medication Protocol Passed - 11/22/2021  9:44 PM        Passed - Appointment in past 12 or next 3 months             Recent Outpatient Visits              3 weeks ago Acne vulgaris    [facility]erm[facility] - [facility], [facility] [address] [facility] [employer]    Office Visit    2 months ago Alcohol-induced chronic pancreatitis [facility])    [address] [facility]-Physiatry Memory MD Fredo    Office Visit    2 months ago Acne vulgaris    Dermatology - [facility], [address] Awanda Furlong, [employer]    Office Visit    3 months ago [address], Mann Obrien MD    Office Visit    3 months ago Chronic scapular pain    [address] MD    Office Visit           Future Appointments         Provider Department Appt Notes    In 1 week Zoltan Gillespie MD [place] [employer] [place], [facility], [address] [place] [address] Alcohol-induced chronic pancreatitis , policy informed

## 2021-11-28 ENCOUNTER — HOSPITAL ENCOUNTER (EMERGENCY)
Facility: HOSPITAL | Age: 46
Discharge: HOME | End: 2021-11-28
Attending: EMERGENCY MEDICINE
Payer: COMMERCIAL

## 2021-11-28 VITALS
DIASTOLIC BLOOD PRESSURE: 82 MMHG | WEIGHT: 190 LBS | TEMPERATURE: 99 F | HEIGHT: 75 IN | BODY MASS INDEX: 23.62 KG/M2 | HEART RATE: 68 BPM | RESPIRATION RATE: 16 BRPM | OXYGEN SATURATION: 100 % | SYSTOLIC BLOOD PRESSURE: 134 MMHG

## 2021-11-28 DIAGNOSIS — R11.2 NON-INTRACTABLE VOMITING WITH NAUSEA, UNSPECIFIED VOMITING TYPE: Primary | ICD-10-CM

## 2021-11-28 DIAGNOSIS — M79.10 MYALGIA: ICD-10-CM

## 2021-11-28 PROCEDURE — 96375 TX/PRO/DX INJ NEW DRUG ADDON: CPT

## 2021-11-28 PROCEDURE — 80048 BASIC METABOLIC PNL TOTAL CA: CPT | Performed by: EMERGENCY MEDICINE

## 2021-11-28 PROCEDURE — 85025 COMPLETE CBC W/AUTO DIFF WBC: CPT | Performed by: EMERGENCY MEDICINE

## 2021-11-28 PROCEDURE — 96374 THER/PROPH/DIAG INJ IV PUSH: CPT

## 2021-11-28 PROCEDURE — 96361 HYDRATE IV INFUSION ADD-ON: CPT

## 2021-11-28 PROCEDURE — 99284 EMERGENCY DEPT VISIT MOD MDM: CPT

## 2021-11-28 RX ORDER — ONDANSETRON 4 MG/1
4 TABLET, ORALLY DISINTEGRATING ORAL EVERY 4 HOURS PRN
Qty: 10 TABLET | Refills: 0 | Status: SHIPPED | OUTPATIENT
Start: 2021-11-28 | End: 2021-12-05

## 2021-11-28 RX ORDER — KETOROLAC TROMETHAMINE 30 MG/ML
30 INJECTION, SOLUTION INTRAMUSCULAR; INTRAVENOUS ONCE
Status: COMPLETED | OUTPATIENT
Start: 2021-11-28 | End: 2021-11-28

## 2021-11-28 RX ORDER — KETOROLAC TROMETHAMINE 10 MG/1
10 TABLET, FILM COATED ORAL EVERY 6 HOURS PRN
Qty: 20 TABLET | Refills: 0 | Status: SHIPPED | OUTPATIENT
Start: 2021-11-28 | End: 2021-12-05

## 2021-11-28 RX ORDER — ONDANSETRON 2 MG/ML
4 INJECTION INTRAMUSCULAR; INTRAVENOUS ONCE
Status: COMPLETED | OUTPATIENT
Start: 2021-11-28 | End: 2021-11-28

## 2021-11-28 NOTE — ED PROVIDER NOTES
Patient Seen in: [facility] AND [facility] Emergency Department      History   Patient presents with:  Nausea/Vomiting/Diarrhea    Stated Complaint: vomiting, body aches    Subjective:   HPI     27-year-old male with history of alcoholism, Hypertension, here wit for myalgias. Negative for back pain. Skin: Negative for rash. Neurological: Negative for dizziness. Psychiatric/Behavioral: Negative for suicidal ideas. All other systems reviewed and are negative.       Positive for stated complaint: vomiting, bod BUN/CREA Ratio 15.4 10.0 - 20.0    Calcium, Total 8.5 8.5 - 10.1 mg/dL    Calculated Osmolality 301 (H) 275 - 295 mOsm/kg    GFR, Non- 86 >=60    GFR, -American 99 >=60   CBC W/ DIFFERENTIAL    Collection Time: 11/28/21  8:41 AM   Re smoking including chronic lung disease, lung cancer, heart attacks, and strokes. I advised the patient to quit smoking.   I recommended decreasing current daily usage, considering nicotine replacement therapy and recommended the patient follow-up with thei tablet (10 mg total) by mouth every 6 (six) hours as needed. Qty: 20 tablet Refills: 0    !! - Potential duplicate medications found. Please discuss with provider.

## 2021-12-01 NOTE — H&P
[facility] - Gastroenterology                                                                                                  Clinic History and Physical Family History   Problem Relation Age of Onset   • Hypertension Mother    • Heart Disorder Mother    • Other (Other) Mother         copd   • Hypertension Father    • Other (Other) Brother         motorbke accident      Social History: Social History    T Peroxide (EPIDUO FORTE) 0.3-2.5 % External Gel Apply 1 Application topically nightly.  60 g 3   • Doxycycline Hyclate 100 MG Oral Tab Take one tab po every day with food or on a full stomach 30 tablet 3   • tamsulosin (FLOMAX) cap Take 0.4 mg by mouth After as well as surgical options which he was referred to the [address] [place] [address] where review of the records in care everywhere notes saba procedure/surgery 9/24/2020 (I reviewed the operative report).     Review of the chart indicates he was referred to the weight loss, abdomina pain, jaundice  -colonoscopy (he says he will think about it)  -follow up in 6 months    I spent 45 minutes obtaining history, evaluating the patient including a medically appropriate exam, discussing treatment options and counseling

## 2021-12-03 ENCOUNTER — OFFICE VISIT (OUTPATIENT)
Dept: GASTROENTEROLOGY | Facility: CLINIC | Age: 46
End: 2021-12-03
Payer: COMMERCIAL

## 2021-12-03 VITALS
BODY MASS INDEX: 23 KG/M2 | HEIGHT: 75 IN | HEART RATE: 116 BPM | WEIGHT: 185 LBS | SYSTOLIC BLOOD PRESSURE: 138 MMHG | DIASTOLIC BLOOD PRESSURE: 76 MMHG

## 2021-12-03 DIAGNOSIS — K86.1 CHRONIC PANCREATITIS, UNSPECIFIED PANCREATITIS TYPE (HCC): Primary | ICD-10-CM

## 2021-12-03 DIAGNOSIS — G89.29 CHRONIC SCAPULAR PAIN: Primary | ICD-10-CM

## 2021-12-03 DIAGNOSIS — K86.0 ALCOHOL-INDUCED CHRONIC PANCREATITIS (HCC): ICD-10-CM

## 2021-12-03 DIAGNOSIS — M89.8X1 CHRONIC SCAPULAR PAIN: Primary | ICD-10-CM

## 2021-12-03 PROCEDURE — 3078F DIAST BP <80 MM HG: CPT | Performed by: INTERNAL MEDICINE

## 2021-12-03 PROCEDURE — 3075F SYST BP GE 130 - 139MM HG: CPT | Performed by: INTERNAL MEDICINE

## 2021-12-03 PROCEDURE — 3008F BODY MASS INDEX DOCD: CPT | Performed by: INTERNAL MEDICINE

## 2021-12-03 PROCEDURE — 99244 OFF/OP CNSLTJ NEW/EST MOD 40: CPT | Performed by: INTERNAL MEDICINE

## 2021-12-03 RX ORDER — AMLODIPINE BESYLATE 5 MG/1
5 TABLET ORAL DAILY
Qty: 90 TABLET | Refills: 1 | Status: SHIPPED | OUTPATIENT
Start: 2021-12-03 | End: 2022-04-11

## 2021-12-03 RX ORDER — HYDROCODONE BITARTRATE AND ACETAMINOPHEN 5; 325 MG/1; MG/1
1 TABLET ORAL EVERY 8 HOURS PRN
Qty: 20 TABLET | Refills: 0 | OUTPATIENT
Start: 2021-12-03

## 2021-12-03 NOTE — TELEPHONE ENCOUNTER
Medication: HYDROcodone-acetaminophen 5-325 MG Oral Tab     Date of last refill: 09/13/21 (#20/0)  [place] 38/02/86     Last office visit: 09/13/21  Due back to clinic per last office note:  na  Date next office visit sc

## 2021-12-03 NOTE — PATIENT INSTRUCTIONS
1. Continue to work on abstaining from alcohol and tobacco    2. Stay hydrated    3. Continue your pancreas enzymes (let me know if you need refills) and nutritional supplement    4.  Consider following up with your pain specialist or Dr. Dony Marroquin to cons

## 2021-12-03 NOTE — TELEPHONE ENCOUNTER
Refill passed per [facility] protocol. Requested Prescriptions   Pending Prescriptions Disp Refills    amLODIPine 5 MG Oral Tab 90 tablet 1     Sig: Take 1 tablet (5 mg total) by mouth daily.         Hypertensive Medications Protocol Passed - 12/3/2021 12:06 PM        Passed - CMP or BMP in past 12 months        Passed - Appointment in past 6 or next 3 months        Passed - GFR  > 50     Lab Results   Component Value Date    GFRAA 99 11/28/2021                           Recent Outpatient Visits              Today Chronic pancreatitis, unspecified pancreatitis type [facility])    Maria M Walsh MD    Office Visit    1 month ago Acne vulgaris    Dermatology - [address], [address] [place], [place]    Office Visit    2 months ago Alcohol-induced chronic pancreatitis [facility])    [facility] Jacob-Physiatry Karly Gilmore MD    Office Visit    3 months ago Acne vulgaris    Dermatology - [address], [address] [facility] PATARAH    Office Visit    3 months ago [address], Bethanie Cabot, MD    Office Visit

## 2021-12-03 NOTE — TELEPHONE ENCOUNTER
Denied, he does not have a musculoskeletal condition. Please obtain from his primary physician or other specialists if need be.

## 2021-12-06 NOTE — TELEPHONE ENCOUNTER
Left detailed message for patient notifying him of Dr. Aniya Stock message below. Asked him to call the office if he had questions.

## 2021-12-13 RX ORDER — AMLODIPINE BESYLATE 5 MG/1
5 TABLET ORAL DAILY
Qty: 90 TABLET | Refills: 1 | OUTPATIENT
Start: 2021-12-13 | End: 2022-06-11

## 2021-12-13 NOTE — TELEPHONE ENCOUNTER
Dr. Darby Person - Dose verified as 40mg. Okay to send short supply to local [facility] and 90 day supply to [facility]? Rx's pended. Respective Pharmacies selected. Please advise, thank you. Please reply to otf: EM RN TRIAGE    RN called patient.  RN spoke wit

## 2021-12-13 NOTE — TELEPHONE ENCOUNTER
pls call pt; clarify dose of lisinopril; there are 2 doses in his medlist. Which is he really taking?

## 2021-12-14 RX ORDER — LISINOPRIL 40 MG/1
40 TABLET ORAL DAILY
Qty: 14 TABLET | Refills: 0 | Status: SHIPPED | OUTPATIENT
Start: 2021-12-14 | End: 2022-01-13

## 2021-12-14 RX ORDER — LISINOPRIL 40 MG/1
40 TABLET ORAL DAILY
Qty: 90 TABLET | Refills: 1 | Status: SHIPPED | OUTPATIENT
Start: 2021-12-14

## 2021-12-23 RX ORDER — TAMSULOSIN HYDROCHLORIDE 0.4 MG/1
0.4 CAPSULE ORAL
Qty: 90 CAPSULE | Refills: 3 | Status: SHIPPED | OUTPATIENT
Start: 2021-12-23 | End: 2022-07-06

## 2021-12-29 RX ORDER — HYDROCODONE BITARTRATE AND ACETAMINOPHEN 5; 325 MG/1; MG/1
1 TABLET ORAL EVERY 8 HOURS PRN
Qty: 20 TABLET | Refills: 0 | Status: CANCELLED | OUTPATIENT
Start: 2021-12-29

## 2022-01-04 ENCOUNTER — TELEPHONE (OUTPATIENT)
Dept: FAMILY MEDICINE CLINIC | Facility: CLINIC | Age: 47
End: 2022-01-04

## 2022-01-04 NOTE — TELEPHONE ENCOUNTER
Ok to add himtomorrow at 3pm.  Go back to ER if continues if continues to vomit or cant hold anything down.

## 2022-01-04 NOTE — TELEPHONE ENCOUNTER
Spouse calling (FABY on file) and states pt was discharged from [facility] \"for back and stomach hurting;\" spouse not sure of diagnosis for which patient was admitted.  States patient told to call for an appointment since vomited x 1 to

## 2022-01-04 NOTE — TELEPHONE ENCOUNTER
Spoke to patient and relayed Dr. Delice Denver message as shown below. Patient verbalized understanding of whole message and had no further questions at this time. Appointment scheduled for tomorrow. Patient agreed to appointment made.

## 2022-01-05 ENCOUNTER — OFFICE VISIT (OUTPATIENT)
Dept: INTERNAL MEDICINE CLINIC | Facility: CLINIC | Age: 47
End: 2022-01-05
Payer: COMMERCIAL

## 2022-01-05 VITALS
DIASTOLIC BLOOD PRESSURE: 80 MMHG | BODY MASS INDEX: 21.96 KG/M2 | OXYGEN SATURATION: 100 % | HEIGHT: 75 IN | HEART RATE: 103 BPM | TEMPERATURE: 98 F | WEIGHT: 176.63 LBS | SYSTOLIC BLOOD PRESSURE: 127 MMHG

## 2022-01-05 DIAGNOSIS — K86.89 PANCREATIC MASS: ICD-10-CM

## 2022-01-05 DIAGNOSIS — K85.90 ACUTE ON CHRONIC PANCREATITIS (HCC): Primary | ICD-10-CM

## 2022-01-05 DIAGNOSIS — K86.1 ACUTE ON CHRONIC PANCREATITIS (HCC): Primary | ICD-10-CM

## 2022-01-05 PROCEDURE — 99213 OFFICE O/P EST LOW 20 MIN: CPT | Performed by: INTERNAL MEDICINE

## 2022-01-05 PROCEDURE — 3008F BODY MASS INDEX DOCD: CPT | Performed by: INTERNAL MEDICINE

## 2022-01-05 PROCEDURE — 3079F DIAST BP 80-89 MM HG: CPT | Performed by: INTERNAL MEDICINE

## 2022-01-05 PROCEDURE — 3074F SYST BP LT 130 MM HG: CPT | Performed by: INTERNAL MEDICINE

## 2022-01-05 RX ORDER — DIAZEPAM 5 MG/1
TABLET ORAL
Status: ON HOLD | COMMUNITY
Start: 2021-12-29 | End: 2022-01-10

## 2022-01-05 NOTE — PROGRESS NOTES
Subjective:     Patient ID: Comfort Ramirez is a 55year old male. Patient here for ffup. He states he was admitted at [facility] past weekend due to abd pain and states he had another flare up of his chronic pancreatitis.  He states he had bee Isotretinoin 40 MG Oral Cap Take 1 capsule (40 mg total) by mouth 2 (two) times daily. 60 capsule 0   • HYDROcodone-acetaminophen 5-325 MG Oral Tab Take 1 tablet by mouth every 8 (eight) hours as needed for Pain.  (Patient not taking: Reported on 1/5/2022) Effort: Pulmonary effort is normal. No respiratory distress. Breath sounds: Normal breath sounds. No wheezing or rales. Abdominal:      General: Abdomen is flat. Bowel sounds are normal. There is no distension. Palpations: Abdomen is soft.

## 2022-01-06 ENCOUNTER — PATIENT MESSAGE (OUTPATIENT)
Dept: INTERNAL MEDICINE CLINIC | Facility: CLINIC | Age: 47
End: 2022-01-06

## 2022-01-06 ENCOUNTER — MED REC SCAN ONLY (OUTPATIENT)
Dept: INTERNAL MEDICINE CLINIC | Facility: CLINIC | Age: 47
End: 2022-01-06

## 2022-01-06 RX ORDER — HYDROCODONE BITARTRATE AND ACETAMINOPHEN 5; 325 MG/1; MG/1
1 TABLET ORAL EVERY 8 HOURS PRN
Qty: 15 TABLET | Refills: 0 | Status: SHIPPED | OUTPATIENT
Start: 2022-01-06 | End: 2022-01-13

## 2022-01-06 RX ORDER — DIAZEPAM 5 MG/1
5 TABLET ORAL
Refills: 0 | Status: CANCELLED | OUTPATIENT
Start: 2022-01-06

## 2022-01-06 RX ORDER — HYDROCODONE BITARTRATE AND ACETAMINOPHEN 5; 325 MG/1; MG/1
1 TABLET ORAL EVERY 8 HOURS PRN
Qty: 20 TABLET | Refills: 0 | Status: CANCELLED | OUTPATIENT
Start: 2022-01-06

## 2022-01-06 NOTE — TELEPHONE ENCOUNTER
"[facility]  Palliative Care Consultation         Kelechi Coleman MRN# [id number]   Age: 80 year old YOB: 1936   Date of Admission: 8/18/2017    Reason for consult: Goals of care  Decisional support  Patient and family support       Requesting physician/service: Trauma Service       Recommendations        Pt seen and examined. No family present. Pt with some somnolence. Answers questions coherently.  REC: continue present pain mgt.   - will likely need rehab stay and additional assistance at home   - will have discussion about code status and goals of care implications       POLST- was full code- should be addressed prior to discharge     Disease Process/es & Symptoms     Fall- SAH and SDH. R arm injury.  ETOH use- ? Influence on fall  CAD-   Prior CVA with R arm weakness- Question soft tissue vs ligament injury.      Support/Coping   (We typically ask each of our patients the following questions:)    How do you make sense of this? Fell on stairs- it was stupidity  Are you Mormonism? Spiritual? Not so much? Not asked  Are you at peace? N'A  What are your concerns, medical and non-medical, that are not being addressed? N/A  Who are your \"go-to\" people when you need support? Sister in [place]    Plan for psychosocial/spiritual support/follow-up from palliative team:will continue to assess needs     Decision-Making & Goals of Care     Discussion/counseling today about prognosis/goals of care/decisions:   He is too somnolent to discuss discharge plans in detail  Patient has a completed health care directive available in the chart (Y)  Health care agent (only if patient has an available, complete HCD): Sister  Physician orders for life-sustaining treatment (POLST) form is not completed  Code Status: Full code   Patient has decision-making capacity (Y)- prior    Coordination of Care     Findings & plan of care discussed with: PM&R and primary trauma team.   Follow-up plan from " Dr Idalia Monahan,    See pts' previous messages. Pt requesting refill on hydrocodone and valium. Also, see attachments for two medications requested. Last office visit with Dr Idalia Monahan 1/5/22.   Last refill on hydrocodone 9/13/21 by Dr Rye Silva  Last re palliative team: willl continue to follow  Thank you for involving us in the patient's care.     Josh LUCAS NP  Nurse Practitioner- Lead Advanced Practice Provider  [facility] Palliative Medicine Consult Service   [phone]    TT spent: 50 minutes of which 35 minutes were spent in direct face to face contact with patient/family. Greater than 50% of time spent counseling and/or coordinating care.             Chief Complaint     Fall with SAH and SDH with concern for ligamentous injury of dominant R arm.         History of Present Illness     History obtained from: Pt interview and EPIC  80 y.o community dwelling independent male with history of CAD and CVA's (R arm weakness residual since 97') on antiplatelet therapy, admitted following a fall on stairs at home. ED evaluation noted SAH and SDH with question of R arm/shoulder injury- no significant immediate neuro deficits. There has subsequently been concern for partial seizures- loaded with keppra and trileptal, continues with frequent neuro exams, BP control and functional rehab. Palliative care consulted for trauma impact and goals of care. Holding plavix. No significant respiratory issues. Hemodynamically stable.             Past Medical History:   Past Medical History:   Diagnosis Date     Acute exacerbation of CHF (congestive heart failure) (H) 9/2/2016     Atrial fibrillation (H)      Cerebral infarction (H) 12/13/2013, 9/2016     Diagnosis updated by automated process. Provider to review and confirm.     Coronary artery disease      Diabetes (H)      Gastroesophageal reflux disease with esophagitis 5/8/2017     Hyperlipidaemia      Hypertension      Myocardial infarction (H)      PAD (peripheral artery disease) (H)      Stented coronary artery     CABG 2002 4 VESSEL     Type 2 diabetes mellitus with stage 3 chronic kidney disease, with long-term current use of insulin (H) 11/4/2016     Unspecified cerebral artery occlusion with cerebral infarction                Past Surgical History:   Past Surgical History:   Procedure Laterality Date     C MRA UPPER EXTREMITY WO&W CONT       CORONARY ARTERY BYPASS  2002    LIMA-LAD, SVG-OM1, BHD-XQCH-LGOP     ENT SURGERY      VOCAL CORD LESION REMOVED     GENITOURINARY SURGERY      ANGIOPLASTY FOR RENAL ARTERT STENOSIS     HC LEFT HEART CATHETERIZATION  9/7/2016 09/07/2016: CHUCHO x2 to distal and proximal SVG to to RPDA     HEART CATH LEFT HEART CATH  11/30/2001     PHACOEMULSIFICATION CLEAR CORNEA WITH STANDARD INTRAOCULAR LENS IMPLANT Left 1/22/2015    Procedure: PHACOEMULSIFICATION CLEAR CORNEA WITH STANDARD INTRAOCULAR LENS IMPLANT;  Surgeon: Bart Johnson MD;  Location: [facility]     VASCULAR SURGERY      ANGIOPLASTY LEFT LEG FOR pvd               Social/Spiritual History:     Living situation:  Lived alone in [place]  Family system: Sister in [place], some friends- no children  Functional status (needs help with ADLs or IADLs): was IADL's  Employment/education: owned a shoe sale store  Use of community resources: None   Activities/interests: golfiing, fishing, time with friends  History of substance use/abuse: ++ ETOH- 2 drinks or more per day.             Family History:   Family History   Problem Relation Age of Onset     DIABETES Mother      Family History Negative Father      CANCER Maternal Grandmother      Family history reviewed in EPIC and reviewed with pt           Allergies:   Allergies   Allergen Reactions     Aspirin Hives     Penicillins Hives     Contrast Dye Hives              Medications:   I have reviewed this patient's medication profile and medications during this hospitalization             Review of Systems:   The comprehensive review of systems is negative other than noted here and in the HPI.    Palliative Symptom Review (0=no symptom/no concern, 1=mild, 2=moderate, 3=severe): based on observation and not self report except pain and GI concerns.       Pain: 0-1      Fatigue: 2-3      Nausea:  "0-1      Constipation: 0-1      Diarrhea: 0      Depressive Symptoms: 0-1      Anxiety:1      Drowsiness: 2-3      Poor Appetite: 2      Shortness of Breath: 2           Physical exam: Vitals: /59  Pulse 55  Temp 96.6  F (35.9  C) (Axillary)  Resp 16  Ht 1.88 m (6' 2\")  Wt 83.1 kg (183 lb 3.2 oz)  SpO2 93%  BMI 23.52 kg/m2  BMI= Body mass index is 23.52 kg/(m^2).  Elderly man lying in bed. Sleepy. Able to answer questions with prompting. Notes pain and weakness in R shoulder- more pronounced than before injury. NAD  HEENT: symmetrical features. NT/NC. EOMI. Somewhat slurred speech. Tongue midline. Dentition- moderate disrepair. MM dry.  Lungs: some accessory use. RR in mid 20's. No cough or wheeze. Lungs anterolaterally clear  HT:  S1S2 with RRR HR in 80's at rest. Tones distant.  ABD: soft non tender or distended. BS present/   Neuro: ongoing assessment of cranial nerves grossly intact. Moving all extremities. Weakness on right upper extremity secondary to shoulder pain.  strength less than right.   Skin tears on left arm- ecchymosis L arm/elbow  Extremities warm and well perfused         Data Reviewed:     ROUTINE LABS (Last four results)  BMP  Recent Labs  Lab 08/21/17  0749 08/20/17  0302 08/19/17  0303 08/18/17  0215    137 138 137   POTASSIUM 4.4 4.0 4.0 3.8   CHLORIDE 108 107 107 104   MIGUEL 9.9 9.2 9.1 8.7   CO2 19* 18* 15* 16*   BUN 40* 45* 38* 40*   CR 1.42* 1.64* 1.40* 1.61*   * 170* 126* 163*     CBC  Recent Labs  Lab 08/21/17  0749 08/20/17  0302 08/19/17  0303 08/18/17  0215   WBC 8.2 7.6 8.8 13.4*   RBC 3.38* 3.10* 3.34* 3.53*   HGB 9.9* 9.1* 9.6* 10.2*   HCT 30.5* 27.6* 30.2* 31.8*   MCV 90 89 90 90   MCH 29.3 29.4 28.7 28.9   MCHC 32.5 33.0 31.8 32.1   RDW 16.0* 15.9* 15.7* 15.7*   * 121* 128* 130*     INR  Recent Labs  Lab 08/18/17  0215   INR 1.32*           "

## 2022-01-06 NOTE — TELEPHONE ENCOUNTER
I will only give him hydrocodone and not valium (not a good combo to take); he needs to see Dr Cee Rogers GI as I advised him yesterday due to the pancreatic mass he said he was told when he got admitted at [facility] over the weekend.    (last ref

## 2022-01-06 NOTE — TELEPHONE ENCOUNTER
From: Sierra Fails  To:  Dagoberto Olivas MD  Sent: 1/6/2022 12:29 PM CST  Subject: After visit    Good afternoon,   If possible, can the fax information be sent over so that I can fill out a medical release from [facility] to Doctor Francois Mcgregor

## 2022-01-06 NOTE — TELEPHONE ENCOUNTER
See pts' mychart message and nurse response. Will wait for pt to respond to question on med refills.   Please reply to pool: EM RN Parley Nageotte

## 2022-01-06 NOTE — TELEPHONE ENCOUNTER
Dr Shelia Ramachandran orders sent to pt via [employer] r/t to [place] refill only and to follow up with Dr Hina Moser for pancreatic mass.     Please reply to otf: PROSPER Vega

## 2022-01-06 NOTE — TELEPHONE ENCOUNTER
See pts' mychart message and nurse response 1/6/22. Waiting for pt response to name the two medications needed for refill.   Please reply to pool: PROSPER Vega

## 2022-01-07 ENCOUNTER — TELEPHONE (OUTPATIENT)
Dept: INTERNAL MEDICINE CLINIC | Facility: CLINIC | Age: 47
End: 2022-01-07

## 2022-01-07 RX ORDER — ALPRAZOLAM 0.5 MG/1
TABLET ORAL
Qty: 1 TABLET | Refills: 0 | Status: ON HOLD | OUTPATIENT
Start: 2022-01-07 | End: 2022-01-10

## 2022-01-07 NOTE — TELEPHONE ENCOUNTER
Called and left detailed message regarding patient's requested medication. Left call back number if patient had any further questions.

## 2022-01-07 NOTE — TELEPHONE ENCOUNTER
Erx sent for alprazolam 0.5mg tab to be taken 15 min to 30 min before mri; pt should not drive after taking pill.

## 2022-01-07 NOTE — TELEPHONE ENCOUNTER
Verified name and  of patient. Wife of patient (on FABY) calling to report that patient has an MRI of thoracic spine without IV contrast scheduled today at 4:00 pm at external facility that was ordered by WHITNEY Torre.     She states that patient

## 2022-01-08 ENCOUNTER — HOSPITAL ENCOUNTER (EMERGENCY)
Facility: HOSPITAL | Age: 47
Discharge: LEFT BEFORE DISCHARGE | End: 2022-01-08
Payer: COMMERCIAL

## 2022-01-09 ENCOUNTER — PATIENT MESSAGE (OUTPATIENT)
Dept: GASTROENTEROLOGY | Facility: CLINIC | Age: 47
End: 2022-01-09

## 2022-01-10 ENCOUNTER — APPOINTMENT (OUTPATIENT)
Dept: GENERAL RADIOLOGY | Facility: HOSPITAL | Age: 47
End: 2022-01-10
Attending: EMERGENCY MEDICINE
Payer: COMMERCIAL

## 2022-01-10 ENCOUNTER — HOSPITAL ENCOUNTER (OUTPATIENT)
Facility: HOSPITAL | Age: 47
Setting detail: OBSERVATION
Discharge: HOME | End: 2022-01-13
Attending: EMERGENCY MEDICINE | Admitting: INTERNAL MEDICINE
Payer: COMMERCIAL

## 2022-01-10 DIAGNOSIS — K86.1 CHRONIC PANCREATITIS, UNSPECIFIED PANCREATITIS TYPE (HCC): ICD-10-CM

## 2022-01-10 DIAGNOSIS — R10.9 INTRACTABLE ABDOMINAL PAIN: Primary | ICD-10-CM

## 2022-01-10 DIAGNOSIS — K59.00 CONSTIPATION, UNSPECIFIED CONSTIPATION TYPE: ICD-10-CM

## 2022-01-10 DIAGNOSIS — E87.1 HYPONATREMIA: ICD-10-CM

## 2022-01-10 LAB
ALBUMIN SERPL-MCNC: 3.5 G/DL (ref 3.4–5)
ALBUMIN/GLOB SERPL: 0.7 {RATIO} (ref 1–2)
ALP LIVER SERPL-CCNC: 188 U/L
ALT SERPL-CCNC: 101 U/L
ANION GAP SERPL CALC-SCNC: 8 MMOL/L (ref 0–18)
AST SERPL-CCNC: 70 U/L (ref 15–37)
ATRIAL RATE: 100 BPM
BASOPHILS # BLD AUTO: 0.03 X10(3) UL (ref 0–0.2)
BASOPHILS NFR BLD AUTO: 0.3 %
BILIRUB SERPL-MCNC: 0.6 MG/DL (ref 0.1–2)
BILIRUB UR QL CFM: NEGATIVE
BUN BLD-MCNC: 11 MG/DL (ref 7–18)
CALCIUM BLD-MCNC: 10.2 MG/DL (ref 8.5–10.1)
CHLORIDE SERPL-SCNC: 95 MMOL/L (ref 98–112)
CO2 SERPL-SCNC: 29 MMOL/L (ref 21–32)
COLOR UR AUTO: YELLOW
CREAT BLD-MCNC: 1.05 MG/DL
EOSINOPHIL # BLD AUTO: 0.02 X10(3) UL (ref 0–0.7)
EOSINOPHIL NFR BLD AUTO: 0.2 %
ERYTHROCYTE [DISTWIDTH] IN BLOOD BY AUTOMATED COUNT: 13.4 %
GLOBULIN PLAS-MCNC: 5 G/DL (ref 2.8–4.4)
GLUCOSE BLD-MCNC: 104 MG/DL (ref 70–99)
GLUCOSE BLD-MCNC: 105 MG/DL (ref 70–99)
GLUCOSE UR STRIP.AUTO-MCNC: NEGATIVE MG/DL
HCT VFR BLD AUTO: 44.5 %
HGB BLD-MCNC: 14.9 G/DL
HYALINE CASTS #/AREA URNS AUTO: PRESENT /LPF
IMM GRANULOCYTES # BLD AUTO: 0.04 X10(3) UL (ref 0–1)
IMM GRANULOCYTES NFR BLD: 0.4 %
KETONES UR STRIP.AUTO-MCNC: 80 MG/DL
LEUKOCYTE ESTERASE UR QL STRIP.AUTO: NEGATIVE
LIPASE SERPL-CCNC: 40 U/L (ref 73–393)
LYMPHOCYTES # BLD AUTO: 1.62 X10(3) UL (ref 1–4)
LYMPHOCYTES NFR BLD AUTO: 14.3 %
MCH RBC QN AUTO: 32.8 PG (ref 26–34)
MCHC RBC AUTO-ENTMCNC: 33.5 G/DL (ref 31–37)
MCV RBC AUTO: 98 FL
MONOCYTES # BLD AUTO: 0.78 X10(3) UL (ref 0.1–1)
MONOCYTES NFR BLD AUTO: 6.9 %
NEUTROPHILS # BLD AUTO: 8.8 X10 (3) UL (ref 1.5–7.7)
NEUTROPHILS # BLD AUTO: 8.8 X10(3) UL (ref 1.5–7.7)
NEUTROPHILS NFR BLD AUTO: 77.9 %
NITRITE UR QL STRIP.AUTO: NEGATIVE
OSMOLALITY SERPL CALC.SUM OF ELEC: 274 MOSM/KG (ref 275–295)
P AXIS: 79 DEGREES
P-R INTERVAL: 118 MS
PH UR STRIP.AUTO: 6 [PH] (ref 5–8)
PLATELET # BLD AUTO: 617 10(3)UL (ref 150–450)
POTASSIUM SERPL-SCNC: 3.4 MMOL/L (ref 3.5–5.1)
PROT SERPL-MCNC: 8.5 G/DL (ref 6.4–8.2)
PROT UR STRIP.AUTO-MCNC: 30 MG/DL
Q-T INTERVAL: 334 MS
QRS DURATION: 80 MS
QTC CALCULATION (BEZET): 430 MS
R AXIS: 72 DEGREES
RBC # BLD AUTO: 4.54 X10(6)UL
RBC UR QL AUTO: NEGATIVE
SARS-COV-2 RNA RESP QL NAA+PROBE: NOT DETECTED
SODIUM SERPL-SCNC: 132 MMOL/L (ref 136–145)
SP GR UR STRIP.AUTO: 1.03 (ref 1–1.03)
T AXIS: 78 DEGREES
UROBILINOGEN UR STRIP.AUTO-MCNC: 2 MG/DL
VENTRICULAR RATE: 100 BPM
WBC # BLD AUTO: 11.3 X10(3) UL (ref 4–11)

## 2022-01-10 PROCEDURE — 99220 INITIAL OBSERVATION CARE,LEVL III: CPT | Performed by: INTERNAL MEDICINE

## 2022-01-10 PROCEDURE — 74019 RADEX ABDOMEN 2 VIEWS: CPT | Performed by: EMERGENCY MEDICINE

## 2022-01-10 RX ORDER — MORPHINE SULFATE 2 MG/ML
2 INJECTION, SOLUTION INTRAMUSCULAR; INTRAVENOUS EVERY 2 HOUR PRN
Status: DISCONTINUED | OUTPATIENT
Start: 2022-01-10 | End: 2022-01-13

## 2022-01-10 RX ORDER — ONDANSETRON 2 MG/ML
4 INJECTION INTRAMUSCULAR; INTRAVENOUS EVERY 4 HOURS PRN
Status: DISCONTINUED | OUTPATIENT
Start: 2022-01-10 | End: 2022-01-10

## 2022-01-10 RX ORDER — MORPHINE SULFATE 2 MG/ML
1 INJECTION, SOLUTION INTRAMUSCULAR; INTRAVENOUS EVERY 2 HOUR PRN
Status: DISCONTINUED | OUTPATIENT
Start: 2022-01-10 | End: 2022-01-13

## 2022-01-10 RX ORDER — POTASSIUM CHLORIDE 1.5 G/1.77G
40 POWDER, FOR SOLUTION ORAL ONCE
Status: COMPLETED | OUTPATIENT
Start: 2022-01-10 | End: 2022-01-10

## 2022-01-10 RX ORDER — SODIUM CHLORIDE 9 MG/ML
INJECTION, SOLUTION INTRAVENOUS CONTINUOUS
Status: DISCONTINUED | OUTPATIENT
Start: 2022-01-10 | End: 2022-01-13

## 2022-01-10 RX ORDER — SODIUM CHLORIDE 9 MG/ML
INJECTION, SOLUTION INTRAVENOUS CONTINUOUS
Status: ACTIVE | OUTPATIENT
Start: 2022-01-10 | End: 2022-01-10

## 2022-01-10 RX ORDER — DIPHENHYDRAMINE HYDROCHLORIDE 50 MG/ML
50 INJECTION INTRAMUSCULAR; INTRAVENOUS ONCE
Status: COMPLETED | OUTPATIENT
Start: 2022-01-10 | End: 2022-01-10

## 2022-01-10 RX ORDER — MORPHINE SULFATE 4 MG/ML
4 INJECTION, SOLUTION INTRAMUSCULAR; INTRAVENOUS EVERY 2 HOUR PRN
Status: DISCONTINUED | OUTPATIENT
Start: 2022-01-10 | End: 2022-01-13

## 2022-01-10 RX ORDER — ACETAMINOPHEN 325 MG/1
650 TABLET ORAL EVERY 4 HOURS PRN
Status: DISCONTINUED | OUTPATIENT
Start: 2022-01-10 | End: 2022-01-13

## 2022-01-10 RX ORDER — TAMSULOSIN HYDROCHLORIDE 0.4 MG/1
0.4 CAPSULE ORAL
Status: DISCONTINUED | OUTPATIENT
Start: 2022-01-11 | End: 2022-01-13

## 2022-01-10 RX ORDER — ONDANSETRON 2 MG/ML
4 INJECTION INTRAMUSCULAR; INTRAVENOUS EVERY 6 HOURS PRN
Status: DISCONTINUED | OUTPATIENT
Start: 2022-01-10 | End: 2022-01-13

## 2022-01-10 RX ORDER — LORAZEPAM 2 MG/ML
1 INJECTION INTRAMUSCULAR ONCE
Status: COMPLETED | OUTPATIENT
Start: 2022-01-10 | End: 2022-01-10

## 2022-01-10 RX ORDER — PANTOPRAZOLE SODIUM 40 MG/1
40 TABLET, DELAYED RELEASE ORAL
Status: DISCONTINUED | OUTPATIENT
Start: 2022-01-10 | End: 2022-01-13

## 2022-01-10 RX ORDER — AMLODIPINE BESYLATE 5 MG/1
5 TABLET ORAL DAILY
Status: DISCONTINUED | OUTPATIENT
Start: 2022-01-10 | End: 2022-01-13

## 2022-01-10 RX ORDER — HYDROCODONE BITARTRATE AND ACETAMINOPHEN 5; 325 MG/1; MG/1
2 TABLET ORAL EVERY 4 HOURS PRN
Status: DISCONTINUED | OUTPATIENT
Start: 2022-01-10 | End: 2022-01-13

## 2022-01-10 RX ORDER — NALOXONE HYDROCHLORIDE 1 MG/ML
INJECTION INTRAMUSCULAR; INTRAVENOUS; SUBCUTANEOUS
Status: DISPENSED
Start: 2022-01-10 | End: 2022-01-11

## 2022-01-10 RX ORDER — METOCLOPRAMIDE HYDROCHLORIDE 5 MG/ML
10 INJECTION INTRAMUSCULAR; INTRAVENOUS ONCE
Status: COMPLETED | OUTPATIENT
Start: 2022-01-10 | End: 2022-01-10

## 2022-01-10 RX ORDER — HYDROMORPHONE HYDROCHLORIDE 1 MG/ML
0.5 INJECTION, SOLUTION INTRAMUSCULAR; INTRAVENOUS; SUBCUTANEOUS EVERY 30 MIN PRN
Status: DISCONTINUED | OUTPATIENT
Start: 2022-01-10 | End: 2022-01-10

## 2022-01-10 RX ORDER — ENOXAPARIN SODIUM 100 MG/ML
40 INJECTION SUBCUTANEOUS DAILY
Status: DISCONTINUED | OUTPATIENT
Start: 2022-01-11 | End: 2022-01-13

## 2022-01-10 RX ORDER — HYDROCODONE BITARTRATE AND ACETAMINOPHEN 5; 325 MG/1; MG/1
1 TABLET ORAL EVERY 8 HOURS PRN
Status: DISCONTINUED | OUTPATIENT
Start: 2022-01-10 | End: 2022-01-11

## 2022-01-10 RX ORDER — HYDROMORPHONE HYDROCHLORIDE 1 MG/ML
1 INJECTION, SOLUTION INTRAMUSCULAR; INTRAVENOUS; SUBCUTANEOUS EVERY 30 MIN PRN
Status: COMPLETED | OUTPATIENT
Start: 2022-01-10 | End: 2022-01-10

## 2022-01-10 RX ORDER — HYDROCODONE BITARTRATE AND ACETAMINOPHEN 5; 325 MG/1; MG/1
1 TABLET ORAL EVERY 4 HOURS PRN
Status: DISCONTINUED | OUTPATIENT
Start: 2022-01-10 | End: 2022-01-13

## 2022-01-10 RX ORDER — LISINOPRIL 40 MG/1
40 TABLET ORAL DAILY
Status: DISCONTINUED | OUTPATIENT
Start: 2022-01-10 | End: 2022-01-13

## 2022-01-10 NOTE — TELEPHONE ENCOUNTER
Dr. Payton Donaldson--    See patients message below. Rosalia Mckeon reached out Dr. Talisha Barraza office this morning and reported to [facility] ED. Please offer further advisement. Thank you!

## 2022-01-10 NOTE — ED INITIAL ASSESSMENT (HPI)
Discharged from [place] [address] yesterday for mass on pancreas / increasing vomiting and abdominal pain

## 2022-01-10 NOTE — H&P
[facility]                                                               History & Physical         Isaiah Peabody Patient Status:  Emergency    2/3/1975 MRN [id number]   Location [facility] Attending Christofer Sanders MD scanned to media tab   • OTHER SURGICAL HISTORY      pancreatic cyst drainage by Dr Hali Flores.  [facility] [place] procedure done by Dr Claudia Navarrete for the cyst   • UPPER GI ENDOSCOPY,BIOPSY  7/2016       Family history:  Family History   Problem Relation 617.0 01/10/2022    CREATSERUM 1.05 01/10/2022    BUN 11 01/10/2022     01/10/2022    K 3.4 01/10/2022    CL 95 01/10/2022    CO2 29.0 01/10/2022     01/10/2022    CA 10.2 01/10/2022    ALB 3.5 01/10/2022    ALKPHO 188 01/10/2022    BILT 0.6 0 for further follow-up on this as outpatient  2. smoking cessation  5. Nicotine dependence, smoker  1. Smoking cessation  6. Hypokalemia  1. Replace with electrolyte protocol  7. Hyponatremia due to dehydration due to nausea vomiting  1.  IV fluids with norm

## 2022-01-10 NOTE — TELEPHONE ENCOUNTER
From: Cathrine Landau  To: Xavi Garvey MD  Sent: 1/9/2022 3:02 PM CST  Subject: Pancreatic Pain/Scope needed    Hi Dr Noe Leach,    I have been in the [facility] for my pancreas (12/31/21-1/4/22).  I went back to the ER last night because I

## 2022-01-10 NOTE — ED PROVIDER NOTES
Patient Seen in: [facility] Emergency Department      History   Patient presents with:  Abdomen/Flank Pain    Stated Complaint: patient believes he is having a pancreatitis flare up; found mass on pancreas a*    Subjective:   HPI    54-year-old male 1.0 times per week      Types: Cannabis      Comment: occasionaly             Review of Systems   All other systems reviewed and are negative.       Positive for stated complaint: patient believes he is having a pancreatitis flare up; found mass on pancreas Course     Labs Reviewed   COMP METABOLIC PANEL (14) - Abnormal; Notable for the following components:       Result Value    Glucose 104 (*)     Sodium 132 (*)     Potassium 3.4 (*)     Chloride 95 (*)     Calcium, Total 10.2 (*)     Calculated Osmolality Rhythm  Reading: LVH with repolarization                        XR ABDOMEN OBSTRUCTIVE SERIES ROUTINE(2 VW)(CPT=74019)    Result Date: 1/10/2022  PROCEDURE:  XR ABDOMEN OBSTRUCTIVE SERIES ROUTINE(2 VW)(CPT=74019)  TECHNIQUE:  2 view obstructive series of t as a heterogeneous mass in the head neck of the pancreas measuring 5.7 x 4.8 cm in the greatest transverse and AP dimensions.   There is mass-effect and narrowing of the adjacent main portal vein the body and tail the pancreas are atrophic with dilated side

## 2022-01-10 NOTE — TELEPHONE ENCOUNTER
Pts wife Antoine Montenegro called to follow up on this message. She also states that pt is still having pain and would like to speak to RN. Please call.

## 2022-01-10 NOTE — ED QUICK NOTES
Orders for admission, patient is aware of plan and ready to go upstairs. Any questions, please call ED RN Ifeanyi Tineo  at extension [phone]. Vaccinated?  Yes  Type of COVID test sent: Rapid   COVID Suspicion level: High      Titratable drug(s) infusing:no  Rat

## 2022-01-11 ENCOUNTER — APPOINTMENT (OUTPATIENT)
Dept: MRI IMAGING | Facility: HOSPITAL | Age: 47
End: 2022-01-11
Attending: INTERNAL MEDICINE
Payer: COMMERCIAL

## 2022-01-11 LAB
ALBUMIN SERPL-MCNC: 2.8 G/DL (ref 3.4–5)
ALBUMIN/GLOB SERPL: 0.8 {RATIO} (ref 1–2)
ALP LIVER SERPL-CCNC: 176 U/L
ALT SERPL-CCNC: 79 U/L
ANION GAP SERPL CALC-SCNC: 6 MMOL/L (ref 0–18)
AST SERPL-CCNC: 44 U/L (ref 15–37)
BASOPHILS # BLD AUTO: 0.03 X10(3) UL (ref 0–0.2)
BASOPHILS NFR BLD AUTO: 0.2 %
BILIRUB SERPL-MCNC: 0.6 MG/DL (ref 0.1–2)
BUN BLD-MCNC: 11 MG/DL (ref 7–18)
CALCIUM BLD-MCNC: 9.4 MG/DL (ref 8.5–10.1)
CHLORIDE SERPL-SCNC: 102 MMOL/L (ref 98–112)
CO2 SERPL-SCNC: 30 MMOL/L (ref 21–32)
CREAT BLD-MCNC: 0.95 MG/DL
EOSINOPHIL # BLD AUTO: 0.01 X10(3) UL (ref 0–0.7)
EOSINOPHIL NFR BLD AUTO: 0.1 %
ERYTHROCYTE [DISTWIDTH] IN BLOOD BY AUTOMATED COUNT: 13.6 %
GLOBULIN PLAS-MCNC: 3.3 G/DL (ref 2.8–4.4)
GLUCOSE BLD-MCNC: 91 MG/DL (ref 70–99)
HCT VFR BLD AUTO: 39.6 %
HGB BLD-MCNC: 12.9 G/DL
IMM GRANULOCYTES # BLD AUTO: 0.04 X10(3) UL (ref 0–1)
IMM GRANULOCYTES NFR BLD: 0.3 %
INR BLD: 1.22 (ref 0.8–1.2)
LYMPHOCYTES # BLD AUTO: 1.52 X10(3) UL (ref 1–4)
LYMPHOCYTES NFR BLD AUTO: 12.6 %
MAGNESIUM SERPL-MCNC: 1.9 MG/DL (ref 1.6–2.6)
MCH RBC QN AUTO: 32.7 PG (ref 26–34)
MCHC RBC AUTO-ENTMCNC: 32.6 G/DL (ref 31–37)
MCV RBC AUTO: 100.3 FL
MONOCYTES # BLD AUTO: 0.92 X10(3) UL (ref 0.1–1)
MONOCYTES NFR BLD AUTO: 7.6 %
NEUTROPHILS # BLD AUTO: 9.55 X10 (3) UL (ref 1.5–7.7)
NEUTROPHILS # BLD AUTO: 9.55 X10(3) UL (ref 1.5–7.7)
NEUTROPHILS NFR BLD AUTO: 79.2 %
OSMOLALITY SERPL CALC.SUM OF ELEC: 285 MOSM/KG (ref 275–295)
PHOSPHATE SERPL-MCNC: 3.1 MG/DL (ref 2.5–4.9)
PLATELET # BLD AUTO: 507 10(3)UL (ref 150–450)
POTASSIUM SERPL-SCNC: 4.6 MMOL/L (ref 3.5–5.1)
PROT SERPL-MCNC: 6.1 G/DL (ref 6.4–8.2)
PROTHROMBIN TIME: 15.5 SECONDS (ref 11.6–14.8)
RBC # BLD AUTO: 3.95 X10(6)UL
SODIUM SERPL-SCNC: 138 MMOL/L (ref 136–145)
WBC # BLD AUTO: 12.1 X10(3) UL (ref 4–11)

## 2022-01-11 PROCEDURE — 76376 3D RENDER W/INTRP POSTPROCES: CPT | Performed by: INTERNAL MEDICINE

## 2022-01-11 PROCEDURE — 74183 MRI ABD W/O CNTR FLWD CNTR: CPT | Performed by: INTERNAL MEDICINE

## 2022-01-11 PROCEDURE — 99225 SUBSEQUENT OBSERVATION CARE: CPT | Performed by: HOSPITALIST

## 2022-01-11 RX ORDER — LORAZEPAM 2 MG/ML
0.5 INJECTION INTRAMUSCULAR ONCE
Status: COMPLETED | OUTPATIENT
Start: 2022-01-11 | End: 2022-01-11

## 2022-01-11 NOTE — PLAN OF CARE
Assumed pt care at 0730. A&Ox4. RA, denies chest pain/SOB, lung sounds clear, VSS, NSR/ST on tele. Voids. Up with SBA. POC GI to see, MRI abd/pelvis, IVF. POC updated with pt, questions answered, verbalized understanding. Will continue to monitor.       Pro vomiting  Description: INTERVENTIONS:  - Maintain adequate hydration with IV or PO as ordered and tolerated  - Nasogastric tube to low intermittent suction as ordered  - Evaluate effectiveness of ordered antiemetic medications  - Provide nonpharmacologic c ordered  - Monitor response to electrolyte replacements, including rhythm and repeat lab results as appropriate  - Fluid restriction as ordered  - Instruct patient on fluid and nutrition restrictions as appropriate  Outcome: Progressing  Goal: Hemodynamic assistance with activity based on assessment  - Modify environment to reduce risk of injury  - Provide assistive devices as appropriate  - Consider OT/PT consult to assist with strengthening/mobility  - Encourage toileting schedule  Outcome: Progressing

## 2022-01-11 NOTE — PROGRESS NOTES
Pt unable to finish golytely and threw up just now- zofran given.  notified. No new order received.  ok'd not to continue golytely.

## 2022-01-11 NOTE — DIETARY MALNUTRITION NOTE
[facility]    NUTRITION ASSESSMENT    Pt meets severe malnutrition criteria.     CRITERIA FOR MALNUTRITION DIAGNOSIS:  Criteria for severe malnutrition diagnosis: acute illness/injury related to wt loss greater than 5% in 1 month, energy intake less th lb)  08/20/21 : 82.6 kg (182 lb)  08/02/21 : 84.8 kg (187 lb)  07/25/21 : 84.8 kg (187 lb)       NUTRITION:  Diet: Orders Placed This Encounter      NPO     Oral Supplements: Ensure Enlive BID    Percent Meals Eaten (last 3 days)     Date/Time Percent Meal

## 2022-01-11 NOTE — PROGRESS NOTES
01/10/22 1927 01/10/22 1929 01/10/22 1931   Vital Signs   Pulse 99 (!) 121 (!) 137   Heart Rate Source Monitor Monitor Monitor   Resp 19  --   --    Respiratory Quality Normal  --   --    /89 (!) 177/84 (!) 163/88   MAP (mmHg) 104 102 106   BP Loc

## 2022-01-11 NOTE — PLAN OF CARE
Received pt w/ abd pain, n/v. Denies fever, cp, sob. Alert. Oriented. Sr/s.tach per tele. Hr  110-120s. Was able to tolerate some of the golytely but later on unable to finish it and threw up the golytely.  made aware.  Pt medicated w/ morphine for

## 2022-01-11 NOTE — PROGRESS NOTES
[facility]     Hospitalist Progress Note     Leonardo Alexandra Patient Status:  Observation    2/3/1975 MRN [id number]   [facility] 8NE-A Attending Kat Cevallos MD   Hosp Day # 0 PCP Barby Sanchez MD     Chief Complaint: abd pa results for input(s): CRP, CAITLIN, LDH, DDIMER in the last 168 hours. Imaging: Imaging data reviewed in Epic.   Medications:   • enoxaparin  40 mg Subcutaneous Daily   • lisinopril  40 mg Oral Daily   • amLODIPine  5 mg Oral Daily   • pantoprazole  40 mg Oral

## 2022-01-11 NOTE — TELEPHONE ENCOUNTER
Noted hospitalization at [address] and under care with GI there    Val Acosta MD  [facility] - Gastroenterology  1/11/2022  8:58 AM

## 2022-01-11 NOTE — PROGRESS NOTES
Assumed pt care at 1850. A&Ox4. RA, denies chest pain/SOB. VSS, ST on tele. Voids. Up with SBA. POC GI eval, golytely, IVF, POC updated with pt, questions answered, verbalized understanding. Will continue to monitor.      Admission navigator to be completed

## 2022-01-12 ENCOUNTER — APPOINTMENT (OUTPATIENT)
Dept: CT IMAGING | Facility: HOSPITAL | Age: 47
End: 2022-01-12
Attending: INTERNAL MEDICINE
Payer: COMMERCIAL

## 2022-01-12 LAB — CREAT BLD-MCNC: 0.9 MG/DL

## 2022-01-12 PROCEDURE — 99225 SUBSEQUENT OBSERVATION CARE: CPT | Performed by: HOSPITALIST

## 2022-01-12 PROCEDURE — 74178 CT ABD&PLV WO CNTR FLWD CNTR: CPT | Performed by: INTERNAL MEDICINE

## 2022-01-12 RX ORDER — POLYETHYLENE GLYCOL 3350 17 G/17G
17 POWDER, FOR SOLUTION ORAL DAILY PRN
Status: DISCONTINUED | OUTPATIENT
Start: 2022-01-12 | End: 2022-01-13

## 2022-01-12 RX ORDER — SENNOSIDES 8.6 MG
8.6 TABLET ORAL 2 TIMES DAILY PRN
Status: DISCONTINUED | OUTPATIENT
Start: 2022-01-12 | End: 2022-01-13

## 2022-01-12 NOTE — CONSULTS
GASTROENTEROLOGY CONSULTATION  Acacia Carranza MD    Department of Gastroenterology  [facility] Patient Status:  Observation    2/3/1975 MRN [id number]   [facility] 8NE-A Attending Mikayla Roman MD   [facility] Day # 0 Cannabis.     Allergies:  No Known Allergies    Medications:    Current Facility-Administered Medications:   •  0.9% NaCl infusion, , Intravenous, Continuous  •  enoxaparin (LOVENOX) 40 MG/0.4ML injection 40 mg, 40 mg, Subcutaneous, Daily  •  acetaminophen nodes. Allergy: Denies medication allergy, latex/rubber allergy, anaphylactic or other reaction to anesthesia, food allergy. Eyes: Denies blurred/double vision, eye disease, glasses or contacts, glaucoma.   ENT: Denies nose or gums bleeding, mouth sores, (non independent workstation).  Both projection and source MRCP images are evaluated.  Subsequent post contrast imaging was performed after intravenous administration of paramagnetic contrast agent.       3-D RENDERING:  Additional 3-D rendering was gene hernia.     BONES:  No bony lesion or fracture.     LUNG BASES:  No visible pleural disease.  Lung bases not well assessed with MRI.     OTHER:  Negative.            Impression   CONCLUSION:     1.  There is evidence of chronic pancreatitis along the pancre Tammy. Pt will likely need follow up at [facility] with surgery to consider resection of the remainder of the pancreas. 4. Diet as tolerated. Consider NJ placement for enteral feedings, if pt unable to tolerate adequate calories.       Thank you for mathew

## 2022-01-12 NOTE — PROGRESS NOTES
[facility]     Hospitalist Progress Note     Willian Johnson Patient Status:  Observation    2/3/1975 MRN [id number]   [facility] 8NE-A Attending Bruna Rico MD   Hosp Day # 0 PCP Kimberley Oshea MD     Chief Complaint: abd pa CAITLIN, LDH, DDIMER in the last 168 hours. Imaging: Imaging data reviewed in Epic.   Medications:   • enoxaparin  40 mg Subcutaneous Daily   • lisinopril  40 mg Oral Daily   • amLODIPine  5 mg Oral Daily   • pantoprazole  40 mg Oral Before breakfast   • tamsu h/o pulmonary nodule- outpt f/u    # Nicotine dependence- advis cessation    # Severe malnutrition- appreciate dietary evaluation and recs     # Thrombocytosis- suspect reactive       DC planning pending GI input and tolerating diet, pain control     Plan

## 2022-01-12 NOTE — PROGRESS NOTES
[facility]  Progress Note    Willian Youngbrian Patient Status:  Observation    2/3/1975 MRN [id number]   [facility] 8NE-A Attending Bruna Rico MD   Hosp Day # 0 PCP Kimberley Oshea MD     Subjective:  Improved abdominal pain. pancreatic cyst drainage by Dr Prince Varma.  then saw Saúl Aparicio had procedure done by Dr Gregorio Grant for the cyst   • UPPER GI ENDOSCOPY,BIOPSY  7/2016       Objective:  Blood pressure 137/67, pulse 85, temperature 98.3 °F (36.8 °C), temperature source Oral,

## 2022-01-12 NOTE — PROGRESS NOTES
Medical record release form filled out and faxed to medical records for CD of MRCP and CT of abdomen. Patient given number to [facility] to follow up.

## 2022-01-12 NOTE — PLAN OF CARE
Alert. Oriented. Sr per tele. Hr 70s. Medicated w/ morphine for abd pain. Ivf infusing. Tolerating cl liquid diet. No n/v noted yet. Ambulatory. No bm noted. Bs present. Poc updated w/ pt. Cont. to monitor pt.

## 2022-01-12 NOTE — PLAN OF CARE
Patient alert and oriented x4. On RA. NSR on tele. Continent of bowel and bladder, patient stated has not had a real bowel movement recently. Patient on soft diet, with orders to advance diet as tolerated.  Complaints of abdominal pain, pain medication admi Problem: GASTROINTESTINAL - ADULT  Goal: Minimal or absence of nausea and vomiting  Description: INTERVENTIONS:  - Maintain adequate hydration with IV or PO as ordered and tolerated  - Nasogastric tube to low intermittent suction as ordered  - Evaluate e symptoms of electrolyte imbalances  - Administer electrolyte replacement as ordered  - Monitor response to electrolyte replacements, including rhythm and repeat lab results as appropriate  - Fluid restriction as ordered  - Instruct patient on fluid and nut on patient safety including physical limitations  - Instruct pt to call for assistance with activity based on assessment  - Modify environment to reduce risk of injury  - Provide assistive devices as appropriate  - Consider OT/PT consult to assist with str

## 2022-01-13 VITALS
BODY MASS INDEX: 21.17 KG/M2 | HEART RATE: 77 BPM | TEMPERATURE: 98 F | HEIGHT: 74 IN | OXYGEN SATURATION: 100 % | DIASTOLIC BLOOD PRESSURE: 72 MMHG | SYSTOLIC BLOOD PRESSURE: 154 MMHG | RESPIRATION RATE: 18 BRPM | WEIGHT: 165 LBS

## 2022-01-13 PROCEDURE — 99217 OBSERVATION CARE DISCHARGE: CPT | Performed by: HOSPITALIST

## 2022-01-13 RX ORDER — OXYCODONE AND ACETAMINOPHEN 10; 325 MG/1; MG/1
1 TABLET ORAL EVERY 4 HOURS PRN
Qty: 30 TABLET | Refills: 0 | Status: SHIPPED | OUTPATIENT
Start: 2022-01-13

## 2022-01-13 RX ORDER — OXYCODONE AND ACETAMINOPHEN 10; 325 MG/1; MG/1
1 TABLET ORAL EVERY 4 HOURS PRN
Status: DISCONTINUED | OUTPATIENT
Start: 2022-01-13 | End: 2022-01-13

## 2022-01-13 NOTE — PLAN OF CARE
Alert. Oriented. Sr per tele. Req morphine for abd pain tonight. Ivf infusing. Tolerated diet well. No n/v noted. Cd's for MRI and CT are in the chart. Poc updated w/ pt. Cont. to monitor pt.

## 2022-01-13 NOTE — PLAN OF CARE
Explained discharge instructions including medications and follow-ups to the patient, verbalized understanding, IV removed, tele monitor discontinued, will be transported via wheelchair.      Problem: Patient/Family Goals  Goal: [facility] cardiac monitoring, monitor vital signs, obtain 12 lead EKG if indicated  - Evaluate effectiveness of antiarrhythmic and heart rate control medications as ordered  - Initiate emergency measures for life threatening arrhythmias  - Monitor electrolytes and a with meals as needed  - Monitor I&O, WT and lab values  - Obtain nutritional consult as needed  - Optimize oral hygiene and moisture  - Encourage food from home; allow for food preferences  - Enhance eating environment  1/13/2022 1204 by Reshma Lindsay RN status, including labs, urine output, blood pressure (other measures as available)  - Encourage oral intake as appropriate  - Instruct patient on fluid and nutrition restrictions as appropriate  1/13/2022 1204 by Binu Liz RN  Outcome: Completed  1/1 Completed  1/13/2022 1204 by Gayatri Munson RN  Outcome: Progressing  1/13/2022 1010 by Gayatri Munson RN  Outcome: Progressing

## 2022-01-13 NOTE — PROGRESS NOTES
[facility]     Hospitalist Progress Note     Danita Lucila Patient Status:  Observation    2/3/1975 MRN [id number]   [facility] 8NE-A Attending Nelly Jane MD   Hosp Day # 0 PCP Montana Bass MD     Chief Complaint: abd pa input(s): CK in the last 168 hours. Inflammatory Markers  No results for input(s): CRP, CAITLIN, LDH, DDIMER in the last 168 hours. Imaging: Imaging data reviewed in Epic.   Medications:   • enoxaparin  40 mg Subcutaneous Daily   • lisinopril  40 mg Oral Macario # Hyponatremia- IVF and resolved    # h/o alcohol abuse- been dry for years    # HTN- cont meds     # h/o pulmonary nodule- outpt f/u    # Nicotine dependence- advise cessation    # Severe malnutrition- appreciate dietary evaluation and recs     # Thro

## 2022-01-13 NOTE — PLAN OF CARE
Assumed pt care at 0730. A&Ox4. RA, denies chest pain/SOB, lung sounds clear, VSS, NSR/ST on tele. Voids. Up with SBA. POC GODWIN, dc today, gi followup [facility]. POC updated with pt and family, questions answered, verbalized understanding.  Will con absence of nausea and vomiting  Description: INTERVENTIONS:  - Maintain adequate hydration with IV or PO as ordered and tolerated  - Nasogastric tube to low intermittent suction as ordered  - Evaluate effectiveness of ordered antiemetic medications  - Prov electrolyte replacement as ordered  - Monitor response to electrolyte replacements, including rhythm and repeat lab results as appropriate  - Fluid restriction as ordered  - Instruct patient on fluid and nutrition restrictions as appropriate  Outcome: Prog limitations  - Instruct pt to call for assistance with activity based on assessment  - Modify environment to reduce risk of injury  - Provide assistive devices as appropriate  - Consider OT/PT consult to assist with strengthening/mobility  - Encourage toil

## 2022-01-13 NOTE — DIETARY MALNUTRITION NOTE
[facility]    NUTRITION ASSESSMENT    Pt meets severe malnutrition criteria.     CRITERIA FOR MALNUTRITION DIAGNOSIS:  Criteria for severe malnutrition diagnosis: acute illness/injury related to wt loss greater than 5% in 1 month, energy intake less th lb)  08/28/21 : 83.9 kg (185 lb)  08/20/21 : 82.6 kg (182 lb)  08/02/21 : 84.8 kg (187 lb)  07/25/21 : 84.8 kg (187 lb)       NUTRITION:  Diet: Orders Placed This Encounter      Regular/General diet Texture Consistency: Soft / Easy to Chew ; Is Patient on

## 2022-01-14 ENCOUNTER — PATIENT MESSAGE (OUTPATIENT)
Dept: INTERNAL MEDICINE CLINIC | Facility: CLINIC | Age: 47
End: 2022-01-14

## 2022-01-14 ENCOUNTER — TELEPHONE (OUTPATIENT)
Dept: INTERNAL MEDICINE CLINIC | Facility: CLINIC | Age: 47
End: 2022-01-14

## 2022-01-14 ENCOUNTER — PATIENT OUTREACH (OUTPATIENT)
Dept: CASE MANAGEMENT | Age: 47
End: 2022-01-14

## 2022-01-14 DIAGNOSIS — R31.9 HEMATURIA, UNSPECIFIED TYPE: Primary | ICD-10-CM

## 2022-01-14 RX ORDER — CYCLOBENZAPRINE HCL 10 MG
TABLET ORAL
Qty: 30 TABLET | Refills: 0 | OUTPATIENT
Start: 2022-01-14

## 2022-01-14 NOTE — TELEPHONE ENCOUNTER
Patient reports small blood clot in urine x2 today, half the size of a green pea. Denies any pain with urination/back pain etc. Patient denies having any additional symptoms at this time. Recent CT scan 1/12/22.  No hx of kidney stones/urinary issues per pa

## 2022-01-14 NOTE — PROGRESS NOTES
Initial Post Discharge Follow Up   Discharge Date: 1/13/22  Contact Date: 1/14/2022    Consent Verification:  Assessment Completed With: Patient  HIPAA Verified?   Yes    Discharge Dx:     Abd pain with pancreatic mass on imaging from [facility]

## 2022-01-14 NOTE — DISCHARGE SUMMARY
[facility] HOSPITALIST  DISCHARGE SUMMARY     Shira Jones Patient Status:  Observation    2/3/1975 MRN [id number]   [facility] 8NE-A Attending No att. providers found   Hosp Day # 0 PCP Larry Horton MD     Date of Admission: 1/10/20 diarrhea. Denies any associated fever or chills.   Patient has history of chronic pancreatitis and pancreatic cyst and has had previous pancreatic cyst drainage by Vilma Lin who had referred patient to [facility] according to patient a Instructions Prescription details   oxyCODONE-acetaminophen  MG Tabs  Commonly known as: PERCOCET      Take 1 tablet by mouth every 4 (four) hours as needed.    Quantity: 30 tablet  Refills: 0        CHANGE how you take these medications      Instruct as:  [address] [facility]              Where to Get Your Medications      These medications were sent to [facility] [address] [facility], [address] [place] [address]. [phone], [phone]  [address] [place] [address]., [place]a [place] [address]    Phone: [phone]   · [facility]

## 2022-01-14 NOTE — TELEPHONE ENCOUNTER
Medication request: CYCLOBENZAPRINE 10 MG TABLET  Take 1 tablet (10 mg total) by mouth nightly. LOV- 9/13/21  NOV- none    [place]/Last refill: 8/28/21 #30 r-0    LMTCB.   Per conversation w/Dr. Sarah Mckinney, he is not providing any more refills for this patient

## 2022-01-14 NOTE — TELEPHONE ENCOUNTER
----- Message from Jagruti Mosley RN sent at 1/14/2022  3:00 PM CST -----  Regarding: FW: Blood/Clots in Urine      ----- Message -----  From: Meena Dotson: 1/14/2022   2:05 PM CST  To: Edna Rn Triage  Subject: Blood/Clots in Urine

## 2022-01-14 NOTE — TELEPHONE ENCOUNTER
Renetta Alberts RN 1/14/2022 3:00 PM CST        ----- Message -----  From: Stephanie Luna: 1/14/2022 2:05 PM CST  To: Em Rn Triage  Subject: Blood/Clots in Urine     Good afternoon,    On this morning I experienced blood/small clots of blood coming

## 2022-01-15 ENCOUNTER — LAB ENCOUNTER (OUTPATIENT)
Dept: LAB | Age: 47
End: 2022-01-15
Attending: INTERNAL MEDICINE
Payer: COMMERCIAL

## 2022-01-15 DIAGNOSIS — R31.9 HEMATURIA, UNSPECIFIED TYPE: ICD-10-CM

## 2022-01-15 LAB
BILIRUB UR QL STRIP.AUTO: NEGATIVE
COLOR UR AUTO: YELLOW
GLUCOSE UR STRIP.AUTO-MCNC: NEGATIVE MG/DL
KETONES UR STRIP.AUTO-MCNC: NEGATIVE MG/DL
LEUKOCYTE ESTERASE UR QL STRIP.AUTO: NEGATIVE
NITRITE UR QL STRIP.AUTO: NEGATIVE
PH UR STRIP.AUTO: 7 [PH] (ref 5–8)
PROT UR STRIP.AUTO-MCNC: NEGATIVE MG/DL
RBC #/AREA URNS AUTO: >10 /HPF
SP GR UR STRIP.AUTO: 1.01 (ref 1–1.03)
UROBILINOGEN UR STRIP.AUTO-MCNC: <2 MG/DL

## 2022-01-15 PROCEDURE — 87086 URINE CULTURE/COLONY COUNT: CPT

## 2022-01-15 PROCEDURE — 81001 URINALYSIS AUTO W/SCOPE: CPT

## 2022-01-15 NOTE — TELEPHONE ENCOUNTER
Wife called , pt continue to have blood in urine, advised need to be evaluated, relayed  message from yesterday, stated pt do not want to go to ER and wait 4 hrs, advised do not delay care, Pt have has s/s since Thursday , stated he will just do urine te

## 2022-01-15 NOTE — TELEPHONE ENCOUNTER
Please advise patient to push fluids, watch for increasing bleeding, if bleeding increases he has no choice and needs to go to emergency room.   Otherwise I would advise that he contact urology office as well I see Manpreet santamaria nurse practiti

## 2022-01-17 ENCOUNTER — TELEPHONE (OUTPATIENT)
Dept: INTERNAL MEDICINE CLINIC | Facility: CLINIC | Age: 47
End: 2022-01-17

## 2022-01-17 NOTE — TELEPHONE ENCOUNTER
RN called patient and offered appt in APRN at [facility] on 1/31 or DEEPTI at [facility]. Patient willing to take the appt on 1/19 11am with Sy Jean PA-C here at [facility]. Instructions given. All questions answered and he verbalized understanding.

## 2022-01-17 NOTE — TELEPHONE ENCOUNTER
WILY spoke with patient today for TCM call:  Patient states he is still passing some blood and blood clots in his urine which started the day after his discharge 1/14/22.  Patient states he forgot to tell Dr. Yariel Olson about this, however, states that he did

## 2022-01-17 NOTE — TELEPHONE ENCOUNTER
His urine culture was negative for infection; ua did show blood and he is having already gross hematuria; he needs to see urologist Dr Kory Melgar for further evaluation of hematuria.  Ok to see his NP polina lanza so pt can be seen right away

## 2022-01-17 NOTE — TELEPHONE ENCOUNTER
Urology RN group: please advise if patient can be seen soon. Patient has gross hematuria. Today patient denied burning pain, no fever. Patient is pushing fluids.

## 2022-01-17 NOTE — PROGRESS NOTES
Initial Post Discharge Follow Up   Discharge Date: 1/13/22  Contact Date: 1/14/2022    Consent Verification:  Assessment Completed With: Patient  HIPAA Verified?   Yes    Discharge Dx:     Abd pain with pancreatic mass on imaging from [facility] instructions explained to you?   o On a scale of 1-5   1- Very Poor and 5- Very well   o Very well  • Do you have any questions about your discharge instructions? No  • Before leaving the hospital was your diagnoses explained to you?  Yes  • Do you have an have any questions about your new medication? No  • Did you  your discharge medications when you left the hospital? Yes  • May I go over your medications with you to make sure we are not missing anything? yes  • Are there any reasons that keep you fr and when to call 911. Patient verbalized understanding of these. NCM instructed patient to call PCP with any questions or needs, he states he will.     CCM referral placed:  Not Applicable    BOOK BY DATE: 1/27/22    [x]  Discharge Summary, Discharge medica

## 2022-01-18 NOTE — PROGRESS NOTES
Subjective:   Logan Morales is a 55year old male with hx of HTN, pancreatitis s/p post exploratory laparotomy, caryn-en-Y lateral pancreaticojejunostomy with duodenum-preserving pancreatic head resection on 9/24/20 at [place] [address], elevated PSA, BPH who Take 1 tablet (40 mg total) by mouth daily. 90 tablet 1   • amLODIPine 5 MG Oral Tab Take 1 tablet (5 mg total) by mouth daily.  90 tablet 1   • pantoprazole 40 MG Oral Tab EC Take 1 tablet (40 mg total) by mouth before breakfast. 90 tablet 1   • ondansetro 01/11/2022       Urinalysis Results (last three years):  Recent Labs     08/20/20  2006 08/25/20  1326 01/06/21  0817 01/10/22  1134 01/15/22  0930   COLORUR Yellow Yellow Yellow Yellow Yellow   CLARITY Clear Clear Clear Hazy* Hazy*   SPECGRAVITY 1.006 1.0 LIVER:  There are numerous, simple nonenhancing intrahepatic cysts. The largest cyst is seen within the anterior segment of the right lobe measuring 1.6 cm. No enhancing or suspicious hepatic lesions are noted.  BILIARY:  There is diffuse gallbladder dist nonenhancing foci within the region of the head/uncinate process of the pancreas measuring 1.2 cm and 0.9 cm respectively. These could perhaps represent dilated branch ducts, pancreatic pseudocysts or simple cysts.  3. There is dilatation of the gallbladde (DEH=24125), 1/31/2021, 5:52 PM.  INDICATIONS:  Pancreatic mass. Pancreatitis. Patient has a history of Natalie-en-Y pancreatic to jejunostomy for the purposes of pancreatic head pseudocyst resection.   TECHNIQUE:  CT scanning was performed of the abdomen fr collection appears in close proximity to the pancreaticojejunostomy and could represent focal duct dilatation. There is dilatation of the pancreatic duct within the pancreatic tail measuring up to 3.5 mm in caliber. ADRENALS:  Unremarkable.  KIDNEYS:  Michelle Donaldo the tail of the pancreas measuring up to 3.5 mm in caliber. 5. The gallbladder is mildly distended. There is possible layering sludge within the gallbladder. Clinical correlation with physical exam is recommended.   6.  Avascular necrosis of the left fem

## 2022-01-19 ENCOUNTER — OFFICE VISIT (OUTPATIENT)
Dept: SURGERY | Facility: CLINIC | Age: 47
End: 2022-01-19
Payer: COMMERCIAL

## 2022-01-19 DIAGNOSIS — R31.0 GROSS HEMATURIA: Primary | ICD-10-CM

## 2022-01-19 DIAGNOSIS — R97.20 ELEVATED PSA: ICD-10-CM

## 2022-01-19 DIAGNOSIS — R82.90 URINE FINDING: ICD-10-CM

## 2022-01-19 LAB
APPEARANCE: CLEAR
BILIRUBIN: NEGATIVE
GLUCOSE (URINE DIPSTICK): NEGATIVE MG/DL
KETONES (URINE DIPSTICK): NEGATIVE MG/DL
LEUKOCYTES: NEGATIVE
MULTISTIX LOT#: ABNORMAL NUMERIC
NITRITE, URINE: NEGATIVE
PH, URINE: 6.5 (ref 4.5–8)
SPECIFIC GRAVITY: 1.02 (ref 1–1.03)
UROBILINOGEN,SEMI-QN: 1 MG/DL (ref 0–1.9)

## 2022-01-19 PROCEDURE — 81003 URINALYSIS AUTO W/O SCOPE: CPT | Performed by: PHYSICIAN ASSISTANT

## 2022-01-19 PROCEDURE — 99214 OFFICE O/P EST MOD 30 MIN: CPT | Performed by: PHYSICIAN ASSISTANT

## 2022-01-19 PROCEDURE — 51798 US URINE CAPACITY MEASURE: CPT | Performed by: PHYSICIAN ASSISTANT

## 2022-01-19 RX ORDER — HYDROCODONE BITARTRATE AND ACETAMINOPHEN 10; 325 MG/1; MG/1
TABLET ORAL
COMMUNITY
Start: 2022-01-09 | End: 2022-02-03

## 2022-01-20 ENCOUNTER — OFFICE VISIT (OUTPATIENT)
Dept: INTERNAL MEDICINE CLINIC | Facility: CLINIC | Age: 47
End: 2022-01-20
Payer: COMMERCIAL

## 2022-01-20 VITALS
OXYGEN SATURATION: 100 % | BODY MASS INDEX: 22.41 KG/M2 | DIASTOLIC BLOOD PRESSURE: 68 MMHG | HEART RATE: 104 BPM | SYSTOLIC BLOOD PRESSURE: 130 MMHG | HEIGHT: 74 IN | TEMPERATURE: 99 F | WEIGHT: 174.63 LBS

## 2022-01-20 DIAGNOSIS — I10 ESSENTIAL HYPERTENSION: ICD-10-CM

## 2022-01-20 DIAGNOSIS — K85.90 ACUTE ON CHRONIC PANCREATITIS (HCC): Primary | ICD-10-CM

## 2022-01-20 DIAGNOSIS — K86.1 ACUTE ON CHRONIC PANCREATITIS (HCC): Primary | ICD-10-CM

## 2022-01-20 DIAGNOSIS — K86.3 PANCREATIC PSEUDOCYST: ICD-10-CM

## 2022-01-20 DIAGNOSIS — R91.1 PULMONARY NODULE: ICD-10-CM

## 2022-01-20 LAB — NON GYNE INTERPRETATION: NEGATIVE

## 2022-01-20 PROCEDURE — 3008F BODY MASS INDEX DOCD: CPT | Performed by: INTERNAL MEDICINE

## 2022-01-20 PROCEDURE — 3078F DIAST BP <80 MM HG: CPT | Performed by: INTERNAL MEDICINE

## 2022-01-20 PROCEDURE — 99495 TRANSJ CARE MGMT MOD F2F 14D: CPT | Performed by: INTERNAL MEDICINE

## 2022-01-20 PROCEDURE — 3075F SYST BP GE 130 - 139MM HG: CPT | Performed by: INTERNAL MEDICINE

## 2022-01-22 NOTE — PROGRESS NOTES
HPI:    Bigg Kwok is a 55year old male here today for hospital follow up.    He was discharged from Inpatient hospital, [facility]  to Home   Admission Date: 1/10/22   Discharge Date: 1/13/22  Hospital Discharge Diagnoses (since 12/22/2021) mouth every 4 (four) hours as needed. tamsulosin (FLOMAX) cap, Take 1 capsule (0.4 mg total) by mouth After Breakfast. TAKE 30 MINUTES AFTER MEAL  lisinopril 40 MG Oral Tab, Take 1 tablet (40 mg total) by mouth daily.   amLODIPine 5 MG Oral Tab, Take 1 tab blurred vision or double vision  HEENT: denies nasal congestion, sinus pain or ST  LUNGS: denies shortness of breath with exertion  CARDIOVASCULAR: denies chest pain on exertion or palpitations  GI: + abdominal pain, denies heartburn, denies diarrhea  MUSC nodule  Plan: unchanged and stable for more than 2 yrs when compared to old cxr thus already felt to be benign nodule.     (K86.3) Pancreatic pseudocyst  Plan: see above.  Pt had seen his gI surgeon already in [facility]; was apparently told to observe for now

## 2022-02-02 ENCOUNTER — PATIENT MESSAGE (OUTPATIENT)
Dept: INTERNAL MEDICINE CLINIC | Facility: CLINIC | Age: 47
End: 2022-02-02

## 2022-02-03 RX ORDER — HYDROCODONE BITARTRATE AND ACETAMINOPHEN 10; 325 MG/1; MG/1
1 TABLET ORAL EVERY 8 HOURS PRN
Qty: 20 TABLET | Refills: 0 | Status: SHIPPED | OUTPATIENT
Start: 2022-02-03 | End: 2022-03-02

## 2022-03-03 NOTE — TELEPHONE ENCOUNTER
Protocol failed or has No Protocol, please review  Last office visit = 1/202022   Last refill = 2/3/2022    Requested Prescriptions   Pending Prescriptions Disp Refills    HYDROcodone-acetaminophen  MG Oral Tab 20 tablet 0     Sig: Take 1 tablet by mouth every 8 (eight) hours as needed for Pain.         There is no refill protocol information for this order           Recent Outpatient Visits              1 month ago Acute on chronic pancreatitis [facility])    [facility], Aristeo Espinoza MD    Office Visit    1 month ago Gross hematuria    [facility], [facility], [address], Titi Trammell, Vanessa Paz, DEEPTI    Office Visit    1 month ago Acute on chronic pancreatitis [facility])    [facility], Alexsander Espinoza MD    Office Visit    2 months ago Chronic pancreatitis, unspecified pancreatitis type [facility])    Fanny Luna, Lalita Currie MD    Office Visit    4 months ago Acne vulgaris    Dermatology - [address] [place], [place]    Office Visit

## 2022-03-04 RX ORDER — HYDROCODONE BITARTRATE AND ACETAMINOPHEN 10; 325 MG/1; MG/1
1 TABLET ORAL EVERY 8 HOURS PRN
Qty: 15 TABLET | Refills: 0 | Status: SHIPPED | OUTPATIENT
Start: 2022-03-04 | End: 2022-03-09

## 2022-03-08 ENCOUNTER — TELEPHONE (OUTPATIENT)
Dept: INTERNAL MEDICINE CLINIC | Facility: CLINIC | Age: 47
End: 2022-03-08

## 2022-03-08 NOTE — TELEPHONE ENCOUNTER
Please review refill protocol failed/ no protocol  Requested Prescriptions   Pending Prescriptions Disp Refills    HYDROcodone-acetaminophen  MG Oral Tab 15 tablet 0     Sig: Take 1 tablet by mouth every 8 (eight) hours as needed for Pain.         There is no refill protocol information for this order

## 2022-03-08 NOTE — TELEPHONE ENCOUNTER
Form received from [facility] requesting alternative drug for Hydrocodone-Acetamin 1-/325 related to drug is on backorder and unable to order anymore. Form placed in Dr. Susan Velazquez in basket for review.  Message fwd to Dr. Gentry Olson for review

## 2022-03-09 RX ORDER — HYDROCODONE BITARTRATE AND ACETAMINOPHEN 10; 325 MG/1; MG/1
1 TABLET ORAL EVERY 8 HOURS PRN
Qty: 15 TABLET | Refills: 0 | Status: ON HOLD | OUTPATIENT
Start: 2022-03-09

## 2022-03-09 NOTE — TELEPHONE ENCOUNTER
Wife calling as follow up to below, patient is out of medication and awaiting an alternative plan. Per wife, would prefer an alternative medication if provider can prescribe one. If not,requests that Rx for hydrocodone is sent to the [facility] in [place] [employer] on patient behalf. Medication pended with new pharmacy for provider consideration.

## 2022-03-11 RX ORDER — HYDROCODONE BITARTRATE AND ACETAMINOPHEN 10; 325 MG/1; MG/1
1 TABLET ORAL EVERY 8 HOURS PRN
Qty: 15 TABLET | Refills: 0 | OUTPATIENT
Start: 2022-03-11

## 2022-03-21 NOTE — ED QUICK NOTES
Assumed care of patient from triage. Reporting chest pain and dyspnea since 3 days ago, patient states he wasn't doing anything in particular when it started. Nothing makes it better or worse. Continuous telemetry and pulse ox applied. No

## 2022-03-29 ENCOUNTER — TELEPHONE (OUTPATIENT)
Dept: GASTROENTEROLOGY | Facility: CLINIC | Age: 47
End: 2022-03-29

## 2022-03-29 NOTE — TELEPHONE ENCOUNTER
Ok to schedule colonoscopy with MAC with split golytely for colorectal cancer screening at [facility], [address] [facility] - Gastroenterology

## 2022-03-29 NOTE — TELEPHONE ENCOUNTER
Dr. eDsire Muniz,    Patient is ready to schedule colonoscopy as recommended at office visit 12/3/21. Please advise on orders, thank you!

## 2022-03-29 NOTE — TELEPHONE ENCOUNTER
Pt's wife Sukhjinder Greenwood is calling to schedule pt's colonoscopy. Dr wanted him to have one from last office visit and he said he would think about it. . now he wants to schedule refer to OV from 12-3-21.  Please call

## 2022-04-04 ENCOUNTER — APPOINTMENT (OUTPATIENT)
Dept: CT IMAGING | Facility: HOSPITAL | Age: 47
End: 2022-04-04
Attending: EMERGENCY MEDICINE
Payer: COMMERCIAL

## 2022-04-04 ENCOUNTER — HOSPITAL ENCOUNTER (INPATIENT)
Facility: HOSPITAL | Age: 47
LOS: 2 days | Discharge: HOME | End: 2022-04-06
Attending: EMERGENCY MEDICINE | Admitting: HOSPITALIST
Payer: COMMERCIAL

## 2022-04-04 DIAGNOSIS — K85.80 OTHER ACUTE PANCREATITIS, UNSPECIFIED COMPLICATION STATUS: Primary | ICD-10-CM

## 2022-04-04 PROBLEM — K85.90 ACUTE PANCREATITIS (HCC): Status: ACTIVE | Noted: 2022-04-04

## 2022-04-04 PROBLEM — K85.90 ACUTE PANCREATITIS: Status: ACTIVE | Noted: 2022-04-04

## 2022-04-04 LAB
ALBUMIN SERPL-MCNC: 3.2 G/DL (ref 3.4–5)
ALP LIVER SERPL-CCNC: 74 U/L
ALT SERPL-CCNC: 20 U/L
ANION GAP SERPL CALC-SCNC: 5 MMOL/L (ref 0–18)
AST SERPL-CCNC: 14 U/L (ref 15–37)
BASOPHILS # BLD AUTO: 0.02 X10(3) UL (ref 0–0.2)
BASOPHILS NFR BLD AUTO: 0.2 %
BILIRUB DIRECT SERPL-MCNC: 0.1 MG/DL (ref 0–0.2)
BILIRUB SERPL-MCNC: 0.4 MG/DL (ref 0.1–2)
BILIRUB UR QL: NEGATIVE
BUN BLD-MCNC: 16 MG/DL (ref 7–18)
BUN/CREAT SERPL: 18.8 (ref 10–20)
CALCIUM BLD-MCNC: 8.6 MG/DL (ref 8.5–10.1)
CHLORIDE SERPL-SCNC: 109 MMOL/L (ref 98–112)
CLARITY UR: CLEAR
CO2 SERPL-SCNC: 29 MMOL/L (ref 21–32)
COLOR UR: YELLOW
CREAT BLD-MCNC: 0.85 MG/DL
DEPRECATED RDW RBC AUTO: 55.3 FL (ref 35.1–46.3)
EOSINOPHIL # BLD AUTO: 0.04 X10(3) UL (ref 0–0.7)
EOSINOPHIL NFR BLD AUTO: 0.4 %
ERYTHROCYTE [DISTWIDTH] IN BLOOD BY AUTOMATED COUNT: 15.3 % (ref 11–15)
GLUCOSE BLD-MCNC: 88 MG/DL (ref 70–99)
GLUCOSE UR-MCNC: NEGATIVE MG/DL
HCT VFR BLD AUTO: 39.6 %
HGB BLD-MCNC: 12.9 G/DL
HGB UR QL STRIP.AUTO: NEGATIVE
IMM GRANULOCYTES # BLD AUTO: 0.03 X10(3) UL (ref 0–1)
IMM GRANULOCYTES NFR BLD: 0.3 %
KETONES UR-MCNC: NEGATIVE MG/DL
LEUKOCYTE ESTERASE UR QL STRIP.AUTO: NEGATIVE
LIPASE SERPL-CCNC: 151 U/L (ref 73–393)
LYMPHOCYTES # BLD AUTO: 1.67 X10(3) UL (ref 1–4)
LYMPHOCYTES NFR BLD AUTO: 18 %
MCH RBC QN AUTO: 32 PG (ref 26–34)
MCHC RBC AUTO-ENTMCNC: 32.6 G/DL (ref 31–37)
MCV RBC AUTO: 98.3 FL
MONOCYTES # BLD AUTO: 0.46 X10(3) UL (ref 0.1–1)
MONOCYTES NFR BLD AUTO: 5 %
NEUTROPHILS # BLD AUTO: 7.05 X10 (3) UL (ref 1.5–7.7)
NEUTROPHILS # BLD AUTO: 7.05 X10(3) UL (ref 1.5–7.7)
NEUTROPHILS NFR BLD AUTO: 76.1 %
NITRITE UR QL STRIP.AUTO: NEGATIVE
OSMOLALITY SERPL CALC.SUM OF ELEC: 297 MOSM/KG (ref 275–295)
PH UR: 7 [PH] (ref 5–8)
PLATELET # BLD AUTO: 359 10(3)UL (ref 150–450)
POTASSIUM SERPL-SCNC: 4 MMOL/L (ref 3.5–5.1)
PROT SERPL-MCNC: 6.6 G/DL (ref 6.4–8.2)
PROT UR-MCNC: NEGATIVE MG/DL
RBC # BLD AUTO: 4.03 X10(6)UL
SARS-COV-2 RNA RESP QL NAA+PROBE: NOT DETECTED
SODIUM SERPL-SCNC: 143 MMOL/L (ref 136–145)
SP GR UR STRIP: 1.02 (ref 1–1.03)
UROBILINOGEN UR STRIP-ACNC: 0.2
WBC # BLD AUTO: 9.3 X10(3) UL (ref 4–11)

## 2022-04-04 PROCEDURE — 99285 EMERGENCY DEPT VISIT HI MDM: CPT

## 2022-04-04 PROCEDURE — 80048 BASIC METABOLIC PNL TOTAL CA: CPT | Performed by: EMERGENCY MEDICINE

## 2022-04-04 PROCEDURE — 96375 TX/PRO/DX INJ NEW DRUG ADDON: CPT

## 2022-04-04 PROCEDURE — 96376 TX/PRO/DX INJ SAME DRUG ADON: CPT

## 2022-04-04 PROCEDURE — 96361 HYDRATE IV INFUSION ADD-ON: CPT

## 2022-04-04 PROCEDURE — 80076 HEPATIC FUNCTION PANEL: CPT | Performed by: EMERGENCY MEDICINE

## 2022-04-04 PROCEDURE — 83690 ASSAY OF LIPASE: CPT | Performed by: EMERGENCY MEDICINE

## 2022-04-04 PROCEDURE — 74177 CT ABD & PELVIS W/CONTRAST: CPT | Performed by: EMERGENCY MEDICINE

## 2022-04-04 PROCEDURE — 96374 THER/PROPH/DIAG INJ IV PUSH: CPT

## 2022-04-04 PROCEDURE — 85025 COMPLETE CBC W/AUTO DIFF WBC: CPT | Performed by: EMERGENCY MEDICINE

## 2022-04-04 RX ORDER — HYDROMORPHONE HYDROCHLORIDE 1 MG/ML
2 INJECTION, SOLUTION INTRAMUSCULAR; INTRAVENOUS; SUBCUTANEOUS EVERY 2 HOUR PRN
Status: DISCONTINUED | OUTPATIENT
Start: 2022-04-04 | End: 2022-04-06

## 2022-04-04 RX ORDER — DIPHENHYDRAMINE HYDROCHLORIDE 50 MG/ML
INJECTION INTRAMUSCULAR; INTRAVENOUS
Status: COMPLETED
Start: 2022-04-04 | End: 2022-04-04

## 2022-04-04 RX ORDER — MORPHINE SULFATE 4 MG/ML
4 INJECTION, SOLUTION INTRAMUSCULAR; INTRAVENOUS ONCE
Status: COMPLETED | OUTPATIENT
Start: 2022-04-04 | End: 2022-04-04

## 2022-04-04 RX ORDER — AMLODIPINE BESYLATE 5 MG/1
5 TABLET ORAL DAILY
Status: DISCONTINUED | OUTPATIENT
Start: 2022-04-05 | End: 2022-04-06

## 2022-04-04 RX ORDER — TAMSULOSIN HYDROCHLORIDE 0.4 MG/1
0.4 CAPSULE ORAL
Status: DISCONTINUED | OUTPATIENT
Start: 2022-04-05 | End: 2022-04-06

## 2022-04-04 RX ORDER — SODIUM CHLORIDE, SODIUM LACTATE, POTASSIUM CHLORIDE, CALCIUM CHLORIDE 600; 310; 30; 20 MG/100ML; MG/100ML; MG/100ML; MG/100ML
INJECTION, SOLUTION INTRAVENOUS CONTINUOUS
Status: DISCONTINUED | OUTPATIENT
Start: 2022-04-04 | End: 2022-04-06

## 2022-04-04 RX ORDER — BISACODYL 10 MG
10 SUPPOSITORY, RECTAL RECTAL
Status: DISCONTINUED | OUTPATIENT
Start: 2022-04-04 | End: 2022-04-06

## 2022-04-04 RX ORDER — LACTULOSE 10 G/15ML
20 SOLUTION ORAL EVERY 6 HOURS PRN
Status: DISCONTINUED | OUTPATIENT
Start: 2022-04-04 | End: 2022-04-06

## 2022-04-04 RX ORDER — PROCHLORPERAZINE EDISYLATE 5 MG/ML
5 INJECTION INTRAMUSCULAR; INTRAVENOUS EVERY 8 HOURS PRN
Status: DISCONTINUED | OUTPATIENT
Start: 2022-04-04 | End: 2022-04-06

## 2022-04-04 RX ORDER — SODIUM CHLORIDE 9 MG/ML
INJECTION, SOLUTION INTRAVENOUS CONTINUOUS
Status: ACTIVE | OUTPATIENT
Start: 2022-04-04 | End: 2022-04-04

## 2022-04-04 RX ORDER — ONDANSETRON 2 MG/ML
4 INJECTION INTRAMUSCULAR; INTRAVENOUS EVERY 6 HOURS PRN
Status: DISCONTINUED | OUTPATIENT
Start: 2022-04-04 | End: 2022-04-06

## 2022-04-04 RX ORDER — HEPARIN SODIUM 5000 [USP'U]/ML
5000 INJECTION, SOLUTION INTRAVENOUS; SUBCUTANEOUS EVERY 12 HOURS SCHEDULED
Status: DISCONTINUED | OUTPATIENT
Start: 2022-04-04 | End: 2022-04-06

## 2022-04-04 RX ORDER — DOCUSATE SODIUM 100 MG/1
100 CAPSULE, LIQUID FILLED ORAL 2 TIMES DAILY
Status: DISCONTINUED | OUTPATIENT
Start: 2022-04-04 | End: 2022-04-06

## 2022-04-04 RX ORDER — ACETAMINOPHEN 325 MG/1
650 TABLET ORAL EVERY 6 HOURS PRN
Status: DISCONTINUED | OUTPATIENT
Start: 2022-04-04 | End: 2022-04-06

## 2022-04-04 RX ORDER — METOCLOPRAMIDE HYDROCHLORIDE 5 MG/ML
INJECTION INTRAMUSCULAR; INTRAVENOUS
Status: COMPLETED
Start: 2022-04-04 | End: 2022-04-04

## 2022-04-04 RX ORDER — DIPHENHYDRAMINE HYDROCHLORIDE 50 MG/ML
25 INJECTION INTRAMUSCULAR; INTRAVENOUS ONCE
Status: COMPLETED | OUTPATIENT
Start: 2022-04-04 | End: 2022-04-04

## 2022-04-04 RX ORDER — METOCLOPRAMIDE HYDROCHLORIDE 5 MG/ML
5 INJECTION INTRAMUSCULAR; INTRAVENOUS ONCE
Status: COMPLETED | OUTPATIENT
Start: 2022-04-04 | End: 2022-04-04

## 2022-04-04 RX ORDER — HYDROMORPHONE HYDROCHLORIDE 1 MG/ML
0.5 INJECTION, SOLUTION INTRAMUSCULAR; INTRAVENOUS; SUBCUTANEOUS EVERY 2 HOUR PRN
Status: DISCONTINUED | OUTPATIENT
Start: 2022-04-04 | End: 2022-04-06

## 2022-04-04 RX ORDER — HYDROMORPHONE HYDROCHLORIDE 1 MG/ML
1 INJECTION, SOLUTION INTRAMUSCULAR; INTRAVENOUS; SUBCUTANEOUS EVERY 2 HOUR PRN
Status: DISCONTINUED | OUTPATIENT
Start: 2022-04-04 | End: 2022-04-06

## 2022-04-04 RX ORDER — TEMAZEPAM 15 MG/1
15 CAPSULE ORAL NIGHTLY PRN
Status: DISCONTINUED | OUTPATIENT
Start: 2022-04-04 | End: 2022-04-06

## 2022-04-04 RX ORDER — ONDANSETRON 2 MG/ML
4 INJECTION INTRAMUSCULAR; INTRAVENOUS ONCE
Status: COMPLETED | OUTPATIENT
Start: 2022-04-04 | End: 2022-04-04

## 2022-04-04 RX ORDER — DIPHENHYDRAMINE HYDROCHLORIDE 50 MG/ML
25 INJECTION INTRAMUSCULAR; INTRAVENOUS EVERY 4 HOURS PRN
Status: DISCONTINUED | OUTPATIENT
Start: 2022-04-04 | End: 2022-04-06

## 2022-04-04 NOTE — ED QUICK NOTES
Pt vomiting, green emesis into basin. Dr. Vishnu Beebe notified, see [facility] for medication administration.

## 2022-04-04 NOTE — H&P
[facility]    PATIENT'S NAME: Julien Witt   ATTENDING PHYSICIAN: Faith Mendoza. Sunday Fiore MD   PATIENT ACCOUNT#:   [id number]    LOCATION:  [address]  MEDICAL RECORD #:   [id number]       YOB: 1975  ADMISSION DATE:       04/04/2022    HISTORY AND PHYSICAL EXAMINATION    CHIEF COMPLAINT:  Recurrent pancreatitis. HISTORY OF PRESENT ILLNESS:  The patient is a 71-year-old [employer] male with recurrent idiopathic pancreatitis history and chronic pseudocyst.  Came into the emergency department today for intense epigastric abdominal pain, starting early morning hours, associated with nausea and poor appetite. CBC and chemistry were unremarkable. Urinalysis showed no evidence of urinary tract infection. CT scan of the abdomen showed findings suggestive of acute on chronic pancreatitis, unchanged since prior exam; extensive coarse pancreatic calcifications, most prominent within pancreatic head, have slightly decreased in size but not entirely resolved since January 2022; bilobed cystic lesion seen within the ventral aspect of the pancreatic head may represent a small pseudocyst and is unchanged; there is peripancreatic inflammation with infiltration of fat planes between the mesenteric and superior artery and vein which may be secondary to inflammation, unchanged; distended gallbladder, unchanged; large amount of excessive stool seen throughout the colon. The patient was started on IV fluids, pain medications, and he will be admitted to the hospital for further management. PAST MEDICAL HISTORY:  Chronic recurrent pancreatitis, initially thought to be secondary to alcohol, but he continued to have recurrent episodes with development of pseudocyst; required endoscopic drainage. Eventually he was seen at [facility] and had exploratory laparotomy, Natalie-en-Y pancreaticojejunostomy using the duodenum and sparing the pancreatic head.   The patient had chronic pain syndrome, chronic pancreatic insufficiency, and hypertension. Pseudocyst being monitored with imaging studies. PAST SURGICAL HISTORY:  As mentioned above. MEDICATIONS:  Please see medication reconciliation list.     ALLERGIES:  No known drug allergies. FAMILY HISTORY:  Positive for hypertension. SOCIAL HISTORY:  He has a history of alcohol abuse, but he quit drinking over 2 years ago. He denies any drug use. He smokes a pack a day. Lives with his family. Independent in his basic activities of daily living. REVIEW OF SYSTEMS:  He does have chronic epigastric pain, but the pain became more intense in the early morning hours, associated with nausea. He denies any alcohol intake recently. Other 12-point review of systems is negative. PHYSICAL EXAMINATION:    GENERAL:  Alert and oriented to time, place, and person. Slightly malnourished and underweight. VITAL SIGNS:  Temperature 97.9, pulse 93, respiratory rate 12, blood pressure 160/77, pulse ox 100% on room air. HEENT:  Atraumatic. Oropharynx clear. Dry mucous membranes. Normal hard and soft palate. Eyes:  Anicteric sclerae. NECK:  Supple. No lymphadenopathy. Trachea midline. Full range of motion. LUNGS:  Clear to auscultation bilaterally. Normal respiratory effort. HEART:  Regular rate and rhythm. S1 and S2 auscultated. No murmur. ABDOMEN:  Soft, nondistended. Hypoactive bowel sounds. Tenderness to palpation, epigastric area. Mild guarding, but no rebound tenderness. EXTREMITIES:  No peripheral edema, clubbing, or cyanosis. NEUROLOGIC:  Motor and sensory intact. Cranial nerves II through XII are intact. ASSESSMENT:    1. Recurrent acute on chronic pancreatitis with underlying unchanged pseudocyst; peripancreatic inflammatory changes, also unchanged from January 2021.  2.   Pancreatic insufficiency. 3.   Hypertension. PLAN:  The patient will be admitted to general medical floor. Pain control. IV fluids. Gastroenterology consult. N.p.o. except ice chips. Monitor his hemodynamic status. Monitor his CBC, CMP, and lipase. Further recommendations to follow.       Dictated By Gregory Trujillo MD  d: 04/04/2022 16:03:38  t: 04/04/2022 16:59:09  [facility] [id number]  FB/

## 2022-04-04 NOTE — ED QUICK NOTES
Pt states he does not feel relief of pain after multiple doses of Morphine and requesting something stronger. MD Didier Bautista notified.

## 2022-04-04 NOTE — ED QUICK NOTES
JL    Are you ok with these? Orders for admission, patient is aware of plan and ready to go upstairs. Any questions, please call ED RN Micaela at [address].      Patient Covid vaccination status: Fully vaccinated     COVID Test Ordered in ED: Rapid SARS-CoV-2 by PCR-pending    COVID Suspicion at Admission: N/A    Running Infusions:  None    Mental Status/LOC at time of transport: A&Ox4/4    Other pertinent information:   CIWA score: N/A   NIH score:  N/A

## 2022-04-05 LAB
ALBUMIN SERPL-MCNC: 3.1 G/DL (ref 3.4–5)
ALBUMIN/GLOB SERPL: 0.9 {RATIO} (ref 1–2)
ALP LIVER SERPL-CCNC: 88 U/L
ALT SERPL-CCNC: 17 U/L
ANION GAP SERPL CALC-SCNC: 6 MMOL/L (ref 0–18)
AST SERPL-CCNC: 12 U/L (ref 15–37)
BASOPHILS # BLD AUTO: 0.02 X10(3) UL (ref 0–0.2)
BASOPHILS NFR BLD AUTO: 0.2 %
BILIRUB SERPL-MCNC: 0.8 MG/DL (ref 0.1–2)
BUN BLD-MCNC: 10 MG/DL (ref 7–18)
BUN/CREAT SERPL: 11.9 (ref 10–20)
CALCIUM BLD-MCNC: 8.5 MG/DL (ref 8.5–10.1)
CHLORIDE SERPL-SCNC: 102 MMOL/L (ref 98–112)
CO2 SERPL-SCNC: 29 MMOL/L (ref 21–32)
CREAT BLD-MCNC: 0.84 MG/DL
DEPRECATED RDW RBC AUTO: 53.1 FL (ref 35.1–46.3)
EOSINOPHIL # BLD AUTO: 0.02 X10(3) UL (ref 0–0.7)
EOSINOPHIL NFR BLD AUTO: 0.2 %
ERYTHROCYTE [DISTWIDTH] IN BLOOD BY AUTOMATED COUNT: 14.9 % (ref 11–15)
GLOBULIN PLAS-MCNC: 3.4 G/DL (ref 2.8–4.4)
GLUCOSE BLD-MCNC: 94 MG/DL (ref 70–99)
HCT VFR BLD AUTO: 40.6 %
HGB BLD-MCNC: 13.5 G/DL
IMM GRANULOCYTES # BLD AUTO: 0.04 X10(3) UL (ref 0–1)
IMM GRANULOCYTES NFR BLD: 0.4 %
LIPASE SERPL-CCNC: 76 U/L (ref 73–393)
LYMPHOCYTES # BLD AUTO: 1.3 X10(3) UL (ref 1–4)
LYMPHOCYTES NFR BLD AUTO: 13.5 %
MCH RBC QN AUTO: 32.1 PG (ref 26–34)
MCHC RBC AUTO-ENTMCNC: 33.3 G/DL (ref 31–37)
MCV RBC AUTO: 96.7 FL
MONOCYTES # BLD AUTO: 0.62 X10(3) UL (ref 0.1–1)
MONOCYTES NFR BLD AUTO: 6.5 %
NEUTROPHILS # BLD AUTO: 7.61 X10 (3) UL (ref 1.5–7.7)
NEUTROPHILS # BLD AUTO: 7.61 X10(3) UL (ref 1.5–7.7)
NEUTROPHILS NFR BLD AUTO: 79.2 %
OSMOLALITY SERPL CALC.SUM OF ELEC: 283 MOSM/KG (ref 275–295)
PLATELET # BLD AUTO: 358 10(3)UL (ref 150–450)
POTASSIUM SERPL-SCNC: 3.6 MMOL/L (ref 3.5–5.1)
PROT SERPL-MCNC: 6.5 G/DL (ref 6.4–8.2)
RBC # BLD AUTO: 4.2 X10(6)UL
SODIUM SERPL-SCNC: 137 MMOL/L (ref 136–145)
WBC # BLD AUTO: 9.6 X10(3) UL (ref 4–11)

## 2022-04-05 PROCEDURE — 80053 COMPREHEN METABOLIC PANEL: CPT | Performed by: HOSPITALIST

## 2022-04-05 PROCEDURE — 85025 COMPLETE CBC W/AUTO DIFF WBC: CPT | Performed by: HOSPITALIST

## 2022-04-05 PROCEDURE — 83690 ASSAY OF LIPASE: CPT | Performed by: HOSPITALIST

## 2022-04-05 RX ORDER — LISINOPRIL 40 MG/1
40 TABLET ORAL DAILY
Status: DISCONTINUED | OUTPATIENT
Start: 2022-04-06 | End: 2022-04-06

## 2022-04-05 RX ORDER — SENNOSIDES 8.6 MG
8.6 TABLET ORAL 2 TIMES DAILY
Status: DISCONTINUED | OUTPATIENT
Start: 2022-04-05 | End: 2022-04-06

## 2022-04-05 NOTE — TELEPHONE ENCOUNTER
Prep sent    Thanks    Maura Barboza MD  [facility], [facility] - Gastroenterology  4/5/2022  12:30 PM

## 2022-04-05 NOTE — PLAN OF CARE
Problem: GASTROINTESTINAL - ADULT  Goal: Minimal or absence of nausea and vomiting  Description: INTERVENTIONS:  - Maintain adequate hydration with IV or PO as ordered and tolerated  - Evaluate effectiveness of ordered antiemetic medications  - Provide nonpharmacologic comfort measures as appropriate  - Advance diet as tolerated, if ordered  - Obtain nutritional consult as needed  - Evaluate fluid balance  Outcome: Progressing     Problem: SKIN/TISSUE INTEGRITY - ADULT  Goal: Skin integrity remains intact  Description: INTERVENTIONS  - Assess and document risk factors for pressure ulcer development  - Assess and document skin integrity  - Monitor for areas of redness and/or skin breakdown  - Initiate interventions, skin care algorithm/standards of care as needed  Outcome: Progressing     Problem: SAFETY ADULT - FALL  Goal: Free from fall injury  Description: INTERVENTIONS:  - Assess pt frequently for physical needs  - Identify cognitive and physical deficits and behaviors that affect risk of falls.   - [place] fall precautions as indicated by assessment.  - Educate pt/family on patient safety including physical limitations  - Instruct pt to call for assistance with activity based on assessment  - Modify environment to reduce risk of injury  - Provide assistive devices as appropriate  - Consider OT/PT consult to assist with strengthening/mobility  - Encourage toileting schedule  Outcome: Progressing     Problem: GASTROINTESTINAL - ADULT  Goal: Maintains or returns to baseline bowel function  Description: INTERVENTIONS:  - Assess bowel function  - Maintain adequate hydration with IV or PO as ordered and tolerated  - Evaluate effectiveness of GI medications  - Encourage mobilization and activity  - Obtain nutritional consult as needed  - Establish a toileting routine/schedule  - Consider collaborating with pharmacy to review patient's medication profile  Outcome: Not Progressing     Problem: PAIN - ADULT  Goal: Verbalizes/displays adequate comfort level or patient's stated pain goal  Description: INTERVENTIONS:  - Encourage pt to monitor pain and request assistance  - Assess pain using appropriate pain scale  - Administer analgesics based on type and severity of pain and evaluate response  - Implement non-pharmacological measures as appropriate and evaluate response  - Consider cultural and social influences on pain and pain management  - Manage/alleviate anxiety  - Utilize distraction and/or relaxation techniques  - Monitor for opioid side effects  - Notify MD/LIP if interventions unsuccessful or patient reports new pain  - Anticipate increased pain with activity and pre-medicate as appropriate  Outcome: Not Progressing

## 2022-04-05 NOTE — CM/SW NOTE
04/05/22 1000   CM/SW Referral Data   Referral Source    Reason for Referral Discharge planning   Informant Patient   Pertinent Medical Hx   Does patient have an established PCP? Yes  Jose Merino)   Patient Info   Patient's Current Mental Status at Time of Assessment Alert;Oriented   Patient's [address]   Patient lives with Spouse/Significant other   Patient Status Prior to Admission   Independent with ADLs and Mobility Yes   Discharge Needs   Anticipated D/C needs No anticipated discharge needs     Pt discussed during nursing rounds. Dx acute pancreatitis. From home w/spouse, independent and active prior to dx. No home care needs anticipated on dc. Plan: Home w/spouse today. / to remain available for support and/or discharge planning.      MASHA Arce    [phone]

## 2022-04-05 NOTE — PLAN OF CARE
Pt's diet adv to clear liq; tolerating well. PRN Diuladid given for pain. Problem: Patient Centered Care  Goal: Patient preferences are identified and integrated in the patient's plan of care  Description: Interventions:  - What would you like us to know as we care for you?  I live with my wife  - Provide timely, complete, and accurate information to patient/family  - Incorporate patient and family knowledge, values, beliefs, and cultural backgrounds into the planning and delivery of care  - Encourage patient/family to participate in care and decision-making at the level they choose  - Honor patient and family perspectives and choices  Outcome: Progressing     Problem: Patient/Family Goals  Goal: Patient/Family Long Term Goal  Description: Patient's Long Term Goal: return home and be healthy    Interventions:  - follow Md discharge orders  - See additional Care Plan goals for specific interventions  Outcome: Progressing  Goal: Patient/Family Short Term Goal  Description: Patient's Short Term Goal: control/alleviate pain    Interventions:   - monitor labs and vital signs  -monitor and medicate for pain  -maintained npo as ordered  - See additional Care Plan goals for specific interventions  Outcome: Not Progressing     Problem: GASTROINTESTINAL - ADULT  Goal: Minimal or absence of nausea and vomiting  Description: INTERVENTIONS:  - Maintain adequate hydration with IV or PO as ordered and tolerated  - Nasogastric tube to low intermittent suction as ordered  - Evaluate effectiveness of ordered antiemetic medications  - Provide nonpharmacologic comfort measures as appropriate  - Advance diet as tolerated, if ordered  - Obtain nutritional consult as needed  - Evaluate fluid balance  Outcome: Progressing  Goal: Maintains or returns to baseline bowel function  Description: INTERVENTIONS:  - Assess bowel function  - Maintain adequate hydration with IV or PO as ordered and tolerated  - Evaluate effectiveness of GI medications  - Encourage mobilization and activity  - Obtain nutritional consult as needed  - Establish a toileting routine/schedule  - Consider collaborating with pharmacy to review patient's medication profile  Outcome: Progressing     Problem: SKIN/TISSUE INTEGRITY - ADULT  Goal: Skin integrity remains intact  Description: INTERVENTIONS  - Assess and document risk factors for pressure ulcer development  - Assess and document skin integrity  - Monitor for areas of redness and/or skin breakdown  - Initiate interventions, skin care algorithm/standards of care as needed  Outcome: Progressing     Problem: PAIN - ADULT  Goal: Verbalizes/displays adequate comfort level or patient's stated pain goal  Description: INTERVENTIONS:  - Encourage pt to monitor pain and request assistance  - Assess pain using appropriate pain scale  - Administer analgesics based on type and severity of pain and evaluate response  - Implement non-pharmacological measures as appropriate and evaluate response  - Consider cultural and social influences on pain and pain management  - Manage/alleviate anxiety  - Utilize distraction and/or relaxation techniques  - Monitor for opioid side effects  - Notify MD/LIP if interventions unsuccessful or patient reports new pain  - Anticipate increased pain with activity and pre-medicate as appropriate  Outcome: Not Progressing     Problem: SAFETY ADULT - FALL  Goal: Free from fall injury  Description: INTERVENTIONS:  - Assess pt frequently for physical needs  - Identify cognitive and physical deficits and behaviors that affect risk of falls.   - [place] fall precautions as indicated by assessment.  - Educate pt/family on patient safety including physical limitations  - Instruct pt to call for assistance with activity based on assessment  - Modify environment to reduce risk of injury  - Provide assistive devices as appropriate  - Consider OT/PT consult to assist with strengthening/mobility  - Encourage toileting schedule  Outcome: Progressing

## 2022-04-05 NOTE — TELEPHONE ENCOUNTER
I contacted patient and reviewed medications and allergies. Patient agreed to [facility] instructions. Scheduled for: Colonoscopy [address]   Provider Name: Dr Payton Donaldson   Date: Wed 5/25/2022   [facility]   Sedation: MAC   Time: 12:45 pm, ([facility]  Prep: split golytely  Meds/Allergies Reconciled?: NKDA   Diagnosis with codes:  CRC screening Z12.11  Was patient informed to call insurance with codes (Y/N):  Yes  Referral sent?:  Yes  [facility] or [facility] notified?: Electronic case request was sent to [place] [phone] via [employer]. Medication Orders: Per ACE Inhibitors and ARBs protocol, do not take the night before and/or day of procedure: Lisinopril  Per ACE Inhibitors and ARBs protocol, do not take the night before and/or day of procedure:Pt is aware to NOT take iron pills, herbal meds and diet supplements for 7 days before exam. Also to NOT take any form of alcohol, recreational drugs and any forms of ED meds 24 hours before exam.     Misc Orders: Patient was informed that they will need a COVID 19 test prior to their procedure. Patient verbally understood & will await a phone call from [facility] to schedule. Further instructions given by staff:   Instructions given and pt verbalized understanding

## 2022-04-06 VITALS
HEART RATE: 83 BPM | OXYGEN SATURATION: 100 % | SYSTOLIC BLOOD PRESSURE: 166 MMHG | WEIGHT: 181.63 LBS | DIASTOLIC BLOOD PRESSURE: 75 MMHG | BODY MASS INDEX: 22.58 KG/M2 | HEIGHT: 75 IN | RESPIRATION RATE: 18 BRPM | TEMPERATURE: 98 F

## 2022-04-06 LAB
ANION GAP SERPL CALC-SCNC: 4 MMOL/L (ref 0–18)
BASOPHILS # BLD AUTO: 0.02 X10(3) UL (ref 0–0.2)
BASOPHILS NFR BLD AUTO: 0.3 %
BUN BLD-MCNC: 9 MG/DL (ref 7–18)
BUN/CREAT SERPL: 11.8 (ref 10–20)
CALCIUM BLD-MCNC: 8.7 MG/DL (ref 8.5–10.1)
CHLORIDE SERPL-SCNC: 102 MMOL/L (ref 98–112)
CO2 SERPL-SCNC: 32 MMOL/L (ref 21–32)
CREAT BLD-MCNC: 0.76 MG/DL
DEPRECATED RDW RBC AUTO: 53.1 FL (ref 35.1–46.3)
EOSINOPHIL # BLD AUTO: 0.02 X10(3) UL (ref 0–0.7)
EOSINOPHIL NFR BLD AUTO: 0.3 %
ERYTHROCYTE [DISTWIDTH] IN BLOOD BY AUTOMATED COUNT: 14.6 % (ref 11–15)
GLUCOSE BLD-MCNC: 103 MG/DL (ref 70–99)
HCT VFR BLD AUTO: 39 %
HGB BLD-MCNC: 12.8 G/DL
IMM GRANULOCYTES # BLD AUTO: 0.02 X10(3) UL (ref 0–1)
IMM GRANULOCYTES NFR BLD: 0.3 %
LYMPHOCYTES # BLD AUTO: 1.42 X10(3) UL (ref 1–4)
LYMPHOCYTES NFR BLD AUTO: 20.8 %
MCH RBC QN AUTO: 32.2 PG (ref 26–34)
MCHC RBC AUTO-ENTMCNC: 32.8 G/DL (ref 31–37)
MCV RBC AUTO: 98 FL
MONOCYTES # BLD AUTO: 0.43 X10(3) UL (ref 0.1–1)
MONOCYTES NFR BLD AUTO: 6.3 %
NEUTROPHILS # BLD AUTO: 4.91 X10 (3) UL (ref 1.5–7.7)
NEUTROPHILS # BLD AUTO: 4.91 X10(3) UL (ref 1.5–7.7)
NEUTROPHILS NFR BLD AUTO: 72 %
OSMOLALITY SERPL CALC.SUM OF ELEC: 285 MOSM/KG (ref 275–295)
PLATELET # BLD AUTO: 341 10(3)UL (ref 150–450)
POTASSIUM SERPL-SCNC: 3.7 MMOL/L (ref 3.5–5.1)
RBC # BLD AUTO: 3.98 X10(6)UL
SODIUM SERPL-SCNC: 138 MMOL/L (ref 136–145)
WBC # BLD AUTO: 6.8 X10(3) UL (ref 4–11)

## 2022-04-06 PROCEDURE — 85025 COMPLETE CBC W/AUTO DIFF WBC: CPT | Performed by: HOSPITALIST

## 2022-04-06 PROCEDURE — 80048 BASIC METABOLIC PNL TOTAL CA: CPT | Performed by: HOSPITALIST

## 2022-04-06 RX ORDER — HYDROMORPHONE HYDROCHLORIDE 1 MG/ML
0.5 INJECTION, SOLUTION INTRAMUSCULAR; INTRAVENOUS; SUBCUTANEOUS EVERY 2 HOUR PRN
Status: DISCONTINUED | OUTPATIENT
Start: 2022-04-06 | End: 2022-04-06

## 2022-04-06 RX ORDER — HYDROCODONE BITARTRATE AND ACETAMINOPHEN 10; 325 MG/1; MG/1
2 TABLET ORAL EVERY 6 HOURS PRN
Status: DISCONTINUED | OUTPATIENT
Start: 2022-04-06 | End: 2022-04-06

## 2022-04-06 RX ORDER — HYDROCODONE BITARTRATE AND ACETAMINOPHEN 10; 325 MG/1; MG/1
1 TABLET ORAL EVERY 6 HOURS PRN
Status: DISCONTINUED | OUTPATIENT
Start: 2022-04-06 | End: 2022-04-06

## 2022-04-06 NOTE — PLAN OF CARE
Problem: Patient Centered Care  Goal: Patient preferences are identified and integrated in the patient's plan of care  Description: Interventions:  - What would you like us to know as we care for you?  I live with my wife  - Provide timely, complete, and accurate information to patient/family  - Incorporate patient and family knowledge, values, beliefs, and cultural backgrounds into the planning and delivery of care  - Encourage patient/family to participate in care and decision-making at the level they choose  - Honor patient and family perspectives and choices  Outcome: Progressing     Problem: GASTROINTESTINAL - ADULT  Goal: Minimal or absence of nausea and vomiting  Description: INTERVENTIONS:  - Maintain adequate hydration with IV or PO as ordered and tolerated  - Nasogastric tube to low intermittent suction as ordered  - Evaluate effectiveness of ordered antiemetic medications  - Provide nonpharmacologic comfort measures as appropriate  - Advance diet as tolerated, if ordered  - Obtain nutritional consult as needed  - Evaluate fluid balance  Outcome: Progressing  Goal: Maintains or returns to baseline bowel function  Description: INTERVENTIONS:  - Assess bowel function  - Maintain adequate hydration with IV or PO as ordered and tolerated  - Evaluate effectiveness of GI medications  - Encourage mobilization and activity  - Obtain nutritional consult as needed  - Establish a toileting routine/schedule  - Consider collaborating with pharmacy to review patient's medication profile  Outcome: Progressing     Problem: SKIN/TISSUE INTEGRITY - ADULT  Goal: Skin integrity remains intact  Description: INTERVENTIONS  - Assess and document risk factors for pressure ulcer development  - Assess and document skin integrity  - Monitor for areas of redness and/or skin breakdown  - Initiate interventions, skin care algorithm/standards of care as needed  Outcome: Progressing     Problem: PAIN - ADULT  Goal: Verbalizes/displays adequate comfort level or patient's stated pain goal  Description: INTERVENTIONS:  - Encourage pt to monitor pain and request assistance  - Assess pain using appropriate pain scale  - Administer analgesics based on type and severity of pain and evaluate response  - Implement non-pharmacological measures as appropriate and evaluate response  - Consider cultural and social influences on pain and pain management  - Manage/alleviate anxiety  - Utilize distraction and/or relaxation techniques  - Monitor for opioid side effects  - Notify MD/LIP if interventions unsuccessful or patient reports new pain  - Anticipate increased pain with activity and pre-medicate as appropriate  Outcome: Progressing     Problem: SAFETY ADULT - FALL  Goal: Free from fall injury  Description: INTERVENTIONS:  - Assess pt frequently for physical needs  - Identify cognitive and physical deficits and behaviors that affect risk of falls. - [place] fall precautions as indicated by assessment.  - Educate pt/family on patient safety including physical limitations  - Instruct pt to call for assistance with activity based on assessment  - Modify environment to reduce risk of injury  - Provide assistive devices as appropriate  - Consider OT/PT consult to assist with strengthening/mobility  - Encourage toileting schedule  Outcome: Progressing     Patient A/Ox4, resting in bed, encourage to ambulate in room. Complaining of abdominal pain 5-6/10, manage as ordered. Fall precaution. Diet advanced patient tolerated. Continue IV fluids. Call light within reach.

## 2022-04-06 NOTE — PLAN OF CARE
Problem: GASTROINTESTINAL - ADULT  Goal: Minimal or absence of nausea and vomiting  Description: INTERVENTIONS:  - Maintain adequate hydration with IV or PO as ordered and tolerated  - Evaluate effectiveness of ordered antiemetic medications  - Provide nonpharmacologic comfort measures as appropriate  - Advance diet as tolerated, if ordered  - Obtain nutritional consult as needed  - Evaluate fluid balance  Outcome: Progressing     Problem: SKIN/TISSUE INTEGRITY - ADULT  Goal: Skin integrity remains intact  Description: INTERVENTIONS  - Assess and document risk factors for pressure ulcer development  - Assess and document skin integrity  - Monitor for areas of redness and/or skin breakdown  - Initiate interventions, skin care algorithm/standards of care as needed  Outcome: Progressing     Problem: PAIN - ADULT  Goal: Verbalizes/displays adequate comfort level or patient's stated pain goal  Description: INTERVENTIONS:  - Encourage pt to monitor pain and request assistance  - Assess pain using appropriate pain scale  - Administer analgesics based on type and severity of pain and evaluate response  - Implement non-pharmacological measures as appropriate and evaluate response  - Consider cultural and social influences on pain and pain management  - Manage/alleviate anxiety  - Utilize distraction and/or relaxation techniques  - Monitor for opioid side effects  - Notify MD/LIP if interventions unsuccessful or patient reports new pain  - Anticipate increased pain with activity and pre-medicate as appropriate  Outcome: Progressing     Problem: SAFETY ADULT - FALL  Goal: Free from fall injury  Description: INTERVENTIONS:  - Assess pt frequently for physical needs  - Identify cognitive and physical deficits and behaviors that affect risk of falls.   - [place] fall precautions as indicated by assessment.  - Educate pt/family on patient safety including physical limitations  - Instruct pt to call for assistance with activity based on assessment  - Modify environment to reduce risk of injury  - Provide assistive devices as appropriate  - Consider OT/PT consult to assist with strengthening/mobility  - Encourage toileting schedule  Outcome: Progressing     Problem: GASTROINTESTINAL - ADULT  Goal: Maintains or returns to baseline bowel function  Description: INTERVENTIONS:  - Assess bowel function  - Maintain adequate hydration with IV or PO as ordered and tolerated  - Evaluate effectiveness of GI medications  - Encourage mobilization and activity  - Obtain nutritional consult as needed  - Establish a toileting routine/schedule  - Consider collaborating with pharmacy to review patient's medication profile  Outcome: Not Progressing

## 2022-04-07 ENCOUNTER — PATIENT OUTREACH (OUTPATIENT)
Dept: CASE MANAGEMENT | Age: 47
End: 2022-04-07

## 2022-04-07 ENCOUNTER — TELEPHONE (OUTPATIENT)
Dept: INTERNAL MEDICINE CLINIC | Facility: CLINIC | Age: 47
End: 2022-04-07

## 2022-04-07 ENCOUNTER — APPOINTMENT (OUTPATIENT)
Dept: CT IMAGING | Facility: HOSPITAL | Age: 47
End: 2022-04-07
Attending: EMERGENCY MEDICINE
Payer: COMMERCIAL

## 2022-04-07 ENCOUNTER — HOSPITAL ENCOUNTER (INPATIENT)
Facility: HOSPITAL | Age: 47
LOS: 4 days | Discharge: HOME | End: 2022-04-11
Attending: EMERGENCY MEDICINE | Admitting: HOSPITALIST
Payer: COMMERCIAL

## 2022-04-07 DIAGNOSIS — K85.90 ACUTE PANCREATITIS, UNSPECIFIED COMPLICATION STATUS, UNSPECIFIED PANCREATITIS TYPE: Primary | ICD-10-CM

## 2022-04-07 LAB
ALBUMIN SERPL-MCNC: 3.3 G/DL (ref 3.4–5)
ALP LIVER SERPL-CCNC: 77 U/L
ALT SERPL-CCNC: 16 U/L
ANION GAP SERPL CALC-SCNC: 5 MMOL/L (ref 0–18)
AST SERPL-CCNC: 10 U/L (ref 15–37)
BASOPHILS # BLD AUTO: 0.02 X10(3) UL (ref 0–0.2)
BASOPHILS NFR BLD AUTO: 0.3 %
BILIRUB DIRECT SERPL-MCNC: 0.1 MG/DL (ref 0–0.2)
BILIRUB SERPL-MCNC: 0.4 MG/DL (ref 0.1–2)
BILIRUB UR QL: NEGATIVE
BUN BLD-MCNC: 8 MG/DL (ref 7–18)
BUN/CREAT SERPL: 7.4 (ref 10–20)
CALCIUM BLD-MCNC: 9.1 MG/DL (ref 8.5–10.1)
CHLORIDE SERPL-SCNC: 102 MMOL/L (ref 98–112)
CLARITY UR: CLEAR
CO2 SERPL-SCNC: 30 MMOL/L (ref 21–32)
COLOR UR: YELLOW
CREAT BLD-MCNC: 1.08 MG/DL
DEPRECATED RDW RBC AUTO: 51.9 FL (ref 35.1–46.3)
EOSINOPHIL # BLD AUTO: 0.02 X10(3) UL (ref 0–0.7)
EOSINOPHIL NFR BLD AUTO: 0.3 %
ERYTHROCYTE [DISTWIDTH] IN BLOOD BY AUTOMATED COUNT: 14.6 % (ref 11–15)
GLUCOSE BLD-MCNC: 106 MG/DL (ref 70–99)
GLUCOSE UR-MCNC: NEGATIVE MG/DL
HCT VFR BLD AUTO: 43.6 %
HGB BLD-MCNC: 14.7 G/DL
HGB UR QL STRIP.AUTO: NEGATIVE
IMM GRANULOCYTES # BLD AUTO: 0.01 X10(3) UL (ref 0–1)
IMM GRANULOCYTES NFR BLD: 0.1 %
KETONES UR-MCNC: 20 MG/DL
LEUKOCYTE ESTERASE UR QL STRIP.AUTO: NEGATIVE
LIPASE SERPL-CCNC: 60 U/L (ref 73–393)
LYMPHOCYTES # BLD AUTO: 1.64 X10(3) UL (ref 1–4)
LYMPHOCYTES NFR BLD AUTO: 23.7 %
MAGNESIUM SERPL-MCNC: 2.3 MG/DL (ref 1.6–2.6)
MCH RBC QN AUTO: 32.6 PG (ref 26–34)
MCHC RBC AUTO-ENTMCNC: 33.7 G/DL (ref 31–37)
MCV RBC AUTO: 96.7 FL
MONOCYTES # BLD AUTO: 0.33 X10(3) UL (ref 0.1–1)
MONOCYTES NFR BLD AUTO: 4.8 %
NEUTROPHILS # BLD AUTO: 4.91 X10 (3) UL (ref 1.5–7.7)
NEUTROPHILS # BLD AUTO: 4.91 X10(3) UL (ref 1.5–7.7)
NEUTROPHILS NFR BLD AUTO: 70.8 %
NITRITE UR QL STRIP.AUTO: NEGATIVE
OSMOLALITY SERPL CALC.SUM OF ELEC: 283 MOSM/KG (ref 275–295)
PH UR: 7 [PH] (ref 5–8)
PLATELET # BLD AUTO: 398 10(3)UL (ref 150–450)
POTASSIUM SERPL-SCNC: 3.5 MMOL/L (ref 3.5–5.1)
PROT SERPL-MCNC: 7.4 G/DL (ref 6.4–8.2)
PROT UR-MCNC: NEGATIVE MG/DL
RBC # BLD AUTO: 4.51 X10(6)UL
SARS-COV-2 RNA RESP QL NAA+PROBE: NOT DETECTED
SODIUM SERPL-SCNC: 137 MMOL/L (ref 136–145)
SP GR UR STRIP: 1.01 (ref 1–1.03)
UROBILINOGEN UR STRIP-ACNC: <2
VIT C UR-MCNC: NEGATIVE MG/DL
WBC # BLD AUTO: 6.9 X10(3) UL (ref 4–11)

## 2022-04-07 PROCEDURE — 99222 1ST HOSP IP/OBS MODERATE 55: CPT | Performed by: HOSPITALIST

## 2022-04-07 PROCEDURE — 74177 CT ABD & PELVIS W/CONTRAST: CPT | Performed by: EMERGENCY MEDICINE

## 2022-04-07 RX ORDER — HYDROMORPHONE HYDROCHLORIDE 1 MG/ML
1.2 INJECTION, SOLUTION INTRAMUSCULAR; INTRAVENOUS; SUBCUTANEOUS EVERY 2 HOUR PRN
Status: DISCONTINUED | OUTPATIENT
Start: 2022-04-07 | End: 2022-04-08

## 2022-04-07 RX ORDER — PROCHLORPERAZINE EDISYLATE 5 MG/ML
5 INJECTION INTRAMUSCULAR; INTRAVENOUS EVERY 8 HOURS PRN
Status: DISCONTINUED | OUTPATIENT
Start: 2022-04-07 | End: 2022-04-11

## 2022-04-07 RX ORDER — GABAPENTIN 300 MG/1
300 CAPSULE ORAL NIGHTLY
Status: DISCONTINUED | OUTPATIENT
Start: 2022-04-07 | End: 2022-04-09

## 2022-04-07 RX ORDER — ONDANSETRON 2 MG/ML
4 INJECTION INTRAMUSCULAR; INTRAVENOUS ONCE
Status: COMPLETED | OUTPATIENT
Start: 2022-04-07 | End: 2022-04-07

## 2022-04-07 RX ORDER — HYDROMORPHONE HYDROCHLORIDE 1 MG/ML
0.8 INJECTION, SOLUTION INTRAMUSCULAR; INTRAVENOUS; SUBCUTANEOUS EVERY 2 HOUR PRN
Status: DISCONTINUED | OUTPATIENT
Start: 2022-04-07 | End: 2022-04-08

## 2022-04-07 RX ORDER — ONDANSETRON 2 MG/ML
4 INJECTION INTRAMUSCULAR; INTRAVENOUS EVERY 6 HOURS PRN
Status: DISCONTINUED | OUTPATIENT
Start: 2022-04-07 | End: 2022-04-11

## 2022-04-07 RX ORDER — SODIUM CHLORIDE, SODIUM LACTATE, POTASSIUM CHLORIDE, CALCIUM CHLORIDE 600; 310; 30; 20 MG/100ML; MG/100ML; MG/100ML; MG/100ML
INJECTION, SOLUTION INTRAVENOUS CONTINUOUS
Status: DISCONTINUED | OUTPATIENT
Start: 2022-04-07 | End: 2022-04-11

## 2022-04-07 RX ORDER — AMLODIPINE BESYLATE 5 MG/1
5 TABLET ORAL DAILY
Status: DISCONTINUED | OUTPATIENT
Start: 2022-04-07 | End: 2022-04-09

## 2022-04-07 RX ORDER — TEMAZEPAM 15 MG/1
15 CAPSULE ORAL NIGHTLY PRN
Status: DISCONTINUED | OUTPATIENT
Start: 2022-04-07 | End: 2022-04-11

## 2022-04-07 RX ORDER — PANTOPRAZOLE SODIUM 40 MG/1
40 TABLET, DELAYED RELEASE ORAL
Status: DISCONTINUED | OUTPATIENT
Start: 2022-04-08 | End: 2022-04-11

## 2022-04-07 RX ORDER — HYDROMORPHONE HYDROCHLORIDE 1 MG/ML
0.4 INJECTION, SOLUTION INTRAMUSCULAR; INTRAVENOUS; SUBCUTANEOUS EVERY 2 HOUR PRN
Status: DISCONTINUED | OUTPATIENT
Start: 2022-04-07 | End: 2022-04-08

## 2022-04-07 RX ORDER — HYDRALAZINE HYDROCHLORIDE 20 MG/ML
10 INJECTION INTRAMUSCULAR; INTRAVENOUS
Status: DISCONTINUED | OUTPATIENT
Start: 2022-04-07 | End: 2022-04-11

## 2022-04-07 RX ORDER — HEPARIN SODIUM 5000 [USP'U]/ML
5000 INJECTION, SOLUTION INTRAVENOUS; SUBCUTANEOUS EVERY 12 HOURS SCHEDULED
Status: DISCONTINUED | OUTPATIENT
Start: 2022-04-07 | End: 2022-04-11

## 2022-04-07 RX ORDER — ACETAMINOPHEN 325 MG/1
650 TABLET ORAL EVERY 6 HOURS PRN
Status: DISCONTINUED | OUTPATIENT
Start: 2022-04-07 | End: 2022-04-09

## 2022-04-07 RX ORDER — NICOTINE 21 MG/24HR
1 PATCH, TRANSDERMAL 24 HOURS TRANSDERMAL DAILY
Status: DISCONTINUED | OUTPATIENT
Start: 2022-04-07 | End: 2022-04-11

## 2022-04-07 RX ORDER — TAMSULOSIN HYDROCHLORIDE 0.4 MG/1
0.4 CAPSULE ORAL
Status: DISCONTINUED | OUTPATIENT
Start: 2022-04-08 | End: 2022-04-11

## 2022-04-07 RX ORDER — AMLODIPINE BESYLATE 5 MG/1
5 TABLET ORAL DAILY
Status: DISCONTINUED | OUTPATIENT
Start: 2022-04-08 | End: 2022-04-07

## 2022-04-07 RX ORDER — LISINOPRIL 40 MG/1
40 TABLET ORAL DAILY
Status: DISCONTINUED | OUTPATIENT
Start: 2022-04-08 | End: 2022-04-11

## 2022-04-07 RX ORDER — MORPHINE SULFATE 4 MG/ML
4 INJECTION, SOLUTION INTRAMUSCULAR; INTRAVENOUS ONCE
Status: COMPLETED | OUTPATIENT
Start: 2022-04-07 | End: 2022-04-07

## 2022-04-07 NOTE — TELEPHONE ENCOUNTER
Spoke to the pt today for TCM. The patient was recently hospitalized for acute pancratitis. The pt is scheduled for a TCM-HFU appointment on 4/14/2022.   (A TCM/HFU appt is recommended by 4/13/2022 as the pt is a high risk for readmission.)    Condition Update: The patient reported central abdominal pain has returned last night. The patient reported pain was rated 7-8/10. The patient has treated slight nausea with the prescribed Rx zofran. The patient has not been able to have any food or drink since discharge. The patient denies any fever, vomiting, dizziness, jaundice, abdominal distention, tachycardia, or SOB. The patient did request a refill for Nocro this morning. The patient admitted to not having any norco at home currently. [facility] did advise the patient to be seen in the ED if pain is unmanageable at home, and the patient is unable to tolerate fluids or foods. The patient verbalized understanding and agreement. The patient does plan to have spouse return the patient to the ED if symptoms continue. TRIAGE:  Please f/u with the pt and provide any further recommendations per Dr. Alia Vanegas. Please also follow up on the patient's requested refill from earlier today. Thank you!

## 2022-04-07 NOTE — PROGRESS NOTES
Spoke with Dr. Jolie Garcia regarding patient's blood pressure. No new orders.  Continue to monitor per MD.

## 2022-04-07 NOTE — ED QUICK NOTES
Orders for admission, patient is aware of plan and ready to go upstairs. Any questions, please call ED RN Fernando Capps at extension [phone]. Patient Covid vaccination status: Fully vaccinated     COVID Test Ordered in ED: Rapid SARS-CoV-2 by PCR    COVID Suspicion at Admission: N/A    Running Infusions:  None    Mental Status/LOC at time of transport: a/o x 4    Other pertinent information: Discharged yesterday for pancreatitis, here today for persistent pain, repeated CT, unchanged pancreatitis. Morphine given for pain.    CIWA score: N/A   NIH score:  N/A

## 2022-04-07 NOTE — ED INITIAL ASSESSMENT (HPI)
Mid abd pain onset 2000 last night. Discharged from [facility] yesterday after being tx for pancreatitis. Denies n/v/d. Tachycardic in triage.

## 2022-04-07 NOTE — H&P
[facility]    PATIENT'S NAME: Austen DAVILA   ATTENDING PHYSICIAN: Anna Arroyo MD   PATIENT ACCOUNT#:   [id number]    LOCATION:  [place] [address]  MEDICAL RECORD #:   [id number]       YOB: 1975  ADMISSION DATE:       04/07/2022    HISTORY AND PHYSICAL EXAMINATION    CHIEF COMPLAINT:  Abdominal pain and acute on chronic pancreatitis. HISTORY OF PRESENT ILLNESS:  The patient is a 80-year-old [facility] American male with history of recurrent acute on chronic pancreatitis. Last hospitalization was 3 days ago on April 4. His pain improved after IV fluids. Yesterday, was able to tolerate soft diet and he was discharged home. He was doing well up until 8 p.m. when his pain start heading back again. Throughout the night, he continued to have pain and nausea. Today, came into the emergency department for evaluation. Chemistry and liver function tests were unremarkable. Patient had a repeat CT scan of the abdomen which showed acute on chronic pancreatitis, unchanged since prior exam.  Extensive coarse pancreatic calcifications, most prominent within the pancreatic duct, have slightly decreased in size since January 2022, but are unchanged since April 4, 2022. Bilobed cystic lesion within the ventral aspect of the pancreatic head suggesting a small pseudo cyst which is unchanged as well. Peripancreatic inflammatory changes once again adjacent to the superior mesenteric vein and artery, unchanged. Patient was started on IV fluids and he will be admitted to the hospital for further management. 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 He was seen at [facility]. Had exploratory laparotomy, Natalie-en-Y, pancreaticojejunostomy using duodenum and sparing of pancreatic head. Had chronic pain syndrome, chronic pancreatic insufficiency, and hypertension, pseudogout. Pseudocyst is being monitored by imaging studies. PAST SURGICAL HISTORY:  As mentioned above. MEDICATIONS:  Please see medication reconciliation list.     ALLERGIES:  No known drug allergies. FAMILY HISTORY:  Positive for hypertension. SOCIAL HISTORY:  History of alcohol abuse but he quit drinking over 2 years ago. Denies any drug use. He smokes 1 pack a day. Lives with his family. Independent in his basic activities of daily living. REVIEW OF SYSTEMS:  Mid abdominal pain radiating to the back and left lower quadrant. Pain recurred at 8 p.m. last night. Patient said his last meal was a soft diet in the hospital, did not eat after that. Denies any alcohol intake. Other 12-point review of systems is negative. PHYSICAL EXAMINATION:    GENERAL:  Alert, oriented to time, place, and person. Moderate distress. Appears slightly malnourished. VITAL SIGNS:  Temperature 98.5, pulse 134 upon arrival, sinus tachycardia, improved after IV fluids, respiratory rate 18, blood pressure 160/98, pulse ox 96% on room air. HEENT:  Atraumatic. Oropharynx clear. Dry mucous membranes. Normal hard and soft palate. Eyes:  Anicteric sclerae. NECK:  Supple. No lymphadenopathy. Trachea midline. Full range of motion. LUNGS:  Clear to auscultation bilaterally. Normal respiratory effort. No intercostal retractions. HEART:  Regular rate and rhythm. S1 and S2 auscultated. Tachycardiac. ABDOMEN:  Soft, nondistended. Hypoactive bowel sounds. Tenderness, epigastric area. EXTREMITIES:  No peripheral edema, clubbing, or cyanosis. NEUROLOGIC:  Motor and sensory intact. Cranial nerves II through XII intact. ASSESSMENT AND PLAN:    1. Recurrent acute on chronic pancreatitis with intractable abdominal pain. 2.   Hypertension. 3.   Underlying chronic pseudocyst with chronic pancreatic insufficiency. Patient will be admitted to general medical floor. Monitor his hemodynamic status. Aggressive IV fluid management. N.p.o.  Pain control. Gastroenterology consult. Further recommendations to follow.       Dictated By Jos Salazar MD  d: 04/07/2022 13:42:02  t: 04/07/2022 13:50:50  Job [id number]  FB/

## 2022-04-07 NOTE — PLAN OF CARE
Problem: Patient Centered Care  Goal: Patient preferences are identified and integrated in the patient's plan of care  Description: Interventions:  - What would you like us to know as we care for you? I live with my wife. My daughter and grandchildren are in [place]  - Provide timely, complete, and accurate information to patient/family  - Incorporate patient and family knowledge, values, beliefs, and cultural backgrounds into the planning and delivery of care  - Encourage patient/family to participate in care and decision-making at the level they choose  - Honor patient and family perspectives and choices  Outcome: Progressing     Problem: RISK FOR INFECTION - ADULT  Goal: Absence of fever/infection during anticipated neutropenic period  Description: INTERVENTIONS  - Monitor WBC  - Administer growth factors as ordered  - Implement neutropenic guidelines  Outcome: Progressing     Problem: SAFETY ADULT - FALL  Goal: Free from fall injury  Description: INTERVENTIONS:  - Assess pt frequently for physical needs  - Identify cognitive and physical deficits and behaviors that affect risk of falls.   - [place] fall precautions as indicated by assessment.  - Educate pt/family on patient safety including physical limitations  - Instruct pt to call for assistance with activity based on assessment  - Modify environment to reduce risk of injury  - Provide assistive devices as appropriate  - Consider OT/PT consult to assist with strengthening/mobility  - Encourage toileting schedule  Outcome: Progressing     Problem: DISCHARGE PLANNING  Goal: Discharge to home or other facility with appropriate resources  Description: INTERVENTIONS:  - Identify barriers to discharge w/pt and caregiver  - Include patient/family/discharge partner in discharge planning  - Arrange for needed discharge resources and transportation as appropriate  - Identify discharge learning needs (meds, wound care, etc)  - Arrange for interpreters to assist at discharge as needed  - Consider post-discharge preferences of patient/family/discharge partner  - Complete POLST form as appropriate  - Assess patient's ability to be responsible for managing their own health  - Refer to Case Management Department for coordinating discharge planning if the patient needs post-hospital services based on physician/LIP order or complex needs related to functional status, cognitive ability or social support system  Outcome: Progressing     Problem: COPING  Goal: Pt/Family able to verbalize concerns and demonstrate effective coping strategies  Description: INTERVENTIONS:  - Assist patient/family to identify coping skills, available support systems and cultural and spiritual values  - Provide emotional support, including active listening and acknowledgement of concerns of patient and caregivers  - Reduce environmental stimuli, as able  - Instruct patient/family in relaxation techniques, as appropriate  - Assess for spiritual and psychosocial needs and initiate Spiritual Care or Behavioral Health consult as needed  Outcome: Progressing     Problem: PAIN - ADULT  Goal: Verbalizes/displays adequate comfort level or patient's stated pain goal  Description: INTERVENTIONS:  - Encourage pt to monitor pain and request assistance  - Assess pain using appropriate pain scale  - Administer analgesics based on type and severity of pain and evaluate response  - Implement non-pharmacological measures as appropriate and evaluate response  - Consider cultural and social influences on pain and pain management  - Manage/alleviate anxiety  - Utilize distraction and/or relaxation techniques  - Monitor for opioid side effects  - Notify MD/LIP if interventions unsuccessful or patient reports new pain  - Anticipate increased pain with activity and pre-medicate as appropriate  Outcome: Not Progressing       Admitted and oriented to unit. IVF infusing. Wife at bedside. PRN pain & BP medications.  Endorsed to night RN pt's blood pressures and paged MD regarding last BP. Bed in lowest position and call light within reach.

## 2022-04-07 NOTE — TELEPHONE ENCOUNTER
Please see Lisa Pruitt RN's message below and advise    Hydrocodone routed separately to PCP for approval this morning    Do you want to see him in office sooner?     Future Appointments   Date Time Provider Lizandro Mack   4/14/2022  2:00 PM Ladi Coffman MD [facility] ADO   5/25/2022 12:45 PM TORI, PROCEDURE [id number] None

## 2022-04-08 LAB
ALBUMIN SERPL-MCNC: 3.4 G/DL (ref 3.4–5)
ALBUMIN/GLOB SERPL: 0.8 {RATIO} (ref 1–2)
ALP LIVER SERPL-CCNC: 95 U/L
ALT SERPL-CCNC: 16 U/L
ANION GAP SERPL CALC-SCNC: 4 MMOL/L (ref 0–18)
AST SERPL-CCNC: 8 U/L (ref 15–37)
BASOPHILS # BLD AUTO: 0.02 X10(3) UL (ref 0–0.2)
BASOPHILS NFR BLD AUTO: 0.2 %
BILIRUB SERPL-MCNC: 0.5 MG/DL (ref 0.1–2)
BUN BLD-MCNC: 5 MG/DL (ref 7–18)
BUN/CREAT SERPL: 5.4 (ref 10–20)
CALCIUM BLD-MCNC: 9.1 MG/DL (ref 8.5–10.1)
CHLORIDE SERPL-SCNC: 99 MMOL/L (ref 98–112)
CO2 SERPL-SCNC: 31 MMOL/L (ref 21–32)
CREAT BLD-MCNC: 0.93 MG/DL
DEPRECATED RDW RBC AUTO: 50.9 FL (ref 35.1–46.3)
EOSINOPHIL # BLD AUTO: 0.03 X10(3) UL (ref 0–0.7)
EOSINOPHIL NFR BLD AUTO: 0.3 %
ERYTHROCYTE [DISTWIDTH] IN BLOOD BY AUTOMATED COUNT: 14.3 % (ref 11–15)
GLOBULIN PLAS-MCNC: 4.1 G/DL (ref 2.8–4.4)
GLUCOSE BLD-MCNC: 105 MG/DL (ref 70–99)
HCT VFR BLD AUTO: 43.4 %
HGB BLD-MCNC: 14.6 G/DL
IMM GRANULOCYTES # BLD AUTO: 0.03 X10(3) UL (ref 0–1)
IMM GRANULOCYTES NFR BLD: 0.3 %
LYMPHOCYTES # BLD AUTO: 1.65 X10(3) UL (ref 1–4)
LYMPHOCYTES NFR BLD AUTO: 19 %
MCH RBC QN AUTO: 32.4 PG (ref 26–34)
MCHC RBC AUTO-ENTMCNC: 33.6 G/DL (ref 31–37)
MCV RBC AUTO: 96.4 FL
MONOCYTES # BLD AUTO: 0.45 X10(3) UL (ref 0.1–1)
MONOCYTES NFR BLD AUTO: 5.2 %
NEUTROPHILS # BLD AUTO: 6.51 X10 (3) UL (ref 1.5–7.7)
NEUTROPHILS # BLD AUTO: 6.51 X10(3) UL (ref 1.5–7.7)
NEUTROPHILS NFR BLD AUTO: 75 %
OSMOLALITY SERPL CALC.SUM OF ELEC: 276 MOSM/KG (ref 275–295)
PLATELET # BLD AUTO: 384 10(3)UL (ref 150–450)
POTASSIUM SERPL-SCNC: 4.1 MMOL/L (ref 3.5–5.1)
PROT SERPL-MCNC: 7.5 G/DL (ref 6.4–8.2)
RBC # BLD AUTO: 4.5 X10(6)UL
SODIUM SERPL-SCNC: 134 MMOL/L (ref 136–145)
WBC # BLD AUTO: 8.7 X10(3) UL (ref 4–11)

## 2022-04-08 PROCEDURE — 99233 SBSQ HOSP IP/OBS HIGH 50: CPT | Performed by: HOSPITALIST

## 2022-04-08 RX ORDER — ONDANSETRON 2 MG/ML
4 INJECTION INTRAMUSCULAR; INTRAVENOUS EVERY 6 HOURS PRN
Status: DISCONTINUED | OUTPATIENT
Start: 2022-04-08 | End: 2022-04-11

## 2022-04-08 RX ORDER — SODIUM PHOSPHATE, DIBASIC AND SODIUM PHOSPHATE, MONOBASIC 7; 19 G/133ML; G/133ML
1 ENEMA RECTAL ONCE AS NEEDED
Status: COMPLETED | OUTPATIENT
Start: 2022-04-08 | End: 2022-04-10

## 2022-04-08 RX ORDER — DIPHENHYDRAMINE HYDROCHLORIDE 50 MG/ML
12.5 INJECTION INTRAMUSCULAR; INTRAVENOUS EVERY 4 HOURS PRN
Status: DISCONTINUED | OUTPATIENT
Start: 2022-04-08 | End: 2022-04-11

## 2022-04-08 RX ORDER — DOCUSATE SODIUM 100 MG/1
100 CAPSULE, LIQUID FILLED ORAL 2 TIMES DAILY
Status: DISCONTINUED | OUTPATIENT
Start: 2022-04-08 | End: 2022-04-11

## 2022-04-08 RX ORDER — NALOXONE HYDROCHLORIDE 0.4 MG/ML
0.08 INJECTION, SOLUTION INTRAMUSCULAR; INTRAVENOUS; SUBCUTANEOUS
Status: DISCONTINUED | OUTPATIENT
Start: 2022-04-08 | End: 2022-04-11

## 2022-04-08 RX ORDER — SODIUM CHLORIDE 9 MG/ML
INJECTION, SOLUTION INTRAVENOUS CONTINUOUS
Status: DISCONTINUED | OUTPATIENT
Start: 2022-04-08 | End: 2022-04-11

## 2022-04-08 RX ORDER — BISACODYL 10 MG
10 SUPPOSITORY, RECTAL RECTAL
Status: DISCONTINUED | OUTPATIENT
Start: 2022-04-08 | End: 2022-04-11

## 2022-04-08 RX ORDER — POLYETHYLENE GLYCOL 3350 17 G/17G
17 POWDER, FOR SOLUTION ORAL DAILY PRN
Status: DISCONTINUED | OUTPATIENT
Start: 2022-04-08 | End: 2022-04-11

## 2022-04-08 NOTE — PLAN OF CARE
Problem: Patient Centered Care  Goal: Patient preferences are identified and integrated in the patient's plan of care  Description: Interventions:  - What would you like us to know as we care for you? I live with my wife  - Provide timely, complete, and accurate information to patient/family  - Incorporate patient and family knowledge, values, beliefs, and cultural backgrounds into the planning and delivery of care  - Encourage patient/family to participate in care and decision-making at the level they choose  - Honor patient and family perspectives and choices  Outcome: Progressing     Problem: RISK FOR INFECTION - ADULT  Goal: Absence of fever/infection during anticipated neutropenic period  Description: INTERVENTIONS  - Monitor WBC  - Administer growth factors as ordered  - Implement neutropenic guidelines  Outcome: Progressing     Problem: SAFETY ADULT - FALL  Goal: Free from fall injury  Description: INTERVENTIONS:  - Assess pt frequently for physical needs  - Identify cognitive and physical deficits and behaviors that affect risk of falls.   - [place] fall precautions as indicated by assessment.  - Educate pt/family on patient safety including physical limitations  - Instruct pt to call for assistance with activity based on assessment  - Modify environment to reduce risk of injury  - Provide assistive devices as appropriate  - Consider OT/PT consult to assist with strengthening/mobility  - Encourage toileting schedule  Outcome: Progressing     Problem: DISCHARGE PLANNING  Goal: Discharge to home or other facility with appropriate resources  Description: INTERVENTIONS:  - Identify barriers to discharge w/pt and caregiver  - Include patient/family/discharge partner in discharge planning  - Arrange for needed discharge resources and transportation as appropriate  - Identify discharge learning needs (meds, wound care, etc)  - Arrange for interpreters to assist at discharge as needed  - Consider post-discharge preferences of patient/family/discharge partner  - Complete POLST form as appropriate  - Assess patient's ability to be responsible for managing their own health  - Refer to Case Management Department for coordinating discharge planning if the patient needs post-hospital services based on physician/LIP order or complex needs related to functional status, cognitive ability or social support system  Outcome: Progressing     Problem: COPING  Goal: Pt/Family able to verbalize concerns and demonstrate effective coping strategies  Description: INTERVENTIONS:  - Assist patient/family to identify coping skills, available support systems and cultural and spiritual values  - Provide emotional support, including active listening and acknowledgement of concerns of patient and caregivers  - Reduce environmental stimuli, as able  - Instruct patient/family in relaxation techniques, as appropriate  - Assess for spiritual and psychosocial needs and initiate Spiritual Care or Behavioral Health consult as needed  Outcome: Progressing     Problem: CARDIOVASCULAR - ADULT  Goal: Maintains optimal cardiac output and hemodynamic stability  Description: INTERVENTIONS:  - Monitor vital signs, rhythm, and trends  - Monitor for bleeding, hypotension and signs of decreased cardiac output  - Evaluate effectiveness of vasoactive medications to optimize hemodynamic stability  - Monitor arterial and/or venous puncture sites for bleeding and/or hematoma  - Assess quality of pulses, skin color and temperature  - Assess for signs of decreased coronary artery perfusion - ex.  Angina  - Evaluate fluid balance, assess for edema, trend weights  Outcome: Progressing  Goal: Absence of cardiac arrhythmias or at baseline  Description: INTERVENTIONS:  - Continuous cardiac monitoring, monitor vital signs, obtain 12 lead EKG if indicated  - Evaluate effectiveness of antiarrhythmic and heart rate control medications as ordered  - Initiate emergency measures for life threatening arrhythmias  - Monitor electrolytes and administer replacement therapy as ordered  Outcome: Progressing     Problem: PAIN - ADULT  Goal: Verbalizes/displays adequate comfort level or patient's stated pain goal  Description: INTERVENTIONS:  - Encourage pt to monitor pain and request assistance  - Assess pain using appropriate pain scale  - Administer analgesics based on type and severity of pain and evaluate response  - Implement non-pharmacological measures as appropriate and evaluate response  - Consider cultural and social influences on pain and pain management  - Manage/alleviate anxiety  - Utilize distraction and/or relaxation techniques  - Monitor for opioid side effects  - Notify MD/LIP if interventions unsuccessful or patient reports new pain  - Anticipate increased pain with activity and pre-medicate as appropriate  Outcome: Not Progressing       PRNs for constipation. PCA pump for pain. IVF infusing. Family at bedside. Bed in lowest position, call light within reach and safety precautions in place.

## 2022-04-08 NOTE — H&P
Brief H&P as patient was just discharged yesterday and bounce back. A 52year old with flare of chronic pancreatitis with complicated surgical history and followed by [facility]. Has had several admission for flare of chronic pancreatitis with active tob use (a trigger) and poor compliance with low fat diet. Imaging shows no change in imaging a few days ago compared to January. History is unchanged from a few days ago. No n/v/f/c/diarrhea. No melena/hematochezia/hematemesis. No dysphagia/gerd. VSS  Gen: AxO x3, NAD  HEENT:  Mmm, non icteric sclera  PUL:  CTAB of anterior chest  CVS: RRR, no murmurs  Abd: BS +, Soft, ND, ttp in epigastric region  Ext: no c/c/e    A/P:    52year old with flare of chronic pancreatitis with poor compliance with lifestyle changes (tob use and low fat diet)  -Bowel rest  -IV hydration  -Pain control with both opioids and adjuncts such as neuropathic meds   -consider pain management consult if necessary  -If improves in am, would advance to clear liquids    Thank you for allowing me to participate in the care of your patient.     Time spent in direct patient contact and decision making as well as counseling/coordination of care:  25 minutes    Valentino Cedillo MD  [facility], GI  [phone]

## 2022-04-08 NOTE — PLAN OF CARE
A/O x4, c/o pain to lower abdomen and lower back radiating to left leg. Pain unmanaged with Dilaudid IV push. BP elevated, PRN hydralazine given once. MD notified of above, PCA pump w/ Dilaudid ordered. Call light within reach, bed alarm on, non-skid socks on, frequent rounding done. Problem: Patient Centered Care  Goal: Patient preferences are identified and integrated in the patient's plan of care  Description: Interventions:  - What would you like us to know as we care for you?   - Provide timely, complete, and accurate information to patient/family  - Incorporate patient and family knowledge, values, beliefs, and cultural backgrounds into the planning and delivery of care  - Encourage patient/family to participate in care and decision-making at the level they choose  - Honor patient and family perspectives and choices  Outcome: Progressing     Problem: RISK FOR INFECTION - ADULT  Goal: Absence of fever/infection during anticipated neutropenic period  Description: INTERVENTIONS  - Monitor WBC  - Administer growth factors as ordered  - Implement neutropenic guidelines  Outcome: Progressing     Problem: SAFETY ADULT - FALL  Goal: Free from fall injury  Description: INTERVENTIONS:  - Assess pt frequently for physical needs  - Identify cognitive and physical deficits and behaviors that affect risk of falls.   - [place] fall precautions as indicated by assessment.  - Educate pt/family on patient safety including physical limitations  - Instruct pt to call for assistance with activity based on assessment  - Modify environment to reduce risk of injury  - Provide assistive devices as appropriate  - Consider OT/PT consult to assist with strengthening/mobility  - Encourage toileting schedule  Outcome: Progressing     Problem: DISCHARGE PLANNING  Goal: Discharge to home or other facility with appropriate resources  Description: INTERVENTIONS:  - Identify barriers to discharge w/pt and caregiver  - Include patient/family/discharge partner in discharge planning  - Arrange for needed discharge resources and transportation as appropriate  - Identify discharge learning needs (meds, wound care, etc)  - Arrange for interpreters to assist at discharge as needed  - Consider post-discharge preferences of patient/family/discharge partner  - Complete POLST form as appropriate  - Assess patient's ability to be responsible for managing their own health  - Refer to Case Management Department for coordinating discharge planning if the patient needs post-hospital services based on physician/LIP order or complex needs related to functional status, cognitive ability or social support system  Outcome: Progressing     Problem: COPING  Goal: Pt/Family able to verbalize concerns and demonstrate effective coping strategies  Description: INTERVENTIONS:  - Assist patient/family to identify coping skills, available support systems and cultural and spiritual values  - Provide emotional support, including active listening and acknowledgement of concerns of patient and caregivers  - Reduce environmental stimuli, as able  - Instruct patient/family in relaxation techniques, as appropriate  - Assess for spiritual and psychosocial needs and initiate Spiritual Care or Behavioral Health consult as needed  Outcome: Progressing     Problem: CARDIOVASCULAR - ADULT  Goal: Maintains optimal cardiac output and hemodynamic stability  Description: INTERVENTIONS:  - Monitor vital signs, rhythm, and trends  - Monitor for bleeding, hypotension and signs of decreased cardiac output  - Evaluate effectiveness of vasoactive medications to optimize hemodynamic stability  - Monitor arterial and/or venous puncture sites for bleeding and/or hematoma  - Assess quality of pulses, skin color and temperature  - Assess for signs of decreased coronary artery perfusion - ex.  Angina  - Evaluate fluid balance, assess for edema, trend weights  Outcome: Progressing  Goal: Absence of cardiac arrhythmias or at baseline  Description: INTERVENTIONS:  - Continuous cardiac monitoring, monitor vital signs, obtain 12 lead EKG if indicated  - Evaluate effectiveness of antiarrhythmic and heart rate control medications as ordered  - Initiate emergency measures for life threatening arrhythmias  - Monitor electrolytes and administer replacement therapy as ordered  Outcome: Progressing     Problem: PAIN - ADULT  Goal: Verbalizes/displays adequate comfort level or patient's stated pain goal  Description: INTERVENTIONS:  - Encourage pt to monitor pain and request assistance  - Assess pain using appropriate pain scale  - Administer analgesics based on type and severity of pain and evaluate response  - Implement non-pharmacological measures as appropriate and evaluate response  - Consider cultural and social influences on pain and pain management  - Manage/alleviate anxiety  - Utilize distraction and/or relaxation techniques  - Monitor for opioid side effects  - Notify MD/LIP if interventions unsuccessful or patient reports new pain  - Anticipate increased pain with activity and pre-medicate as appropriate  Outcome: Not Progressing

## 2022-04-09 LAB
ANION GAP SERPL CALC-SCNC: 5 MMOL/L (ref 0–18)
BASOPHILS # BLD AUTO: 0.02 X10(3) UL (ref 0–0.2)
BASOPHILS NFR BLD AUTO: 0.3 %
BUN BLD-MCNC: 8 MG/DL (ref 7–18)
BUN/CREAT SERPL: 8.6 (ref 10–20)
CALCIUM BLD-MCNC: 9 MG/DL (ref 8.5–10.1)
CHLORIDE SERPL-SCNC: 98 MMOL/L (ref 98–112)
CO2 SERPL-SCNC: 30 MMOL/L (ref 21–32)
CREAT BLD-MCNC: 0.93 MG/DL
DEPRECATED RDW RBC AUTO: 49.6 FL (ref 35.1–46.3)
EOSINOPHIL # BLD AUTO: 0.03 X10(3) UL (ref 0–0.7)
EOSINOPHIL NFR BLD AUTO: 0.4 %
ERYTHROCYTE [DISTWIDTH] IN BLOOD BY AUTOMATED COUNT: 13.9 % (ref 11–15)
GLUCOSE BLD-MCNC: 93 MG/DL (ref 70–99)
GLUCOSE BLDC GLUCOMTR-MCNC: 93 MG/DL (ref 70–99)
HCT VFR BLD AUTO: 42.2 %
HGB BLD-MCNC: 14.1 G/DL
IMM GRANULOCYTES # BLD AUTO: 0.02 X10(3) UL (ref 0–1)
IMM GRANULOCYTES NFR BLD: 0.3 %
LYMPHOCYTES # BLD AUTO: 1.68 X10(3) UL (ref 1–4)
LYMPHOCYTES NFR BLD AUTO: 22.8 %
MCH RBC QN AUTO: 32.3 PG (ref 26–34)
MCHC RBC AUTO-ENTMCNC: 33.4 G/DL (ref 31–37)
MCV RBC AUTO: 96.8 FL
MONOCYTES # BLD AUTO: 0.46 X10(3) UL (ref 0.1–1)
MONOCYTES NFR BLD AUTO: 6.2 %
NEUTROPHILS # BLD AUTO: 5.17 X10 (3) UL (ref 1.5–7.7)
NEUTROPHILS # BLD AUTO: 5.17 X10(3) UL (ref 1.5–7.7)
NEUTROPHILS NFR BLD AUTO: 70 %
OSMOLALITY SERPL CALC.SUM OF ELEC: 274 MOSM/KG (ref 275–295)
PLATELET # BLD AUTO: 363 10(3)UL (ref 150–450)
POTASSIUM SERPL-SCNC: 3.9 MMOL/L (ref 3.5–5.1)
RBC # BLD AUTO: 4.36 X10(6)UL
SODIUM SERPL-SCNC: 133 MMOL/L (ref 136–145)
TROPONIN I HIGH SENSITIVITY: 9 NG/L
WBC # BLD AUTO: 7.4 X10(3) UL (ref 4–11)

## 2022-04-09 PROCEDURE — 99233 SBSQ HOSP IP/OBS HIGH 50: CPT | Performed by: HOSPITALIST

## 2022-04-09 RX ORDER — MAGNESIUM CARB/ALUMINUM HYDROX 105-160MG
296 TABLET,CHEWABLE ORAL ONCE
Status: COMPLETED | OUTPATIENT
Start: 2022-04-09 | End: 2022-04-09

## 2022-04-09 RX ORDER — AMLODIPINE BESYLATE 10 MG/1
10 TABLET ORAL DAILY
Status: DISCONTINUED | OUTPATIENT
Start: 2022-04-10 | End: 2022-04-11

## 2022-04-09 RX ORDER — AMLODIPINE BESYLATE 5 MG/1
5 TABLET ORAL ONCE
Status: COMPLETED | OUTPATIENT
Start: 2022-04-09 | End: 2022-04-09

## 2022-04-09 NOTE — SIGNIFICANT EVENT
RRT    *See RRT Documentation Record*    Reason the RRT was called: New chest pain and shortness of breath   Assessment of patient leading up to RRT: Chest pain 7/10 and shortness of breath, pt described as \"sharp pressure\"; A/O x4, SpO2 100% on room air, denies nausea and dizziness  Interventions/Testing: STAT EKG and Troponin  Patient Outcome/Disposition: Remained on unit  Family Notified: No  Name of family notified: N/A

## 2022-04-09 NOTE — PLAN OF CARE
BP improved overnight, no PRN meds for BP given. Zofran given for nausea. RRT called for new chest pain and shortness of breath. Vital signs stable. STAT troponin pending. Continues on PCA pump. Call light within reach, bed alarm on, non-skid socks on, frequent rounding done. Problem: Patient Centered Care  Goal: Patient preferences are identified and integrated in the patient's plan of care  Description: Interventions:  - What would you like us to know as we care for you?  I live with my wife  - Provide timely, complete, and accurate information to patient/family  - Incorporate patient and family knowledge, values, beliefs, and cultural backgrounds into the planning and delivery of care  - Encourage patient/family to participate in care and decision-making at the level they choose  - Honor patient and family perspectives and choices  Outcome: Progressing     Problem: PAIN - ADULT  Goal: Verbalizes/displays adequate comfort level or patient's stated pain goal  Description: INTERVENTIONS:  - Encourage pt to monitor pain and request assistance  - Assess pain using appropriate pain scale  - Administer analgesics based on type and severity of pain and evaluate response  - Implement non-pharmacological measures as appropriate and evaluate response  - Consider cultural and social influences on pain and pain management  - Manage/alleviate anxiety  - Utilize distraction and/or relaxation techniques  - Monitor for opioid side effects  - Notify MD/LIP if interventions unsuccessful or patient reports new pain  - Anticipate increased pain with activity and pre-medicate as appropriate  Outcome: Progressing     Problem: RISK FOR INFECTION - ADULT  Goal: Absence of fever/infection during anticipated neutropenic period  Description: INTERVENTIONS  - Monitor WBC  - Administer growth factors as ordered  - Implement neutropenic guidelines  Outcome: Progressing     Problem: SAFETY ADULT - FALL  Goal: Free from fall injury  Description: INTERVENTIONS:  - Assess pt frequently for physical needs  - Identify cognitive and physical deficits and behaviors that affect risk of falls.   - [place] fall precautions as indicated by assessment.  - Educate pt/family on patient safety including physical limitations  - Instruct pt to call for assistance with activity based on assessment  - Modify environment to reduce risk of injury  - Provide assistive devices as appropriate  - Consider OT/PT consult to assist with strengthening/mobility  - Encourage toileting schedule  Outcome: Progressing     Problem: DISCHARGE PLANNING  Goal: Discharge to home or other facility with appropriate resources  Description: INTERVENTIONS:  - Identify barriers to discharge w/pt and caregiver  - Include patient/family/discharge partner in discharge planning  - Arrange for needed discharge resources and transportation as appropriate  - Identify discharge learning needs (meds, wound care, etc)  - Arrange for interpreters to assist at discharge as needed  - Consider post-discharge preferences of patient/family/discharge partner  - Complete POLST form as appropriate  - Assess patient's ability to be responsible for managing their own health  - Refer to Case Management Department for coordinating discharge planning if the patient needs post-hospital services based on physician/LIP order or complex needs related to functional status, cognitive ability or social support system  Outcome: Progressing     Problem: COPING  Goal: Pt/Family able to verbalize concerns and demonstrate effective coping strategies  Description: INTERVENTIONS:  - Assist patient/family to identify coping skills, available support systems and cultural and spiritual values  - Provide emotional support, including active listening and acknowledgement of concerns of patient and caregivers  - Reduce environmental stimuli, as able  - Instruct patient/family in relaxation techniques, as appropriate  - Assess for spiritual and psychosocial needs and initiate Spiritual Care or Behavioral Health consult as needed  Outcome: Progressing     Problem: CARDIOVASCULAR - ADULT  Goal: Maintains optimal cardiac output and hemodynamic stability  Description: INTERVENTIONS:  - Monitor vital signs, rhythm, and trends  - Monitor for bleeding, hypotension and signs of decreased cardiac output  - Evaluate effectiveness of vasoactive medications to optimize hemodynamic stability  - Monitor arterial and/or venous puncture sites for bleeding and/or hematoma  - Assess quality of pulses, skin color and temperature  - Assess for signs of decreased coronary artery perfusion - ex.  Angina  - Evaluate fluid balance, assess for edema, trend weights  Outcome: Progressing  Goal: Absence of cardiac arrhythmias or at baseline  Description: INTERVENTIONS:  - Continuous cardiac monitoring, monitor vital signs, obtain 12 lead EKG if indicated  - Evaluate effectiveness of antiarrhythmic and heart rate control medications as ordered  - Initiate emergency measures for life threatening arrhythmias  - Monitor electrolytes and administer replacement therapy as ordered  Outcome: Progressing

## 2022-04-09 NOTE — PLAN OF CARE
No acute changes. Continues to have pain with PCA in place. Up independently. Call light within reach. Problem: Patient Centered Care  Goal: Patient preferences are identified and integrated in the patient's plan of care  Description: Interventions:  - What would you like us to know as we care for you? I live with my wife  - Provide timely, complete, and accurate information to patient/family  - Incorporate patient and family knowledge, values, beliefs, and cultural backgrounds into the planning and delivery of care  - Encourage patient/family to participate in care and decision-making at the level they choose  - Honor patient and family perspectives and choices  Outcome: Progressing     Problem: PAIN - ADULT  Goal: Verbalizes/displays adequate comfort level or patient's stated pain goal  Description: INTERVENTIONS:  - Encourage pt to monitor pain and request assistance  - Assess pain using appropriate pain scale  - Administer analgesics based on type and severity of pain and evaluate response  - Implement non-pharmacological measures as appropriate and evaluate response  - Consider cultural and social influences on pain and pain management  - Manage/alleviate anxiety  - Utilize distraction and/or relaxation techniques  - Monitor for opioid side effects  - Notify MD/LIP if interventions unsuccessful or patient reports new pain  - Anticipate increased pain with activity and pre-medicate as appropriate  Outcome: Progressing     Problem: RISK FOR INFECTION - ADULT  Goal: Absence of fever/infection during anticipated neutropenic period  Description: INTERVENTIONS  - Monitor WBC  - Administer growth factors as ordered  - Implement neutropenic guidelines  Outcome: Progressing     Problem: SAFETY ADULT - FALL  Goal: Free from fall injury  Description: INTERVENTIONS:  - Assess pt frequently for physical needs  - Identify cognitive and physical deficits and behaviors that affect risk of falls.   - [place] fall precautions as indicated by assessment.  - Educate pt/family on patient safety including physical limitations  - Instruct pt to call for assistance with activity based on assessment  - Modify environment to reduce risk of injury  - Provide assistive devices as appropriate  - Consider OT/PT consult to assist with strengthening/mobility  - Encourage toileting schedule  Outcome: Progressing     Problem: DISCHARGE PLANNING  Goal: Discharge to home or other facility with appropriate resources  Description: INTERVENTIONS:  - Identify barriers to discharge w/pt and caregiver  - Include patient/family/discharge partner in discharge planning  - Arrange for needed discharge resources and transportation as appropriate  - Identify discharge learning needs (meds, wound care, etc)  - Arrange for interpreters to assist at discharge as needed  - Consider post-discharge preferences of patient/family/discharge partner  - Complete POLST form as appropriate  - Assess patient's ability to be responsible for managing their own health  - Refer to Case Management Department for coordinating discharge planning if the patient needs post-hospital services based on physician/LIP order or complex needs related to functional status, cognitive ability or social support system  Outcome: Progressing     Problem: COPING  Goal: Pt/Family able to verbalize concerns and demonstrate effective coping strategies  Description: INTERVENTIONS:  - Assist patient/family to identify coping skills, available support systems and cultural and spiritual values  - Provide emotional support, including active listening and acknowledgement of concerns of patient and caregivers  - Reduce environmental stimuli, as able  - Instruct patient/family in relaxation techniques, as appropriate  - Assess for spiritual and psychosocial needs and initiate Spiritual Care or Behavioral Health consult as needed  Outcome: Progressing     Problem: CARDIOVASCULAR - ADULT  Goal: Maintains optimal cardiac output and hemodynamic stability  Description: INTERVENTIONS:  - Monitor vital signs, rhythm, and trends  - Monitor for bleeding, hypotension and signs of decreased cardiac output  - Evaluate effectiveness of vasoactive medications to optimize hemodynamic stability  - Monitor arterial and/or venous puncture sites for bleeding and/or hematoma  - Assess quality of pulses, skin color and temperature  - Assess for signs of decreased coronary artery perfusion - ex.  Angina  - Evaluate fluid balance, assess for edema, trend weights  Outcome: Progressing  Goal: Absence of cardiac arrhythmias or at baseline  Description: INTERVENTIONS:  - Continuous cardiac monitoring, monitor vital signs, obtain 12 lead EKG if indicated  - Evaluate effectiveness of antiarrhythmic and heart rate control medications as ordered  - Initiate emergency measures for life threatening arrhythmias  - Monitor electrolytes and administer replacement therapy as ordered  Outcome: Progressing

## 2022-04-10 LAB
ANION GAP SERPL CALC-SCNC: 7 MMOL/L (ref 0–18)
BASOPHILS # BLD AUTO: 0.02 X10(3) UL (ref 0–0.2)
BASOPHILS NFR BLD AUTO: 0.3 %
BUN BLD-MCNC: 7 MG/DL (ref 7–18)
BUN/CREAT SERPL: 8.6 (ref 10–20)
CALCIUM BLD-MCNC: 9.2 MG/DL (ref 8.5–10.1)
CHLORIDE SERPL-SCNC: 99 MMOL/L (ref 98–112)
CO2 SERPL-SCNC: 30 MMOL/L (ref 21–32)
CREAT BLD-MCNC: 0.81 MG/DL
DEPRECATED RDW RBC AUTO: 49.9 FL (ref 35.1–46.3)
EOSINOPHIL # BLD AUTO: 0.01 X10(3) UL (ref 0–0.7)
EOSINOPHIL NFR BLD AUTO: 0.1 %
ERYTHROCYTE [DISTWIDTH] IN BLOOD BY AUTOMATED COUNT: 14 % (ref 11–15)
GLUCOSE BLD-MCNC: 90 MG/DL (ref 70–99)
HCT VFR BLD AUTO: 42.5 %
HGB BLD-MCNC: 14 G/DL
IMM GRANULOCYTES # BLD AUTO: 0.02 X10(3) UL (ref 0–1)
IMM GRANULOCYTES NFR BLD: 0.3 %
LYMPHOCYTES # BLD AUTO: 1.09 X10(3) UL (ref 1–4)
LYMPHOCYTES NFR BLD AUTO: 15 %
MCH RBC QN AUTO: 31.9 PG (ref 26–34)
MCHC RBC AUTO-ENTMCNC: 32.9 G/DL (ref 31–37)
MCV RBC AUTO: 96.8 FL
MONOCYTES # BLD AUTO: 0.49 X10(3) UL (ref 0.1–1)
MONOCYTES NFR BLD AUTO: 6.7 %
NEUTROPHILS # BLD AUTO: 5.64 X10 (3) UL (ref 1.5–7.7)
NEUTROPHILS # BLD AUTO: 5.64 X10(3) UL (ref 1.5–7.7)
NEUTROPHILS NFR BLD AUTO: 77.6 %
OSMOLALITY SERPL CALC.SUM OF ELEC: 280 MOSM/KG (ref 275–295)
PLATELET # BLD AUTO: 359 10(3)UL (ref 150–450)
POTASSIUM SERPL-SCNC: 4 MMOL/L (ref 3.5–5.1)
RBC # BLD AUTO: 4.39 X10(6)UL
SODIUM SERPL-SCNC: 136 MMOL/L (ref 136–145)
WBC # BLD AUTO: 7.3 X10(3) UL (ref 4–11)

## 2022-04-10 PROCEDURE — 99233 SBSQ HOSP IP/OBS HIGH 50: CPT | Performed by: HOSPITALIST

## 2022-04-10 NOTE — PLAN OF CARE
Problem: Patient Centered Care  Goal: Patient preferences are identified and integrated in the patient's plan of care  Description: Interventions:  - What would you like us to know as we care for you? I live with my wife  - Provide timely, complete, and accurate information to patient/family  - Incorporate patient and family knowledge, values, beliefs, and cultural backgrounds into the planning and delivery of care  - Encourage patient/family to participate in care and decision-making at the level they choose  - Honor patient and family perspectives and choices  Outcome: Progressing     Problem: PAIN - ADULT  Goal: Verbalizes/displays adequate comfort level or patient's stated pain goal  Description: INTERVENTIONS:  - Encourage pt to monitor pain and request assistance  - Assess pain using appropriate pain scale  - Administer analgesics based on type and severity of pain and evaluate response  - Implement non-pharmacological measures as appropriate and evaluate response  - Consider cultural and social influences on pain and pain management  - Manage/alleviate anxiety  - Utilize distraction and/or relaxation techniques  - Monitor for opioid side effects  - Notify MD/LIP if interventions unsuccessful or patient reports new pain  - Anticipate increased pain with activity and pre-medicate as appropriate  Outcome: Progressing     Problem: RISK FOR INFECTION - ADULT  Goal: Absence of fever/infection during anticipated neutropenic period  Description: INTERVENTIONS  - Monitor WBC  - Administer growth factors as ordered  - Implement neutropenic guidelines  Outcome: Progressing     Problem: SAFETY ADULT - FALL  Goal: Free from fall injury  Description: INTERVENTIONS:  - Assess pt frequently for physical needs  - Identify cognitive and physical deficits and behaviors that affect risk of falls.   - [place] fall precautions as indicated by assessment.  - Educate pt/family on patient safety including physical limitations  - Instruct pt to call for assistance with activity based on assessment  - Modify environment to reduce risk of injury  - Provide assistive devices as appropriate  - Consider OT/PT consult to assist with strengthening/mobility  - Encourage toileting schedule  Outcome: Progressing     Problem: DISCHARGE PLANNING  Goal: Discharge to home or other facility with appropriate resources  Description: INTERVENTIONS:  - Identify barriers to discharge w/pt and caregiver  - Include patient/family/discharge partner in discharge planning  - Arrange for needed discharge resources and transportation as appropriate  - Identify discharge learning needs (meds, wound care, etc)  - Arrange for interpreters to assist at discharge as needed  - Consider post-discharge preferences of patient/family/discharge partner  - Complete POLST form as appropriate  - Assess patient's ability to be responsible for managing their own health  - Refer to Case Management Department for coordinating discharge planning if the patient needs post-hospital services based on physician/LIP order or complex needs related to functional status, cognitive ability or social support system  Outcome: Progressing     Problem: COPING  Goal: Pt/Family able to verbalize concerns and demonstrate effective coping strategies  Description: INTERVENTIONS:  - Assist patient/family to identify coping skills, available support systems and cultural and spiritual values  - Provide emotional support, including active listening and acknowledgement of concerns of patient and caregivers  - Reduce environmental stimuli, as able  - Instruct patient/family in relaxation techniques, as appropriate  - Assess for spiritual and psychosocial needs and initiate Spiritual Care or Behavioral Health consult as needed  Outcome: Progressing     Problem: CARDIOVASCULAR - ADULT  Goal: Maintains optimal cardiac output and hemodynamic stability  Description: INTERVENTIONS:  - Monitor vital signs, rhythm, and trends  - Monitor for bleeding, hypotension and signs of decreased cardiac output  - Evaluate effectiveness of vasoactive medications to optimize hemodynamic stability  - Monitor arterial and/or venous puncture sites for bleeding and/or hematoma  - Assess quality of pulses, skin color and temperature  - Assess for signs of decreased coronary artery perfusion - ex. Angina  - Evaluate fluid balance, assess for edema, trend weights  Outcome: Progressing  Goal: Absence of cardiac arrhythmias or at baseline  Description: INTERVENTIONS:  - Continuous cardiac monitoring, monitor vital signs, obtain 12 lead EKG if indicated  - Evaluate effectiveness of antiarrhythmic and heart rate control medications as ordered  - Initiate emergency measures for life threatening arrhythmias  - Monitor electrolytes and administer replacement therapy as ordered  Outcome: Progressing     Patient with PCA pump. Passing gas, but no bowel movement at this time.

## 2022-04-10 NOTE — PLAN OF CARE
No acute changes. Patient tolerating clear liquid diet. Enema given today did produce one bowel movement per patient report. Continues to use PCA. Safety precautions in place. Problem: Patient Centered Care  Goal: Patient preferences are identified and integrated in the patient's plan of care  Description: Interventions:  - What would you like us to know as we care for you?  I live with my wife  - Provide timely, complete, and accurate information to patient/family  - Incorporate patient and family knowledge, values, beliefs, and cultural backgrounds into the planning and delivery of care  - Encourage patient/family to participate in care and decision-making at the level they choose  - Honor patient and family perspectives and choices  4/10/2022 1700 by Merlinda Birkenhead, RN  Outcome: Progressing  4/10/2022 0946 by Merlinda Birkenhead, RN  Outcome: Progressing     Problem: PAIN - ADULT  Goal: Verbalizes/displays adequate comfort level or patient's stated pain goal  Description: INTERVENTIONS:  - Encourage pt to monitor pain and request assistance  - Assess pain using appropriate pain scale  - Administer analgesics based on type and severity of pain and evaluate response  - Implement non-pharmacological measures as appropriate and evaluate response  - Consider cultural and social influences on pain and pain management  - Manage/alleviate anxiety  - Utilize distraction and/or relaxation techniques  - Monitor for opioid side effects  - Notify MD/LIP if interventions unsuccessful or patient reports new pain  - Anticipate increased pain with activity and pre-medicate as appropriate  4/10/2022 1700 by Merlinda Birkenhead, RN  Outcome: Progressing  4/10/2022 0946 by Merlinda Birkenhead, RN  Outcome: Progressing     Problem: RISK FOR INFECTION - ADULT  Goal: Absence of fever/infection during anticipated neutropenic period  Description: INTERVENTIONS  - Monitor WBC  - Administer growth factors as ordered  - Implement neutropenic Patient Education      Discharge Instructions: Vaginal Delivery    Signs of Illness:  CALL YOUR PROVIDER IF ANY OF THE FOLLOWING OCCUR  1. Temperature of 100.4 or above  2. Chills  3. Severe abdominal pain  4. Excessive vaginal bleeding (more than 1 pad/hour)  5. Large amount of clots  6. Unusual discharge or drainage  7. Discharge with foul odor  8. Pain or burning on urination  9. Painful, reddened, tender breasts  10: Pain/swelling/redness in the calf of your legs. 11. Other symptoms you do not understand     Activity  1. Rest when your baby rests  2. 1-2 weeks after delivery, gradually increase your activity    General Concerns  1. Eat healthy, proper nutrition and adequate fluids are essential for healing, strength and energy. 2. Continue monthly self breast exams  3. No douching, tampons or intercourse for 6 weeks  4. No tub baths, pools, or hot tubs for 6 weeks      Follow-up  Call you provider on the day of discharge to schedule a follow-up appointment in 2 weeks. Call [phone] if you have any questions, and ask to speak with a nurse. DISCHARGE SUMMARY from Nurse    The following personal items collected during your admission are returned to you:   Dental Appliance:    Vision:    Hearing Aid:    Jewelry:    Clothing:    Other Valuables:    Valuables sent to safe:                *  Please give a list of your current medications to your Primary Care Provider. *  Please update this list whenever your medications are discontinued, doses are      changed, or new medications (including over-the-counter products) are added. *  Please carry medication information at all times in case of emergency situations. These are general instructions for a healthy lifestyle:    No smoking/ No tobacco products/ Avoid exposure to second hand smoke    Surgeon General's Warning:  Quitting smoking now greatly reduces serious risk to your health.     Obesity, smoking, and sedentary lifestyle greatly increases guidelines  4/10/2022 1700 by Shawn Morales RN  Outcome: Progressing  4/10/2022 0946 by Shawn Morales RN  Outcome: Progressing     Problem: SAFETY ADULT - FALL  Goal: Free from fall injury  Description: INTERVENTIONS:  - Assess pt frequently for physical needs  - Identify cognitive and physical deficits and behaviors that affect risk of falls.   - [facility] fall precautions as indicated by assessment.  - Educate pt/family on patient safety including physical limitations  - Instruct pt to call for assistance with activity based on assessment  - Modify environment to reduce risk of injury  - Provide assistive devices as appropriate  - Consider OT/PT consult to assist with strengthening/mobility  - Encourage toileting schedule  4/10/2022 1700 by Shawn Morales RN  Outcome: Progressing  4/10/2022 0946 by Shawn Morales RN  Outcome: Progressing     Problem: DISCHARGE PLANNING  Goal: Discharge to home or other facility with appropriate resources  Description: INTERVENTIONS:  - Identify barriers to discharge w/pt and caregiver  - Include patient/family/discharge partner in discharge planning  - Arrange for needed discharge resources and transportation as appropriate  - Identify discharge learning needs (meds, wound care, etc)  - Arrange for interpreters to assist at discharge as needed  - Consider post-discharge preferences of patient/family/discharge partner  - Complete POLST form as appropriate  - Assess patient's ability to be responsible for managing their own health  - Refer to Case Management Department for coordinating discharge planning if the patient needs post-hospital services based on physician/LIP order or complex needs related to functional status, cognitive ability or social support system  4/10/2022 1700 by Shawn Morales RN  Outcome: Progressing  4/10/2022 0946 by Shawn Morales RN  Outcome: Progressing     Problem: COPING  Goal: Pt/Family able to verbalize concerns and demonstrate effective your risk for illness    A healthy diet, regular physical exercise & weight monitoring are important for maintaining a healthy lifestyle    You may be retaining fluid if you have a history of heart failure or if you experience any of the following symptoms:  Weight gain of 3 pounds or more overnight or 5 pounds in a week, increased swelling in our hands or feet or shortness of breath while lying flat in bed. Please call your doctor as soon as you notice any of these symptoms; do not wait until your next office visit. Recognize signs and symptoms of STROKE:    F-face looks uneven    A-arms unable to move or move unevenly    S-speech slurred or non-existent    T-time-call 911 as soon as signs and symptoms begin-DO NOT go       Back to bed or wait to see if you get better-TIME IS BRAIN. The discharge information has been reviewed with the patient. The patient verbalized understanding. Patient armband removed and shredded    MyChart Activation    Thank you for requesting access to [facility]. Please follow the instructions below to securely access and download your online medical record. [facility] allows you to send messages to your doctor, view your test results, renew your prescriptions, schedule appointments, and more. How Do I Sign Up? 1. In your internet browser, go to [email or web address]. 2. Click on the First Time User? Click Here link in the Sign In box. You will see the New Member Sign Up page. 3. Enter your [facility] Access Code exactly as it appears below. You will not need to use this code after youve completed the sign-up process. If you do not sign up before the expiration date, you must request a new code. [facility] Access Code: [email or web address]  Expires: 2019 11:54 AM (This is the date your [facility] access code will )    4. Enter the last four digits of your Social Security Number (xxxx) and Date of Birth (mm/dd/yyyy) as indicated and click Submit.  You coping strategies  Description: INTERVENTIONS:  - Assist patient/family to identify coping skills, available support systems and cultural and spiritual values  - Provide emotional support, including active listening and acknowledgement of concerns of patient and caregivers  - Reduce environmental stimuli, as able  - Instruct patient/family in relaxation techniques, as appropriate  - Assess for spiritual and psychosocial needs and initiate Spiritual Care or Behavioral Health consult as needed  4/10/2022 1700 by Shabnam Perry RN  Outcome: Progressing  4/10/2022 0946 by Shabnam Perry RN  Outcome: Progressing     Problem: CARDIOVASCULAR - ADULT  Goal: Maintains optimal cardiac output and hemodynamic stability  Description: INTERVENTIONS:  - Monitor vital signs, rhythm, and trends  - Monitor for bleeding, hypotension and signs of decreased cardiac output  - Evaluate effectiveness of vasoactive medications to optimize hemodynamic stability  - Monitor arterial and/or venous puncture sites for bleeding and/or hematoma  - Assess quality of pulses, skin color and temperature  - Assess for signs of decreased coronary artery perfusion - ex.  Angina  - Evaluate fluid balance, assess for edema, trend weights  4/10/2022 1700 by Shabnam Perry RN  Outcome: Progressing  4/10/2022 0946 by Shabnam Perry RN  Outcome: Progressing  Goal: Absence of cardiac arrhythmias or at baseline  Description: INTERVENTIONS:  - Continuous cardiac monitoring, monitor vital signs, obtain 12 lead EKG if indicated  - Evaluate effectiveness of antiarrhythmic and heart rate control medications as ordered  - Initiate emergency measures for life threatening arrhythmias  - Monitor electrolytes and administer replacement therapy as ordered  4/10/2022 1700 by Shabnam Perry RN  Outcome: Progressing  4/10/2022 0946 by Shabnam Perry RN  Outcome: Progressing will be taken to the next sign-up page. 5. Create a IntroNett ID. This will be your [facility] login ID and cannot be changed, so think of one that is secure and easy to remember. 6. Create a [facility] password. You can change your password at any time. 7. Enter your Password Reset Question and Answer. This can be used at a later time if you forget your password. 8. Enter your e-mail address. You will receive e-mail notification when new information is available in [address] 9. Click Sign Up. You can now view and download portions of your medical record. 10. Click the Download Summary menu link to download a portable copy of your medical information. Additional Information    If you have questions, please visit the Frequently Asked Questions section of the [employer] website at [email or web address] Remember, [facility] is NOT to be used for urgent needs. For medical emergencies, dial 911. After Your Delivery (the Postpartum Period): After Your Visit  Your Care Instructions  Congratulations on the birth of your baby. Like pregnancy, the  period can be a time of excitement, gunjan, and exhaustion. You may look at your wondrous little baby and feel happy. You may also be overwhelmed by your new sleep hours and new responsibilities. At first, babies often sleep during the days and are awake at night. They do not have a pattern or routine. They may make sudden gasps, jerk themselves awake, or look like they have crossed eyes. These are all normal, and they may even make you smile. In these first weeks after delivery, try to take good care of yourself. It may take 4 to 6 weeks to feel like yourself again, and possibly longer if you had a  birth. You will likely feel very tired for several weeks. Your days will be full of ups and downs, but lots of gunjan as well. Follow-up care is a key part of your treatment and safety.  Be sure to make and go to all appointments, and call your doctor if you are having problems. Its also a good idea to know your test results and keep a list of the medicines you take. How can you care for yourself at home? Take care of your body after delivery  · Use pads instead of tampons for the bloody flow that may last as long as 2 weeks. · Ease cramps with acetaminophen (Tylenol). · Ease soreness of hemorrhoids and the area between your vagina and rectum with ice compresses or witch hazel pads. · Ease constipation by drinking lots of fluid and eating high-fiber foods. Ask your doctor about over-the-counter stool softeners. · Cleanse yourself with a gentle squeeze of warm water from a bottle instead of wiping with toilet paper. · Take a sitz bath in warm water several times a day. · Wear a good nursing bra. Ease sore and swollen breasts with warm, wet washcloths. · If you are not breast-feeding, use ice rather than heat for breast soreness. · Your period may not start for several months if you are breast-feeding. You may bleed more, and longer at first, than you did before you got pregnant. · Wait until you are healed (about 4 to 6 weeks) before you have sexual intercourse. Your doctor will tell you when it is okay to have sex. · Try not to travel with your baby for 5 or 6 weeks. If you take a long car trip, make frequent stops to walk around and stretch. Avoid exhaustion  · Rest every day. Try to nap when your baby naps. · Ask another adult to be with you for a few days after delivery. · Plan for  if you have other children. · Stay flexible so you can eat at odd hours and sleep when you need to. Both you and your baby are making new schedules. · Plan small trips to get out of the house. Change can make you feel less tired. · Ask for help with housework, cooking, and shopping. Remind yourself that your job is to care for your baby. Know about help for postpartum depression  · \"Baby blues are common for the first 1 to 2 weeks after birth.  You may cry or feel sad or irritable for no reason. · Rest whenever you can. Being tired makes it harder to handle your emotions. · Go for walks with your baby. · Talk to your partner, friends, and family about your feelings. · If your symptoms last for more than a few weeks, or if you feel very depressed, ask your doctor for help. · Postpartum depression can be treated. Support groups and counseling can help. Sometimes medicine can also help. Stay healthy  · Eat healthy foods so you have more energy, make good breast milk, and lose extra baby pounds. · If you breast-feed, avoid alcohol and drugs. Stay smoke-free. If you quit during pregnancy, congratulations. · Start daily exercise after 4 to 6 weeks, but rest when you feel tired. · Learn exercises to tone your belly. Do Kegel exercises to regain strength in your pelvic muscles. You can do these exercises while you stand or sit. ¨ Squeeze the same muscles you would use to stop your urine. Your belly and rear end (buttocks) should not move. ¨ Hold the squeeze for 3 seconds, then relax for 3 seconds. ¨ Repeat the exercise 10 to 15 times for each session. Do three or more sessions each day. · Find a class for new mothers and new babies that has an exercise time. · If you had a  birth, give yourself a bit more time before you exercise, and be careful. When should you call for help? Call 911 anytime you think you may need emergency care. For example, call if:  · You have sudden, severe pain in your belly. · You passed out (lost consciousness). Call your doctor now or seek immediate medical care if:  · You have severe vaginal bleeding. You are passing blood clots and soaking through a pad each hour for 2 or more hours. · Your vaginal bleeding seems to be getting heavier or is still bright red 4 days after delivery, or you pass blood clots larger than the size of a golf ball. · You are dizzy or lightheaded, or you feel like you may faint.   · You are vomiting or cannot keep fluids down. · You have a fever. · You have new or more belly pain. · You pass tissue (not just blood). · Your breast or breasts have hard, red, or tender areas. · You have an urgent or frequent need to urinate, along with a burning feeling. · You have severe pain, tenderness, or swelling in your vagina or the area between your rectum and vagina. · You have severe pains in your chest, belly, back, or legs. · You have feelings of severe despair or great anxiety. · Your baby is unusually cranky or is sleeping too much. · Your baby's eyes are red or have discharge. · Your baby has white patches on the roof and sides of the mouth or tongue. · Your baby's umbilical cord is foul-smelling, swollen, red, or leaking pus. · There is blood or mucus in your baby's bowel movements. · Your baby has fewer than 6 wet diapers a day. · Your baby does not want to eat, or your baby is throwing up with every feeding. · Your baby has trouble breathing. · Your baby has a rectal temperature of 100.4°F or more, or an underarm temperature over 99.4°F.  · You baby has a low temperature less than 97.5°F rectal, or less than 97. 0°F underarm. · Your baby's skin looks yellow. Watch closely for changes in your health, and be sure to contact your doctor if you have any problems. Where can you learn more? Go to Crowd Play.be  Enter A461 in the search box to learn more about \"After Your Delivery (the Postpartum Period): After Your Visit. \"   © 4303-0217 Healthwise, Incorporated. Care instructions adapted under license by [employer]sh[employer] (which disclaims liability or warranty for this information). This care instruction is for use with your licensed healthcare professional. If you have questions about a medical condition or this instruction, always ask your healthcare professional. [address] any warranty or liability for your use of this information.   Content Version: 9.3.99111; Last Revised: July 8, 2010      Depression After Childbirth: After Your Visit  Your Care Instructions  Many women get the \"baby blues\" during the first few days after childbirth. They may lose sleep, feel irritable, cry easily, and feel happy one minute and sad the next. Hormone changes are one cause of these emotional changes. Also, the demands of a new baby, coupled with visits from relatives or other family needs, add to a mother's stress. The \"baby blues\" usually peak around the fourth day and then ease up in less than 2 weeks. If your moodiness or anxiety lasts for more than 2 weeks, or if you feel like life is not worth living, you may have postpartum depression. This is different for each mother. Some mothers with serious depression may worry intensely about their infant's well-being, while others may feel distant from their child. Some mothers might even feel that they might harm their baby. A mother may have signs of paranoia, wondering if someone is watching her. Depression is not a sign of weakness. It is a medical condition that requires treatment. Medicine and counseling are often effective at reducing depression. Talk to your doctor about taking antidepressant medicine while breast-feeding. Follow-up care is a key part of your treatment and safety. Be sure to make and go to all appointments, and call your doctor if you are having problems. Its also a good idea to know your test results and keep a list of the medicines you take. How can you care for yourself at home? · Take your medicines exactly as prescribed. Call your doctor if you think you are having a problem with your medicine. · Eat a balanced diet, so that you can keep up your energy. · Get regular daily exercise, such as walks, to help improve your mood. · Get as much sunlight as possible. Keep your shades and curtains open, and get outside as much as you can.   · Avoid using alcohol or other substances to feel better. · Get as much rest and sleep as possible, and avoid doing too much. Being too tired can increase depression. · Play stimulating music throughout your day and soothing music at night. · Schedule outings and visits with friends and family. Ask them to call you regularly, so that you do not feel alone. · Ask for help with preparing food and other daily tasks. Family and friends are often happy to help a mother with a . · Be honest with yourself and those who care about you. Tell them about your struggle. · Join a support group of new mothers. No one can better understand the challenges of caring for a  than other new mothers. When should you call for help? Call 911 anytime you think you may need emergency care. For example, call if:  · You feel you cannot stop from hurting yourself or someone else. Call your doctor now or seek immediate medical care if:  · You are having trouble caring for yourself or your baby. · You have signs of paranoia that can occur with postpartum depression. You fear that someone is watching you, stealing from you, or reading your mind. · You hear voices. · You have symptoms of postpartum depression, such as:  ¨ Sleeplessness. ¨ Anxiety. ¨ Hopelessness. ¨ Irritability. ¨ Poor concentration. · Someone you know has depression and:  ¨ Starts to give away his or her possessions. ¨ Uses illegal drugs or drinks alcohol heavily. ¨ Talks or writes about death, including writing suicide notes and talking about guns, knives, or pills. ¨ Starts to spend a lot of time alone. ¨ Acts very aggressively or suddenly appears calm. Watch closely for changes in your health, and be sure to contact your doctor if you have any problems. Where can you learn more? Go to Poynt.be  Enter B965 in the search box to learn more about \"Depression After Childbirth: After Your Visit. \"   © 9047-9204 Healthwise, Incorporated.  Care instructions adapted under license by [employer] (which disclaims liability or warranty for this information). This care instruction is for use with your licensed healthcare professional. If you have questions about a medical condition or this instruction, always ask your healthcare professional. [place] [address] any warranty or liability for your use of this information. Content Version: 9.3.57901; Last Revised: April 18, 2011    Breast Self-Exam: After Your Visit  Your Care Instructions  A breast self-exam is when you check your breasts for lumps or changes. This regular exam helps you learn how your breasts normally look and feel. Most breast problems or changes are not because of cancer. Breast self-exam is not a substitute for a mammogram. Having regular breast exams by your doctor and regular mammograms improve your chances of finding any problems with your breasts. Some women set a time each month to do a step-by-step breast self-exam. Other women like a less formal system. They might look at their breasts as they brush their teeth, or feel their breasts once in a while in the shower. If you notice a change in your breast, tell your doctor. Follow-up care is a key part of your treatment and safety. Be sure to make and go to all appointments, and call your doctor if you are having problems. Its also a good idea to know your test results and keep a list of the medicines you take. How do you do a breast self-exam?  · The best time to examine your breasts is usually one week after your menstrual period begins. Your breasts should not be tender then. If you do not have periods, you might do your exam on a day of the month that is easy to remember. · To examine your breasts:  ¨ Remove all your clothes above the waist and lie down. When you are lying down, your breast tissue spreads evenly over your chest wall, which makes it easier to feel all your breast tissue.   ¨ Use the pads--not the fingertips--of the 3 middle fingers of your left hand to check your right breast. Move your fingers slowly in small coin-sized circles that overlap. ¨ Use three levels of pressure to feel of all your breast tissue. Use light pressure to feel the tissue close to the skin surface. Use medium pressure to feel a little deeper. Use firm pressure to feel your tissue close to your breastbone and ribs. Use each pressure level to feel your breast tissue before moving on to the next spot. ¨ Check your entire breast, moving up and down as if following a strip from the collarbone to the bra line, and from the armpit to the ribs. Repeat until you have covered the entire breast.  ¨ Repeat this procedure for your left breast, using the pads of the 3 middle fingers of your right hand. · To examine your breasts while in the shower:  ¨ Place one arm over your head and lightly soap your breast on that side. ¨ Using the pads of your fingers, gently move your hand over your breast (in the strip pattern described above), feeling carefully for any lumps or changes. ¨ Repeat for the other breast.  · Have your doctor inspect anything you notice to see if you need further testing. Where can you learn more? Go to worldhistoryproject.be  Enter P148 in the search box to learn more about \"Breast Self-Exam: After Your Visit. \"   © 0879-1752 Healthwise, Incorporated. Care instructions adapted under license by [employer] (which disclaims liability or warranty for this information). This care instruction is for use with your licensed healthcare professional. If you have questions about a medical condition or this instruction, always ask your healthcare professional. [place] [address] any warranty or liability for your use of this information.   Content Version: 9.3.73169; Last Revised: September 6, 2011   Bottle-Feeding: After Your Visit  Your Care Instructions  Your reasons for wanting to bottle-feed your baby with formula are personal. Leatha Pérez and your partner can make the best decision for you and your baby. Formulas can provide all the calories and nutrients your baby needs in the first 6 months of life. Several types of formulas are available. Most babies start with a cow's milk-based formula, such as Enfamil, Good Start, or Similac. Talk to your doctor before trying other types of formulas, which include soy and lactose-free formulas. At first, preparing the bottles and formula can seem confusing, but it gets easier and faster with some practice. Your  baby probably will want to eat every 2 to 3 hours. Do not worry about the exact timing for the first few weeks, but feed your baby whenever he or she is hungry. In general, your baby should not go longer than 4 hours without eating during the day for the first few months. Sit in a comfortable chair with your arms supported on pillows. Look into your baby's eyes and talk or sing while you are giving the bottle. Enjoy this special time you have with your baby. Follow-up care is a key part of your childs treatment and safety. Be sure to make and go to all appointments, and call your doctor if your child is having problems. Its also a good idea to know your childs test results and keep a list of the medicines your child takes. At each well-baby visit, talk to your doctor about your baby's nutritional needs, which change as he or she grows and develops. How can you care for yourself at home? · Prepare your supplies for bottle-feeding before your baby is born, if possible. ¨ Have a supply of small bottles (usually 4 ounces) for your baby's first few weeks. ¨ You may want to buy a variety of bottle nipples so you can see which type your baby likes. ¨ Before using bottles and nipples the first time, wash them in hot water and dish soap and rinse with hot water. · Ask your doctor which formula to use.  You can buy formula as a liquid concentrate or a powder that you mix with water. Formulas also come in a ready-to-feed form. Always use formula with added iron unless the doctor says not to. · Make sure you have clean, safe water to mix with the formula. Boil water--even bottled water--for 1 to 2 minutes, and let it cool before mixing it with formula. · Wash your hands before preparing formula. · Read the label to see how much water to mix with the formula. If you add too little water, it can upset your baby's stomach. If you add too much water, your baby will not get the right nutrition. · Cover the prepared formula and store it in a refrigerator. Use it within 24 hours. · Soak dirty baby bottles in water and dish soap. Wash bottles and nipples in the upper rack of the  or hand-wash them in hot water with dish soap. To bottle-feed your baby  · Warm the formula to room temperature or body temperature before feeding. The best way to warm it is in a pan of heated water. Do not use a microwave, which can cause hot spots in the formula that can burn your baby's mouth. · Before feeding your baby, check the temperature of the formula by dripping 2 or 3 drops on the inside of your wrist. It should be warm, not cold or hot. · Place a bib or cloth under your baby's chin to help keep clothes clean. Have a second cloth handy to use when burping your baby. · Support your baby with one arm, with your baby's head resting in the bend of your elbow. Keep your baby's head higher than his or her chest.  · Stroke the center of your baby's lower lip to encourage the mouth to open wider. A wide mouth will cover more of the nipple and will help reduce the amount of air the baby sucks in. · Angle the bottle so the neck of the bottle and the nipple stay full of milk. This helps reduce how much air your baby swallows. · Do not prop the bottle in your baby's mouth or let him or her hold it alone. This increases your baby's chances of choking or getting ear infections.   · During the first few weeks, burp your baby after every 2 ounces of formula. This helps get rid of swallowed air and reduces spitting up. · You will know your baby is full when he or she stops sucking. Your baby may spit out the nipple, turn his or her head away, or fall asleep when full. [place] babies usually drink from 1 to 3 ounces each feeding. · Throw away any formula left in the bottle after you have fed your baby. Bacteria can grow in the leftover formula. · It can be helpful to hold your baby upright for about 30 minutes after eating to reduce spitting up. When should you call for help? Watch closely for changes in your child's health, and be sure to contact your doctor if:  · Your child does not seem to be growing and gaining weight. · Your child has trouble passing stools, or his or her stools are hard and dry. · Your child is vomiting. · Your child has diarrhea or a skin rash. · Your child cries most of the time. · Your child has gas, bloating, or cramps after drinking a bottle. Where can you learn more? Go to LevelEleven.be  Enter P111 in the search box to learn more about \"Bottle-Feeding: After Your Visit. \"   © 8768-1308 Healthwise, Incorporated. Care instructions adapted under license by [employer] (which disclaims liability or warranty for this information). This care instruction is for use with your licensed healthcare professional. If you have questions about a medical condition or this instruction, always ask your healthcare professional. [place] [address] any warranty or liability for your use of this information. Content Version: 9.3.33162; Last Revised: 2012      Learning About Birth Control After Childbirth  What is birth control? Birth control is any method used to prevent pregnancy. Another word for birth control is contraception. Wait until you are healed (about 4 to 6 weeks) before you have sexual intercourse.  If you have sex without birth control, there is a chance that you could get pregnant. This is true even if you haven't started having periods again. Even if you breastfeed, you can still get pregnant. The only sure way to prevent another pregnancy is to not have sex. But finding a good method of birth control that you are comfortable with can help you avoid an unplanned pregnancy. Your doctor can help you choose the birth control method that is right for you. What are the types of birth control? · Long-acting reversible contraception (LARC) is the best reversible method you can use to prevent pregnancy. If you decide you want to get pregnant, you can have them removed. LARCs are implants and intrauterine devices (IUDs). While they are being used, they usually prevent pregnancy for years. ? Implants are placed under the skin of the arm. This can be done right after you give birth. They release the hormone progestin and prevent pregnancy for about 3 years. ? IUDs are placed in the uterus by a doctor. This can be done right after you give birth, if you and your doctor discuss it beforehand. Or it can be done at a doctor visit later. There are two main types of IUDs--the copper IUD and the hormonal IUD. The hormonal IUD releases progestin. IUDs prevent pregnancy for 3 to 10 years, depending on the type. · Hormonal methods are very good at preventing pregnancy. Combination birth control pills (\"the pill\"), skin patches, and vaginal rings release the hormones estrogen and progestin. Depo-Provera is a shot you get every 3 months. Shots, mini-pills, IUDs, and implants release progestin only. They are safe to use while breastfeeding. · Barrier methods don't prevent pregnancy as well as implants, IUDs, or hormonal methods do. Barrier methods include condoms, diaphragms, and cervical caps. You must use barrier methods every time you have sex. You can use a diaphragm or a cervical cap 6 weeks after you give birth.  But if you had one before you got pregnant, you will need to have it refitted. Condoms can be used anytime after you give birth. · Natural family planning is also known as fertility awareness or the rhythm method. It can work if you and your partner are very careful and you have a regular ovulation cycle. But it doesn't work better than other birth control methods. You will need to keep good records so you know when you are most likely to become pregnant. And during those times, you will need to use a barrier method or not have sex. · Permanent birth control (sterilization) gives you lasting protection against pregnancy. A man can have a vasectomy. A woman can have her tubes tied (tubal ligation) or blocked (tubal implant). But this is only a good choice if you are sure that you don't want any more children. · Emergency contraception, such as the morning-after pill (Plan B), is a backup method to prevent pregnancy if you didn't use birth control or if a condom breaks. You can use this method for up to 5 days after you had sex. But it works best if you take it right away. It is safe to use while breastfeeding. A copper IUD can be used for emergency contraception. It can be placed up to 5 days after you've had unprotected sex. How can you get birth control? · You can buy:  ? Condoms and spermicides without a prescription in drugstores, online, and in many grocery stores. ? Emergency contraception without a prescription at most drugstores. · You need to see a doctor to:  ? Get a prescription for birth control pills and other methods that use hormones. ? Have an IUD or implant inserted. ? Be fitted for a diaphragm or cervical cap. Follow-up care is a key part of your treatment and safety. Be sure to make and go to all appointments, and call your doctor if you are having problems. It's also a good idea to know your test results and keep a list of the medicines you take. Where can you learn more?   Go to http://spencer.info/. Aden Og in the search box to learn more about \"Learning About Birth Control After Childbirth. \"  Current as of: September 5, 2018  Content Version: 11.9  © 7254-7225 [employer]. Care instructions adapted under license by [employer] (which disclaims liability or warranty for this information). If you have questions about a medical condition or this instruction, always ask your healthcare professional. [address] any warranty or liability for your use of this information.

## 2022-04-11 VITALS
BODY MASS INDEX: 22 KG/M2 | OXYGEN SATURATION: 100 % | DIASTOLIC BLOOD PRESSURE: 81 MMHG | HEART RATE: 84 BPM | RESPIRATION RATE: 16 BRPM | TEMPERATURE: 98 F | SYSTOLIC BLOOD PRESSURE: 142 MMHG | WEIGHT: 172 LBS

## 2022-04-11 LAB
ANION GAP SERPL CALC-SCNC: 5 MMOL/L (ref 0–18)
BASOPHILS # BLD AUTO: 0.02 X10(3) UL (ref 0–0.2)
BASOPHILS NFR BLD AUTO: 0.5 %
BUN BLD-MCNC: 7 MG/DL (ref 7–18)
BUN/CREAT SERPL: 8.5 (ref 10–20)
CALCIUM BLD-MCNC: 9 MG/DL (ref 8.5–10.1)
CHLORIDE SERPL-SCNC: 103 MMOL/L (ref 98–112)
CO2 SERPL-SCNC: 31 MMOL/L (ref 21–32)
CREAT BLD-MCNC: 0.82 MG/DL
DEPRECATED RDW RBC AUTO: 48.4 FL (ref 35.1–46.3)
EOSINOPHIL # BLD AUTO: 0.03 X10(3) UL (ref 0–0.7)
EOSINOPHIL NFR BLD AUTO: 0.8 %
ERYTHROCYTE [DISTWIDTH] IN BLOOD BY AUTOMATED COUNT: 13.7 % (ref 11–15)
GLUCOSE BLD-MCNC: 104 MG/DL (ref 70–99)
HCT VFR BLD AUTO: 38.3 %
HGB BLD-MCNC: 12.9 G/DL
IMM GRANULOCYTES # BLD AUTO: 0.01 X10(3) UL (ref 0–1)
IMM GRANULOCYTES NFR BLD: 0.3 %
LYMPHOCYTES # BLD AUTO: 1.22 X10(3) UL (ref 1–4)
LYMPHOCYTES NFR BLD AUTO: 31 %
MCH RBC QN AUTO: 32.2 PG (ref 26–34)
MCHC RBC AUTO-ENTMCNC: 33.7 G/DL (ref 31–37)
MCV RBC AUTO: 95.5 FL
MONOCYTES # BLD AUTO: 0.38 X10(3) UL (ref 0.1–1)
MONOCYTES NFR BLD AUTO: 9.6 %
NEUTROPHILS # BLD AUTO: 2.28 X10 (3) UL (ref 1.5–7.7)
NEUTROPHILS # BLD AUTO: 2.28 X10(3) UL (ref 1.5–7.7)
NEUTROPHILS NFR BLD AUTO: 57.8 %
OSMOLALITY SERPL CALC.SUM OF ELEC: 286 MOSM/KG (ref 275–295)
PLATELET # BLD AUTO: 317 10(3)UL (ref 150–450)
POTASSIUM SERPL-SCNC: 4.2 MMOL/L (ref 3.5–5.1)
RBC # BLD AUTO: 4.01 X10(6)UL
SODIUM SERPL-SCNC: 139 MMOL/L (ref 136–145)
WBC # BLD AUTO: 3.9 X10(3) UL (ref 4–11)

## 2022-04-11 PROCEDURE — 99239 HOSP IP/OBS DSCHRG MGMT >30: CPT | Performed by: HOSPITALIST

## 2022-04-11 RX ORDER — HYDROCODONE BITARTRATE AND ACETAMINOPHEN 10; 325 MG/1; MG/1
1 TABLET ORAL EVERY 4 HOURS PRN
Qty: 15 TABLET | Refills: 0 | Status: SHIPPED | OUTPATIENT
Start: 2022-04-11

## 2022-04-11 RX ORDER — NICOTINE 21 MG/24HR
1 PATCH, TRANSDERMAL 24 HOURS TRANSDERMAL DAILY
Refills: 0 | Status: SHIPPED | COMMUNITY
Start: 2022-04-12

## 2022-04-11 RX ORDER — HYDROCODONE BITARTRATE AND ACETAMINOPHEN 10; 325 MG/1; MG/1
1 TABLET ORAL EVERY 4 HOURS PRN
Status: DISCONTINUED | OUTPATIENT
Start: 2022-04-11 | End: 2022-04-11

## 2022-04-11 RX ORDER — PSEUDOEPHEDRINE HCL 30 MG
100 TABLET ORAL 2 TIMES DAILY
Refills: 0 | Status: SHIPPED | COMMUNITY
Start: 2022-04-11

## 2022-04-11 RX ORDER — AMLODIPINE BESYLATE 10 MG/1
10 TABLET ORAL DAILY
Qty: 30 TABLET | Refills: 0 | Status: SHIPPED | OUTPATIENT
Start: 2022-04-12

## 2022-04-11 RX ORDER — METOPROLOL SUCCINATE 25 MG/1
12.5 TABLET, EXTENDED RELEASE ORAL
Qty: 30 TABLET | Refills: 0 | Status: SHIPPED | OUTPATIENT
Start: 2022-04-12

## 2022-04-11 NOTE — PLAN OF CARE
Problem: Patient Centered Care  Goal: Patient preferences are identified and integrated in the patient's plan of care  Description: Interventions:  - What would you like us to know as we care for you? I live with my wife  - Provide timely, complete, and accurate information to patient/family  - Incorporate patient and family knowledge, values, beliefs, and cultural backgrounds into the planning and delivery of care  - Encourage patient/family to participate in care and decision-making at the level they choose  - Honor patient and family perspectives and choices  Outcome: Progressing     Problem: PAIN - ADULT  Goal: Verbalizes/displays adequate comfort level or patient's stated pain goal  Description: INTERVENTIONS:  - Encourage pt to monitor pain and request assistance  - Assess pain using appropriate pain scale  - Administer analgesics based on type and severity of pain and evaluate response  - Implement non-pharmacological measures as appropriate and evaluate response  - Consider cultural and social influences on pain and pain management  - Manage/alleviate anxiety  - Utilize distraction and/or relaxation techniques  - Monitor for opioid side effects  - Notify MD/LIP if interventions unsuccessful or patient reports new pain  - Anticipate increased pain with activity and pre-medicate as appropriate  Outcome: Progressing     Problem: RISK FOR INFECTION - ADULT  Goal: Absence of fever/infection during anticipated neutropenic period  Description: INTERVENTIONS  - Monitor WBC  - Administer growth factors as ordered  - Implement neutropenic guidelines  Outcome: Progressing     Problem: SAFETY ADULT - FALL  Goal: Free from fall injury  Description: INTERVENTIONS:  - Assess pt frequently for physical needs  - Identify cognitive and physical deficits and behaviors that affect risk of falls.   - [place] fall precautions as indicated by assessment.  - Educate pt/family on patient safety including physical limitations  - Instruct pt to call for assistance with activity based on assessment  - Modify environment to reduce risk of injury  - Provide assistive devices as appropriate  - Consider OT/PT consult to assist with strengthening/mobility  - Encourage toileting schedule  Outcome: Progressing     Problem: DISCHARGE PLANNING  Goal: Discharge to home or other facility with appropriate resources  Description: INTERVENTIONS:  - Identify barriers to discharge w/pt and caregiver  - Include patient/family/discharge partner in discharge planning  - Arrange for needed discharge resources and transportation as appropriate  - Identify discharge learning needs (meds, wound care, etc)  - Arrange for interpreters to assist at discharge as needed  - Consider post-discharge preferences of patient/family/discharge partner  - Complete POLST form as appropriate  - Assess patient's ability to be responsible for managing their own health  - Refer to Case Management Department for coordinating discharge planning if the patient needs post-hospital services based on physician/LIP order or complex needs related to functional status, cognitive ability or social support system  Outcome: Progressing     Problem: COPING  Goal: Pt/Family able to verbalize concerns and demonstrate effective coping strategies  Description: INTERVENTIONS:  - Assist patient/family to identify coping skills, available support systems and cultural and spiritual values  - Provide emotional support, including active listening and acknowledgement of concerns of patient and caregivers  - Reduce environmental stimuli, as able  - Instruct patient/family in relaxation techniques, as appropriate  - Assess for spiritual and psychosocial needs and initiate Spiritual Care or Behavioral Health consult as needed  Outcome: Progressing     Problem: CARDIOVASCULAR - ADULT  Goal: Maintains optimal cardiac output and hemodynamic stability  Description: INTERVENTIONS:  - Monitor vital signs, rhythm, and trends  - Monitor for bleeding, hypotension and signs of decreased cardiac output  - Evaluate effectiveness of vasoactive medications to optimize hemodynamic stability  - Monitor arterial and/or venous puncture sites for bleeding and/or hematoma  - Assess quality of pulses, skin color and temperature  - Assess for signs of decreased coronary artery perfusion - ex. Angina  - Evaluate fluid balance, assess for edema, trend weights  Outcome: Progressing  Goal: Absence of cardiac arrhythmias or at baseline  Description: INTERVENTIONS:  - Continuous cardiac monitoring, monitor vital signs, obtain 12 lead EKG if indicated  - Evaluate effectiveness of antiarrhythmic and heart rate control medications as ordered  - Initiate emergency measures for life threatening arrhythmias  - Monitor electrolytes and administer replacement therapy as ordered  Outcome: Progressing     Patient having multiple bowel movements now. Abdominal pain improving, per pt report. Tolerating clear liquid diet.

## 2022-04-11 NOTE — CM/SW NOTE
04/11/22 1200   CM/DEB Referral Data   Referral Source    Reason for Referral Discharge planning   Informant Patient   Readmission Assessment   Factors that patient feels contributed to this readmission Acute/Chronic Clinical Presentation   Pt's living situation prior to admission? Home with family   Pt's level of independence at discharge? No assist/independent (minimal)   Pt. received education on diagnoses at time of discharge? Yes   Did you know who and how to call someone if you felt worse? Yes   Did any new symptoms or issues develop after you were discharged? No   Were medications taken as indicated on discharge instructions? Yes   Did you have a follow-up appointment scheduled at time of discharge? No   Was full assessment completed by DEB/AMY on prior admission? Yes   Was the recommended discharge plan achieved? Yes   Pertinent Medical Hx   Does patient have an established PCP? Yes  Torres Slaughter   Patient Info   Patient's Current Mental Status at Time of Assessment Alert;Oriented   Patient's [address]   Patient lives with Spouse/Significant other   Patient Status Prior to Admission   Independent with ADLs and Mobility Yes   Discharge Needs   Anticipated D/C needs No anticipated discharge needs     Pt discussed during nursing rounds. Dx acute pancreatitis. From home w/spouse, independent and active prior to dx. No home care needs anticipated on dc. Plan: Home w/spouse pending medical clearance. / to remain available for support and/or discharge planning.      MASHA Daniel    [phone]

## 2022-04-11 NOTE — PLAN OF CARE
Tolerating diet. Discharge education and IV removed by this RN. Patient understands follow up instructions. All questions answered and belongings gathered. Discharged to home. Problem: Patient Centered Care  Goal: Patient preferences are identified and integrated in the patient's plan of care  Description: Interventions:  - What would you like us to know as we care for you?  I live with my wife  - Provide timely, complete, and accurate information to patient/family  - Incorporate patient and family knowledge, values, beliefs, and cultural backgrounds into the planning and delivery of care  - Encourage patient/family to participate in care and decision-making at the level they choose  - Honor patient and family perspectives and choices  Outcome: Adequate for Discharge     Problem: PAIN - ADULT  Goal: Verbalizes/displays adequate comfort level or patient's stated pain goal  Description: INTERVENTIONS:  - Encourage pt to monitor pain and request assistance  - Assess pain using appropriate pain scale  - Administer analgesics based on type and severity of pain and evaluate response  - Implement non-pharmacological measures as appropriate and evaluate response  - Consider cultural and social influences on pain and pain management  - Manage/alleviate anxiety  - Utilize distraction and/or relaxation techniques  - Monitor for opioid side effects  - Notify MD/LIP if interventions unsuccessful or patient reports new pain  - Anticipate increased pain with activity and pre-medicate as appropriate  Outcome: Adequate for Discharge     Problem: RISK FOR INFECTION - ADULT  Goal: Absence of fever/infection during anticipated neutropenic period  Description: INTERVENTIONS  - Monitor WBC  - Administer growth factors as ordered  - Implement neutropenic guidelines  Outcome: Adequate for Discharge     Problem: SAFETY ADULT - FALL  Goal: Free from fall injury  Description: INTERVENTIONS:  - Assess pt frequently for physical needs  - Identify cognitive and physical deficits and behaviors that affect risk of falls.   - [place] fall precautions as indicated by assessment.  - Educate pt/family on patient safety including physical limitations  - Instruct pt to call for assistance with activity based on assessment  - Modify environment to reduce risk of injury  - Provide assistive devices as appropriate  - Consider OT/PT consult to assist with strengthening/mobility  - Encourage toileting schedule  Outcome: Adequate for Discharge     Problem: DISCHARGE PLANNING  Goal: Discharge to home or other facility with appropriate resources  Description: INTERVENTIONS:  - Identify barriers to discharge w/pt and caregiver  - Include patient/family/discharge partner in discharge planning  - Arrange for needed discharge resources and transportation as appropriate  - Identify discharge learning needs (meds, wound care, etc)  - Arrange for interpreters to assist at discharge as needed  - Consider post-discharge preferences of patient/family/discharge partner  - Complete POLST form as appropriate  - Assess patient's ability to be responsible for managing their own health  - Refer to Case Management Department for coordinating discharge planning if the patient needs post-hospital services based on physician/LIP order or complex needs related to functional status, cognitive ability or social support system  Outcome: Adequate for Discharge     Problem: COPING  Goal: Pt/Family able to verbalize concerns and demonstrate effective coping strategies  Description: INTERVENTIONS:  - Assist patient/family to identify coping skills, available support systems and cultural and spiritual values  - Provide emotional support, including active listening and acknowledgement of concerns of patient and caregivers  - Reduce environmental stimuli, as able  - Instruct patient/family in relaxation techniques, as appropriate  - Assess for spiritual and psychosocial needs and initiate Spiritual Care or Behavioral Health consult as needed  Outcome: Adequate for Discharge     Problem: CARDIOVASCULAR - ADULT  Goal: Maintains optimal cardiac output and hemodynamic stability  Description: INTERVENTIONS:  - Monitor vital signs, rhythm, and trends  - Monitor for bleeding, hypotension and signs of decreased cardiac output  - Evaluate effectiveness of vasoactive medications to optimize hemodynamic stability  - Monitor arterial and/or venous puncture sites for bleeding and/or hematoma  - Assess quality of pulses, skin color and temperature  - Assess for signs of decreased coronary artery perfusion - ex.  Angina  - Evaluate fluid balance, assess for edema, trend weights  Outcome: Adequate for Discharge  Goal: Absence of cardiac arrhythmias or at baseline  Description: INTERVENTIONS:  - Continuous cardiac monitoring, monitor vital signs, obtain 12 lead EKG if indicated  - Evaluate effectiveness of antiarrhythmic and heart rate control medications as ordered  - Initiate emergency measures for life threatening arrhythmias  - Monitor electrolytes and administer replacement therapy as ordered  Outcome: Adequate for Discharge

## 2022-04-11 NOTE — CM/SW NOTE
04/11/22 1300   Discharge disposition   Expected discharge disposition Home or Self   Discharge transportation Private car     Pt is stable for dc today. MD dc order entered. No home care needs identified on dc. Pt's spouse will provide transport at dc. Plan: Home w/spouse today. / to remain available for support and/or discharge planning.      MASHA Gordon    [phone]

## 2022-04-12 ENCOUNTER — PATIENT OUTREACH (OUTPATIENT)
Dept: CASE MANAGEMENT | Age: 47
End: 2022-04-12

## 2022-04-12 PROCEDURE — 1111F DSCHRG MED/CURRENT MED MERGE: CPT

## 2022-04-13 RX ORDER — NICOTINE 21 MG/24HR
1 PATCH, TRANSDERMAL 24 HOURS TRANSDERMAL DAILY
Refills: 0 | Status: CANCELLED | OUTPATIENT
Start: 2022-04-13

## 2022-04-14 ENCOUNTER — OFFICE VISIT (OUTPATIENT)
Dept: INTERNAL MEDICINE CLINIC | Facility: CLINIC | Age: 47
End: 2022-04-14
Payer: COMMERCIAL

## 2022-04-14 VITALS
TEMPERATURE: 99 F | HEIGHT: 75 IN | HEART RATE: 108 BPM | BODY MASS INDEX: 22.55 KG/M2 | DIASTOLIC BLOOD PRESSURE: 64 MMHG | OXYGEN SATURATION: 99 % | SYSTOLIC BLOOD PRESSURE: 118 MMHG | WEIGHT: 181.38 LBS

## 2022-04-14 DIAGNOSIS — I10 ESSENTIAL HYPERTENSION: ICD-10-CM

## 2022-04-14 DIAGNOSIS — K85.90 ACUTE ON CHRONIC PANCREATITIS (HCC): Primary | ICD-10-CM

## 2022-04-14 DIAGNOSIS — K86.1 ACUTE ON CHRONIC PANCREATITIS (HCC): Primary | ICD-10-CM

## 2022-04-14 DIAGNOSIS — K86.3 PANCREATIC PSEUDOCYST: ICD-10-CM

## 2022-04-14 DIAGNOSIS — Z12.11 COLON CANCER SCREENING: ICD-10-CM

## 2022-04-14 PROCEDURE — 3074F SYST BP LT 130 MM HG: CPT | Performed by: INTERNAL MEDICINE

## 2022-04-14 PROCEDURE — 3078F DIAST BP <80 MM HG: CPT | Performed by: INTERNAL MEDICINE

## 2022-04-14 PROCEDURE — 3008F BODY MASS INDEX DOCD: CPT | Performed by: INTERNAL MEDICINE

## 2022-04-14 PROCEDURE — 99495 TRANSJ CARE MGMT MOD F2F 14D: CPT | Performed by: INTERNAL MEDICINE

## 2022-04-18 RX ORDER — HYDROCODONE BITARTRATE AND ACETAMINOPHEN 10; 325 MG/1; MG/1
1 TABLET ORAL EVERY 8 HOURS PRN
Qty: 15 TABLET | Refills: 0 | OUTPATIENT
Start: 2022-04-18

## 2022-05-06 RX ORDER — HYDROCODONE BITARTRATE AND ACETAMINOPHEN 10; 325 MG/1; MG/1
1 TABLET ORAL EVERY 8 HOURS PRN
Qty: 15 TABLET | Refills: 0 | Status: SHIPPED | OUTPATIENT
Start: 2022-05-06

## 2022-05-06 NOTE — TELEPHONE ENCOUNTER
Patient calling ( identified name and  ) requesting  refill for Norco      Medication pended to run thru Protocol

## 2022-05-12 RX ORDER — PANTOPRAZOLE SODIUM 40 MG/1
40 TABLET, DELAYED RELEASE ORAL
Qty: 90 TABLET | Refills: 1 | Status: SHIPPED | OUTPATIENT
Start: 2022-05-12

## 2022-05-12 NOTE — TELEPHONE ENCOUNTER
[facility].   Requested Prescriptions   Pending Prescriptions Disp Refills    PANTOPRAZOLE 40 MG Oral Tab EC [Pharmacy Med Name: Pantoprazole Sodium 40 MG Oral Tablet Delayed Release] 90 tablet 3     Sig: TAKE 1 TABLET BY MOUTH  BEFORE BREAKFAST        Gastrointestional Medication Protocol Passed - 5/11/2022 10:54 PM        Passed - Appointment in past 12 or next 3 months            Recent Outpatient Visits              4 weeks ago Acute on chronic pancreatitis [facility])    [facility], Jesi Espinoza MD    Office Visit    3 months ago Acute on chronic pancreatitis [facility])    [facility], Jesi Espinoza MD    Office Visit    3 months ago Gross hematuria    [facility], [address], Jo Ann Cruz, PA-C    Office Visit    4 months ago Acute on chronic pancreatitis [facility])    [facility], Jesi Espinoza MD    Office Visit    5 months ago Chronic pancreatitis, unspecified pancreatitis type [facility])    Tavon Geronimo MD    Office Visit          Future Appointments         Provider Department Appt Notes    In 1 week CARLITOS VALLE. Hector Mauro 57 GI PROCEDURE Roel @ [id number]

## 2022-05-19 ENCOUNTER — TELEPHONE (OUTPATIENT)
Dept: GASTROENTEROLOGY | Facility: CLINIC | Age: 47
End: 2022-05-19

## 2022-05-19 NOTE — TELEPHONE ENCOUNTER
pts wife has several questions about pts upcoming proc on 5/25/2022., and she also needs to get instructions, and needs arrival time. Please send instructions to the pts My chart, but wife will still like to speak to the nurse as she has several questions.

## 2022-05-25 ENCOUNTER — SURGERY CENTER DOCUMENTATION (OUTPATIENT)
Dept: SURGERY | Age: 47
End: 2022-05-25

## 2022-05-25 ENCOUNTER — LAB REQUISITION (OUTPATIENT)
Dept: SURGERY | Age: 47
End: 2022-05-25
Payer: COMMERCIAL

## 2022-05-25 DIAGNOSIS — Z12.11 SCREENING FOR COLON CANCER: ICD-10-CM

## 2022-05-25 DIAGNOSIS — Z12.12 ENCOUNTER FOR COLORECTAL CANCER SCREENING: ICD-10-CM

## 2022-05-25 DIAGNOSIS — Z12.11 ENCOUNTER FOR COLORECTAL CANCER SCREENING: ICD-10-CM

## 2022-05-25 PROCEDURE — 88305 TISSUE EXAM BY PATHOLOGIST: CPT | Performed by: INTERNAL MEDICINE

## 2022-05-25 PROCEDURE — 45385 COLONOSCOPY W/LESION REMOVAL: CPT | Performed by: INTERNAL MEDICINE

## 2022-05-26 ENCOUNTER — TELEPHONE (OUTPATIENT)
Dept: GASTROENTEROLOGY | Facility: CLINIC | Age: 47
End: 2022-05-26

## 2022-05-26 RX ORDER — LISINOPRIL 40 MG/1
40 TABLET ORAL DAILY
Qty: 90 TABLET | Refills: 1 | Status: SHIPPED | OUTPATIENT
Start: 2022-05-26

## 2022-05-26 NOTE — TELEPHONE ENCOUNTER
Contacted patient and verified . I reviewed result note with patient. He verbalized understanding and is ok with proceeding to schedule cln. GI Schedulers--    Please see orders below to schedule patient. Please review his adjusted prep instructions. Thank you!

## 2022-05-26 NOTE — TELEPHONE ENCOUNTER
Refill passed per [address] protocol.    Requested Prescriptions   Pending Prescriptions Disp Refills    LISINOPRIL 40 MG Oral Tab [Pharmacy Med Name: Lisinopril 40 MG Oral Tablet] 90 tablet 3     Sig: TAKE 1 TABLET BY MOUTH  DAILY        Hypertensive Medications Protocol Passed - 5/25/2022 11:04 PM        Passed - CMP or BMP in past 12 months        Passed - Appointment in past 6 or next 3 months        Passed - GFR  > 50     Lab Results   Component Value Date    GFRAA 122 04/11/2022                      Recent Outpatient Visits              1 month ago Acute on chronic pancreatitis [facility])    [address], King Saldivar MD    Office Visit    4 months ago Acute on chronic pancreatitis [facility])    [address], King Saldivar MD    Office Visit    4 months ago Gross hematuria    [address], [address] Geraldo Lobe., PA-C    Office Visit    4 months ago Acute on chronic pancreatitis [facility])    [address], King Saldivar MD    Office Visit    5 months ago Chronic pancreatitis, unspecified pancreatitis type [facility])    Elsa Garzon MD    Office Visit

## 2022-05-26 NOTE — TELEPHONE ENCOUNTER
GI staff:    Please contact the patient with the following    The polyps removed from his colon was an adenoma which is a benign growth. However, these are the types of polyps that if not removed could become a colon cancer. As I discussed with him and his wife after the procedure yesterday, some of his colon couldn't be seen due to retained stool so other polyps could have been missed. We discussed repeating the procedure which he was ok with. Please work on scheduling colonoscopy at his convenience with [facility] for colorectal cancer screening and history of adenomatous colon polyp at his convenience with bowel prep instruction as follows    Clear liquid diet the entire day prior.  No solids even for breakfast.    4L splite golytely standard  Additional 2L golytely in the morning on the day before the procedure    Thanks    Almas Mendez MD  [facility] - Gastroenterology  5/26/2022  1:48 PM

## 2022-05-27 RX ORDER — AMLODIPINE BESYLATE 10 MG/1
10 TABLET ORAL DAILY
Qty: 30 TABLET | Refills: 0 | Status: SHIPPED | OUTPATIENT
Start: 2022-05-27

## 2022-05-27 RX ORDER — HYDROCODONE BITARTRATE AND ACETAMINOPHEN 10; 325 MG/1; MG/1
1 TABLET ORAL EVERY 8 HOURS PRN
Qty: 15 TABLET | Refills: 0 | Status: SHIPPED | OUTPATIENT
Start: 2022-05-27

## 2022-06-22 RX ORDER — HYDROCODONE BITARTRATE AND ACETAMINOPHEN 10; 325 MG/1; MG/1
1 TABLET ORAL EVERY 8 HOURS PRN
Qty: 15 TABLET | Refills: 0 | Status: SHIPPED | OUTPATIENT
Start: 2022-06-22

## 2022-06-22 NOTE — TELEPHONE ENCOUNTER
I received a call from pt wife that pt needs a refill for his Norco 10/325mg every 8 hrs.  Please advise Medication has been pended for your review and approval.

## 2022-07-05 ENCOUNTER — NURSE TRIAGE (OUTPATIENT)
Dept: INTERNAL MEDICINE CLINIC | Facility: CLINIC | Age: 47
End: 2022-07-05

## 2022-07-06 ENCOUNTER — OFFICE VISIT (OUTPATIENT)
Dept: INTERNAL MEDICINE CLINIC | Facility: CLINIC | Age: 47
End: 2022-07-06
Payer: COMMERCIAL

## 2022-07-06 VITALS
WEIGHT: 185 LBS | DIASTOLIC BLOOD PRESSURE: 84 MMHG | SYSTOLIC BLOOD PRESSURE: 157 MMHG | HEIGHT: 75 IN | BODY MASS INDEX: 23 KG/M2 | HEART RATE: 111 BPM

## 2022-07-06 DIAGNOSIS — R31.9 HEMATURIA, UNSPECIFIED TYPE: Primary | ICD-10-CM

## 2022-07-06 DIAGNOSIS — N40.1 BENIGN PROSTATIC HYPERPLASIA WITH LOWER URINARY TRACT SYMPTOMS, SYMPTOM DETAILS UNSPECIFIED: ICD-10-CM

## 2022-07-06 DIAGNOSIS — G47.00 INSOMNIA, UNSPECIFIED TYPE: ICD-10-CM

## 2022-07-06 LAB
APPEARANCE: CLEAR
BILIRUBIN: NEGATIVE
GLUCOSE (URINE DIPSTICK): NEGATIVE MG/DL
LEUKOCYTES: NEGATIVE
MULTISTIX EXPIRATION DATE: NORMAL DATE
MULTISTIX LOT#: NORMAL NUMERIC
NITRITE, URINE: NEGATIVE
OCCULT BLOOD: NEGATIVE
PH, URINE: 6 (ref 4.5–8)
PROTEIN (URINE DIPSTICK): NEGATIVE MG/DL
SPECIFIC GRAVITY: >=1.03 (ref 1–1.03)
URINE-COLOR: YELLOW
UROBILINOGEN,SEMI-QN: 0.2 MG/DL (ref 0–1.9)

## 2022-07-06 PROCEDURE — 81002 URINALYSIS NONAUTO W/O SCOPE: CPT | Performed by: INTERNAL MEDICINE

## 2022-07-06 PROCEDURE — 3079F DIAST BP 80-89 MM HG: CPT | Performed by: INTERNAL MEDICINE

## 2022-07-06 PROCEDURE — 3077F SYST BP >= 140 MM HG: CPT | Performed by: INTERNAL MEDICINE

## 2022-07-06 PROCEDURE — 3008F BODY MASS INDEX DOCD: CPT | Performed by: INTERNAL MEDICINE

## 2022-07-06 PROCEDURE — 99214 OFFICE O/P EST MOD 30 MIN: CPT | Performed by: INTERNAL MEDICINE

## 2022-07-06 RX ORDER — ZOLPIDEM TARTRATE 5 MG/1
5 TABLET ORAL NIGHTLY PRN
Qty: 20 TABLET | Refills: 0 | Status: SHIPPED | OUTPATIENT
Start: 2022-07-06

## 2022-07-06 RX ORDER — TAMSULOSIN HYDROCHLORIDE 0.4 MG/1
0.4 CAPSULE ORAL DAILY
Qty: 90 CAPSULE | Refills: 3 | Status: SHIPPED | OUTPATIENT
Start: 2022-07-06

## 2022-07-15 DIAGNOSIS — N40.1 BENIGN PROSTATIC HYPERPLASIA WITH LOWER URINARY TRACT SYMPTOMS, SYMPTOM DETAILS UNSPECIFIED: ICD-10-CM

## 2022-07-15 RX ORDER — ONDANSETRON 4 MG/1
4 TABLET, ORALLY DISINTEGRATING ORAL EVERY 4 HOURS PRN
Qty: 10 TABLET | Refills: 0 | Status: SHIPPED | OUTPATIENT
Start: 2022-07-15 | End: 2022-07-22

## 2022-07-15 RX ORDER — HYDROCODONE BITARTRATE AND ACETAMINOPHEN 10; 325 MG/1; MG/1
1 TABLET ORAL EVERY 8 HOURS PRN
Qty: 15 TABLET | Refills: 0 | Status: SHIPPED | OUTPATIENT
Start: 2022-07-15

## 2022-07-15 RX ORDER — PANTOPRAZOLE SODIUM 40 MG/1
40 TABLET, DELAYED RELEASE ORAL
Qty: 90 TABLET | Refills: 1 | Status: SHIPPED | OUTPATIENT
Start: 2022-07-15

## 2022-07-15 RX ORDER — LISINOPRIL 40 MG/1
40 TABLET ORAL DAILY
Qty: 90 TABLET | Refills: 1 | Status: SHIPPED | OUTPATIENT
Start: 2022-07-15

## 2022-07-15 RX ORDER — TAMSULOSIN HYDROCHLORIDE 0.4 MG/1
0.4 CAPSULE ORAL DAILY
Qty: 90 CAPSULE | Refills: 1 | Status: SHIPPED | OUTPATIENT
Start: 2022-07-15

## 2022-07-15 RX ORDER — PANCRELIPASE LIPASE, PANCRELIPASE PROTEASE, PANCRELIPASE AMYLASE 10000; 32000; 42000 [USP'U]/1; [USP'U]/1; [USP'U]/1
10000 CAPSULE, DELAYED RELEASE ORAL
Qty: 270 CAPSULE | Refills: 1 | Status: SHIPPED | OUTPATIENT
Start: 2022-07-15 | End: 2022-08-14

## 2022-07-15 NOTE — TELEPHONE ENCOUNTER
Patient's wife calling (+FABY, name and  of patient verified), states patient is \"out of almost all of his medications\". Wife asking if refills can be sent to [address] [facility], [facility] doesn't know how to use the mail order\"), then for longer-term refills can use [facility] [place] outpatient pharmacy.       Dr. Lenore Terry: meds pended for review/approval thank you  Triage Support: can refills be transferred to wife's requested pharmacies, instead of [facility] mail order? (for lisinopril and pantopazole), seeking guidance thank you

## 2022-08-07 ENCOUNTER — APPOINTMENT (OUTPATIENT)
Dept: GENERAL RADIOLOGY | Facility: HOSPITAL | Age: 47
End: 2022-08-07
Attending: EMERGENCY MEDICINE
Payer: COMMERCIAL

## 2022-08-07 ENCOUNTER — APPOINTMENT (OUTPATIENT)
Dept: CT IMAGING | Facility: HOSPITAL | Age: 47
End: 2022-08-07
Attending: EMERGENCY MEDICINE
Payer: COMMERCIAL

## 2022-08-07 ENCOUNTER — HOSPITAL ENCOUNTER (INPATIENT)
Facility: HOSPITAL | Age: 47
LOS: 2 days | Discharge: HOME | End: 2022-08-09
Attending: EMERGENCY MEDICINE | Admitting: HOSPITALIST
Payer: COMMERCIAL

## 2022-08-07 DIAGNOSIS — K85.90 ACUTE PANCREATITIS, UNSPECIFIED COMPLICATION STATUS, UNSPECIFIED PANCREATITIS TYPE: Primary | ICD-10-CM

## 2022-08-07 LAB
ALBUMIN SERPL-MCNC: 3.7 G/DL (ref 3.4–5)
ALBUMIN/GLOB SERPL: 1.1 {RATIO} (ref 1–2)
ALP LIVER SERPL-CCNC: 67 U/L
ALT SERPL-CCNC: 24 U/L
ANION GAP SERPL CALC-SCNC: 5 MMOL/L (ref 0–18)
AST SERPL-CCNC: 19 U/L (ref 15–37)
BASOPHILS # BLD AUTO: 0.02 X10(3) UL (ref 0–0.2)
BASOPHILS NFR BLD AUTO: 0.3 %
BILIRUB SERPL-MCNC: 0.4 MG/DL (ref 0.1–2)
BUN BLD-MCNC: 13 MG/DL (ref 7–18)
CALCIUM BLD-MCNC: 9 MG/DL (ref 8.5–10.1)
CHLORIDE SERPL-SCNC: 107 MMOL/L (ref 98–112)
CO2 SERPL-SCNC: 27 MMOL/L (ref 21–32)
CREAT BLD-MCNC: 1.01 MG/DL
D DIMER PPP FEU-MCNC: <0.27 UG/ML FEU (ref ?–0.5)
EOSINOPHIL # BLD AUTO: 0.07 X10(3) UL (ref 0–0.7)
EOSINOPHIL NFR BLD AUTO: 1 %
ERYTHROCYTE [DISTWIDTH] IN BLOOD BY AUTOMATED COUNT: 15.1 %
GFR SERPLBLD BASED ON 1.73 SQ M-ARVRAT: 92 ML/MIN/1.73M2 (ref 60–?)
GLOBULIN PLAS-MCNC: 3.3 G/DL (ref 2.8–4.4)
GLUCOSE BLD-MCNC: 95 MG/DL (ref 70–99)
HCT VFR BLD AUTO: 41.9 %
HGB BLD-MCNC: 13.8 G/DL
IMM GRANULOCYTES # BLD AUTO: 0.01 X10(3) UL (ref 0–1)
IMM GRANULOCYTES NFR BLD: 0.1 %
LIPASE SERPL-CCNC: 145 U/L (ref 73–393)
LYMPHOCYTES # BLD AUTO: 1.97 X10(3) UL (ref 1–4)
LYMPHOCYTES NFR BLD AUTO: 29 %
MCH RBC QN AUTO: 33.4 PG (ref 26–34)
MCHC RBC AUTO-ENTMCNC: 32.9 G/DL (ref 31–37)
MCV RBC AUTO: 101.5 FL
MONOCYTES # BLD AUTO: 0.43 X10(3) UL (ref 0.1–1)
MONOCYTES NFR BLD AUTO: 6.3 %
NEUTROPHILS # BLD AUTO: 4.3 X10 (3) UL (ref 1.5–7.7)
NEUTROPHILS # BLD AUTO: 4.3 X10(3) UL (ref 1.5–7.7)
NEUTROPHILS NFR BLD AUTO: 63.3 %
OSMOLALITY SERPL CALC.SUM OF ELEC: 288 MOSM/KG (ref 275–295)
PLATELET # BLD AUTO: 368 10(3)UL (ref 150–450)
POTASSIUM SERPL-SCNC: 4.2 MMOL/L (ref 3.5–5.1)
PROT SERPL-MCNC: 7 G/DL (ref 6.4–8.2)
RBC # BLD AUTO: 4.13 X10(6)UL
SARS-COV-2 RNA RESP QL NAA+PROBE: NOT DETECTED
SODIUM SERPL-SCNC: 139 MMOL/L (ref 136–145)
TROPONIN I HIGH SENSITIVITY: 8 NG/L
TROPONIN I HIGH SENSITIVITY: 9 NG/L
WBC # BLD AUTO: 6.8 X10(3) UL (ref 4–11)

## 2022-08-07 PROCEDURE — 71275 CT ANGIOGRAPHY CHEST: CPT | Performed by: EMERGENCY MEDICINE

## 2022-08-07 PROCEDURE — 99222 1ST HOSP IP/OBS MODERATE 55: CPT | Performed by: STUDENT IN AN ORGANIZED HEALTH CARE EDUCATION/TRAINING PROGRAM

## 2022-08-07 PROCEDURE — 71045 X-RAY EXAM CHEST 1 VIEW: CPT | Performed by: EMERGENCY MEDICINE

## 2022-08-07 PROCEDURE — 74177 CT ABD & PELVIS W/CONTRAST: CPT | Performed by: EMERGENCY MEDICINE

## 2022-08-07 RX ORDER — ECHINACEA PURPUREA EXTRACT 125 MG
1 TABLET ORAL
Status: DISCONTINUED | OUTPATIENT
Start: 2022-08-07 | End: 2022-08-09

## 2022-08-07 RX ORDER — KETOROLAC TROMETHAMINE 15 MG/ML
15 INJECTION, SOLUTION INTRAMUSCULAR; INTRAVENOUS ONCE
Status: COMPLETED | OUTPATIENT
Start: 2022-08-07 | End: 2022-08-07

## 2022-08-07 RX ORDER — POLYETHYLENE GLYCOL 3350 17 G/17G
17 POWDER, FOR SOLUTION ORAL DAILY PRN
Status: DISCONTINUED | OUTPATIENT
Start: 2022-08-07 | End: 2022-08-09

## 2022-08-07 RX ORDER — SODIUM CHLORIDE 9 MG/ML
INJECTION, SOLUTION INTRAVENOUS CONTINUOUS
Status: CANCELLED | OUTPATIENT
Start: 2022-08-07 | End: 2022-08-07

## 2022-08-07 RX ORDER — ASPIRIN 81 MG/1
324 TABLET, CHEWABLE ORAL ONCE
Status: DISCONTINUED | OUTPATIENT
Start: 2022-08-07 | End: 2022-08-07

## 2022-08-07 RX ORDER — LABETALOL HYDROCHLORIDE 5 MG/ML
20 INJECTION, SOLUTION INTRAVENOUS ONCE
Status: COMPLETED | OUTPATIENT
Start: 2022-08-07 | End: 2022-08-07

## 2022-08-07 RX ORDER — ACETAMINOPHEN 500 MG
500 TABLET ORAL EVERY 4 HOURS PRN
Status: DISCONTINUED | OUTPATIENT
Start: 2022-08-07 | End: 2022-08-09

## 2022-08-07 RX ORDER — BISACODYL 10 MG
10 SUPPOSITORY, RECTAL RECTAL
Status: DISCONTINUED | OUTPATIENT
Start: 2022-08-07 | End: 2022-08-09

## 2022-08-07 RX ORDER — MORPHINE SULFATE 4 MG/ML
4 INJECTION, SOLUTION INTRAMUSCULAR; INTRAVENOUS EVERY 2 HOUR PRN
Status: DISCONTINUED | OUTPATIENT
Start: 2022-08-07 | End: 2022-08-07

## 2022-08-07 RX ORDER — SENNOSIDES 8.6 MG
17.2 TABLET ORAL NIGHTLY PRN
Status: DISCONTINUED | OUTPATIENT
Start: 2022-08-07 | End: 2022-08-09

## 2022-08-07 RX ORDER — MORPHINE SULFATE 2 MG/ML
1 INJECTION, SOLUTION INTRAMUSCULAR; INTRAVENOUS EVERY 2 HOUR PRN
Status: DISCONTINUED | OUTPATIENT
Start: 2022-08-07 | End: 2022-08-07

## 2022-08-07 RX ORDER — HYDROMORPHONE HYDROCHLORIDE 1 MG/ML
0.8 INJECTION, SOLUTION INTRAMUSCULAR; INTRAVENOUS; SUBCUTANEOUS
Status: DISCONTINUED | OUTPATIENT
Start: 2022-08-07 | End: 2022-08-09

## 2022-08-07 RX ORDER — HYDROMORPHONE HYDROCHLORIDE 1 MG/ML
1 INJECTION, SOLUTION INTRAMUSCULAR; INTRAVENOUS; SUBCUTANEOUS ONCE
Status: COMPLETED | OUTPATIENT
Start: 2022-08-07 | End: 2022-08-07

## 2022-08-07 RX ORDER — HYDROMORPHONE HYDROCHLORIDE 1 MG/ML
0.4 INJECTION, SOLUTION INTRAMUSCULAR; INTRAVENOUS; SUBCUTANEOUS
Status: DISCONTINUED | OUTPATIENT
Start: 2022-08-07 | End: 2022-08-09

## 2022-08-07 RX ORDER — MELATONIN
3 NIGHTLY PRN
Status: DISCONTINUED | OUTPATIENT
Start: 2022-08-07 | End: 2022-08-09

## 2022-08-07 RX ORDER — ONDANSETRON 2 MG/ML
4 INJECTION INTRAMUSCULAR; INTRAVENOUS ONCE
Status: COMPLETED | OUTPATIENT
Start: 2022-08-07 | End: 2022-08-07

## 2022-08-07 RX ORDER — TAMSULOSIN HYDROCHLORIDE 0.4 MG/1
0.4 CAPSULE ORAL DAILY
Status: DISCONTINUED | OUTPATIENT
Start: 2022-08-08 | End: 2022-08-09

## 2022-08-07 RX ORDER — SODIUM CHLORIDE, SODIUM LACTATE, POTASSIUM CHLORIDE, CALCIUM CHLORIDE 600; 310; 30; 20 MG/100ML; MG/100ML; MG/100ML; MG/100ML
INJECTION, SOLUTION INTRAVENOUS CONTINUOUS
Status: DISCONTINUED | OUTPATIENT
Start: 2022-08-07 | End: 2022-08-09

## 2022-08-07 RX ORDER — SODIUM PHOSPHATE, DIBASIC AND SODIUM PHOSPHATE, MONOBASIC 7; 19 G/133ML; G/133ML
1 ENEMA RECTAL ONCE AS NEEDED
Status: DISCONTINUED | OUTPATIENT
Start: 2022-08-07 | End: 2022-08-09

## 2022-08-07 RX ORDER — LISINOPRIL 40 MG/1
40 TABLET ORAL DAILY
Status: DISCONTINUED | OUTPATIENT
Start: 2022-08-07 | End: 2022-08-09

## 2022-08-07 RX ORDER — HYDROMORPHONE HYDROCHLORIDE 1 MG/ML
0.5 INJECTION, SOLUTION INTRAMUSCULAR; INTRAVENOUS; SUBCUTANEOUS EVERY 30 MIN PRN
Status: CANCELLED | OUTPATIENT
Start: 2022-08-07 | End: 2022-08-07

## 2022-08-07 RX ORDER — ENOXAPARIN SODIUM 100 MG/ML
40 INJECTION SUBCUTANEOUS DAILY
Status: DISCONTINUED | OUTPATIENT
Start: 2022-08-08 | End: 2022-08-09

## 2022-08-07 RX ORDER — ONDANSETRON 2 MG/ML
4 INJECTION INTRAMUSCULAR; INTRAVENOUS EVERY 6 HOURS PRN
Status: DISCONTINUED | OUTPATIENT
Start: 2022-08-07 | End: 2022-08-09

## 2022-08-07 RX ORDER — HYDROMORPHONE HYDROCHLORIDE 1 MG/ML
0.5 INJECTION, SOLUTION INTRAMUSCULAR; INTRAVENOUS; SUBCUTANEOUS ONCE
Status: COMPLETED | OUTPATIENT
Start: 2022-08-07 | End: 2022-08-07

## 2022-08-07 RX ORDER — NICOTINE 21 MG/24HR
1 PATCH, TRANSDERMAL 24 HOURS TRANSDERMAL DAILY
Status: DISCONTINUED | OUTPATIENT
Start: 2022-08-07 | End: 2022-08-09

## 2022-08-07 RX ORDER — PROCHLORPERAZINE EDISYLATE 5 MG/ML
5 INJECTION INTRAMUSCULAR; INTRAVENOUS EVERY 8 HOURS PRN
Status: DISCONTINUED | OUTPATIENT
Start: 2022-08-07 | End: 2022-08-09

## 2022-08-07 RX ORDER — HYDROMORPHONE HYDROCHLORIDE 1 MG/ML
0.2 INJECTION, SOLUTION INTRAMUSCULAR; INTRAVENOUS; SUBCUTANEOUS
Status: DISCONTINUED | OUTPATIENT
Start: 2022-08-07 | End: 2022-08-09

## 2022-08-07 RX ORDER — IOHEXOL 350 MG/ML
100 INJECTION, SOLUTION INTRAVENOUS
Status: COMPLETED | OUTPATIENT
Start: 2022-08-07 | End: 2022-08-07

## 2022-08-07 RX ORDER — MORPHINE SULFATE 2 MG/ML
2 INJECTION, SOLUTION INTRAMUSCULAR; INTRAVENOUS EVERY 2 HOUR PRN
Status: DISCONTINUED | OUTPATIENT
Start: 2022-08-07 | End: 2022-08-07

## 2022-08-07 NOTE — ED INITIAL ASSESSMENT (HPI)
Patient complaining of left sided stabbing chest pain that began this morning. Patient also complaining of weakness and difficulty breathing. Patient complaining of nausea, denies V/D.

## 2022-08-08 LAB
ALBUMIN SERPL-MCNC: 3.1 G/DL (ref 3.4–5)
ALBUMIN/GLOB SERPL: 1 {RATIO} (ref 1–2)
ALP LIVER SERPL-CCNC: 64 U/L
ALT SERPL-CCNC: 20 U/L
ANION GAP SERPL CALC-SCNC: 5 MMOL/L (ref 0–18)
AST SERPL-CCNC: 10 U/L (ref 15–37)
ATRIAL RATE: 79 BPM
BASOPHILS # BLD AUTO: 0.02 X10(3) UL (ref 0–0.2)
BASOPHILS NFR BLD AUTO: 0.4 %
BILIRUB SERPL-MCNC: 0.5 MG/DL (ref 0.1–2)
BUN BLD-MCNC: 10 MG/DL (ref 7–18)
CALCIUM BLD-MCNC: 8.7 MG/DL (ref 8.5–10.1)
CHLORIDE SERPL-SCNC: 105 MMOL/L (ref 98–112)
CO2 SERPL-SCNC: 27 MMOL/L (ref 21–32)
CREAT BLD-MCNC: 0.88 MG/DL
EOSINOPHIL # BLD AUTO: 0.07 X10(3) UL (ref 0–0.7)
EOSINOPHIL NFR BLD AUTO: 1.3 %
ERYTHROCYTE [DISTWIDTH] IN BLOOD BY AUTOMATED COUNT: 14.7 %
GFR SERPLBLD BASED ON 1.73 SQ M-ARVRAT: 107 ML/MIN/1.73M2 (ref 60–?)
GLOBULIN PLAS-MCNC: 3.1 G/DL (ref 2.8–4.4)
GLUCOSE BLD-MCNC: 107 MG/DL (ref 70–99)
HCT VFR BLD AUTO: 39.1 %
HGB BLD-MCNC: 13.1 G/DL
IMM GRANULOCYTES # BLD AUTO: 0.01 X10(3) UL (ref 0–1)
IMM GRANULOCYTES NFR BLD: 0.2 %
LYMPHOCYTES # BLD AUTO: 1.61 X10(3) UL (ref 1–4)
LYMPHOCYTES NFR BLD AUTO: 29.5 %
MAGNESIUM SERPL-MCNC: 1.9 MG/DL (ref 1.6–2.6)
MCH RBC QN AUTO: 33.5 PG (ref 26–34)
MCHC RBC AUTO-ENTMCNC: 33.5 G/DL (ref 31–37)
MCV RBC AUTO: 100 FL
MONOCYTES # BLD AUTO: 0.37 X10(3) UL (ref 0.1–1)
MONOCYTES NFR BLD AUTO: 6.8 %
NEUTROPHILS # BLD AUTO: 3.38 X10 (3) UL (ref 1.5–7.7)
NEUTROPHILS # BLD AUTO: 3.38 X10(3) UL (ref 1.5–7.7)
NEUTROPHILS NFR BLD AUTO: 61.8 %
OSMOLALITY SERPL CALC.SUM OF ELEC: 284 MOSM/KG (ref 275–295)
P AXIS: 72 DEGREES
P-R INTERVAL: 114 MS
PLATELET # BLD AUTO: 330 10(3)UL (ref 150–450)
POTASSIUM SERPL-SCNC: 3.5 MMOL/L (ref 3.5–5.1)
PROT SERPL-MCNC: 6.2 G/DL (ref 6.4–8.2)
Q-T INTERVAL: 352 MS
QRS DURATION: 84 MS
QTC CALCULATION (BEZET): 403 MS
R AXIS: 59 DEGREES
RBC # BLD AUTO: 3.91 X10(6)UL
SODIUM SERPL-SCNC: 137 MMOL/L (ref 136–145)
T AXIS: 60 DEGREES
VENTRICULAR RATE: 79 BPM
WBC # BLD AUTO: 5.5 X10(3) UL (ref 4–11)

## 2022-08-08 PROCEDURE — 99232 SBSQ HOSP IP/OBS MODERATE 35: CPT | Performed by: HOSPITALIST

## 2022-08-08 NOTE — ED QUICK NOTES
Orders for admission, patient is aware of plan and ready to go upstairs. Any questions, please call ED RN Shannon Barrow at extension [phone].      Patient Covid vaccination status: Fully vaccinated     COVID Test Ordered in ED: Rapid SARS-CoV-2 by PCR    COVID Suspicion at Admission: N/A    Running Infusions:  None    Mental Status/LOC at time of transport: AOx4    Other pertinent information:   CIWA score: N/A   NIH score:  N/A

## 2022-08-08 NOTE — PLAN OF CARE
A/Ox4. Very pleasant and cooperative. On RA. No tele. C/o 4/10 abd pain. Dilaudid PRN. IVF. GI following. Ok to start clears this AM.  Up ad adarsh. Staff will cont to monitor.     Problem: Patient/Family Goals  Goal: Patient/Family Long Term Goal  Description: Patient's Long Term Goal: Go home    Interventions:  - IV fluids  - Pain control  - GI consult  - See additional Care Plan goals for specific interventions  Outcome: Progressing  Goal: Patient/Family Short Term Goal  Description: Patient's Short Term Goal: Feel better    Interventions:   - IV fluids  - Pain medicine  - Bowel rest   - See additional Care Plan goals for specific interventions  Outcome: Progressing     Problem: PAIN - ADULT  Goal: Verbalizes/displays adequate comfort level or patient's stated pain goal  Description: INTERVENTIONS:  - Encourage pt to monitor pain and request assistance  - Assess pain using appropriate pain scale  - Administer analgesics based on type and severity of pain and evaluate response  - Implement non-pharmacological measures as appropriate and evaluate response  - Consider cultural and social influences on pain and pain management  - Manage/alleviate anxiety  - Utilize distraction and/or relaxation techniques  - Monitor for opioid side effects  - Notify MD/LIP if interventions unsuccessful or patient reports new pain  - Anticipate increased pain with activity and pre-medicate as appropriate  Outcome: Progressing

## 2022-08-08 NOTE — PLAN OF CARE
Pt. A&O x4, on RA, , no tele. VSS. LR infusing at 125/hr. PRN pain medication given. Up with standby. Watching for withdrawal, pt. Drinks 1 pint of alcohol/week. Fall and safety precautions in place. Will continue to monitor.      Problem: Patient/Family Goals  Goal: Patient/Family Long Term Goal  Description: Patient's Long Term Goal: Go home    Interventions:  - IV fluids  - Pain control  - GI consult  - See additional Care Plan goals for specific interventions  Outcome: Progressing  Goal: Patient/Family Short Term Goal  Description: Patient's Short Term Goal: Feel better    Interventions:   - IV fluids  - Pain medicine  - Bowel rest   - See additional Care Plan goals for specific interventions  Outcome: Progressing     Problem: PAIN - ADULT  Goal: Verbalizes/displays adequate comfort level or patient's stated pain goal  Description: INTERVENTIONS:  - Encourage pt to monitor pain and request assistance  - Assess pain using appropriate pain scale  - Administer analgesics based on type and severity of pain and evaluate response  - Implement non-pharmacological measures as appropriate and evaluate response  - Consider cultural and social influences on pain and pain management  - Manage/alleviate anxiety  - Utilize distraction and/or relaxation techniques  - Monitor for opioid side effects  - Notify MD/LIP if interventions unsuccessful or patient reports new pain  - Anticipate increased pain with activity and pre-medicate as appropriate  Outcome: Progressing

## 2022-08-09 VITALS
OXYGEN SATURATION: 100 % | HEIGHT: 75 IN | WEIGHT: 185 LBS | TEMPERATURE: 98 F | DIASTOLIC BLOOD PRESSURE: 84 MMHG | BODY MASS INDEX: 23 KG/M2 | HEART RATE: 71 BPM | RESPIRATION RATE: 18 BRPM | SYSTOLIC BLOOD PRESSURE: 176 MMHG

## 2022-08-09 PROCEDURE — 99239 HOSP IP/OBS DSCHRG MGMT >30: CPT | Performed by: HOSPITALIST

## 2022-08-09 RX ORDER — HYDROCODONE BITARTRATE AND ACETAMINOPHEN 5; 325 MG/1; MG/1
1 TABLET ORAL EVERY 4 HOURS PRN
Status: DISCONTINUED | OUTPATIENT
Start: 2022-08-09 | End: 2022-08-09

## 2022-08-09 RX ORDER — HYDROCODONE BITARTRATE AND ACETAMINOPHEN 5; 325 MG/1; MG/1
2 TABLET ORAL EVERY 4 HOURS PRN
Status: DISCONTINUED | OUTPATIENT
Start: 2022-08-09 | End: 2022-08-09

## 2022-08-09 RX ORDER — HYDROCODONE BITARTRATE AND ACETAMINOPHEN 10; 325 MG/1; MG/1
1 TABLET ORAL EVERY 8 HOURS PRN
Qty: 15 TABLET | Refills: 0 | Status: SHIPPED | OUTPATIENT
Start: 2022-08-09

## 2022-08-09 NOTE — PROGRESS NOTES
Pt alert and oriented. Not on tele. RA.  C/o 5/10 pain. 0.4mg IVP Dilaudid given. Denied nausea. CLD. LR @ 125.

## 2022-08-09 NOTE — PLAN OF CARE
Assumed patient care at 7:30. A/O x4. Room air. No tele ordered at this time. Voids in the unrial and bathroom. Clear liquid diet- advance as tolerated. LR infusing in the right AC @ 125. Electrolyte protocol (non cardiac). All safety precaution are and will continue to monitor the patient     -IV and tele was removed before discharge. Went over paperwork with the paper.  Answered all questions and concerns before discharging     Problem: Patient/Family Goals  Goal: Patient/Family Long Term Goal  Description: Patient's Long Term Goal: Go home    Interventions:  - IV fluids  - Pain control  - GI consult  - See additional Care Plan goals for specific interventions  Outcome: Progressing  Goal: Patient/Family Short Term Goal  Description: Patient's Short Term Goal: Feel better    Interventions:   - IV fluids  - Pain medicine  - Bowel rest   - See additional Care Plan goals for specific interventions  Outcome: Progressing

## 2022-08-10 ENCOUNTER — PATIENT OUTREACH (OUTPATIENT)
Dept: CASE MANAGEMENT | Age: 47
End: 2022-08-10

## 2022-08-10 DIAGNOSIS — K86.0 ALCOHOL-INDUCED CHRONIC PANCREATITIS (HCC): ICD-10-CM

## 2022-08-10 DIAGNOSIS — K85.90 ACUTE PANCREATITIS, UNSPECIFIED COMPLICATION STATUS, UNSPECIFIED PANCREATITIS TYPE: ICD-10-CM

## 2022-08-10 DIAGNOSIS — Z02.9 ENCOUNTERS FOR UNSPECIFIED ADMINISTRATIVE PURPOSE: ICD-10-CM

## 2022-08-10 NOTE — PAYOR COMM NOTE
Discharge Notification    Patient Name: Nik Herrera: [facility]  Subscriber #: [id number]  Authorization Number: [id number]  Admit Date/Time: 8/7/2022 4:13 PM  Discharge Date/Time: 8/9/2022 5:56 PM

## 2022-08-11 ENCOUNTER — OFFICE VISIT (OUTPATIENT)
Dept: INTERNAL MEDICINE CLINIC | Facility: CLINIC | Age: 47
End: 2022-08-11
Payer: COMMERCIAL

## 2022-08-11 VITALS
TEMPERATURE: 97 F | HEIGHT: 75 IN | DIASTOLIC BLOOD PRESSURE: 64 MMHG | BODY MASS INDEX: 23 KG/M2 | WEIGHT: 185 LBS | SYSTOLIC BLOOD PRESSURE: 143 MMHG | OXYGEN SATURATION: 100 % | RESPIRATION RATE: 16 BRPM | HEART RATE: 107 BPM

## 2022-08-11 DIAGNOSIS — K86.3 PANCREATIC PSEUDOCYST: ICD-10-CM

## 2022-08-11 DIAGNOSIS — I10 HYPERTENSION, UNSPECIFIED TYPE: ICD-10-CM

## 2022-08-11 DIAGNOSIS — F12.90 MARIJUANA USE: ICD-10-CM

## 2022-08-11 DIAGNOSIS — F10.10 ALCOHOL ABUSE: ICD-10-CM

## 2022-08-11 DIAGNOSIS — K85.90 ACUTE ON CHRONIC PANCREATITIS (HCC): Primary | ICD-10-CM

## 2022-08-11 DIAGNOSIS — F17.200 TOBACCO DEPENDENCE: ICD-10-CM

## 2022-08-11 DIAGNOSIS — K86.1 ACUTE ON CHRONIC PANCREATITIS (HCC): Primary | ICD-10-CM

## 2022-08-11 PROCEDURE — 99495 TRANSJ CARE MGMT MOD F2F 14D: CPT | Performed by: NURSE PRACTITIONER

## 2022-08-11 PROCEDURE — 3008F BODY MASS INDEX DOCD: CPT | Performed by: NURSE PRACTITIONER

## 2022-08-11 PROCEDURE — 3078F DIAST BP <80 MM HG: CPT | Performed by: NURSE PRACTITIONER

## 2022-08-11 PROCEDURE — 3077F SYST BP >= 140 MM HG: CPT | Performed by: NURSE PRACTITIONER

## 2022-08-11 RX ORDER — AMLODIPINE BESYLATE 10 MG/1
10 TABLET ORAL DAILY
Qty: 30 TABLET | Refills: 0 | Status: SHIPPED | OUTPATIENT
Start: 2022-08-11

## 2022-08-11 RX ORDER — AMLODIPINE BESYLATE 10 MG/1
10 TABLET ORAL DAILY
COMMUNITY

## 2022-08-17 PROBLEM — I10 HYPERTENSION: Status: ACTIVE | Noted: 2018-05-16

## 2022-08-17 PROBLEM — F12.90 MARIJUANA USE: Status: ACTIVE | Noted: 2022-08-17

## 2022-08-29 RX ORDER — HYDROCODONE BITARTRATE AND ACETAMINOPHEN 10; 325 MG/1; MG/1
1 TABLET ORAL EVERY 8 HOURS PRN
Qty: 15 TABLET | Refills: 0 | Status: SHIPPED | OUTPATIENT
Start: 2022-08-29

## 2022-09-09 ENCOUNTER — LAB ENCOUNTER (OUTPATIENT)
Dept: LAB | Age: 47
End: 2022-09-09
Attending: INTERNAL MEDICINE
Payer: COMMERCIAL

## 2022-09-09 DIAGNOSIS — N40.1 BENIGN PROSTATIC HYPERPLASIA WITH LOWER URINARY TRACT SYMPTOMS, SYMPTOM DETAILS UNSPECIFIED: ICD-10-CM

## 2022-09-09 DIAGNOSIS — R97.20 ELEVATED PSA: ICD-10-CM

## 2022-09-09 DIAGNOSIS — J06.9 UPPER RESPIRATORY TRACT INFECTION, UNSPECIFIED TYPE: ICD-10-CM

## 2022-09-09 DIAGNOSIS — Z12.5 PROSTATE CANCER SCREENING: ICD-10-CM

## 2022-09-09 LAB
ALBUMIN SERPL-MCNC: 3.5 G/DL (ref 3.4–5)
ALBUMIN/GLOB SERPL: 1.2 {RATIO} (ref 1–2)
ALP LIVER SERPL-CCNC: 75 U/L
ALT SERPL-CCNC: 23 U/L
ANION GAP SERPL CALC-SCNC: 8 MMOL/L (ref 0–18)
AST SERPL-CCNC: 13 U/L (ref 15–37)
BILIRUB SERPL-MCNC: 0.3 MG/DL (ref 0.1–2)
BUN BLD-MCNC: 17 MG/DL (ref 7–18)
BUN/CREAT SERPL: 16.3 (ref 10–20)
CALCIUM BLD-MCNC: 8.7 MG/DL (ref 8.5–10.1)
CHLORIDE SERPL-SCNC: 108 MMOL/L (ref 98–112)
CO2 SERPL-SCNC: 27 MMOL/L (ref 21–32)
CREAT BLD-MCNC: 1.04 MG/DL
FASTING STATUS PATIENT QL REPORTED: NO
GFR SERPLBLD BASED ON 1.73 SQ M-ARVRAT: 89 ML/MIN/1.73M2 (ref 60–?)
GLOBULIN PLAS-MCNC: 3 G/DL (ref 2.8–4.4)
GLUCOSE BLD-MCNC: 126 MG/DL (ref 70–99)
OSMOLALITY SERPL CALC.SUM OF ELEC: 299 MOSM/KG (ref 275–295)
POTASSIUM SERPL-SCNC: 3.8 MMOL/L (ref 3.5–5.1)
PROT SERPL-MCNC: 6.5 G/DL (ref 6.4–8.2)
PSA FREE MFR SERPL: 10 %
PSA FREE SERPL-MCNC: 0.96 NG/ML
PSA SERPL-MCNC: 10 NG/ML (ref ?–4)
SODIUM SERPL-SCNC: 143 MMOL/L (ref 136–145)

## 2022-09-09 PROCEDURE — 84154 ASSAY OF PSA FREE: CPT

## 2022-09-09 PROCEDURE — 80053 COMPREHEN METABOLIC PANEL: CPT

## 2022-09-09 PROCEDURE — 84153 ASSAY OF PSA TOTAL: CPT

## 2022-09-09 PROCEDURE — 36415 COLL VENOUS BLD VENIPUNCTURE: CPT

## 2022-09-10 ENCOUNTER — TELEPHONE (OUTPATIENT)
Dept: INTERNAL MEDICINE CLINIC | Facility: CLINIC | Age: 47
End: 2022-09-10

## 2022-09-10 DIAGNOSIS — R97.20 ELEVATED PSA: Primary | ICD-10-CM

## 2022-09-10 LAB — SARS-COV-2 RNA RESP QL NAA+PROBE: NOT DETECTED

## 2022-10-03 NOTE — TELEPHONE ENCOUNTER
This is the third attempt to reach this pt in regards to scheduling with NO success scheduling for procedure. Letter sent mail and mychart. TE closed.

## 2022-10-04 DIAGNOSIS — I10 HYPERTENSION, UNSPECIFIED TYPE: ICD-10-CM

## 2022-10-04 RX ORDER — HYDROCODONE BITARTRATE AND ACETAMINOPHEN 10; 325 MG/1; MG/1
1 TABLET ORAL EVERY 8 HOURS PRN
Qty: 15 TABLET | Refills: 0 | Status: SHIPPED | OUTPATIENT
Start: 2022-10-04

## 2022-10-04 RX ORDER — AMLODIPINE BESYLATE 10 MG/1
10 TABLET ORAL DAILY
Qty: 90 TABLET | Refills: 1 | Status: SHIPPED | OUTPATIENT
Start: 2022-10-04

## 2022-10-04 NOTE — TELEPHONE ENCOUNTER
Patient's spouse,  (on FABY, verified patient's name and ) was calling for update on refill request. Merissa Seals that it is being processed. She verbalized understanding.

## 2022-10-04 NOTE — TELEPHONE ENCOUNTER
Routed to Dr Shelia Ramachandran for advise, thanks. Last ref for amlodipine on 8-11-22 # 30 by SHERITA Keita        Refill passed per [facility] protocol   Requested Prescriptions   Pending Prescriptions Disp Refills    amLODIPine 10 MG Oral Tab 90 tablet 1     Sig: Take 1 tablet (10 mg total) by mouth daily.         Hypertensive Medications Protocol Passed - 10/4/2022  1:03 PM        Passed - In person appointment in the past 12 or next 3 months       Recent Outpatient Visits              3 weeks ago Alcohol-induced chronic pancreatitis [facility])    [address], Aristeo Grayson MD    Office Visit    1 month ago Acute on chronic pancreatitis [facility])    [facility], SHERITA    Office Visit    3 months ago Hematuria, unspecified type    [address], Glen Yoon MD    Office Visit    5 months ago Acute on chronic pancreatitis [facility])    [address], Aristeo Grayson MD    Office Visit    8 months ago Acute on chronic pancreatitis [facility])    [address], Aristeo Grayson MD    Office Visit                 Passed - Last BP reading less than 140/90     BP Readings from Last 1 Encounters:  09/09/22 : 126/76                Passed - CMP or BMP in past 6 months     Recent Results (from the past 4392 hour(s))   COMP METABOLIC PANEL (14)    Collection Time: 09/09/22 10:51 AM   Result Value Ref Range    Glucose 126 (H) 70 - 99 mg/dL    Sodium 143 136 - 145 mmol/L    Potassium 3.8 3.5 - 5.1 mmol/L    Chloride 108 98 - 112 mmol/L    CO2 27.0 21.0 - 32.0 mmol/L    Anion Gap 8 0 - 18 mmol/L    BUN 17 7 - 18 mg/dL    Creatinine 1.04 0.70 - 1.30 mg/dL    BUN/CREA Ratio 16.3 10.0 - 20.0    Calcium, Total 8.7 8.5 - 10.1 mg/dL    Calculated Osmolality 299 (H) 275 - 295 mOsm/kg    eGFR-Cr 89 >=60 mL/min/1.73m2    ALT 23 16 - 61 U/L    AST 13 (L) 15 - 37 U/L    Alkaline Phosphatase 75 45 - 117 U/L Bilirubin, Total 0.3 0.1 - 2.0 mg/dL    Total Protein 6.5 6.4 - 8.2 g/dL    Albumin 3.5 3.4 - 5.0 g/dL    Globulin  3.0 2.8 - 4.4 g/dL    A/G Ratio 1.2 1.0 - 2.0    Patient Fasting for CMP? No      *Note: Due to a large number of results and/or encounters for the requested time period, some results have not been displayed. A complete set of results can be found in Results Review. Passed - In person appointment or virtual visit in the past 6 months       Recent Outpatient Visits              3 weeks ago Alcohol-induced chronic pancreatitis [facility])    [address], Aristeo Kennedy MD    Office Visit    1 month ago Acute on chronic pancreatitis [facility])    [facility]    Office Visit    3 months ago Hematuria, unspecified type    [address] MD    Office Visit    5 months ago Acute on chronic pancreatitis [facility])    [address] Lendel Magic, MD    Office Visit    8 months ago Acute on chronic pancreatitis [facility])    [address] MD    Office Visit                 Passed - EGFRCR or GFRAA > 50     GFR Evaluation  EGFRCR: 89 , resulted on 9/9/2022               HYDROcodone-acetaminophen  MG Oral Tab 15 tablet 0     Sig: Take 1 tablet by mouth every 8 (eight) hours as needed for Pain.         There is no refill protocol information for this order

## 2022-10-10 ENCOUNTER — TELEPHONE (OUTPATIENT)
Dept: GASTROENTEROLOGY | Facility: CLINIC | Age: 47
End: 2022-10-10

## 2022-10-10 NOTE — TELEPHONE ENCOUNTER
Pt is calling to schedule the procedure there were previous attempts made to contact the pt to schedule with no success, pt is ready to schedule now please call

## 2022-10-26 RX ORDER — PANTOPRAZOLE SODIUM 40 MG/1
40 TABLET, DELAYED RELEASE ORAL
Qty: 90 TABLET | Refills: 1 | Status: SHIPPED | OUTPATIENT
Start: 2022-10-26

## 2022-10-27 ENCOUNTER — HOSPITAL ENCOUNTER (EMERGENCY)
Facility: HOSPITAL | Age: 47
Discharge: HOME | End: 2022-10-27
Attending: EMERGENCY MEDICINE
Payer: COMMERCIAL

## 2022-10-27 ENCOUNTER — APPOINTMENT (OUTPATIENT)
Dept: GENERAL RADIOLOGY | Facility: HOSPITAL | Age: 47
End: 2022-10-27
Payer: COMMERCIAL

## 2022-10-27 VITALS
HEIGHT: 75 IN | DIASTOLIC BLOOD PRESSURE: 90 MMHG | WEIGHT: 187 LBS | OXYGEN SATURATION: 100 % | BODY MASS INDEX: 23.25 KG/M2 | SYSTOLIC BLOOD PRESSURE: 163 MMHG | TEMPERATURE: 98 F | RESPIRATION RATE: 18 BRPM | HEART RATE: 94 BPM

## 2022-10-27 DIAGNOSIS — S61.217A LACERATION OF LEFT LITTLE FINGER WITHOUT FOREIGN BODY WITHOUT DAMAGE TO NAIL, INITIAL ENCOUNTER: ICD-10-CM

## 2022-10-27 DIAGNOSIS — R07.81 PLEURITIC CHEST PAIN: Primary | ICD-10-CM

## 2022-10-27 LAB
ALBUMIN SERPL-MCNC: 3.6 G/DL (ref 3.4–5)
ALBUMIN/GLOB SERPL: 1 {RATIO} (ref 1–2)
ALP LIVER SERPL-CCNC: 67 U/L
ALT SERPL-CCNC: 22 U/L
ANION GAP SERPL CALC-SCNC: 4 MMOL/L (ref 0–18)
AST SERPL-CCNC: 14 U/L (ref 15–37)
ATRIAL RATE: 92 BPM
BASOPHILS # BLD AUTO: 0.03 X10(3) UL (ref 0–0.2)
BASOPHILS NFR BLD AUTO: 0.4 %
BILIRUB SERPL-MCNC: 0.4 MG/DL (ref 0.1–2)
BUN BLD-MCNC: 17 MG/DL (ref 7–18)
CALCIUM BLD-MCNC: 9.1 MG/DL (ref 8.5–10.1)
CHLORIDE SERPL-SCNC: 110 MMOL/L (ref 98–112)
CO2 SERPL-SCNC: 28 MMOL/L (ref 21–32)
CREAT BLD-MCNC: 0.98 MG/DL
D DIMER PPP FEU-MCNC: 0.31 UG/ML FEU (ref ?–0.5)
EOSINOPHIL # BLD AUTO: 0.07 X10(3) UL (ref 0–0.7)
EOSINOPHIL NFR BLD AUTO: 1 %
ERYTHROCYTE [DISTWIDTH] IN BLOOD BY AUTOMATED COUNT: 14.5 %
GFR SERPLBLD BASED ON 1.73 SQ M-ARVRAT: 96 ML/MIN/1.73M2 (ref 60–?)
GLOBULIN PLAS-MCNC: 3.5 G/DL (ref 2.8–4.4)
GLUCOSE BLD-MCNC: 100 MG/DL (ref 70–99)
HCT VFR BLD AUTO: 40.3 %
HGB BLD-MCNC: 13.8 G/DL
IMM GRANULOCYTES # BLD AUTO: 0.02 X10(3) UL (ref 0–1)
IMM GRANULOCYTES NFR BLD: 0.3 %
LIPASE SERPL-CCNC: 107 U/L (ref 73–393)
LYMPHOCYTES # BLD AUTO: 2.08 X10(3) UL (ref 1–4)
LYMPHOCYTES NFR BLD AUTO: 30.4 %
MCH RBC QN AUTO: 33.5 PG (ref 26–34)
MCHC RBC AUTO-ENTMCNC: 34.2 G/DL (ref 31–37)
MCV RBC AUTO: 97.8 FL
MONOCYTES # BLD AUTO: 0.48 X10(3) UL (ref 0.1–1)
MONOCYTES NFR BLD AUTO: 7 %
NEUTROPHILS # BLD AUTO: 4.17 X10 (3) UL (ref 1.5–7.7)
NEUTROPHILS # BLD AUTO: 4.17 X10(3) UL (ref 1.5–7.7)
NEUTROPHILS NFR BLD AUTO: 60.9 %
OSMOLALITY SERPL CALC.SUM OF ELEC: 296 MOSM/KG (ref 275–295)
P AXIS: 79 DEGREES
P-R INTERVAL: 114 MS
PLATELET # BLD AUTO: 339 10(3)UL (ref 150–450)
POTASSIUM SERPL-SCNC: 3.7 MMOL/L (ref 3.5–5.1)
PROT SERPL-MCNC: 7.1 G/DL (ref 6.4–8.2)
Q-T INTERVAL: 332 MS
QRS DURATION: 88 MS
QTC CALCULATION (BEZET): 410 MS
R AXIS: 68 DEGREES
RBC # BLD AUTO: 4.12 X10(6)UL
SARS-COV-2 RNA RESP QL NAA+PROBE: NOT DETECTED
SODIUM SERPL-SCNC: 142 MMOL/L (ref 136–145)
T AXIS: -21 DEGREES
TROPONIN I HIGH SENSITIVITY: 9 NG/L
VENTRICULAR RATE: 92 BPM
WBC # BLD AUTO: 6.9 X10(3) UL (ref 4–11)

## 2022-10-27 PROCEDURE — 85379 FIBRIN DEGRADATION QUANT: CPT | Performed by: EMERGENCY MEDICINE

## 2022-10-27 PROCEDURE — 96361 HYDRATE IV INFUSION ADD-ON: CPT

## 2022-10-27 PROCEDURE — 99284 EMERGENCY DEPT VISIT MOD MDM: CPT

## 2022-10-27 PROCEDURE — 71045 X-RAY EXAM CHEST 1 VIEW: CPT | Performed by: EMERGENCY MEDICINE

## 2022-10-27 PROCEDURE — 90471 IMMUNIZATION ADMIN: CPT

## 2022-10-27 PROCEDURE — 80053 COMPREHEN METABOLIC PANEL: CPT

## 2022-10-27 PROCEDURE — 83690 ASSAY OF LIPASE: CPT

## 2022-10-27 PROCEDURE — 85025 COMPLETE CBC W/AUTO DIFF WBC: CPT | Performed by: EMERGENCY MEDICINE

## 2022-10-27 PROCEDURE — 84484 ASSAY OF TROPONIN QUANT: CPT | Performed by: EMERGENCY MEDICINE

## 2022-10-27 PROCEDURE — 96374 THER/PROPH/DIAG INJ IV PUSH: CPT

## 2022-10-27 PROCEDURE — 93005 ELECTROCARDIOGRAM TRACING: CPT

## 2022-10-27 PROCEDURE — 83690 ASSAY OF LIPASE: CPT | Performed by: EMERGENCY MEDICINE

## 2022-10-27 PROCEDURE — 80053 COMPREHEN METABOLIC PANEL: CPT | Performed by: EMERGENCY MEDICINE

## 2022-10-27 PROCEDURE — 93010 ELECTROCARDIOGRAM REPORT: CPT

## 2022-10-27 PROCEDURE — 84484 ASSAY OF TROPONIN QUANT: CPT

## 2022-10-27 PROCEDURE — 96375 TX/PRO/DX INJ NEW DRUG ADDON: CPT

## 2022-10-27 PROCEDURE — 85025 COMPLETE CBC W/AUTO DIFF WBC: CPT

## 2022-10-27 RX ORDER — HYDROMORPHONE HYDROCHLORIDE 1 MG/ML
1 INJECTION, SOLUTION INTRAMUSCULAR; INTRAVENOUS; SUBCUTANEOUS ONCE
Status: COMPLETED | OUTPATIENT
Start: 2022-10-27 | End: 2022-10-27

## 2022-10-27 RX ORDER — KETOROLAC TROMETHAMINE 15 MG/ML
15 INJECTION, SOLUTION INTRAMUSCULAR; INTRAVENOUS ONCE
Status: COMPLETED | OUTPATIENT
Start: 2022-10-27 | End: 2022-10-27

## 2022-10-27 NOTE — TELEPHONE ENCOUNTER
Refill passed per [employer] [facility] protocol.   Requested Prescriptions   Pending Prescriptions Disp Refills    PANTOPRAZOLE 40 MG Oral Tab EC [Pharmacy Med Name: Pantoprazole Sodium 40 MG Oral Tablet Delayed Release] 90 tablet 3     Sig: TAKE 1 TABLET BY MOUTH  BEFORE BREAKFAST       Gastrointestional Medication Protocol Passed - 10/25/2022  9:23 PM        Passed - In person appointment or virtual visit in the past 12 mos or appointment in next 3 mos     Recent Outpatient Visits              1 month ago Alcohol-induced chronic pancreatitis [facility])    [facility]    Office Visit    2 months ago Acute on chronic pancreatitis [facility])    [address], APRN    Office Visit    3 months ago Hematuria, unspecified type    [facility]    Office Visit    6 months ago Acute on chronic pancreatitis [facility])    [place]    Office Visit    9 months ago Acute on chronic pancreatitis [facility])    [place] [facility]    Office Visit                          Recent Outpatient Visits              1 month ago Alcohol-induced chronic pancreatitis [facility])    [place]    Office Visit    2 months ago Acute on chronic pancreatitis [facility])    [address], APRN    Office Visit    3 months ago Hematuria, unspecified type    [facility]    Office Visit    6 months ago Acute on chronic pancreatitis [facility])    [place] [facility]    Office Visit    9 months ago Acute on chronic pancreatitis [facility])    [address] MD    Office Visit

## 2022-10-27 NOTE — DISCHARGE INSTRUCTIONS
Take Tylenol or Advil for discomfort. Follow-up with your PCP this week. Return if symptoms worsen or new symptoms develop. Keep wound clean and apply antibiotic ointment twice a day.

## 2022-10-27 NOTE — TELEPHONE ENCOUNTER
Refill passed per [address] [place] protocol.   Requested Prescriptions   Pending Prescriptions Disp Refills    PANTOPRAZOLE 40 MG Oral Tab EC [Pharmacy Med Name: Pantoprazole Sodium 40 MG Oral Tablet Delayed Release] 90 tablet 3     Sig: TAKE 1 TABLET BY MOUTH  BEFORE BREAKFAST       Gastrointestional Medication Protocol Passed - 10/25/2022  9:23 PM        Passed - In person appointment or virtual visit in the past 12 mos or appointment in next 3 mos     Recent Outpatient Visits              1 month ago Alcohol-induced chronic pancreatitis [facility])    [address] Duana Pilot, MD    Office Visit    2 months ago Acute on chronic pancreatitis [facility])    [address], APRN    Office Visit    3 months ago Hematuria, unspecified type    [address], Jessica Nichols MD    Office Visit    6 months ago Acute on chronic pancreatitis [facility])    [address] Duana Pilot, MD    Office Visit    9 months ago Acute on chronic [facility])    [address] Duana Pilot, MD    Office Visit                          Recent Outpatient Visits              1 month ago Alcohol-induced chronic pancreatitis [facility])    [address] Duana Pilot, MD    Office Visit    2 months ago Acute on chronic pancreatitis [facility])    [address], APRN    Office Visit    3 months ago Hematuria, unspecified type    [address], Jessica Nichols MD    Office Visit    6 months ago Acute on chronic pancreatitis [facility])    [address] Duana Pilot, MD    Office Visit    9 months ago Acute on chronic pancreatitis [facility])    Laura Miles MD    Office Visit

## 2022-10-28 ENCOUNTER — PATIENT MESSAGE (OUTPATIENT)
Dept: SURGERY | Facility: CLINIC | Age: 47
End: 2022-10-28

## 2022-11-14 NOTE — TELEPHONE ENCOUNTER
Future Appointments   Date Time Provider Lizandro Mack   11/23/2022  4:00 PM Matheus Romero MD [facility] [address]     S/w patient.

## 2022-11-23 ENCOUNTER — OFFICE VISIT (OUTPATIENT)
Dept: SURGERY | Facility: CLINIC | Age: 47
End: 2022-11-23
Payer: COMMERCIAL

## 2022-11-23 DIAGNOSIS — N40.1 BPH WITH OBSTRUCTION/LOWER URINARY TRACT SYMPTOMS: ICD-10-CM

## 2022-11-23 DIAGNOSIS — R97.20 ELEVATED PSA: Primary | ICD-10-CM

## 2022-11-23 DIAGNOSIS — N13.8 BPH WITH OBSTRUCTION/LOWER URINARY TRACT SYMPTOMS: ICD-10-CM

## 2022-11-23 DIAGNOSIS — R31.0 HEMATURIA, GROSS: ICD-10-CM

## 2022-11-23 DIAGNOSIS — N32.81 OAB (OVERACTIVE BLADDER): ICD-10-CM

## 2022-11-23 DIAGNOSIS — R82.90 URINE FINDING: ICD-10-CM

## 2022-11-23 LAB
APPEARANCE: CLEAR
BILIRUBIN: NEGATIVE
GLUCOSE (URINE DIPSTICK): NEGATIVE MG/DL
KETONES (URINE DIPSTICK): NEGATIVE MG/DL
LEUKOCYTES: NEGATIVE
MULTISTIX LOT#: NORMAL NUMERIC
NITRITE, URINE: NEGATIVE
OCCULT BLOOD: NEGATIVE
PH, URINE: 5.5 (ref 4.5–8)
PROTEIN (URINE DIPSTICK): NEGATIVE MG/DL
SPECIFIC GRAVITY: >=1.03 (ref 1–1.03)
URINE-COLOR: YELLOW
UROBILINOGEN,SEMI-QN: 0.2 MG/DL (ref 0–1.9)

## 2022-11-23 PROCEDURE — 81003 URINALYSIS AUTO W/O SCOPE: CPT | Performed by: SURGERY

## 2022-11-23 PROCEDURE — 99214 OFFICE O/P EST MOD 30 MIN: CPT | Performed by: SURGERY

## 2022-11-23 RX ORDER — PANCRELIPASE LIPASE, PANCRELIPASE PROTEASE, PANCRELIPASE AMYLASE 10000; 32000; 42000 [USP'U]/1; [USP'U]/1; [USP'U]/1
CAPSULE, DELAYED RELEASE ORAL
COMMUNITY
Start: 2022-10-02 | End: 2022-11-25

## 2022-11-25 RX ORDER — PANCRELIPASE LIPASE, PANCRELIPASE PROTEASE, PANCRELIPASE AMYLASE 10000; 32000; 42000 [USP'U]/1; [USP'U]/1; [USP'U]/1
10000 CAPSULE, DELAYED RELEASE ORAL
Qty: 270 CAPSULE | Refills: 0 | Status: SHIPPED | OUTPATIENT
Start: 2022-11-25

## 2022-11-25 RX ORDER — HYDROCODONE BITARTRATE AND ACETAMINOPHEN 10; 325 MG/1; MG/1
1 TABLET ORAL EVERY 8 HOURS PRN
Qty: 15 TABLET | Refills: 0 | Status: SHIPPED | OUTPATIENT
Start: 2022-11-25

## 2022-11-25 NOTE — TELEPHONE ENCOUNTER
Dr Mahin Zhang: please review medications. norco and zenpep. Patient spouse called. He is also out of Paco Pep.  (I advised patient should get this from GI, however she asked if you are able to send this time)

## 2022-11-28 ENCOUNTER — LAB ENCOUNTER (OUTPATIENT)
Dept: LAB | Facility: HOSPITAL | Age: 47
End: 2022-11-28
Attending: SURGERY
Payer: COMMERCIAL

## 2022-11-28 DIAGNOSIS — R97.20 ELEVATED PROSTATE SPECIFIC ANTIGEN (PSA): Primary | ICD-10-CM

## 2022-11-28 PROCEDURE — 36415 COLL VENOUS BLD VENIPUNCTURE: CPT

## 2022-12-01 ENCOUNTER — TELEPHONE (OUTPATIENT)
Dept: SURGERY | Facility: CLINIC | Age: 47
End: 2022-12-01

## 2022-12-01 DIAGNOSIS — R97.20 ELEVATED PSA: Primary | ICD-10-CM

## 2022-12-01 NOTE — TELEPHONE ENCOUNTER
Called patient and discussed 4K results today. Based on our discussion of the numbers below, we will proceed with prostate biopsy in clinic after we complete his cystoscopy.     4K Score 20.8  (Predicts a 61% chance of clinically significant prostate cancer on biopsy)    PSA 7.43    Free PSA 9%    Prior PSA Results:  Lab Results   Component Value Date    PSA 10.00 (H) 09/09/2022    PSA 2.98 12/22/2020    PSA 1.6 05/16/2018    PSAS 3.96 08/24/2020

## 2022-12-02 RX ORDER — CIPROFLOXACIN 500 MG/1
500 TABLET, FILM COATED ORAL 2 TIMES DAILY
Qty: 6 TABLET | Refills: 0 | Status: SHIPPED | OUTPATIENT
Start: 2022-12-02

## 2022-12-02 RX ORDER — CEFDINIR 300 MG/1
300 CAPSULE ORAL EVERY 12 HOURS
Qty: 6 CAPSULE | Refills: 0 | Status: SHIPPED | OUTPATIENT
Start: 2022-12-02 | End: 2022-12-05

## 2022-12-02 NOTE — TELEPHONE ENCOUNTER
RN offerdd 12/29 10:30 AM prostate biopsy with Dr Shannan Vaughan. He agreed to plans and instructions. Cipro and Cefdinir order placed.

## 2022-12-12 NOTE — PROGRESS NOTES
HPI:   HPI   39year-old male is here in the office complaining of upper back pain for the past 2 weeks. Patient is currently taking Norco 5/325 mg as needed for abdominal pain s/p surgery. Patient states that Tevin Benites does not help much.  The pain does not ra 02/03/1987  Annual Physical due on 08/17/2021  Annual Depression Screen due on 10/06/2021  Influenza Vaccine Completed    Past Medical History:     Past Medical History:   Diagnosis Date   • Alcohol abuse    • Back problem    • High blood pressure    • Gregorio file        Minutes per session: Not on file      Stress: Not on file    Relationships      Social connections        Talks on phone: Not on file        Gets together: Not on file        Attends Taoist service: Not on file        Active member of club o Height: 6' 2\" (1.88 m)       Body mass index is 21.7 kg/m². Physical Exam:   Physical Exam   Vitals reviewed. Constitutional: He is oriented to person, place, and time. He appears well-developed and well-nourished. He is cooperative. No distress. Niacinamide Pregnancy And Lactation Text: These medications are considered safe during pregnancy.

## 2022-12-19 ENCOUNTER — PROCEDURE (OUTPATIENT)
Dept: SURGERY | Facility: CLINIC | Age: 47
End: 2022-12-19
Payer: COMMERCIAL

## 2022-12-19 VITALS — TEMPERATURE: 97 F | HEART RATE: 96 BPM | DIASTOLIC BLOOD PRESSURE: 72 MMHG | SYSTOLIC BLOOD PRESSURE: 132 MMHG

## 2022-12-19 DIAGNOSIS — N32.81 OAB (OVERACTIVE BLADDER): ICD-10-CM

## 2022-12-19 DIAGNOSIS — N40.1 BPH WITH OBSTRUCTION/LOWER URINARY TRACT SYMPTOMS: ICD-10-CM

## 2022-12-19 DIAGNOSIS — R31.0 HEMATURIA, GROSS: Primary | ICD-10-CM

## 2022-12-19 DIAGNOSIS — N13.8 BPH WITH OBSTRUCTION/LOWER URINARY TRACT SYMPTOMS: ICD-10-CM

## 2022-12-19 RX ORDER — CIPROFLOXACIN 500 MG/1
500 TABLET, FILM COATED ORAL ONCE
Status: COMPLETED | OUTPATIENT
Start: 2022-12-19 | End: 2022-12-19

## 2022-12-19 RX ORDER — TAMSULOSIN HYDROCHLORIDE 0.4 MG/1
0.8 CAPSULE ORAL DAILY
Qty: 180 CAPSULE | Refills: 6 | Status: SHIPPED | OUTPATIENT
Start: 2022-12-19

## 2022-12-19 RX ORDER — OXYBUTYNIN CHLORIDE 10 MG/1
10 TABLET, EXTENDED RELEASE ORAL DAILY
Qty: 90 TABLET | Refills: 6 | Status: SHIPPED | OUTPATIENT
Start: 2022-12-19

## 2022-12-19 RX ADMIN — CIPROFLOXACIN 500 MG: 500 TABLET, FILM COATED ORAL at 11:17:00

## 2022-12-28 ENCOUNTER — PATIENT MESSAGE (OUTPATIENT)
Dept: SURGERY | Facility: CLINIC | Age: 47
End: 2022-12-28

## 2022-12-29 ENCOUNTER — PROCEDURE (OUTPATIENT)
Dept: SURGERY | Facility: CLINIC | Age: 47
End: 2022-12-29
Payer: COMMERCIAL

## 2022-12-29 VITALS — DIASTOLIC BLOOD PRESSURE: 75 MMHG | SYSTOLIC BLOOD PRESSURE: 107 MMHG | HEART RATE: 109 BPM

## 2022-12-29 DIAGNOSIS — R97.20 ELEVATED PSA: Primary | ICD-10-CM

## 2022-12-29 PROCEDURE — 76872 US TRANSRECTAL: CPT | Performed by: SURGERY

## 2022-12-29 PROCEDURE — 3078F DIAST BP <80 MM HG: CPT | Performed by: SURGERY

## 2022-12-29 PROCEDURE — 55700 BIOPSY OF PROSTATE,NEEDLE/PUNCH: CPT | Performed by: SURGERY

## 2022-12-29 PROCEDURE — 3074F SYST BP LT 130 MM HG: CPT | Performed by: SURGERY

## 2022-12-30 NOTE — PROGRESS NOTES
Continue reschedule this patient's upcoming appointment with me to a 6-month appointment for PSA/EDILMA? Thanks,  CECILIA Daniel,    I just left you a voicemail regarding this, but the prostate biopsy results are back and the news is great! There is no cancer seen on biopsy, just a small area of inflammation. This could be the reason for the PSA elevation. Instead of your appointment next week, I will have the clinic in touch with you to make an appointment in 6 months to see me back for a repeat PSA and prostate exam.  We will continue to keep a close eye on your PSA, but the biopsy news is excellent. Please let me know if you have any questions.     Thanks,  Zac Shaw MD

## 2022-12-30 NOTE — TELEPHONE ENCOUNTER
This encounter is now closed. Patient completed the prostate biopsy on 12/29 with Dr Neftali Tompkins.

## 2023-01-01 NOTE — TELEPHONE ENCOUNTER
Please review. Protocol failed / No protocol. Requested Prescriptions   Pending Prescriptions Disp Refills    ondansetron 4 MG Oral Tablet Dispersible 20 tablet 1     Sig: Take 1 tablet (4 mg total) by mouth every 8 (eight) hours as needed for Nausea. There is no refill protocol information for this order       HYDROcodone-acetaminophen  MG Oral Tab 15 tablet 0     Sig: Take 1 tablet by mouth every 8 (eight) hours as needed for Pain.        There is no refill protocol information for this order          Recent Outpatient Visits              1 month ago Elevated PSA    Hernandez Matt Dickson MD    Office Visit    3 months ago Alcohol-induced chronic pancreatitis [facility])    [facility], Nehemias Verde MD    Office Visit    4 months ago Acute on chronic pancreatitis [facility])    [facility], SHERITA    Office Visit    5 months ago Hematuria, unspecified type    [facility], Erica Sanchez, Claudette Rome, MD    Office Visit    8 months ago Acute on chronic pancreatitis [facility])    [address], Nehemias Vega MD    Office Visit

## 2023-01-02 RX ORDER — ONDANSETRON 4 MG/1
4 TABLET, ORALLY DISINTEGRATING ORAL EVERY 8 HOURS PRN
Qty: 20 TABLET | Refills: 1 | Status: SHIPPED | OUTPATIENT
Start: 2023-01-02

## 2023-01-02 RX ORDER — HYDROCODONE BITARTRATE AND ACETAMINOPHEN 10; 325 MG/1; MG/1
1 TABLET ORAL EVERY 8 HOURS PRN
Qty: 15 TABLET | Refills: 0 | Status: SHIPPED | OUTPATIENT
Start: 2023-01-02

## 2023-01-10 ENCOUNTER — PATIENT MESSAGE (OUTPATIENT)
Dept: SURGERY | Facility: CLINIC | Age: 48
End: 2023-01-10

## 2023-01-10 ENCOUNTER — TELEPHONE (OUTPATIENT)
Dept: SURGERY | Facility: CLINIC | Age: 48
End: 2023-01-10

## 2023-01-10 RX ORDER — OXYBUTYNIN CHLORIDE 10 MG/1
10 TABLET, EXTENDED RELEASE ORAL DAILY
Qty: 90 TABLET | Refills: 3 | Status: SHIPPED | OUTPATIENT
Start: 2023-01-10

## 2023-01-17 ENCOUNTER — TELEPHONE (OUTPATIENT)
Dept: SURGERY | Facility: CLINIC | Age: 48
End: 2023-01-17

## 2023-01-17 NOTE — TELEPHONE ENCOUNTER
RN reached out to patient via [facility] to offer 6 month follow up with PSA/EDILMA .  Offered 6/29 Thursday at 3:30 PM

## 2023-01-25 ENCOUNTER — HOSPITAL ENCOUNTER (EMERGENCY)
Facility: HOSPITAL | Age: 48
Discharge: HOME | End: 2023-01-25
Attending: EMERGENCY MEDICINE
Payer: COMMERCIAL

## 2023-01-25 VITALS
WEIGHT: 185 LBS | RESPIRATION RATE: 19 BRPM | HEART RATE: 96 BPM | HEIGHT: 75 IN | BODY MASS INDEX: 23 KG/M2 | TEMPERATURE: 97 F | SYSTOLIC BLOOD PRESSURE: 147 MMHG | DIASTOLIC BLOOD PRESSURE: 67 MMHG | OXYGEN SATURATION: 100 %

## 2023-01-25 DIAGNOSIS — R10.13 EPIGASTRIC PAIN: Primary | ICD-10-CM

## 2023-01-25 DIAGNOSIS — R07.9 ACUTE CHEST PAIN: ICD-10-CM

## 2023-01-25 DIAGNOSIS — F43.9 STRESS AT HOME: ICD-10-CM

## 2023-01-25 LAB
ALBUMIN SERPL-MCNC: 3.3 G/DL (ref 3.4–5)
ALBUMIN/GLOB SERPL: 1 {RATIO} (ref 1–2)
ALP LIVER SERPL-CCNC: 71 U/L
ALT SERPL-CCNC: 19 U/L
ANION GAP SERPL CALC-SCNC: 7 MMOL/L (ref 0–18)
AST SERPL-CCNC: 12 U/L (ref 15–37)
ATRIAL RATE: 108 BPM
BASOPHILS # BLD AUTO: 0.01 X10(3) UL (ref 0–0.2)
BASOPHILS NFR BLD AUTO: 0.1 %
BILIRUB SERPL-MCNC: 0.6 MG/DL (ref 0.1–2)
BILIRUB UR QL STRIP.AUTO: NEGATIVE
BUN BLD-MCNC: 17 MG/DL (ref 7–18)
CALCIUM BLD-MCNC: 8.7 MG/DL (ref 8.5–10.1)
CHLORIDE SERPL-SCNC: 107 MMOL/L (ref 98–112)
CLARITY UR REFRACT.AUTO: CLEAR
CO2 SERPL-SCNC: 27 MMOL/L (ref 21–32)
COLOR UR AUTO: YELLOW
CREAT BLD-MCNC: 1.01 MG/DL
EOSINOPHIL # BLD AUTO: 0.01 X10(3) UL (ref 0–0.7)
EOSINOPHIL NFR BLD AUTO: 0.1 %
ERYTHROCYTE [DISTWIDTH] IN BLOOD BY AUTOMATED COUNT: 14 %
GFR SERPLBLD BASED ON 1.73 SQ M-ARVRAT: 92 ML/MIN/1.73M2 (ref 60–?)
GLOBULIN PLAS-MCNC: 3.2 G/DL (ref 2.8–4.4)
GLUCOSE BLD-MCNC: 101 MG/DL (ref 70–99)
GLUCOSE UR STRIP.AUTO-MCNC: NEGATIVE MG/DL
HCT VFR BLD AUTO: 37.7 %
HGB BLD-MCNC: 12.8 G/DL
IMM GRANULOCYTES # BLD AUTO: 0.05 X10(3) UL (ref 0–1)
IMM GRANULOCYTES NFR BLD: 0.5 %
KETONES UR STRIP.AUTO-MCNC: NEGATIVE MG/DL
LEUKOCYTE ESTERASE UR QL STRIP.AUTO: NEGATIVE
LIPASE SERPL-CCNC: 264 U/L (ref 73–393)
LYMPHOCYTES # BLD AUTO: 0.75 X10(3) UL (ref 1–4)
LYMPHOCYTES NFR BLD AUTO: 7.6 %
MCH RBC QN AUTO: 32.9 PG (ref 26–34)
MCHC RBC AUTO-ENTMCNC: 34 G/DL (ref 31–37)
MCV RBC AUTO: 96.9 FL
MONOCYTES # BLD AUTO: 0.39 X10(3) UL (ref 0.1–1)
MONOCYTES NFR BLD AUTO: 4 %
NEUTROPHILS # BLD AUTO: 8.64 X10 (3) UL (ref 1.5–7.7)
NEUTROPHILS # BLD AUTO: 8.64 X10(3) UL (ref 1.5–7.7)
NEUTROPHILS NFR BLD AUTO: 87.7 %
NITRITE UR QL STRIP.AUTO: NEGATIVE
OSMOLALITY SERPL CALC.SUM OF ELEC: 294 MOSM/KG (ref 275–295)
P AXIS: 73 DEGREES
P-R INTERVAL: 114 MS
PH UR STRIP.AUTO: 6 [PH] (ref 5–8)
PLATELET # BLD AUTO: 294 10(3)UL (ref 150–450)
POTASSIUM SERPL-SCNC: 3.6 MMOL/L (ref 3.5–5.1)
PROT SERPL-MCNC: 6.5 G/DL (ref 6.4–8.2)
PROT UR STRIP.AUTO-MCNC: NEGATIVE MG/DL
Q-T INTERVAL: 324 MS
QRS DURATION: 86 MS
QTC CALCULATION (BEZET): 434 MS
R AXIS: 62 DEGREES
RBC # BLD AUTO: 3.89 X10(6)UL
SODIUM SERPL-SCNC: 141 MMOL/L (ref 136–145)
SP GR UR STRIP.AUTO: 1.01 (ref 1–1.03)
T AXIS: 55 DEGREES
TROPONIN I HIGH SENSITIVITY: 7 NG/L
UROBILINOGEN UR STRIP.AUTO-MCNC: 0.2 MG/DL
VENTRICULAR RATE: 108 BPM
WBC # BLD AUTO: 9.9 X10(3) UL (ref 4–11)

## 2023-01-25 PROCEDURE — 93005 ELECTROCARDIOGRAM TRACING: CPT

## 2023-01-25 PROCEDURE — 96375 TX/PRO/DX INJ NEW DRUG ADDON: CPT

## 2023-01-25 PROCEDURE — 93010 ELECTROCARDIOGRAM REPORT: CPT

## 2023-01-25 PROCEDURE — 99285 EMERGENCY DEPT VISIT HI MDM: CPT

## 2023-01-25 PROCEDURE — 83690 ASSAY OF LIPASE: CPT | Performed by: EMERGENCY MEDICINE

## 2023-01-25 PROCEDURE — 84484 ASSAY OF TROPONIN QUANT: CPT | Performed by: EMERGENCY MEDICINE

## 2023-01-25 PROCEDURE — 99284 EMERGENCY DEPT VISIT MOD MDM: CPT

## 2023-01-25 PROCEDURE — 96374 THER/PROPH/DIAG INJ IV PUSH: CPT

## 2023-01-25 PROCEDURE — 85025 COMPLETE CBC W/AUTO DIFF WBC: CPT | Performed by: EMERGENCY MEDICINE

## 2023-01-25 PROCEDURE — 80053 COMPREHEN METABOLIC PANEL: CPT | Performed by: EMERGENCY MEDICINE

## 2023-01-25 PROCEDURE — 81015 MICROSCOPIC EXAM OF URINE: CPT | Performed by: EMERGENCY MEDICINE

## 2023-01-25 PROCEDURE — 81001 URINALYSIS AUTO W/SCOPE: CPT | Performed by: EMERGENCY MEDICINE

## 2023-01-25 PROCEDURE — 96361 HYDRATE IV INFUSION ADD-ON: CPT

## 2023-01-25 PROCEDURE — S0028 INJECTION, FAMOTIDINE, 20 MG: HCPCS | Performed by: EMERGENCY MEDICINE

## 2023-01-25 RX ORDER — FAMOTIDINE 10 MG/ML
20 INJECTION, SOLUTION INTRAVENOUS ONCE
Status: COMPLETED | OUTPATIENT
Start: 2023-01-25 | End: 2023-01-25

## 2023-01-25 RX ORDER — HYDROCODONE BITARTRATE AND ACETAMINOPHEN 5; 325 MG/1; MG/1
1 TABLET ORAL EVERY 8 HOURS PRN
Qty: 6 TABLET | Refills: 0 | Status: SHIPPED | OUTPATIENT
Start: 2023-01-25

## 2023-01-25 RX ORDER — HYDROMORPHONE HYDROCHLORIDE 1 MG/ML
1 INJECTION, SOLUTION INTRAMUSCULAR; INTRAVENOUS; SUBCUTANEOUS ONCE
Status: COMPLETED | OUTPATIENT
Start: 2023-01-25 | End: 2023-01-25

## 2023-01-25 RX ORDER — ONDANSETRON 2 MG/ML
4 INJECTION INTRAMUSCULAR; INTRAVENOUS ONCE
Status: COMPLETED | OUTPATIENT
Start: 2023-01-25 | End: 2023-01-25

## 2023-01-25 NOTE — ED INITIAL ASSESSMENT (HPI)
States he thinks he is having a pancreatitis flare up. Pt states he has been nauseous and vomited this morning. Also c/o right arm numbness that began approx 2 hours ago. Also states that he had chest pressure after he was shoveling snow. States chest pain has since subsided.

## 2023-02-27 RX ORDER — ONDANSETRON 4 MG/1
4 TABLET, ORALLY DISINTEGRATING ORAL EVERY 8 HOURS PRN
Qty: 20 TABLET | Refills: 0 | Status: SHIPPED | OUTPATIENT
Start: 2023-02-27

## 2023-02-28 NOTE — TELEPHONE ENCOUNTER
Please review. Protocol failed / No protocol. Requested Prescriptions   Pending Prescriptions Disp Refills    ondansetron 4 MG Oral Tablet Dispersible 20 tablet 0     Sig: Take 1 tablet (4 mg total) by mouth every 8 (eight) hours as needed for Nausea.        There is no refill protocol information for this order          Recent Outpatient Visits              3 months ago Elevated PSA    Stanley Méndez MD    Office Visit    5 months ago Alcohol-induced chronic pancreatitis [facility])    [address], Erica Sanchez, King Saldivar MD    Office Visit    6 months ago Acute on chronic pancreatitis [facility])    [facility], APRN    Office Visit    7 months ago Hematuria, unspecified type    [address] [facility], Katie Chen MD    Office Visit    10 months ago Acute on chronic pancreatitis [facility])    [address], Erica Sanchez, King Saldivar MD    Office Visit            Future Appointments         Provider Department Appt Notes    In 1 week Rajesh Rodgers MD [address] [facility], [place] Blood pressure medication refills    In 4 months Susan Palacio MD [address] PSA prior and EDILMA

## 2023-03-07 RX ORDER — HYDROCODONE BITARTRATE AND ACETAMINOPHEN 10; 325 MG/1; MG/1
1 TABLET ORAL EVERY 8 HOURS PRN
Qty: 15 TABLET | Refills: 0 | Status: SHIPPED | OUTPATIENT
Start: 2023-03-07

## 2023-03-07 NOTE — TELEPHONE ENCOUNTER
Please review. Protocol failed / No protocol. Future Appointments   Date Time Provider Lizandro Martina   3/9/2023  4:00 PM Keron Schafer MD [facility] ADO       Requested Prescriptions   Pending Prescriptions Disp Refills    HYDROcodone-acetaminophen  MG Oral Tab 15 tablet 0     Sig: Take 1 tablet by mouth every 8 (eight) hours as needed for Pain.        There is no refill protocol information for this order          Recent Outpatient Visits              3 months ago Elevated PSA    One Honorio Sanz MD    Office Visit    5 months ago Alcohol-induced chronic pancreatitis [facility])    Erica Contreras, Collette Leyland, MD    Office Visit    6 months ago [facility] chronic pancreat[facility]    Office Visit    8 months ago Hematuria, unspecified type    Harris Litten, Herminia Cousins, MD    Office Visit    10 months ago Acute on chronic pancreatitis [facility])    Erica Contreras, Collette Leyland, MD    Office Visit            Future Appointments         Provider Department Appt Notes    In 3 days Reynaldo Jain, MD Harris Litten, [place] Blood pressure medication refills    In 3 months Barbara Garcia MD [facility], [address] [place], [place] [address] prior and EDILMA

## 2023-03-09 ENCOUNTER — OFFICE VISIT (OUTPATIENT)
Dept: INTERNAL MEDICINE CLINIC | Facility: CLINIC | Age: 48
End: 2023-03-09

## 2023-03-09 VITALS
OXYGEN SATURATION: 99 % | WEIGHT: 179.63 LBS | DIASTOLIC BLOOD PRESSURE: 73 MMHG | HEART RATE: 99 BPM | HEIGHT: 75 IN | TEMPERATURE: 98 F | SYSTOLIC BLOOD PRESSURE: 116 MMHG | BODY MASS INDEX: 22.33 KG/M2

## 2023-03-09 DIAGNOSIS — R01.1 HEART MURMUR: ICD-10-CM

## 2023-03-09 DIAGNOSIS — K86.0 ALCOHOL-INDUCED CHRONIC PANCREATITIS (HCC): ICD-10-CM

## 2023-03-09 DIAGNOSIS — N40.1 BENIGN PROSTATIC HYPERPLASIA WITH LOWER URINARY TRACT SYMPTOMS, SYMPTOM DETAILS UNSPECIFIED: ICD-10-CM

## 2023-03-09 DIAGNOSIS — Z00.00 ANNUAL PHYSICAL EXAM: Primary | ICD-10-CM

## 2023-03-09 DIAGNOSIS — R97.20 ELEVATED PSA: ICD-10-CM

## 2023-03-09 DIAGNOSIS — D36.9 ADENOMATOUS POLYP: ICD-10-CM

## 2023-03-09 DIAGNOSIS — I10 ESSENTIAL HYPERTENSION: ICD-10-CM

## 2023-03-09 PROCEDURE — 3074F SYST BP LT 130 MM HG: CPT | Performed by: INTERNAL MEDICINE

## 2023-03-09 PROCEDURE — 99396 PREV VISIT EST AGE 40-64: CPT | Performed by: INTERNAL MEDICINE

## 2023-03-09 PROCEDURE — 3008F BODY MASS INDEX DOCD: CPT | Performed by: INTERNAL MEDICINE

## 2023-03-09 PROCEDURE — 90677 PCV20 VACCINE IM: CPT | Performed by: INTERNAL MEDICINE

## 2023-03-09 PROCEDURE — 90471 IMMUNIZATION ADMIN: CPT | Performed by: INTERNAL MEDICINE

## 2023-03-09 PROCEDURE — 3078F DIAST BP <80 MM HG: CPT | Performed by: INTERNAL MEDICINE

## 2023-03-09 RX ORDER — ZOLPIDEM TARTRATE 5 MG/1
5 TABLET ORAL NIGHTLY PRN
Qty: 15 TABLET | Refills: 0 | Status: SHIPPED | OUTPATIENT
Start: 2023-03-09

## 2023-03-21 DIAGNOSIS — I10 HYPERTENSION, UNSPECIFIED TYPE: ICD-10-CM

## 2023-03-21 RX ORDER — AMLODIPINE BESYLATE 10 MG/1
10 TABLET ORAL DAILY
Qty: 30 TABLET | Refills: 0 | Status: SHIPPED | OUTPATIENT
Start: 2023-03-21

## 2023-03-21 RX ORDER — TAMSULOSIN HYDROCHLORIDE 0.4 MG/1
0.8 CAPSULE ORAL DAILY
Qty: 60 CAPSULE | Refills: 0 | Status: CANCELLED | OUTPATIENT
Start: 2023-03-21

## 2023-03-21 RX ORDER — PANCRELIPASE LIPASE, PANCRELIPASE PROTEASE, PANCRELIPASE AMYLASE 10000; 32000; 42000 [USP'U]/1; [USP'U]/1; [USP'U]/1
10000 CAPSULE, DELAYED RELEASE ORAL
Qty: 810 CAPSULE | Refills: 0 | Status: CANCELLED | OUTPATIENT
Start: 2023-03-21

## 2023-03-21 NOTE — TELEPHONE ENCOUNTER
I called patient as he did not indicate which meds are being requested. He confirmed which meds he needs and I am routing to myself for protocol to be ran.

## 2023-03-25 RX ORDER — ZOLPIDEM TARTRATE 5 MG/1
5 TABLET ORAL NIGHTLY PRN
Qty: 15 TABLET | Refills: 0 | Status: SHIPPED | OUTPATIENT
Start: 2023-03-25

## 2023-03-31 RX ORDER — HYDROCODONE BITARTRATE AND ACETAMINOPHEN 10; 325 MG/1; MG/1
1 TABLET ORAL EVERY 8 HOURS PRN
Qty: 15 TABLET | Refills: 0 | Status: SHIPPED | OUTPATIENT
Start: 2023-03-31

## 2023-05-01 RX ORDER — HYDROCODONE BITARTRATE AND ACETAMINOPHEN 10; 325 MG/1; MG/1
1 TABLET ORAL EVERY 8 HOURS PRN
Qty: 15 TABLET | Refills: 0 | Status: SHIPPED | OUTPATIENT
Start: 2023-05-01

## 2023-05-01 RX ORDER — OXYBUTYNIN CHLORIDE 10 MG/1
10 TABLET, EXTENDED RELEASE ORAL DAILY
Qty: 90 TABLET | Refills: 0 | Status: SHIPPED | OUTPATIENT
Start: 2023-05-01

## 2023-05-01 RX ORDER — ZOLPIDEM TARTRATE 5 MG/1
5 TABLET ORAL NIGHTLY PRN
Qty: 15 TABLET | Refills: 0 | Status: SHIPPED | OUTPATIENT
Start: 2023-05-01

## 2023-05-23 ENCOUNTER — APPOINTMENT (OUTPATIENT)
Dept: CT IMAGING | Facility: HOSPITAL | Age: 48
End: 2023-05-23
Attending: EMERGENCY MEDICINE
Payer: COMMERCIAL

## 2023-05-23 PROCEDURE — 74177 CT ABD & PELVIS W/CONTRAST: CPT | Performed by: EMERGENCY MEDICINE

## 2023-05-25 ENCOUNTER — TELEPHONE (OUTPATIENT)
Dept: INTERNAL MEDICINE CLINIC | Facility: CLINIC | Age: 48
End: 2023-05-25

## 2023-05-30 RX ORDER — OXYBUTYNIN CHLORIDE 10 MG/1
10 TABLET, EXTENDED RELEASE ORAL DAILY
Qty: 90 TABLET | Refills: 0 | OUTPATIENT
Start: 2023-05-30

## 2023-06-01 DIAGNOSIS — I10 HYPERTENSION, UNSPECIFIED TYPE: ICD-10-CM

## 2023-06-02 RX ORDER — AMLODIPINE BESYLATE 10 MG/1
10 TABLET ORAL DAILY
Qty: 90 TABLET | Refills: 3 | OUTPATIENT
Start: 2023-06-02

## 2023-06-02 RX ORDER — HYDROCODONE BITARTRATE AND ACETAMINOPHEN 10; 325 MG/1; MG/1
1 TABLET ORAL EVERY 8 HOURS PRN
Qty: 15 TABLET | Refills: 0 | OUTPATIENT
Start: 2023-06-02

## 2023-06-08 ENCOUNTER — OFFICE VISIT (OUTPATIENT)
Dept: INTERNAL MEDICINE CLINIC | Facility: CLINIC | Age: 48
End: 2023-06-08

## 2023-06-08 DIAGNOSIS — G47.00 INSOMNIA, UNSPECIFIED TYPE: ICD-10-CM

## 2023-06-08 DIAGNOSIS — K86.0 ALCOHOL-INDUCED CHRONIC PANCREATITIS (HCC): ICD-10-CM

## 2023-06-08 DIAGNOSIS — I10 ESSENTIAL HYPERTENSION: Primary | ICD-10-CM

## 2023-06-08 PROCEDURE — 3008F BODY MASS INDEX DOCD: CPT | Performed by: INTERNAL MEDICINE

## 2023-06-08 PROCEDURE — 3079F DIAST BP 80-89 MM HG: CPT | Performed by: INTERNAL MEDICINE

## 2023-06-08 PROCEDURE — 99213 OFFICE O/P EST LOW 20 MIN: CPT | Performed by: INTERNAL MEDICINE

## 2023-06-08 PROCEDURE — 3075F SYST BP GE 130 - 139MM HG: CPT | Performed by: INTERNAL MEDICINE

## 2023-06-08 RX ORDER — ZOLPIDEM TARTRATE 5 MG/1
5 TABLET ORAL NIGHTLY PRN
Qty: 15 TABLET | Refills: 0 | Status: SHIPPED | OUTPATIENT
Start: 2023-06-08

## 2023-06-08 RX ORDER — AMLODIPINE BESYLATE 10 MG/1
10 TABLET ORAL DAILY
Qty: 90 TABLET | Refills: 3 | Status: SHIPPED | OUTPATIENT
Start: 2023-06-08

## 2023-06-08 RX ORDER — AMLODIPINE BESYLATE 10 MG/1
10 TABLET ORAL DAILY
Qty: 14 TABLET | Refills: 0 | Status: SHIPPED | OUTPATIENT
Start: 2023-06-08 | End: 2023-06-08

## 2023-06-09 VITALS
DIASTOLIC BLOOD PRESSURE: 84 MMHG | TEMPERATURE: 97 F | HEART RATE: 81 BPM | SYSTOLIC BLOOD PRESSURE: 138 MMHG | BODY MASS INDEX: 22.12 KG/M2 | OXYGEN SATURATION: 98 % | WEIGHT: 177.88 LBS | HEIGHT: 75 IN

## 2023-06-28 ENCOUNTER — TELEPHONE (OUTPATIENT)
Facility: CLINIC | Age: 48
End: 2023-06-28

## 2023-06-28 ENCOUNTER — OFFICE VISIT (OUTPATIENT)
Facility: CLINIC | Age: 48
End: 2023-06-28

## 2023-06-28 VITALS
HEART RATE: 80 BPM | WEIGHT: 177 LBS | SYSTOLIC BLOOD PRESSURE: 131 MMHG | HEIGHT: 75 IN | BODY MASS INDEX: 22.01 KG/M2 | DIASTOLIC BLOOD PRESSURE: 71 MMHG

## 2023-06-28 DIAGNOSIS — Z86.010 PERSONAL HISTORY OF COLONIC POLYPS: Primary | ICD-10-CM

## 2023-06-28 DIAGNOSIS — K86.1 ACUTE ON CHRONIC PANCREATITIS (HCC): ICD-10-CM

## 2023-06-28 DIAGNOSIS — K85.90 ACUTE ON CHRONIC PANCREATITIS (HCC): ICD-10-CM

## 2023-06-28 DIAGNOSIS — Z86.010 PERSONAL HISTORY OF COLONIC POLYPS: ICD-10-CM

## 2023-06-28 DIAGNOSIS — Z09 HOSPITAL DISCHARGE FOLLOW-UP: Primary | ICD-10-CM

## 2023-06-28 PROCEDURE — 3078F DIAST BP <80 MM HG: CPT | Performed by: NURSE PRACTITIONER

## 2023-06-28 PROCEDURE — 3008F BODY MASS INDEX DOCD: CPT | Performed by: NURSE PRACTITIONER

## 2023-06-28 PROCEDURE — 99214 OFFICE O/P EST MOD 30 MIN: CPT | Performed by: NURSE PRACTITIONER

## 2023-06-28 PROCEDURE — 3075F SYST BP GE 130 - 139MM HG: CPT | Performed by: NURSE PRACTITIONER

## 2023-06-28 RX ORDER — SODIUM, POTASSIUM,MAG SULFATES 17.5-3.13G
SOLUTION, RECONSTITUTED, ORAL ORAL
Qty: 1 EACH | Refills: 0 | Status: SHIPPED | OUTPATIENT
Start: 2023-06-28

## 2023-06-29 ENCOUNTER — OFFICE VISIT (OUTPATIENT)
Dept: SURGERY | Facility: CLINIC | Age: 48
End: 2023-06-29

## 2023-06-29 ENCOUNTER — LAB ENCOUNTER (OUTPATIENT)
Dept: LAB | Facility: HOSPITAL | Age: 48
End: 2023-06-29
Attending: SURGERY
Payer: COMMERCIAL

## 2023-06-29 DIAGNOSIS — R97.20 ELEVATED PSA: Primary | ICD-10-CM

## 2023-06-29 DIAGNOSIS — Z00.00 ANNUAL PHYSICAL EXAM: ICD-10-CM

## 2023-06-29 DIAGNOSIS — R97.20 ELEVATED PSA: ICD-10-CM

## 2023-06-29 LAB
ALBUMIN SERPL-MCNC: 3.6 G/DL (ref 3.4–5)
ALBUMIN/GLOB SERPL: 1.2 {RATIO} (ref 1–2)
ALP LIVER SERPL-CCNC: 69 U/L
ALT SERPL-CCNC: 23 U/L
ANION GAP SERPL CALC-SCNC: <0 MMOL/L (ref 0–18)
APPEARANCE: CLEAR
AST SERPL-CCNC: 18 U/L (ref 15–37)
BASOPHILS # BLD AUTO: 0.02 X10(3) UL (ref 0–0.2)
BASOPHILS NFR BLD AUTO: 0.4 %
BILIRUB SERPL-MCNC: 0.3 MG/DL (ref 0.1–2)
BILIRUBIN: NEGATIVE
BUN BLD-MCNC: 16 MG/DL (ref 7–18)
CALCIUM BLD-MCNC: 8.8 MG/DL (ref 8.5–10.1)
CHLORIDE SERPL-SCNC: 111 MMOL/L (ref 98–112)
CHOLEST SERPL-MCNC: 163 MG/DL (ref ?–200)
CO2 SERPL-SCNC: 30 MMOL/L (ref 21–32)
CREAT BLD-MCNC: 1.08 MG/DL
EOSINOPHIL # BLD AUTO: 0.04 X10(3) UL (ref 0–0.7)
EOSINOPHIL NFR BLD AUTO: 0.8 %
ERYTHROCYTE [DISTWIDTH] IN BLOOD BY AUTOMATED COUNT: 13.9 %
FASTING PATIENT LIPID ANSWER: NO
FASTING STATUS PATIENT QL REPORTED: NO
GFR SERPLBLD BASED ON 1.73 SQ M-ARVRAT: 85 ML/MIN/1.73M2 (ref 60–?)
GLOBULIN PLAS-MCNC: 3.1 G/DL (ref 2.8–4.4)
GLUCOSE (URINE DIPSTICK): NEGATIVE MG/DL
GLUCOSE BLD-MCNC: 104 MG/DL (ref 70–99)
HCT VFR BLD AUTO: 38.7 %
HDLC SERPL-MCNC: 74 MG/DL (ref 40–59)
HGB BLD-MCNC: 13.4 G/DL
IMM GRANULOCYTES # BLD AUTO: 0.01 X10(3) UL (ref 0–1)
IMM GRANULOCYTES NFR BLD: 0.2 %
KETONES (URINE DIPSTICK): NEGATIVE MG/DL
LDLC SERPL CALC-MCNC: 76 MG/DL (ref ?–100)
LEUKOCYTES: NEGATIVE
LYMPHOCYTES # BLD AUTO: 1.82 X10(3) UL (ref 1–4)
LYMPHOCYTES NFR BLD AUTO: 34.4 %
MCH RBC QN AUTO: 33.8 PG (ref 26–34)
MCHC RBC AUTO-ENTMCNC: 34.6 G/DL (ref 31–37)
MCV RBC AUTO: 97.5 FL
MONOCYTES # BLD AUTO: 0.36 X10(3) UL (ref 0.1–1)
MONOCYTES NFR BLD AUTO: 6.8 %
MULTISTIX LOT#: NORMAL NUMERIC
NEUTROPHILS # BLD AUTO: 3.04 X10 (3) UL (ref 1.5–7.7)
NEUTROPHILS # BLD AUTO: 3.04 X10(3) UL (ref 1.5–7.7)
NEUTROPHILS NFR BLD AUTO: 57.4 %
NITRITE, URINE: NEGATIVE
NONHDLC SERPL-MCNC: 89 MG/DL (ref ?–130)
OCCULT BLOOD: NEGATIVE
OSMOLALITY SERPL CALC.SUM OF ELEC: 291 MOSM/KG (ref 275–295)
PH, URINE: 6 (ref 4.5–8)
PLATELET # BLD AUTO: 323 10(3)UL (ref 150–450)
POTASSIUM SERPL-SCNC: 4.3 MMOL/L (ref 3.5–5.1)
PROT SERPL-MCNC: 6.7 G/DL (ref 6.4–8.2)
PROTEIN (URINE DIPSTICK): NEGATIVE MG/DL
PSA FREE MFR SERPL: 10 %
PSA FREE SERPL-MCNC: 0.76 NG/ML
PSA SERPL-MCNC: 7.7 NG/ML (ref ?–4)
RBC # BLD AUTO: 3.97 X10(6)UL
SODIUM SERPL-SCNC: 140 MMOL/L (ref 136–145)
SPECIFIC GRAVITY: 1.02 (ref 1–1.03)
TRIGL SERPL-MCNC: 70 MG/DL (ref 30–149)
URINE-COLOR: YELLOW
UROBILINOGEN,SEMI-QN: 0.2 MG/DL (ref 0–1.9)
VLDLC SERPL CALC-MCNC: 11 MG/DL (ref 0–30)
WBC # BLD AUTO: 5.3 X10(3) UL (ref 4–11)

## 2023-06-29 PROCEDURE — 84154 ASSAY OF PSA FREE: CPT

## 2023-06-29 PROCEDURE — 99214 OFFICE O/P EST MOD 30 MIN: CPT | Performed by: SURGERY

## 2023-06-29 PROCEDURE — 85025 COMPLETE CBC W/AUTO DIFF WBC: CPT

## 2023-06-29 PROCEDURE — 80061 LIPID PANEL: CPT

## 2023-06-29 PROCEDURE — 84153 ASSAY OF PSA TOTAL: CPT

## 2023-06-29 PROCEDURE — 81003 URINALYSIS AUTO W/O SCOPE: CPT | Performed by: SURGERY

## 2023-06-29 PROCEDURE — 80053 COMPREHEN METABOLIC PANEL: CPT

## 2023-06-29 PROCEDURE — 36415 COLL VENOUS BLD VENIPUNCTURE: CPT

## 2023-07-10 ENCOUNTER — PATIENT MESSAGE (OUTPATIENT)
Dept: GASTROENTEROLOGY | Facility: CLINIC | Age: 48
End: 2023-07-10

## 2023-07-11 ENCOUNTER — TELEPHONE (OUTPATIENT)
Facility: CLINIC | Age: 48
End: 2023-07-11

## 2023-07-11 NOTE — TELEPHONE ENCOUNTER
Dr. James Geller,    Patient has colonoscopy scheduled for tomorrow. He did not take 2L golytely as advised this morning and then start split dose tonight. He does have both prep, how do you want him to proceed?

## 2023-07-11 NOTE — TELEPHONE ENCOUNTER
Spoke to patient and reviewed prep drinking instructions. Patient verbalized understanding. All questions answered.

## 2023-07-11 NOTE — TELEPHONE ENCOUNTER
From: Lexie Abel  To: Naomi De Oliveira MD  Sent: 7/10/2023 5:01 PM CDT  Subject: Colonoscopy    Good afternoon Dr Sravanthi Ford,    I have 2 bowel preps, one I picked up on last week and the other I picked up on today. The one I picked up last week is a smaller dosage (sodium sulfate 2 bottles). The other is the lemon lime flavored sodium chloride( larger container) if I have to drink the larger container, I would like to cancel the colonoscopy. Niraje tried in the past to drink this and could not complete it. I will await further instruction.     Kind regards  Geremias Young

## 2023-07-11 NOTE — TELEPHONE ENCOUNTER
Patient calling regards receiving two preps, unsure which to take prior to procedure on 7/12/23. Please call.

## 2023-07-11 NOTE — TELEPHONE ENCOUNTER
Please review. Protocol failed or has no protocol. Requested Prescriptions   Pending Prescriptions Disp Refills    HYDROcodone-acetaminophen  MG Oral Tab 30 tablet 0     Sig: Take 1 tablet by mouth every 8 (eight) hours as needed for Pain. There is no refill protocol information for this order       zolpidem 5 MG Oral Tab 15 tablet 0     Sig: Take 1 tablet (5 mg total) by mouth nightly as needed for Sleep.        There is no refill protocol information for this order          Recent Outpatient Visits              1 week ago Elevated PSA    One Lupillo Sanz MD    Office Visit    1 week ago Hospital discharge follow-up    Annette Johnson APN    Office Visit    1 month ago Essential hypertension    Madina Johnson MD    Office Visit    1 month ago     [address], [address], Paolo Malagon MD    Office Visit    4 months ago Annual physical exam    [address], [address], Madina Carrillo MD    Office Visit            Future Appointments         Provider Department Appt Notes    Tomorrow [address], [address], [place] Colonoscopy w/MAC@ [facility]    In 2 months Cecil Lai MD [address], [address] 3 month f/u

## 2023-07-11 NOTE — TELEPHONE ENCOUNTER
He can start the 2L now and then complete as usual    Thanks    Salvador Vanegas MD  [address] - Gastroenterology  7/11/2023  2:58 PM

## 2023-07-12 ENCOUNTER — LAB REQUISITION (OUTPATIENT)
Dept: SURGERY | Age: 48
End: 2023-07-12
Payer: COMMERCIAL

## 2023-07-12 ENCOUNTER — SURGERY CENTER DOCUMENTATION (OUTPATIENT)
Age: 48
End: 2023-07-12

## 2023-07-12 DIAGNOSIS — Z86.010 PERSONAL HISTORY OF COLONIC POLYPS: ICD-10-CM

## 2023-07-12 DIAGNOSIS — Z12.11 ENCOUNTER FOR COLONOSCOPY DUE TO HISTORY OF ADENOMATOUS COLONIC POLYPS: Primary | ICD-10-CM

## 2023-07-12 DIAGNOSIS — Z86.010 ENCOUNTER FOR COLONOSCOPY DUE TO HISTORY OF ADENOMATOUS COLONIC POLYPS: Primary | ICD-10-CM

## 2023-07-12 PROCEDURE — 45380 COLONOSCOPY AND BIOPSY: CPT | Performed by: INTERNAL MEDICINE

## 2023-07-12 PROCEDURE — 88305 TISSUE EXAM BY PATHOLOGIST: CPT | Performed by: INTERNAL MEDICINE

## 2023-07-12 RX ORDER — HYDROCODONE BITARTRATE AND ACETAMINOPHEN 10; 325 MG/1; MG/1
1 TABLET ORAL EVERY 8 HOURS PRN
Qty: 30 TABLET | Refills: 0 | Status: SHIPPED | OUTPATIENT
Start: 2023-07-12

## 2023-07-12 RX ORDER — ZOLPIDEM TARTRATE 5 MG/1
5 TABLET ORAL NIGHTLY PRN
Qty: 15 TABLET | Refills: 0 | Status: SHIPPED | OUTPATIENT
Start: 2023-07-12

## 2023-07-13 ENCOUNTER — TELEPHONE (OUTPATIENT)
Dept: GASTROENTEROLOGY | Facility: CLINIC | Age: 48
End: 2023-07-13

## 2023-07-13 NOTE — TELEPHONE ENCOUNTER
Health maintenance updated. Last colonoscopy done 7/12/23. 5 year recall placed into Pt Outreach, next due on 07/2028 per Dr. Pete Nava.

## 2023-07-13 NOTE — TELEPHONE ENCOUNTER
GI staff: please place recall for colonoscopy in 5 years     Then close encounter    Thanks    Xavi Garvey MD  [address] - Gastroenterology  7/13/2023  10:16 AM

## 2023-07-14 RX ORDER — HYDROCODONE BITARTRATE AND ACETAMINOPHEN 10; 325 MG/1; MG/1
1 TABLET ORAL EVERY 8 HOURS PRN
Qty: 30 TABLET | Refills: 0 | OUTPATIENT
Start: 2023-07-14

## 2023-07-14 RX ORDER — ZOLPIDEM TARTRATE 5 MG/1
5 TABLET ORAL NIGHTLY PRN
Qty: 15 TABLET | Refills: 0 | OUTPATIENT
Start: 2023-07-14

## 2023-07-19 ENCOUNTER — MED REC SCAN ONLY (OUTPATIENT)
Facility: CLINIC | Age: 48
End: 2023-07-19

## 2023-08-03 ENCOUNTER — TELEPHONE (OUTPATIENT)
Dept: ORTHOPEDICS CLINIC | Facility: CLINIC | Age: 48
End: 2023-08-03

## 2023-08-03 DIAGNOSIS — M54.40 LOW BACK PAIN WITH SCIATICA, SCIATICA LATERALITY UNSPECIFIED, UNSPECIFIED BACK PAIN LATERALITY, UNSPECIFIED CHRONICITY: Primary | ICD-10-CM

## 2023-08-03 NOTE — TELEPHONE ENCOUNTER
Patient has an upcoming appt for sciatica/ lower back pain. At this time patient has not had any imaging done, please place RX accordingly. I have notified the patient to come in 20 min prior to appointment time to have the imaging done . Please forward to the  pool to schedule imaging. Thank you.       Future Appointments   Date Time Provider Lizandro Mack   8/4/2023  9:20 AM Rainer Gonzalez MD [facility]   10/3/2023  9:00 AM Urvashi Sharma MD [facility] [address]

## 2023-08-04 ENCOUNTER — OFFICE VISIT (OUTPATIENT)
Dept: ORTHOPEDICS CLINIC | Facility: CLINIC | Age: 48
End: 2023-08-04
Payer: COMMERCIAL

## 2023-08-04 ENCOUNTER — HOSPITAL ENCOUNTER (OUTPATIENT)
Dept: GENERAL RADIOLOGY | Age: 48
Discharge: HOME | End: 2023-08-04
Attending: STUDENT IN AN ORGANIZED HEALTH CARE EDUCATION/TRAINING PROGRAM
Payer: COMMERCIAL

## 2023-08-04 VITALS — BODY MASS INDEX: 22.01 KG/M2 | WEIGHT: 177 LBS | HEIGHT: 75 IN

## 2023-08-04 DIAGNOSIS — M54.40 LOW BACK PAIN WITH SCIATICA, SCIATICA LATERALITY UNSPECIFIED, UNSPECIFIED BACK PAIN LATERALITY, UNSPECIFIED CHRONICITY: ICD-10-CM

## 2023-08-04 DIAGNOSIS — M54.16 LUMBAR RADICULOPATHY: Primary | ICD-10-CM

## 2023-08-04 PROCEDURE — 72100 X-RAY EXAM L-S SPINE 2/3 VWS: CPT | Performed by: STUDENT IN AN ORGANIZED HEALTH CARE EDUCATION/TRAINING PROGRAM

## 2023-08-04 RX ORDER — GABAPENTIN 100 MG/1
100 CAPSULE ORAL 3 TIMES DAILY
Qty: 90 CAPSULE | Refills: 0 | Status: SHIPPED | OUTPATIENT
Start: 2023-08-04

## 2023-08-18 RX ORDER — ZOLPIDEM TARTRATE 5 MG/1
5 TABLET ORAL NIGHTLY PRN
Qty: 15 TABLET | Refills: 0 | Status: CANCELLED | OUTPATIENT
Start: 2023-08-18

## 2023-08-18 RX ORDER — HYDROCODONE BITARTRATE AND ACETAMINOPHEN 10; 325 MG/1; MG/1
1 TABLET ORAL EVERY 8 HOURS PRN
Qty: 30 TABLET | Refills: 0 | Status: CANCELLED | OUTPATIENT
Start: 2023-08-18

## 2023-08-18 NOTE — TELEPHONE ENCOUNTER
Please review. Protocol failed or has no protocol. Requested Prescriptions   Pending Prescriptions Disp Refills    HYDROcodone-acetaminophen  MG Oral Tab 30 tablet 0     Sig: Take 1 tablet by mouth every 8 (eight) hours as needed for Pain. There is no refill protocol information for this order       zolpidem 5 MG Oral Tab 15 tablet 0     Sig: Take 1 tablet (5 mg total) by mouth nightly as needed for Sleep.        There is no refill protocol information for this order          Recent Outpatient Visits              2 weeks ago Lumbar radiculopathy    [facility], [address], Lisa Cade MD    Office Visit    1 month ago Elevated PSA    One Judson Sanz MD    Office Visit    1 month ago Hospital discharge follow-up    Annette Torres APN    Office Visit    2 months ago Essential hypertension    Laura Torres MD    Office Visit    2 months ago     Azeem Gonzalez, [address] [facility],[address], Leonid Bal MD    Office Visit            Future Appointments         Provider Department Appt Notes    In 1 week BK MR RM1 (1.5T) [address] [facility]     In 1 week MD Azeem Lira, [place], [facility] follow up on [facility]    In 1 month MD Azeem Stone, [address], [facility] 3 month f/u

## 2023-08-19 RX ORDER — HYDROCODONE BITARTRATE AND ACETAMINOPHEN 10; 325 MG/1; MG/1
1 TABLET ORAL EVERY 8 HOURS PRN
Qty: 30 TABLET | Refills: 0 | Status: SHIPPED | OUTPATIENT
Start: 2023-08-19

## 2023-08-19 RX ORDER — ZOLPIDEM TARTRATE 5 MG/1
5 TABLET ORAL NIGHTLY PRN
Qty: 15 TABLET | Refills: 0 | Status: SHIPPED | OUTPATIENT
Start: 2023-08-19

## 2023-08-20 NOTE — TELEPHONE ENCOUNTER
No protocol for requested medication. Please advise on refill request.      Requested Prescriptions     Pending Prescriptions Disp Refills    HYDROcodone-acetaminophen  MG Oral Tab 30 tablet 0     Sig: Take 1 tablet by mouth every 8 (eight) hours as needed for Pain.    zolpidem 5 MG Oral Tab 15 tablet 0     Sig: Take 1 tablet (5 mg total) by mouth nightly as needed for Sleep. Recent Visits  Date Type Provider Dept   06/08/23 Office Visit Denisa Javed MD [facility]   03/09/23 Office Visit Denisa Javed MD [facility]   09/09/22 Office Visit Denisa Javed MD [facility]   07/06/22 Office Visit Tad Sloan MD [facility]   04/14/22 Office Visit Denisa Javed MD [facility]   Showing recent visits within past 540 days with a meds authorizing provider and meeting all other requirements  Future Appointments  No visits were found meeting these conditions. Showing future appointments within next 150 days with a meds authorizing provider and meeting all other requirements     Requested Prescriptions   Pending Prescriptions Disp Refills    HYDROcodone-acetaminophen  MG Oral Tab 30 tablet 0     Sig: Take 1 tablet by mouth every 8 (eight) hours as needed for Pain. There is no refill protocol information for this order       zolpidem 5 MG Oral Tab 15 tablet 0     Sig: Take 1 tablet (5 mg total) by mouth nightly as needed for Sleep.        There is no refill protocol information for this order         Future Appointments         Provider Department Appt Notes    In 5 days BK MR RM1 (1.5T) [address] [facility]     In 1 week Vilma Colon MD [facility] Ort[place] Marcum follow up on [facility]    In 1 month Lizz Augustine MD [address] 3 month f/u           Recent Outpatient Visits              2 weeks ago Lumbar radiculopathy    [facility] Mylene Bonilla, Balwinder Landa, Elena Conner MD    Office Visit    1 month ago Elevated PSA    [address], [address] [facility], Ovi Marroquin MD    Office Visit    1 month ago Hospital discharge follow-up    [address] [place] [address], [place]    Office Visit    2 months ago Essential hypertension    [address] [facility], Desiree Peñaloza MD    Office Visit    2 months ago     [address] [place] [address], Marie Antunez MD    Office Visit

## 2023-08-28 ENCOUNTER — HOSPITAL ENCOUNTER (OUTPATIENT)
Dept: MRI IMAGING | Age: 48
Discharge: HOME | End: 2023-08-28
Attending: STUDENT IN AN ORGANIZED HEALTH CARE EDUCATION/TRAINING PROGRAM
Payer: COMMERCIAL

## 2023-08-28 DIAGNOSIS — M54.16 LUMBAR RADICULOPATHY: ICD-10-CM

## 2023-08-28 PROCEDURE — 72148 MRI LUMBAR SPINE W/O DYE: CPT | Performed by: STUDENT IN AN ORGANIZED HEALTH CARE EDUCATION/TRAINING PROGRAM

## 2023-08-29 ENCOUNTER — OFFICE VISIT (OUTPATIENT)
Dept: ORTHOPEDICS CLINIC | Facility: CLINIC | Age: 48
End: 2023-08-29
Payer: COMMERCIAL

## 2023-08-29 VITALS — BODY MASS INDEX: 21.76 KG/M2 | HEIGHT: 75 IN | WEIGHT: 175 LBS

## 2023-08-29 DIAGNOSIS — M51.26 LUMBAR DISC HERNIATION: Primary | ICD-10-CM

## 2023-08-29 PROCEDURE — 3008F BODY MASS INDEX DOCD: CPT | Performed by: STUDENT IN AN ORGANIZED HEALTH CARE EDUCATION/TRAINING PROGRAM

## 2023-08-29 PROCEDURE — 99214 OFFICE O/P EST MOD 30 MIN: CPT | Performed by: STUDENT IN AN ORGANIZED HEALTH CARE EDUCATION/TRAINING PROGRAM

## 2023-08-29 RX ORDER — GABAPENTIN 300 MG/1
300 CAPSULE ORAL 3 TIMES DAILY
Qty: 90 CAPSULE | Refills: 0 | Status: SHIPPED | OUTPATIENT
Start: 2023-08-29

## 2023-08-29 RX ORDER — GABAPENTIN 100 MG/1
100 CAPSULE ORAL 3 TIMES DAILY
Qty: 90 CAPSULE | Refills: 0 | OUTPATIENT
Start: 2023-08-29

## 2023-09-05 RX ORDER — GABAPENTIN 300 MG/1
300 CAPSULE ORAL 3 TIMES DAILY
Qty: 90 CAPSULE | Refills: 0 | Status: SHIPPED | OUTPATIENT
Start: 2023-09-05

## 2023-09-05 NOTE — TELEPHONE ENCOUNTER
Gabapentin  Last OV: 08/29/23  Last refill date: 08/29/23     #/refills: 90/0  Upcoming appt:    Future Appointments   Date Time Provider Lizandro Martina   9/11/2023 10:15 AM Denny Aase, MD [facility]   10/3/2023  9:00 AM Lizz Augustine MD [facility] [address]

## 2023-09-11 ENCOUNTER — OFFICE VISIT (OUTPATIENT)
Dept: PAIN CLINIC | Facility: CLINIC | Age: 48
End: 2023-09-11
Payer: COMMERCIAL

## 2023-09-11 ENCOUNTER — PATIENT MESSAGE (OUTPATIENT)
Dept: PAIN CLINIC | Facility: CLINIC | Age: 48
End: 2023-09-11

## 2023-09-11 VITALS — HEART RATE: 89 BPM | DIASTOLIC BLOOD PRESSURE: 68 MMHG | SYSTOLIC BLOOD PRESSURE: 130 MMHG | OXYGEN SATURATION: 100 %

## 2023-09-11 DIAGNOSIS — M54.32 SCIATICA OF LEFT SIDE: Primary | ICD-10-CM

## 2023-09-11 PROCEDURE — 99214 OFFICE O/P EST MOD 30 MIN: CPT | Performed by: ANESTHESIOLOGY

## 2023-09-11 PROCEDURE — 3075F SYST BP GE 130 - 139MM HG: CPT | Performed by: ANESTHESIOLOGY

## 2023-09-11 PROCEDURE — 3078F DIAST BP <80 MM HG: CPT | Performed by: ANESTHESIOLOGY

## 2023-09-11 RX ORDER — METHYLPREDNISOLONE 4 MG/1
TABLET ORAL
Qty: 1 EACH | Refills: 0 | Status: SHIPPED | OUTPATIENT
Start: 2023-09-11

## 2023-09-11 NOTE — PROGRESS NOTES
Name: Joseline Branham   : 2/3/1975   DOS: 2023     Pain Clinic Follow Up Visit:   Patient presents with: Follow - Up: Review MRI      Joseline Branham is a 50year old male referred for evaluation of low back pain. The patient was last seen in  for myofascial scapular pain and responded very well to trigger point injections. However, over the course of the past year has been experiencing worsening axial back pain with left-sided radicular symptoms. The pain is described as shooting with numbness and tingling down the lateral aspect of the calf and into the foot. Rates pain as 8 out of 10. This significantly impacts his function and is made worse by ambulation. He is taking care of his wife who recently had surgery for rectal CA and caretaking activities also worsen his pain. Pt denies any chills, fever, or weakness. There is no bladder or bowel incontinence associated with the pain. REVIEW OF SYSTEMS:  A ten point review of systems was performed with pertinent positives and negatives in the HPI. Morphine                ITCHING    Current Outpatient Medications   Medication Sig Dispense Refill    methylPREDNISolone (MEDROL) 4 MG Oral Tablet Therapy Pack Take as directed 1 each 0    gabapentin 300 MG Oral Cap Take 1 capsule (300 mg total) by mouth 3 (three) times daily. 90 capsule 0    HYDROcodone-acetaminophen  MG Oral Tab Take 1 tablet by mouth every 8 (eight) hours as needed for Pain. 30 tablet 0    zolpidem 5 MG Oral Tab Take 1 tablet (5 mg total) by mouth nightly as needed for Sleep. 15 tablet 0    tamsulosin 0.4 MG Oral Cap Take 1 capsule (0.4 mg total) by mouth daily. 90 capsule 3    Mirabegron ER 25 MG Oral Tablet 24 Hr Take 1 tablet (25 mg total) by mouth daily. 90 tablet 5    amLODIPine 10 MG Oral Tab Take 1 tablet (10 mg total) by mouth daily. 90 tablet 3    lisinopril 40 MG Oral Tab Take 1 tablet (40 mg total) by mouth daily.  90 tablet 3    pantoprazole 40 MG Oral Tab EC Take 1 tablet (40 mg total) by mouth before breakfast. 90 tablet 3    ondansetron 4 MG Oral Tablet Dispersible Take 1 tablet (4 mg total) by mouth every 8 (eight) hours as needed for Nausea. 20 tablet 0    ZENPEP 20929-14006 units Oral Cap DR Particles Take 1 capsule (10,000 Units total) by mouth 3 (three) times daily with meals. TAKE 1 CAPSULE BY MOUTH THREE TIMES DAILY DAILY WITH MEALS 810 capsule 0    gabapentin 100 MG Oral Cap Take 1 capsule (100 mg total) by mouth 3 (three) times daily. (Patient not taking: Reported on 9/11/2023) 90 capsule 0    PEG 3350-KCl-Na Bicarb-NaCl 420 g Oral Recon Soln Split dose (Patient not taking: Reported on 8/4/2023) 1 each 0    Na Sulfate-K Sulfate-Mg Sulf (SUPREP BOWEL PREP KIT) 17.5-3.13-1.6 GM/177ML Oral Solution Take as directed (Patient not taking: Reported on 8/4/2023) 1 each 0         EXAM:   /68 (BP Location: Left arm, Patient Position: Sitting)   Pulse 89   SpO2 100%   General:  Patient is a(n) 50year old year old male in no acute distress. Neurologic[de-identified] WNL-Orientation to time, place and person, normal mood & affect, concentration & attention span intact. Inspection:  Ambulates with well-coordinated, fluid, non-antalgic gait. Gait is normal.  Neck: Full range of motion  Cranial nerve: Grossly intact  Respiratory: Nonlabored  Cardiovascular: Regular rate and rhythm  Back: Moderate tenderness palpation of lumbar paravertebral musculature. Gait is intact. Straight leg raise positive on the left at 45 degrees. Mild EHL weakness. IMAGES:     Lumbar MRI reviewed independently with patient. Evidence of multilevel lumbar degenerative disc disease with left paracentral disc protrusion causing foraminal stenosis at the L5 and L6 vertebral body level.     ASSESSMENT AND PLAN:   Sciatica of left side  (primary encounter diagnosis)    The patient is a pleasant 44-year-old gentleman who presents today for evaluation of low back pain with left-sided radicular symptoms. Rates pain 8 out of 10. Complains of pain with numbness and tingling down the lateral aspect of the calf into the foot. Does have positive straight leg raise along with imaging evidence of left paracentral disc protrusion causing neuroforaminal stenosis at the L5-L6 level with mild mass effect on the descending nerve root. These imaging findings are concordant with his symptoms and radicular pattern. Recommended trial of lumbar transforaminal epidural steroid injection. Recent benefits of the procedure discussed with patient. Patient states significant discomfort right now therefore I did send a Medrol Dosepak until authorization can be obtained. Of note, patient has failed conservative therapy to date including nonsteroidals, gabapentin, hydrocodone, and physical therapy. Pain is  limiting functional status. Failed conservative treatment consisting of medications, activity modification, and  PT. 1.Risks of long term narcotic use d/w pt including dependence, abuse, and death from overdose and mixing narcotic with alcohol. Pt verbalized understanding. Medications filled today:  Requested Prescriptions     Signed Prescriptions Disp Refills    methylPREDNISolone (MEDROL) 4 MG Oral Tablet Therapy Pack 1 each 0     Sig: Take as directed     Orders:Orders Placed This Encounter      [address]        Radiology orders and consultations:None  The patient indicates understanding of these issues and agrees to the plan. No follow-ups on file.     Anette Sun MD, 9/11/2023, 10:31 AM

## 2023-09-11 NOTE — PROGRESS NOTES
Patient presents in office today with reported pain in lower back, sciatica    Current pain level reported = 8/10    Last reported dose of Gabapentin 300g at 730a      Narcotic Contract renewal NA    Urine Drug screen NA

## 2023-09-11 NOTE — TELEPHONE ENCOUNTER
From: Hobert Rinne  To: Alphonse Perkins MD  Sent: 9/11/2023 3:20 PM CDT  Subject: Pain Medicine    Is there something stronger that can be prescribed for pain considering I have to wait a week or two for [facility] to approve. As discussed during my visit this morning, the norcos do not work and have very little affect. Pain is unbearable during sleeping hours which I constantly wake up throughout the night. Morning I can barely walk.

## 2023-09-12 ENCOUNTER — TELEPHONE (OUTPATIENT)
Dept: PAIN CLINIC | Facility: CLINIC | Age: 48
End: 2023-09-12

## 2023-09-12 DIAGNOSIS — M54.16 LUMBAR RADICULITIS: Primary | ICD-10-CM

## 2023-09-18 ENCOUNTER — APPOINTMENT (OUTPATIENT)
Dept: GENERAL RADIOLOGY | Facility: HOSPITAL | Age: 48
End: 2023-09-18
Attending: ANESTHESIOLOGY
Payer: COMMERCIAL

## 2023-09-18 ENCOUNTER — HOSPITAL ENCOUNTER (OUTPATIENT)
Facility: HOSPITAL | Age: 48
Setting detail: HOSPITAL OUTPATIENT SURGERY
Discharge: HOME | End: 2023-09-18
Attending: ANESTHESIOLOGY | Admitting: ANESTHESIOLOGY
Payer: COMMERCIAL

## 2023-09-18 VITALS
SYSTOLIC BLOOD PRESSURE: 140 MMHG | DIASTOLIC BLOOD PRESSURE: 78 MMHG | OXYGEN SATURATION: 100 % | TEMPERATURE: 98 F | HEART RATE: 88 BPM | RESPIRATION RATE: 18 BRPM

## 2023-09-18 PROCEDURE — 64483 NJX AA&/STRD TFRM EPI L/S 1: CPT | Performed by: ANESTHESIOLOGY

## 2023-09-18 PROCEDURE — 64484 NJX AA&/STRD TFRM EPI L/S EA: CPT | Performed by: ANESTHESIOLOGY

## 2023-09-18 PROCEDURE — 3E0R33Z INTRODUCTION OF ANTI-INFLAMMATORY INTO SPINAL CANAL, PERCUTANEOUS APPROACH: ICD-10-PCS | Performed by: ANESTHESIOLOGY

## 2023-09-18 PROCEDURE — 3E0R3BZ INTRODUCTION OF ANESTHETIC AGENT INTO SPINAL CANAL, PERCUTANEOUS APPROACH: ICD-10-PCS | Performed by: ANESTHESIOLOGY

## 2023-09-18 RX ORDER — DEXAMETHASONE SODIUM PHOSPHATE 10 MG/ML
INJECTION, SOLUTION INTRAMUSCULAR; INTRAVENOUS
Status: DISCONTINUED | OUTPATIENT
Start: 2023-09-18 | End: 2023-09-18

## 2023-09-18 RX ORDER — SODIUM CHLORIDE 9 MG/ML
INJECTION INTRAVENOUS
Status: DISCONTINUED | OUTPATIENT
Start: 2023-09-18 | End: 2023-09-18

## 2023-09-18 RX ORDER — LIDOCAINE HYDROCHLORIDE 10 MG/ML
INJECTION, SOLUTION EPIDURAL; INFILTRATION; INTRACAUDAL; PERINEURAL
Status: DISCONTINUED | OUTPATIENT
Start: 2023-09-18 | End: 2023-09-18

## 2023-09-18 NOTE — OPERATIVE REPORT
[facility]  Operative Report  2023     Trenton Segovia Patient Status:  Hospital Outpatient Surgery    2/3/1975 MRN [id number]   Location [address] [address] Attending Sy Raymond MD   Hosp Day # 0 PCP Mandy Saunders MD     Indication: Norma Kumar is a 50year old male with lumbar radiculitis    Preoperative Diagnosis:  Lumbar radiculitis [M54.16]    Postoperative Diagnosis: Same as above. Procedure performed: LEFT LUMBAR 5-6, LUMBAR 6-SACRAL 1 TRANSFORAMINAL EPIDURAL STEROID  INJECTION MULTIPLE LEVEL with local     Anesthesia: Local  .    EBL: Less than 1 ml. Procedure Description:  After reviewing the patient's history and performing a focused physical examination, the diagnosis was confirmed and contraindications such as infection and coagulopathy were ruled out. Following review of potential side effects and complications, including but not necessarily limited to infection, allergic reaction, local tissue breakdown, nerve injury, and paresis, the patient indicated they understood and agreed to proceed. After obtaining the informed consent, the patient was brought to the procedure room and monitored. In the prone position, following sterile prep and drape of the lumbar region,  the  L5 neural foramen was identified under fluoroscopy. The skin and subcutaneous tissue was anesthetized via 25-gauge 1.5\" needle with approximately 2 cc of 1% lidocaine. A 22-gauge 5\" Quincke spinal needle was introduced toward the inferior aspect of the junction between the transverse process and pedicle of the  L5 level atraumatically under fluoroscopic guidance. The needle was advanced into the anterior epidural space at this level. The needle position was confirmed under AP and lateral fluoroscopic view. Following negative aspiration for CSF and blood, approximately 1 cc of Omnipaque 240 was injected.   An excellent contrast spread along the epidural space and the nerve root was obtained. At this point, 1cc of normal saline with 5 mg of dexamethasone was injected without complication. The needle was withdrawn with stylet in situ after being flushed with 1 cc PF lidocaine. The  L6 neural foramen was also identified under fluoroscopy. The skin and subcutaneous tissue was anesthetized via 25-gauge 1.5\" needle with approximately 2 cc of 1% lidocaine. A 22-gauge 5\" Quincke spinal needle was introduced toward the inferior aspect of the junction between the transverse process and pedicle of the L6 level atraumatically under fluoroscopic guidance. The needle was advanced into the anterior epidural space at this level. The needle position was confirmed under AP and lateral fluoroscopic view. Following negative aspiration for CSF and blood, approximately 1 cc of Omnipaque 240 was injected. An excellent contrast spread along the epidural space and the nerve root was obtained. At this point, 1cc of normal saline with 5 mg of dexamethasone was injected without complication. The needle was withdrawn with stylet in situ after being flushed with 1 cc PF lidocaine. .  The patient tolerated procedure very well. The patient was observed until discharge criteria met. Discharge instructions were given and patient was released to a responsible adult. Complications: None. Follow up: The patient was followed in the pain clinic as needed basis.         Yessy Conway MD

## 2023-09-18 NOTE — H&P
History & Physical Examination    Patient Name: Eddie Rodriguez  MRN: [id number]  CSN: [id number]  YOB: 1975    Pre-Operative Diagnosis:  Lumbar radiculitis [M54.16]    Present Illness: Lumbar radiculitis    methylPREDNISolone (MEDROL) 4 MG Oral Tablet Therapy Pack, Take as directed, Disp: 1 each, Rfl: 0  gabapentin 300 MG Oral Cap, Take 1 capsule (300 mg total) by mouth 3 (three) times daily. , Disp: 90 capsule, Rfl: 0  HYDROcodone-acetaminophen  MG Oral Tab, Take 1 tablet by mouth every 8 (eight) hours as needed for Pain., Disp: 30 tablet, Rfl: 0  zolpidem 5 MG Oral Tab, Take 1 tablet (5 mg total) by mouth nightly as needed for Sleep., Disp: 15 tablet, Rfl: 0  gabapentin 100 MG Oral Cap, Take 1 capsule (100 mg total) by mouth 3 (three) times daily. (Patient not taking: Reported on 9/11/2023), Disp: 90 capsule, Rfl: 0  tamsulosin 0.4 MG Oral Cap, Take 1 capsule (0.4 mg total) by mouth daily. , Disp: 90 capsule, Rfl: 3  PEG 3350-KCl-Na Bicarb-NaCl 420 g Oral Recon Soln, Split dose (Patient not taking: Reported on 8/4/2023), Disp: 1 each, Rfl: 0  Mirabegron ER 25 MG Oral Tablet 24 Hr, Take 1 tablet (25 mg total) by mouth daily. , Disp: 90 tablet, Rfl: 5  Na Sulfate-K Sulfate-Mg Sulf (SUPREP BOWEL PREP KIT) 17.5-3.13-1.6 GM/177ML Oral Solution, Take as directed (Patient not taking: Reported on 8/4/2023), Disp: 1 each, Rfl: 0  amLODIPine 10 MG Oral Tab, Take 1 tablet (10 mg total) by mouth daily. , Disp: 90 tablet, Rfl: 3  lisinopril 40 MG Oral Tab, Take 1 tablet (40 mg total) by mouth daily. , Disp: 90 tablet, Rfl: 3  pantoprazole 40 MG Oral Tab EC, Take 1 tablet (40 mg total) by mouth before breakfast., Disp: 90 tablet, Rfl: 3  ondansetron 4 MG Oral Tablet Dispersible, Take 1 tablet (4 mg total) by mouth every 8 (eight) hours as needed for Nausea., Disp: 20 tablet, Rfl: 0  ZENPEP 07168-69210 units Oral Cap DR Particles, Take 1 capsule (10,000 Units total) by mouth 3 (three) times daily with meals.  TAKE 1 CAPSULE BY MOUTH THREE TIMES DAILY DAILY WITH MEALS, Disp: 810 capsule, Rfl: 0      No current facility-administered medications for this encounter. Allergies:   Morphine                ITCHING    Past Medical History:   Diagnosis Date    Alcohol abuse     Back problem     Colon adenoma 05/25/2022    x1    High blood pressure     Microcytosis     Pancreatic cyst     Pancreatitis     Pancreatitis, alcoholic, acute     Pulmonary nodule 2013    Tobacco use disorder     Unspecified essential hypertension      Past Surgical History:   Procedure Laterality Date    COLONOSCOPY  07/12/2023    EGD  07/20/2016    ELECTROCARDIOGRAM, COMPLETE  12/26/2013    scanned to media tab    OTHER SURGICAL HISTORY      pancreatic cyst drainage by Dr Gypsy Joiner. then saw Anthony Aguilar had procedure done by Dr Kristen Stafford for the cyst    UPPER GI ENDOSCOPY,BIOPSY  07/2016     Family History   Problem Relation Age of Onset    Hypertension Father     Cancer Father     Diabetes Mother     Hypertension Mother     Heart Disorder Mother     Other (Other) Mother         copd    Other (Other) Brother         motorbke accident     Social History    Tobacco Use      Smoking status: Every Day        Packs/day: 0.50        Years: 20.00        Additional pack years: 0.00        Total pack years: 10.00        Types: Cigarettes      Smokeless tobacco: Never    Alcohol use: Not Currently      Comment: 1 pint a week      SYSTEM Check if Review is Normal Check if Physical Exam is Normal If not normal, please explain:   HEENT [x ] [x ]    NECK & BACK [x ] [x ]    HEART [x ] [x ]    LUNGS [x ] [x ]    ABDOMEN [x ] [x ]    UROGENITAL [x ] [x ]    EXTREMITIES [x ] [x ]    OTHER        [ x ] I have discussed the risks and benefits and alternatives with the patient/family. They understand and agree to proceed with plan of care. [ x ] I have reviewed the History and Physical done within the last 30 days. Any changes noted above.     Cee Caicedo MD

## 2023-09-18 NOTE — DISCHARGE INSTRUCTIONS
Home Care Instructions Following Your Pain Procedure     Geremias Young,  It has been a pleasure to have you as our patient. To help you at home, you must follow these general discharge instructions. We will review these with you before you are discharged. It is our hope that you have a complete and uneventful recovery from our procedure. General Instructions:  What to Expect:  Bandages from your procedure today can be removed when you get home. Please avoid soaking and/or swimming for 24 hours. Showering is okay  It is normal to have increased pain symptoms and/or pain at injection site for up to 3-5 days after procedure, you can use heat or ice (20 minutes on 20 minutes off) for comfort. You may experience some temporary side effects which may include restlessness or insomnia, flushing of the face, or heart palpitations. Please contact the provider if these symptoms do not resolve within 3-4 days. Lightheadedness or nausea may occur and should resolve within 24 to 48 hours. If you develop a headache after treatment, rest, drink fluids (with caffeine, if possible) and take mild over-the-counter pain medication. If the headache does not improve with the above treatment, contact the physician. Home Medications:  Resume all previously prescribed medication. Please avoid taking NSAIDs (Non-Steriodal Anti-Inflammatory Drugs) such as:  Ibuprofen ( Advil, Motrin) Aleve (Naproxen), Diclofenac, Meloxicam for 6 hours after procedure. If you are on Coumadin (Warfarin) or any other anti-coagulant (or \"blood thinning\") medication such as Plavix (Clopidogrel), Xarelto (Rivaroxaban), Eliquis (Apixaban), Effient (Prasugrel) etc., restart on the following day from the procedure unless otherwise directed by your provider. If you are a diabetic, please increase the frequency of your glucose monitoring after the procedure as steroids may cause a temporary (2-3 day) increase in your blood sugar.   Contact your primary care physician if your blood sugar remains elevated as you may require some medication adjustment. Diet:  Resume your regular diet as tolerated. Activity: We recommend that you relax and rest during the rest of your procedure day. If you feel weakness in your arms or legs do not drive. Follow-up Appointment  Please schedule a follow-up visit within 3 to 4 weeks after your last procedure date. Question or Concerns:  Feel free to call our office with any questions or concerns at [phone] (option #2)    Jelly Andres  Thank you for coming to [facility] for your procedure. The nurses try very hard to make sure you receive the best care possible. Your trust in them as well as us is greatly appreciated.     Thanks so much,   Dr. Lennox Nipple

## 2023-09-19 ENCOUNTER — TELEPHONE (OUTPATIENT)
Dept: PAIN CLINIC | Facility: CLINIC | Age: 48
End: 2023-09-19

## 2023-09-19 NOTE — TELEPHONE ENCOUNTER
Follow-up call post pain procedure. Left message informing patient to contact the pain clinic at [phone] option #3 regarding any questions or concerns about recent pain procedure.          Procedure:LEFT LUMBAR 5-6, LUMBAR 6-SACRAL 1 TRANSFORAMINAL EPIDURAL STEROID  INJECTION MULTIPLE LEVEL with local   Date: 9/18/2023  Follow up Visit Scheduled:

## 2023-09-20 RX ORDER — GABAPENTIN 300 MG/1
300 CAPSULE ORAL 3 TIMES DAILY
Qty: 90 CAPSULE | Refills: 0 | Status: SHIPPED | OUTPATIENT
Start: 2023-09-20

## 2023-09-20 NOTE — TELEPHONE ENCOUNTER
Gabapentin    Last OV: 08/29/23  Last refill date: 09/05/23 #/refills: 9/0  Upcoming appt: none  Future Appointments   Date Time Provider Lizandro Mack   10/3/2023  9:00 AM Amber Kumar MD [facility]   10/4/2023  9:00 AM Shelby Ulloa MD [facility]

## 2023-09-20 NOTE — TELEPHONE ENCOUNTER
Spoke to pt to advise that the steroid can take between 3-7 days to take full effect and he may still experience some symptoms during that time frame. Advised pt that Dr. Tamara Gamble will discuss any residual sx at his f/u on 10/4 and discuss next steps if there are still sx at that time. Pt TAMMI.

## 2023-09-20 NOTE — TELEPHONE ENCOUNTER
Requested Prescriptions     Signed Prescriptions Disp Refills    GABAPENTIN 300 MG Oral Cap 90 capsule 0     Sig: TAKE 1 CAPSULE BY MOUTH 3 TIMES  DAILY     Authorizing Provider: Jo Islaselor     Future Appointments   Date Time Provider Lizandro Mack   10/3/2023  9:00 AM Mendel Pence, MD [facility]   10/4/2023  9:00 AM Lianna Palma MD [facility]

## 2023-09-20 NOTE — TELEPHONE ENCOUNTER
Pt calling in stating he is still experiencing some pain on his left leg and would like to know if this is normal, please advise.

## 2023-09-25 RX ORDER — HYDROCODONE BITARTRATE AND ACETAMINOPHEN 10; 325 MG/1; MG/1
1 TABLET ORAL EVERY 8 HOURS PRN
Qty: 30 TABLET | Refills: 0 | OUTPATIENT
Start: 2023-09-25

## 2023-09-25 RX ORDER — HYDROCODONE BITARTRATE AND ACETAMINOPHEN 10; 325 MG/1; MG/1
1 TABLET ORAL EVERY 8 HOURS PRN
Qty: 30 TABLET | Refills: 0 | Status: SHIPPED | OUTPATIENT
Start: 2023-09-25

## 2023-09-25 NOTE — TELEPHONE ENCOUNTER
Please review; protocol failed. Requested Prescriptions   Pending Prescriptions Disp Refills    ondansetron 4 MG Oral Tablet Dispersible 20 tablet 0     Sig: Take 1 tablet (4 mg total) by mouth every 8 (eight) hours as needed for Nausea. There is no refill protocol information for this order      Refused Prescriptions Disp Refills    HYDROcodone-acetaminophen  MG Oral Tab 30 tablet 0     Sig: Take 1 tablet by mouth every 8 (eight) hours as needed for Pain.        There is no refill protocol information for this order        Recent Outpatient Visits              2 weeks ago Sciatica of left side    [facility], [address] [place] [address], Noah Ng MD    Office Visit    3 weeks ago Lumbar disc herniation    [facility], [address], Mariaelena Rodriguez MD    Office Visit    1 month ago Lumbar radiculopathy    [facility], [address], Mariaelena Rodriguez MD    Office Visit    2 months ago Elevated PSA    [address], [facility], Kiara Wells MD    Office Visit    2 months ago Hospital discharge follow-up    [address], [address], [place] WHITNEY Cruz    Office Visit          Future Appointments         Provider Department Appt Notes    In 1 week Alexnadra Sainz MD [address], [address] [facility] 3 month f/u    In 1 week Austin Hogan MD [address], [address] [facility] F/U after Left L5,L6 TLESI

## 2023-09-26 RX ORDER — ONDANSETRON 4 MG/1
4 TABLET, ORALLY DISINTEGRATING ORAL EVERY 8 HOURS PRN
Qty: 20 TABLET | Refills: 0 | Status: SHIPPED | OUTPATIENT
Start: 2023-09-26

## 2023-09-26 RX ORDER — ONDANSETRON 4 MG/1
4 TABLET, ORALLY DISINTEGRATING ORAL EVERY 8 HOURS PRN
Qty: 20 TABLET | Refills: 0 | Status: CANCELLED | OUTPATIENT
Start: 2023-09-26

## 2023-10-05 DIAGNOSIS — M54.16 LUMBAR RADICULITIS: Primary | ICD-10-CM

## 2023-10-05 DIAGNOSIS — M54.32 SCIATICA OF LEFT SIDE: ICD-10-CM

## 2023-10-05 RX ORDER — GABAPENTIN 300 MG/1
300 CAPSULE ORAL 3 TIMES DAILY
Qty: 90 CAPSULE | Refills: 0 | OUTPATIENT
Start: 2023-10-05

## 2023-10-05 RX ORDER — HYDROCODONE BITARTRATE AND ACETAMINOPHEN 10; 325 MG/1; MG/1
1 TABLET ORAL EVERY 8 HOURS PRN
Qty: 30 TABLET | Refills: 0 | OUTPATIENT
Start: 2023-10-05

## 2023-10-05 NOTE — TELEPHONE ENCOUNTER
Patient has no treatment agreement, and does not appear as though Norco was discussed, at least according to the plan below. As such, this refill is denied. He can discuss at his follow-up, having had an injection on 9/18/2023. no known allergies

## 2023-10-05 NOTE — TELEPHONE ENCOUNTER
Gabapentin    Last OV: 08/29/23  Last refill date: 09/05/23 #/refills: 9/0  08/04/23  90/0  Upcoming appt: none     Note from LOV:- Increased Gabapentin to 300TID     Confused by the dispensed information

## 2023-10-05 NOTE — TELEPHONE ENCOUNTER
Medication: HYDROcodone-acetaminophen  MG     Date of last refill: 09/25/23  [place] 10[place]88/[place]    Last office visit: 09/11/23  Due back to clinic per last office note:  NA  Date next office visit scheduled:  none    Last URINE Screen: none  Screen Results:  NA      Narcotic Contract EXPIRATION date: none    Last OV note recommendation: The patient is a pleasant 14-year-old gentleman who presents today for evaluation of low back pain with left-sided radicular symptoms. Rates pain 8 out of 10. Complains of pain with numbness and tingling down the lateral aspect of the calf into the foot. Does have positive straight leg raise along with imaging evidence of left paracentral disc protrusion causing neuroforaminal stenosis at the L5-L6 level with mild mass effect on the descending nerve root. These imaging findings are concordant with his symptoms and radicular pattern. Recommended trial of lumbar transforaminal epidural steroid injection. Recent benefits of the procedure discussed with patient. Patient states significant discomfort right now therefore I did send a Medrol Dosepak until authorization can be obtained. Of note, patient has failed conservative therapy to date including nonsteroidals, gabapentin, hydrocodone, and physical therapy.

## 2023-10-12 ENCOUNTER — OFFICE VISIT (OUTPATIENT)
Dept: INTERNAL MEDICINE CLINIC | Facility: CLINIC | Age: 48
End: 2023-10-12
Payer: COMMERCIAL

## 2023-10-12 ENCOUNTER — OFFICE VISIT (OUTPATIENT)
Dept: ORTHOPEDICS CLINIC | Facility: CLINIC | Age: 48
End: 2023-10-12
Payer: COMMERCIAL

## 2023-10-12 VITALS — HEIGHT: 75 IN | WEIGHT: 175 LBS | BODY MASS INDEX: 21.76 KG/M2

## 2023-10-12 VITALS
HEIGHT: 75 IN | SYSTOLIC BLOOD PRESSURE: 120 MMHG | BODY MASS INDEX: 23.23 KG/M2 | TEMPERATURE: 98 F | WEIGHT: 186.81 LBS | OXYGEN SATURATION: 99 % | DIASTOLIC BLOOD PRESSURE: 68 MMHG | HEART RATE: 85 BPM

## 2023-10-12 DIAGNOSIS — M51.26 LUMBAR DISC HERNIATION: Primary | ICD-10-CM

## 2023-10-12 DIAGNOSIS — G47.00 INSOMNIA, UNSPECIFIED TYPE: Primary | ICD-10-CM

## 2023-10-12 PROCEDURE — 3074F SYST BP LT 130 MM HG: CPT | Performed by: INTERNAL MEDICINE

## 2023-10-12 PROCEDURE — 3008F BODY MASS INDEX DOCD: CPT | Performed by: INTERNAL MEDICINE

## 2023-10-12 PROCEDURE — 3008F BODY MASS INDEX DOCD: CPT | Performed by: STUDENT IN AN ORGANIZED HEALTH CARE EDUCATION/TRAINING PROGRAM

## 2023-10-12 PROCEDURE — 3078F DIAST BP <80 MM HG: CPT | Performed by: INTERNAL MEDICINE

## 2023-10-12 PROCEDURE — 90471 IMMUNIZATION ADMIN: CPT | Performed by: INTERNAL MEDICINE

## 2023-10-12 PROCEDURE — 99213 OFFICE O/P EST LOW 20 MIN: CPT | Performed by: INTERNAL MEDICINE

## 2023-10-12 PROCEDURE — 90686 IIV4 VACC NO PRSV 0.5 ML IM: CPT | Performed by: INTERNAL MEDICINE

## 2023-10-12 PROCEDURE — 99215 OFFICE O/P EST HI 40 MIN: CPT | Performed by: STUDENT IN AN ORGANIZED HEALTH CARE EDUCATION/TRAINING PROGRAM

## 2023-10-12 RX ORDER — HYDROCODONE BITARTRATE AND ACETAMINOPHEN 5; 325 MG/1; MG/1
1 TABLET ORAL EVERY 6 HOURS PRN
Qty: 20 TABLET | Refills: 0 | Status: SHIPPED | OUTPATIENT
Start: 2023-10-12

## 2023-10-12 RX ORDER — OXYBUTYNIN CHLORIDE 10 MG/1
TABLET, EXTENDED RELEASE ORAL
COMMUNITY
Start: 2023-09-12

## 2023-10-12 RX ORDER — ZOLPIDEM TARTRATE 10 MG/1
10 TABLET ORAL NIGHTLY PRN
Qty: 15 TABLET | Refills: 0 | Status: SHIPPED | OUTPATIENT
Start: 2023-10-12

## 2023-10-12 NOTE — PROGRESS NOTES
SURGERY SCHEDULING SHEET    Mesfin Raman  2/3/1975  [id number]    Procedure: Left L4-5 microdiscectomy (46352)    Diagnosis:  No primary diagnosis found.     Anesthesia: General    Length of Surgery: 1 hr    Disposition: Outpatient    Assist: RCISTA Morales    OR Table: Chhaya Barboza    Position: Prone    Implant:  None    C-Arm: Yes    Special Equipment: None    Neuromonitoring: None    Pre-op Testing: PER ANESTHESIA GUIDELINES    Clearance: OTHER:  PCP    Post op: 2 weeks post op    Home health: yes    Ekaterina Montez MD  [address] Orthopedic Surgery  Phone: [phone]  Fax: [phone]

## 2023-10-13 ENCOUNTER — TELEPHONE (OUTPATIENT)
Dept: ORTHOPEDICS CLINIC | Facility: CLINIC | Age: 48
End: 2023-10-13

## 2023-10-13 DIAGNOSIS — M51.26 LUMBAR DISC HERNIATION: Primary | ICD-10-CM

## 2023-10-13 NOTE — TELEPHONE ENCOUNTER
Date of Surgery: 11/1/23 R/S TO 11/3/23     Post Op Appt: 11/16/23 @ 830AM     Case ID: [id number]     Notes:         SURGERY SCHEDULING SHEET     Kala Feliz  2/3/1975  [id number]     Procedure: Left L4-5 microdiscectomy (23152)     Diagnosis:  No primary diagnosis found.      Anesthesia: General     Length of Surgery: 1 hr     Disposition: Outpatient     Assist: CRISTA Morales     OR Table: Rainer     Position: Prone     Implant:  None     C-Arm: Yes     Special Equipment: None     Neuromonitoring: None     Pre-op Testing: PER ANESTHESIA GUIDELINES     Clearance: OTHER:  PCP     Post op: 2 weeks post op     Home health: yes     Alma Rosa Young MD  [address] Orthopedic Surgery  Phone: [phone]  Fax: [phone]

## 2023-10-15 NOTE — PROGRESS NOTES
Subjective:     Patient ID: Shira Jones is a 50year old male. Patient comes in today with complaints of persistent insomnia. Has been taking Ambien 5 mg daily as needed for sleep. This apparently has not been working anymore. He is under a lot of stress, wife recovering from some illness. He himself is also dealing with alcohol issues before and has had chronic pancreatitis which does flareup intermittently although nothing recent. Been sober from alcohol. History/Other:   Review of Systems   Constitutional: Negative. Respiratory: Negative. Current Outpatient Medications   Medication Sig Dispense Refill    HYDROcodone-acetaminophen (NORCO) 5-325 MG Oral Tab Take 1 tablet by mouth every 6 (six) hours as needed for Pain. 20 tablet 0    oxybutynin ER 10 MG Oral Tablet 24 Hr       zolpidem 10 MG Oral Tab Take 1 tablet (10 mg total) by mouth nightly as needed for Sleep. 15 tablet 0    ondansetron 4 MG Oral Tablet Dispersible Take 1 tablet (4 mg total) by mouth every 8 (eight) hours as needed for Nausea. 20 tablet 0    HYDROcodone-acetaminophen  MG Oral Tab Take 1 tablet by mouth every 8 (eight) hours as needed for Pain. 30 tablet 0    GABAPENTIN 300 MG Oral Cap TAKE 1 CAPSULE BY MOUTH 3 TIMES  DAILY 90 capsule 0    tamsulosin 0.4 MG Oral Cap Take 1 capsule (0.4 mg total) by mouth daily. 90 capsule 3    Mirabegron ER 25 MG Oral Tablet 24 Hr Take 1 tablet (25 mg total) by mouth daily. 90 tablet 5    amLODIPine 10 MG Oral Tab Take 1 tablet (10 mg total) by mouth daily. 90 tablet 3    lisinopril 40 MG Oral Tab Take 1 tablet (40 mg total) by mouth daily. 90 tablet 3    pantoprazole 40 MG Oral Tab EC Take 1 tablet (40 mg total) by mouth before breakfast. 90 tablet 3    ZENPEP 67273-51078 units Oral Cap DR Particles Take 1 capsule (10,000 Units total) by mouth 3 (three) times daily with meals.  TAKE 1 CAPSULE BY MOUTH THREE TIMES DAILY DAILY WITH MEALS 810 capsule 0    methylPREDNISolone (MEDROL) 4 MG Oral Tablet Therapy Pack Take as directed (Patient not taking: Reported on 10/12/2023) 1 each 0     Allergies:  Morphine                ITCHING    Past Medical History:   Diagnosis Date    Alcohol abuse     Back problem     Colon adenoma 05/25/2022    x1    High blood pressure     Microcytosis     Pancreatic cyst     Pancreatitis     Pancreatitis, alcoholic, acute     Pulmonary nodule 2013    Tobacco use disorder     Unspecified essential hypertension       Past Surgical History:   Procedure Laterality Date    COLONOSCOPY  07/12/2023    EGD  07/20/2016    ELECTROCARDIOGRAM, COMPLETE  12/26/2013    scanned to media tab    OTHER SURGICAL HISTORY      pancreatic cyst drainage by Dr Anaid Stephens. then saw Rachel Diaz had procedure done by Dr Kimberley Martínez for the cyst    UPPER GI ENDOSCOPY,BIOPSY  07/2016      Family History   Problem Relation Age of Onset    Hypertension Father     Cancer Father     Diabetes Mother     Hypertension Mother     Heart Disorder Mother     Other (Other) Mother         copd    Other (Other) Brother         motorbke accident      Social History:   Social History     Socioeconomic History    Marital status:    Tobacco Use    Smoking status: Every Day     Packs/day: 0.50     Years: 20.00     Additional pack years: 0.00     Total pack years: 10.00     Types: Cigarettes    Smokeless tobacco: Never   Vaping Use    Vaping Use: Some days   Substance and Sexual Activity    Alcohol use: Not Currently     Comment: 1 pint a week    Drug use: Not Currently     Frequency: 1.0 times per week     Types: Cannabis     Comment: occasionaly   Other Topics Concern    Caffeine Concern Yes     Comment: 1 ice coffee daily    Exercise Yes        Objective:   Physical Exam  Constitutional:       Appearance: He is well-developed. He is not diaphoretic. HENT:      Head: Normocephalic and atraumatic.       Right Ear: External ear normal.      Left Ear: External ear normal.      Nose: Nose normal. Mouth/Throat:      Pharynx: No oropharyngeal exudate. Eyes:      General:         Right eye: No discharge. Left eye: No discharge. Conjunctiva/sclera: Conjunctivae normal.      Pupils: Pupils are equal, round, and reactive to light. Neck:      Vascular: No JVD. Cardiovascular:      Rate and Rhythm: Normal rate and regular rhythm. Heart sounds: Normal heart sounds. Pulmonary:      Effort: Pulmonary effort is normal. No respiratory distress. Breath sounds: Normal breath sounds. No wheezing or rales. Abdominal:      General: Bowel sounds are normal. There is no distension. Palpations: Abdomen is soft. There is no mass. Tenderness: There is no abdominal tenderness. There is no guarding or rebound. Musculoskeletal:         General: No tenderness. Normal range of motion. Cervical back: Normal range of motion and neck supple. Right lower leg: No edema. Left lower leg: No edema. Lymphadenopathy:      Cervical: No cervical adenopathy. Skin:     General: Skin is warm and dry. Findings: No rash. Neurological:      Mental Status: He is alert and oriented to person, place, and time. Assessment & Plan:   Insomnia, unspecified type  (primary encounter diagnosis)(G47.00) Insomnia, unspecified type  (primary encounter diagnosis)  Plan: Patient having chronic insomnia, has been under a lot of stress also because of wife being ill. I told him he could increase his Ambien to 10 mg at bedtime as needed. Call us back if there is no improvement then we will have to refer him to sleep specialist.    Orders Placed This Encounter      Fluzone Quadrivalent 6mo+ 0.5mL      Meds This Visit:  Requested Prescriptions     Signed Prescriptions Disp Refills    zolpidem 10 MG Oral Tab 15 tablet 0     Sig: Take 1 tablet (10 mg total) by mouth nightly as needed for Sleep.        Imaging & Referrals:  INFLUENZA VACCINE, QUAD, PRESERVATIVE FREE, 0.5 ML

## 2023-10-17 ENCOUNTER — PATIENT MESSAGE (OUTPATIENT)
Dept: INTERNAL MEDICINE CLINIC | Facility: CLINIC | Age: 48
End: 2023-10-17

## 2023-10-17 NOTE — TELEPHONE ENCOUNTER
Left message for pt to call back to inform that Dr Mirta Ribeiro is able to see pt on 10/18 at 2:45pm for medical clearance. If pt calls back please confirm date and time and schedule appt. [employer] message also sent to pt.

## 2023-10-17 NOTE — TELEPHONE ENCOUNTER
PRE OP INSTRUCTIONS REVIEWED, PATIENT STATED UNDERSTANDING. SURGERY SCHEDULING DIRECT NUMBER GIVEN.      [facility][1] ENTERED

## 2023-10-18 ENCOUNTER — PATIENT MESSAGE (OUTPATIENT)
Dept: ORTHOPEDICS CLINIC | Facility: CLINIC | Age: 48
End: 2023-10-18

## 2023-10-18 ENCOUNTER — OFFICE VISIT (OUTPATIENT)
Dept: INTERNAL MEDICINE CLINIC | Facility: CLINIC | Age: 48
End: 2023-10-18

## 2023-10-18 ENCOUNTER — LAB ENCOUNTER (OUTPATIENT)
Dept: LAB | Age: 48
End: 2023-10-18
Attending: INTERNAL MEDICINE
Payer: COMMERCIAL

## 2023-10-18 VITALS
SYSTOLIC BLOOD PRESSURE: 120 MMHG | WEIGHT: 187.5 LBS | BODY MASS INDEX: 23.31 KG/M2 | OXYGEN SATURATION: 98 % | TEMPERATURE: 98 F | DIASTOLIC BLOOD PRESSURE: 72 MMHG | HEART RATE: 109 BPM | HEIGHT: 75 IN

## 2023-10-18 DIAGNOSIS — M51.26 LUMBAR DISC HERNIATION: ICD-10-CM

## 2023-10-18 DIAGNOSIS — I10 ESSENTIAL HYPERTENSION: ICD-10-CM

## 2023-10-18 DIAGNOSIS — Z01.818 PREOP GENERAL PHYSICAL EXAM: ICD-10-CM

## 2023-10-18 DIAGNOSIS — R97.20 ELEVATED PSA: ICD-10-CM

## 2023-10-18 DIAGNOSIS — M51.26 LUMBAR DISC HERNIATION: Primary | ICD-10-CM

## 2023-10-18 DIAGNOSIS — Z01.818 PREOP GENERAL PHYSICAL EXAM: Primary | ICD-10-CM

## 2023-10-18 DIAGNOSIS — K86.0 ALCOHOL-INDUCED CHRONIC PANCREATITIS (HCC): ICD-10-CM

## 2023-10-18 LAB
ANION GAP SERPL CALC-SCNC: 8 MMOL/L (ref 0–18)
ANTIBODY SCREEN: NEGATIVE
APTT PPP: 30.5 SECONDS (ref 23.3–35.6)
ATRIAL RATE: 76 BPM
BASOPHILS # BLD AUTO: 0.02 X10(3) UL (ref 0–0.2)
BASOPHILS NFR BLD AUTO: 0.3 %
BUN BLD-MCNC: 14 MG/DL (ref 7–18)
BUN/CREAT SERPL: 12.6 (ref 10–20)
CALCIUM BLD-MCNC: 8.5 MG/DL (ref 8.5–10.1)
CHLORIDE SERPL-SCNC: 110 MMOL/L (ref 98–112)
CO2 SERPL-SCNC: 28 MMOL/L (ref 21–32)
CREAT BLD-MCNC: 1.11 MG/DL
DEPRECATED RDW RBC AUTO: 55 FL (ref 35.1–46.3)
EGFRCR SERPLBLD CKD-EPI 2021: 82 ML/MIN/1.73M2 (ref 60–?)
EOSINOPHIL # BLD AUTO: 0.04 X10(3) UL (ref 0–0.7)
EOSINOPHIL NFR BLD AUTO: 0.6 %
ERYTHROCYTE [DISTWIDTH] IN BLOOD BY AUTOMATED COUNT: 14.9 % (ref 11–15)
FASTING STATUS PATIENT QL REPORTED: NO
GLUCOSE BLD-MCNC: 112 MG/DL (ref 70–99)
HCT VFR BLD AUTO: 37.9 %
HGB BLD-MCNC: 12.5 G/DL
IMM GRANULOCYTES # BLD AUTO: 0.02 X10(3) UL (ref 0–1)
IMM GRANULOCYTES NFR BLD: 0.3 %
INR BLD: 1.04 (ref 0.8–1.2)
LYMPHOCYTES # BLD AUTO: 2.06 X10(3) UL (ref 1–4)
LYMPHOCYTES NFR BLD AUTO: 32.5 %
MCH RBC QN AUTO: 32.9 PG (ref 26–34)
MCHC RBC AUTO-ENTMCNC: 33 G/DL (ref 31–37)
MCV RBC AUTO: 99.7 FL
MONOCYTES # BLD AUTO: 0.37 X10(3) UL (ref 0.1–1)
MONOCYTES NFR BLD AUTO: 5.8 %
NEUTROPHILS # BLD AUTO: 3.82 X10 (3) UL (ref 1.5–7.7)
NEUTROPHILS # BLD AUTO: 3.82 X10(3) UL (ref 1.5–7.7)
NEUTROPHILS NFR BLD AUTO: 60.5 %
OSMOLALITY SERPL CALC.SUM OF ELEC: 303 MOSM/KG (ref 275–295)
P AXIS: 78 DEGREES
P-R INTERVAL: 120 MS
PLATELET # BLD AUTO: 292 10(3)UL (ref 150–450)
POTASSIUM SERPL-SCNC: 4 MMOL/L (ref 3.5–5.1)
PROTHROMBIN TIME: 14.3 SECONDS (ref 11.6–14.8)
PSA SERPL-MCNC: 7.06 NG/ML (ref ?–4)
Q-T INTERVAL: 360 MS
QRS DURATION: 70 MS
QTC CALCULATION (BEZET): 405 MS
R AXIS: 68 DEGREES
RBC # BLD AUTO: 3.8 X10(6)UL
RH BLOOD TYPE: POSITIVE
SODIUM SERPL-SCNC: 146 MMOL/L (ref 136–145)
T AXIS: 6 DEGREES
VENTRICULAR RATE: 76 BPM
WBC # BLD AUTO: 6.3 X10(3) UL (ref 4–11)

## 2023-10-18 PROCEDURE — 85610 PROTHROMBIN TIME: CPT

## 2023-10-18 PROCEDURE — 85730 THROMBOPLASTIN TIME PARTIAL: CPT

## 2023-10-18 PROCEDURE — 84153 ASSAY OF PSA TOTAL: CPT

## 2023-10-18 PROCEDURE — 86850 RBC ANTIBODY SCREEN: CPT | Performed by: INTERNAL MEDICINE

## 2023-10-18 PROCEDURE — 93005 ELECTROCARDIOGRAM TRACING: CPT

## 2023-10-18 PROCEDURE — 93010 ELECTROCARDIOGRAM REPORT: CPT | Performed by: INTERNAL MEDICINE

## 2023-10-18 PROCEDURE — 85025 COMPLETE CBC W/AUTO DIFF WBC: CPT

## 2023-10-18 PROCEDURE — 86900 BLOOD TYPING SEROLOGIC ABO: CPT | Performed by: INTERNAL MEDICINE

## 2023-10-18 PROCEDURE — 3074F SYST BP LT 130 MM HG: CPT | Performed by: INTERNAL MEDICINE

## 2023-10-18 PROCEDURE — 86901 BLOOD TYPING SEROLOGIC RH(D): CPT | Performed by: INTERNAL MEDICINE

## 2023-10-18 PROCEDURE — 99214 OFFICE O/P EST MOD 30 MIN: CPT | Performed by: INTERNAL MEDICINE

## 2023-10-18 PROCEDURE — 87641 MR-STAPH DNA AMP PROBE: CPT

## 2023-10-18 PROCEDURE — 3078F DIAST BP <80 MM HG: CPT | Performed by: INTERNAL MEDICINE

## 2023-10-18 PROCEDURE — 3008F BODY MASS INDEX DOCD: CPT | Performed by: INTERNAL MEDICINE

## 2023-10-18 PROCEDURE — 36415 COLL VENOUS BLD VENIPUNCTURE: CPT

## 2023-10-18 PROCEDURE — 80048 BASIC METABOLIC PNL TOTAL CA: CPT

## 2023-10-18 RX ORDER — GABAPENTIN 300 MG/1
300 CAPSULE ORAL 3 TIMES DAILY
Qty: 90 CAPSULE | Refills: 1 | Status: SHIPPED | OUTPATIENT
Start: 2023-10-18

## 2023-10-18 NOTE — TELEPHONE ENCOUNTER
From: Kala Feliz  To: Alma Rosa Young  Sent: 10/18/2023 10:56 AM CDT  Subject: Pain    Good morning Dr. Bette Alarcon,    Currently I am still waiting on my mail order of gabapetin and I am out of norco. Can either be prescribed until surgery. I do have a doctors appointment with my PC to be cleared for surgery.     Kind regards

## 2023-10-18 NOTE — PROGRESS NOTES
Subjective:     Patient ID: Danita Gaytan is a 50year old male. Patient presents today for preop medical clearance as requested by Dr. Christophe Egan spine surgeon for scheduled microdiscectomy L4-L5 to be done on November 1, 2023 at [facility].        No hx of CAD, CVA, CHF, IDDM, nor CKD. No history of ZURDO nor bleeding/clotting history of before. History/Other:   Review of Systems   Constitutional: Negative. HENT: Negative. Eyes: Negative. Respiratory: Negative. Cardiovascular:  Negative for chest pain, palpitations and leg swelling. No pnd/orthopnea/no leg edema       Gastrointestinal: Negative. Genitourinary: Negative. Allergic/Immunologic: Negative for immunocompromised state. Hematological: Negative. Current Outpatient Medications   Medication Sig Dispense Refill    HYDROcodone-acetaminophen (NORCO) 5-325 MG Oral Tab Take 1 tablet by mouth every 6 (six) hours as needed for Pain. 20 tablet 0    oxybutynin ER 10 MG Oral Tablet 24 Hr       zolpidem 10 MG Oral Tab Take 1 tablet (10 mg total) by mouth nightly as needed for Sleep. 15 tablet 0    ondansetron 4 MG Oral Tablet Dispersible Take 1 tablet (4 mg total) by mouth every 8 (eight) hours as needed for Nausea. 20 tablet 0    GABAPENTIN 300 MG Oral Cap TAKE 1 CAPSULE BY MOUTH 3 TIMES  DAILY 90 capsule 0    tamsulosin 0.4 MG Oral Cap Take 1 capsule (0.4 mg total) by mouth daily. 90 capsule 3    Mirabegron ER 25 MG Oral Tablet 24 Hr Take 1 tablet (25 mg total) by mouth daily. 90 tablet 5    amLODIPine 10 MG Oral Tab Take 1 tablet (10 mg total) by mouth daily. 90 tablet 3    lisinopril 40 MG Oral Tab Take 1 tablet (40 mg total) by mouth daily. 90 tablet 3    pantoprazole 40 MG Oral Tab EC Take 1 tablet (40 mg total) by mouth before breakfast. 90 tablet 3    ZENPEP 69561-21268 units Oral Cap DR Particles Take 1 capsule (10,000 Units total) by mouth 3 (three) times daily with meals.  TAKE 1 CAPSULE BY MOUTH THREE TIMES DAILY DAILY WITH MEALS 810 capsule 0    HYDROcodone-acetaminophen  MG Oral Tab Take 1 tablet by mouth every 8 (eight) hours as needed for Pain. 30 tablet 0    methylPREDNISolone (MEDROL) 4 MG Oral Tablet Therapy Pack Take as directed (Patient not taking: Reported on 10/12/2023) 1 each 0     Allergies:  Morphine                ITCHING    Past Medical History:   Diagnosis Date    Alcohol abuse     Back problem     Colon adenoma 05/25/2022    x1    High blood pressure     Microcytosis     Pancreatic cyst     Pancreatitis     Pancreatitis, alcoholic, acute     Pulmonary nodule 2013    Tobacco use disorder     Unspecified essential hypertension       Past Surgical History:   Procedure Laterality Date    COLONOSCOPY  07/12/2023    EGD  07/20/2016    ELECTROCARDIOGRAM, COMPLETE  12/26/2013    scanned to media tab    OTHER SURGICAL HISTORY      pancreatic cyst drainage by Dr Jos Palma.  then saw Juventino De Leon had procedure done by Dr Adriana Live for the cyst    UPPER GI ENDOSCOPY,BIOPSY  07/2016      Family History   Problem Relation Age of Onset    Hypertension Father     Cancer Father     Diabetes Mother     Hypertension Mother     Heart Disorder Mother     Other (Other) Mother         copd    Other (Other) Brother         motorbke accident      Social History:   Social History     Socioeconomic History    Marital status:    Tobacco Use    Smoking status: Every Day     Packs/day: 0.50     Years: 20.00     Additional pack years: 0.00     Total pack years: 10.00     Types: Cigarettes     Passive exposure: Current    Smokeless tobacco: Never   Vaping Use    Vaping Use: Some days   Substance and Sexual Activity    Alcohol use: Not Currently     Comment: 1 pint a week    Drug use: Not Currently     Frequency: 1.0 times per week     Types: Cannabis     Comment: occasionaly   Other Topics Concern    Caffeine Concern Yes     Comment: 1 ice coffee daily    Exercise Yes        Objective:   Physical Exam  Constitutional: General: He is not in acute distress. Appearance: Normal appearance. He is well-developed. He is not ill-appearing, toxic-appearing or diaphoretic. HENT:      Head: Normocephalic and atraumatic. Right Ear: Tympanic membrane, ear canal and external ear normal.      Left Ear: Tympanic membrane, ear canal and external ear normal.      Nose: Nose normal.      Mouth/Throat:      Pharynx: No oropharyngeal exudate. Eyes:      General: No scleral icterus. Right eye: No discharge. Left eye: No discharge. Conjunctiva/sclera: Conjunctivae normal.      Pupils: Pupils are equal, round, and reactive to light. Neck:      Thyroid: No thyromegaly. Vascular: No JVD. Cardiovascular:      Rate and Rhythm: Normal rate and regular rhythm. Pulses: Normal pulses. Heart sounds: Murmur heard. No friction rub. No gallop. Pulmonary:      Effort: Pulmonary effort is normal. No respiratory distress. Breath sounds: Normal breath sounds. No wheezing or rales. Abdominal:      General: Abdomen is flat. Bowel sounds are normal. There is no distension. Palpations: Abdomen is soft. There is no mass. Tenderness: There is no abdominal tenderness. There is no guarding or rebound. Musculoskeletal:         General: Normal range of motion. Cervical back: Normal range of motion and neck supple. No rigidity. Right lower leg: No edema. Left lower leg: No edema. Lymphadenopathy:      Cervical: No cervical adenopathy. Skin:     General: Skin is warm and dry. Coloration: Skin is not jaundiced or pale. Findings: No rash. Neurological:      Mental Status: He is alert and oriented to person, place, and time.    Psychiatric:         Mood and Affect: Mood normal.         Assessment & Plan:   (H06.232) Preop general physical exam  (primary encounter diagnosis)  Plan: CBC With Differential With Platelet, Basic         Metabolic Panel (8), Prothrombin Time (PT) [E],        PTT, Activated [E], Type and screen, MRSA         Screen by PCR, EKG 12 Lead to be performed at         [id number] [address] [facility] labs were done and were acceptable range however EKG was  abnormal and with changes from old EKG. Will need to see cardio for cardiac clearance before we can medically clear him.     (M51.26) Lumbar disc herniation  Plan: as per recommendation by Dr Farheen Castillo.     ([facility]) Essential hypertension  Plan: bp controllled with current bp meds. Cpm     (K86.0) Alcohol-induced chronic pancreatitis ([facility] [address]  Plan: pt had been sober from alcohol for this year per pt.          Orders Placed This Encounter      CBC With Differential With Platelet      Basic Metabolic Panel (8)      Prothrombin Time (PT) [E]      PTT, Activated [E]      Type and screen      MRSA Screen by PCR      Meds This Visit:  Requested Prescriptions      No prescriptions requested or ordered in this encounter       Imaging & Referrals:  None

## 2023-10-18 NOTE — TELEPHONE ENCOUNTER
DOS: 11/1/23 UPCOMING  Last OV: 10/12/23  Last refill date: 10/12/20     #/refills: 20/0 ([place], did not receive last fill of gabapentin from pharmacy)  Upcoming appt:    Future Appointments   Date Time Provider Lizandro Mack   11/16/2023  8:30 AM [place] SHERITA Smith [facility]   11/24/2023  8:45 AM Aquilino Lira MD [facility]

## 2023-10-19 ENCOUNTER — TELEPHONE (OUTPATIENT)
Dept: INTERNAL MEDICINE CLINIC | Facility: CLINIC | Age: 48
End: 2023-10-19

## 2023-10-19 LAB — MRSA DNA SPEC QL NAA+PROBE: NEGATIVE

## 2023-10-19 RX ORDER — HYDROCODONE BITARTRATE AND ACETAMINOPHEN 5; 325 MG/1; MG/1
1 TABLET ORAL EVERY 6 HOURS PRN
Qty: 20 TABLET | Refills: 0 | Status: SHIPPED | OUTPATIENT
Start: 2023-10-19

## 2023-10-19 NOTE — PROGRESS NOTES
Alexander Daniel,    I have reviewed your test results. Good news, your PSA is stable at 7.06. Please let me know if you have any questions.     Thanks,  Frantz Clark MD

## 2023-10-19 NOTE — TELEPHONE ENCOUNTER
Called [facility] at [phone], spoke with Cinda Tamayo, cardiac clearance appt scheduled on 10/19 at 1pm with Dr Katya Batista. Spoke with pt, verified , informed pt of appt and provided address, pt verbalized understanding.

## 2023-10-23 ENCOUNTER — MED REC SCAN ONLY (OUTPATIENT)
Dept: INTERNAL MEDICINE CLINIC | Facility: CLINIC | Age: 48
End: 2023-10-23

## 2023-10-25 ENCOUNTER — TELEPHONE (OUTPATIENT)
Dept: INTERNAL MEDICINE CLINIC | Facility: CLINIC | Age: 48
End: 2023-10-25

## 2023-10-25 ENCOUNTER — NURSE ONLY (OUTPATIENT)
Dept: SURGERY | Facility: CLINIC | Age: 48
End: 2023-10-25

## 2023-10-25 NOTE — TELEPHONE ENCOUNTER
Carlos Hernandez  [phone]    Please have Dr Luca Odom adden his notes that pt received cardiac clearance and now is cleared for surgery by pcp. Said to [facility].

## 2023-10-25 NOTE — PROGRESS NOTES
RN Spine Navigator Education for Chuy [place]     Patient is scheduled for a Microdiscectomy with Dr. Christophe Egan on 11/03/23. Patient attended spine navigator visit with care partner. Care partner is [place]. Reviewed expectation that the care partner must be able to provide transportation to and from the hospital. They must help at home for the first week after discharge, including assistance with meals, medication, and dressing changes. It is strongly recommended that someone stays with the patient for 24 hours after anesthesia if going home the day of surgery. Patient Surgical Goals: Improved walking, back to work. Before Your Surgery    Prehab and Clearance  Appointments include labs, nasal swab, imaging, EKG, PCP/cardiac clearance. Patient has also been to pain management. Make sure to complete all preadmission testing so that surgery does not get delayed. Home Environment  Assessed home status: home has stairs, bathroom and bedroom are upstairs, and patient has pets. It is recommended that you prepare your home by putting clean sheets on bed, freezing meals, and putting frequently used items at waist level. Prevent falls by removing items that could cause you to trip, adding nightlights and adding a nonskid mat in shower. Assistive devices can be purchased at a medical supply store including canes, walkers, toileting devices, long handled sponge, shower chair or tub transfer bench, reacher, sock aid, long handled shoehorn, if needed. Smoking Cessation   Educated on the importance of smoking cessation and Smoking any amount or use of nicotine products can delay or prevent healing    Medications to Stop   Do not take any blood thinning medications such as non-steroidal anti-inflammatories (Advil, Aleve, ibuprofen, meloxicam etc.), aspirin (unless told otherwise by your cardiologist), herbal supplements (garlic, turmeric etc.), vitamin E, fish oil or krill oil for at least 7-10 days prior to surgery.  You may only take Tylenol or prescribed narcotic medication if needed for pain. Other medications to stop include: hold ACE/ARBS day of surgery -lisinopril    Leading Up to Day of Surgery  Five days before surgery do Hibiclens soap after your regular body soap. Your last shower should be the night before surgery  Do not shave the surgical area before surgery. One-two business days before surgery, the preadmission testing staff will call you and let you know when to arrive, where to park, when to take your Tylenol and Gatorade, and will provide any additional instructions. After 11pm the day before surgery, do not eat or drink anything including water, gum, or candies. Drink 12 ounces of regular Gatorade (NOT RED) 12 hours prior to your scheduled surgery time. Drink your second 12 ounces of regular Gatorade and take 1000 mg of Tylenol (acetaminophen) 4 hours before your scheduled surgery time. Items to Bring to the [facility] card, ID, advanced directive or medical power of  paperwork, loose fitting clothing, closed toed shoes with a back and a rubber sole that can accommodate swollen feet, long phone charging cord. Do not bring jewelry, valuables, or medications. If you take an uncommon medication that the hospital may not have, it must be brought to the hospital in the original container. In the Hospital     Operative Day and Hospital Stay  In the preoperative area, you will change into a gown, have an IV placed in your hand or arm by the nurse and sign any consent paperwork that is needed for your procedure. You will speak to the surgeon and anesthesiologist. It is important to tell the doctors and nurses if you have had any significant side effects from pain medications or anesthesia such as a rash or severe nausea. In the operating room, the anesthesiologist will hook you up to monitors, give you oxygen through a mask, and give you medicine through the IV to fall asleep. After you are sleeping, the breathing tube will be placed, and you will be positioned on the operating table. You will wake up on the stretcher. During the procedure, the care partner can sit in the surgical waiting area. There are TV screens that say where the patient is in the surgical process. If the procedure is at [facility], there is a liaison that will provide the care partner with updates about the patient. In the recovery room, you will be hooked to monitors and given medications for pain and nausea as needed. While you may not remember this part, a nurse is with you and constantly checking on your condition. You may have a kamara catheter to empty your bladder or a drain coming from your surgical site when you wake up depending on the type and length of surgery. Your family is not generally allowed in the recovery room. When you have met criteria to leave the recovery room, you will go to the same day surgery discharge area. Your family can sit with you there as you continue to wake up, have something to eat and drink, and get your discharge instructions. You can take pain pills in this area if needed. [address] [facility] nurses and/or therapists may be recommended to provide additional help after discharge. The best place to recover is your own home. Preventing surgical complications  Blood clots/pulmonary embolism: ankle pumps, walking, SCDs  Infection: presurgical hibiclens baths, antibiotics, hand washing, don't touch incision  Pneumonia: cough and deep breathe  Nausea/vomiting: start with liquids and small meals, do not take pills on an empty stomach  Constipation: stay hydrated, walk frequently  Tell your nurse if you are experiencing nausea, vomiting or constipation as they have medications to help treat these.      Additional surgical complications  Other risks include side effects from anesthesia, spinal cord or nerve damage (sensory loss, paralysis, bowel or bladder incontinence), spinal fluid leak, bleeding, hoarseness/swallowing problems, unimproved or increased symptoms, eye injury, stroke, and death. At home     Understanding Pain After Surgery  We will do our best to manage your pain after surgery, but it is not possible to be completely pain free. There will be operative pain in your back. Pain in the extremities is usually one of the first symptoms to improve. Numbness, weakness, and tingling should improve over time as your nerves heal, however, there can be a temporary increase in symptoms in the first few days due to inflammation. Pain medications will be prescribed to take home as discharge. If you are at [facility], your prescriptions can be filled by our pharmacy and brought to you bedside. We encourage you to use the medication prescribed to you after your surgery, but please take the lowest possible dose to manage your pain. Taking high doses of narcotics can cause side effects. Transition to plain Tylenol when your pain improves. You may get more continuous pain relief by alternating between medications if you have multiple instead of taking them at the same time (Example: Narcotic at 9am, muscle relaxer at 12pm, narcotic at 3pm, muscle relaxer at 6pm). Write down when you have taken a medication as it may be difficult to remember after a few doses. Post operative medication   Tylenol (acetaminophen): take for pain. Do not take more than 3000mg-4000mg per day because it can damage your liver. Narcotics: take for moderate to severe pain. Some narcotic medications (Norco, Tylenol #3, Percocet, Vicodin) contain Tylenol. Make sure to not exceed the maximum dose if you are taking additional Tylenol with these medications. Do not drink alcohol or drive while taking narcotics. Muscle relaxers: take for muscle cramping. These can cause drowsiness. NSAIDS (Advil, Aleve, ibuprofen, meloxicam etc.) or aspirin: Do not take these unless your physician says it is ok.  For a fusion, it may be several months before you can take NSAIDS. Stool softeners: take to prevent constipation while you are taking narcotic medications. You can get these over the counter at the pharmacy. You may use laxatives, which are stronger, if needed. Request a refill through your pharmacy or through the refill request in [employer] (log in, go to medications, then select refill request) at least two business days before you run out of medication if you believe you will need more. Nonpharmacologic pain management   You may use ice on your incision for 20 minutes every hour to help with pain and swelling. Do not place ice directly on your skin. You can use heat for muscle spasm but do place heat directly on your incision. Make frequent position changes. You can do gentle stretching while avoiding significant bending or twisting. Use deep breathing techniques and distractions such as TV, reading, or games. Movement restrictions  After surgery, no bending, lifting, or twisting. Do not lift anything over 10lbs or lift anything over your shoulders. Avoid pulling or pushing. You may use stairs while holding the handrail. Talk to your doctor at your post-operative appointment to see if you can decrease your restrictions. You may not drive while taking narcotics or muscle relaxants. It is ok to ride in the car but refrain from driving or traveling long distances until cleared to do so by your physician. Post Op Exercise   Walk frequently using a walker if needed. Start with walking short distances and increase as you start to feel better. Do ankle pumps (bringing your feet towards your head then point them towards the ground) 15-20 times every hour when awake to help prevent blood clots. Ask your surgeon about more intense exercise activities at your post-operative appointment.  You and your doctor will discuss how you are feeling as you heal and decide if outpatient physical therapy or a medical fitness referral is needed. Caring for your incision  Your incision will be closed with sutures under the skin and skin glue or Steri-Strips on top of the skin. Skin glue/Steri-Strips will come off on their own. The incision may be covered with a dressing that can come off after three days unless saturated with drainage before that. We prefer that you leave your incision open to air, but if it has drainage, you may place gauze and medical tape over it. Change the gauze and medical tape daily to check for signs of infection. Always wash your hands before touching your incision. You can shower three days after your surgery. We recommend the care partner be present during the first shower for safety. Let soapy water run over the incision, but do not scrub. Gently pat it dry after with a clean towel. Do not apply any creams or lotions to the incision. Do not soak in a tub, pool, or any body of water until your incision is fully healed. Signs of Infection   Check daily for swelling, redness, drainage, pus, foul smell, or separation of the incision. When to Call for Assistance  Call 911 or go to the nearest emergency room if you experience chest pain, difficulty breathing, loss of bowel or bladder control, extreme drowsiness, or any other life-threatening situation. Call the Ortho Spine Office ([phone] Option #3) if you experience persistent nausea or vomiting, signs of infection, separation of incision, poor pain control despite using medication as directed or sudden increase in pain, temperature over 101F, difficulty swallowing, leg swelling, or with any concerns, unanswered questions, or new problems, such as new numbness/weakness/tingling. Follow-up Plan   Appointments with Dr. Dina Stephenson or Napoleon Pearl at 2, 6 and 12 weeks    Answered questions regarding: home health     You can contact the RN Spine Navigator at [phone] or [email or web address] if additional questions arise regarding care.  Please do not call the RN spine navigator for refills or for emergencies. Spine navigator spent 38 minutes conducting a virtual visit to provide education. Thank you for letting the RN Spine Navigator participate in your care.

## 2023-10-25 NOTE — TELEPHONE ENCOUNTER
Attempted to contact Neil Degroot with Dr Lennox Lima at [phone], office is closed, will try again tomorrow.

## 2023-10-26 NOTE — TELEPHONE ENCOUNTER
Spoke with Justyna Cloud and informed that addendum was completed, Justyna Cloud states that he is able to see the addendum and no further action is required at this time.

## 2023-10-27 ENCOUNTER — ANESTHESIA EVENT (OUTPATIENT)
Dept: SURGERY | Facility: HOSPITAL | Age: 48
End: 2023-10-27
Payer: COMMERCIAL

## 2023-10-30 DIAGNOSIS — M51.26 LUMBAR DISC HERNIATION: ICD-10-CM

## 2023-10-30 RX ORDER — GABAPENTIN 300 MG/1
300 CAPSULE ORAL 3 TIMES DAILY
Qty: 90 CAPSULE | Refills: 1 | OUTPATIENT
Start: 2023-10-30

## 2023-10-30 RX ORDER — HYDROCODONE BITARTRATE AND ACETAMINOPHEN 5; 325 MG/1; MG/1
1 TABLET ORAL EVERY 6 HOURS PRN
Qty: 20 TABLET | Refills: 0 | Status: SHIPPED | OUTPATIENT
Start: 2023-10-30

## 2023-10-30 NOTE — TELEPHONE ENCOUNTER
DOS: scheduled 11/3/23  Last OV: 10/12/23    Gabapentin  Last refill date: 10/18/23     #/refills: 90/1    Norco 5-325 mg  Last refill date: 10/19/23     #/refills: 20/0    Upcoming appt:    Future Appointments   Date Time Provider Lizandro Mack   11/16/2023  8:30 AM SHERITA Lewis [facility]   11/24/2023  8:45 AM Donya Jones MD [facility]

## 2023-10-30 NOTE — TELEPHONE ENCOUNTER
Last refill of norco on 10/19/2023 with quantity to get to surgical intervention. Need to know how many tabs he is taking per day? How many tablets does he have left? Has a refill of gabapentin on 10/18/2023 Rx. To review with pharmacy for refill.

## 2023-10-30 NOTE — TELEPHONE ENCOUNTER
Requested Prescriptions     Signed Prescriptions Disp Refills    HYDROcodone-acetaminophen (NORCO) 5-325 MG Oral Tab 20 tablet 0     Sig: Take 1 tablet by mouth every 6 (six) hours as needed for Pain. Authorizing Provider: Dusty Garcia     Refused Prescriptions Disp Refills    gabapentin 300 MG Oral Cap 90 capsule 1     Sig: Take 1 capsule (300 mg total) by mouth 3 (three) times daily.      Refused By: Dusty Garcia     Reason for Refusal: Appt required, please call patient

## 2023-10-30 NOTE — DISCHARGE INSTRUCTIONS
Sometimes managing your health at home requires assistance. The [place] [facility] team has recognized your preference to use [facility]. They can be reached by phone at [phone]. The fax number for your reference is [phone]. A representative from the home health agency will contact you or your family to schedule your first visit. Spine Surgery Postoperative Instructions    Wound / Dressing Care:    Before changing your dressing, wash your hands (or ask your helper to wash their hands for 20 seconds). Do not apply creams, solutions, or ointments to the incision. You may remove the dressing 3 days after surgery, if there is no further drainage, and shower. If there is drainage, cover the wound with new dressing and wait until drainage is no longer present. Apply sterile dressing to wound until 7 days after surgery, then you may leave wound open to air if there is no drainage. Check the incision for increased warmth, redness, swelling, unexplained increase in pain, change in the drainage, or persistent drainage that is not decreasing. Call Dr. Mary Dover office [phone] if there are changes or concerns. If you have Steri strips, please leave them on until they are loose and almost falling off. In this case, you may then gently lift off the Steri strips. Showering:   Do no apply water direct over the wound. It's ok to let water run over the incision. Pat the incision dry with a clean towel and apply new dressing if less than 7 days. Do not bath, swim, or soak in water until cleared by your surgeon. Medications:  Please resume your pre-hospital medications unless otherwise instructed. If you usually take blood thinners, you will be given instructions about when to resume them. Please inform your primary care physician about your admission to the hospital and if there has been a change in your usual medications, while you were hospitalized. Managing pain:  You may have some ups and downs with your pain. You may be encouraged if you notice the gradual improvement in the pain as the days go by. You may be able to take the edge off the pain but not stop all your pain, however. Pain is part of the healing process after injury and surgery. Please follow the plan below to help control your pain and reduce the amount of narcotics you require. - Take 1000 mg of Acetaminophen (Tylenol) three times a day scheduled for first 5 days. Do not exceed more than 3,000 mg in a 24 hour period or mix with other medications that contain acetaminophen. Take Tylenol as needed if no longer requiring supplemental narcotics  - Supplement with Oxycodone 5-10mg every 4 hours as needed  - For muscle spasm type pain take muscle relaxant every 8 hours as needed    As your pain improves, please decrease the amount of pain medication (by taking fewer tablets and/or increasing the time between doses). Do not increase the dose once you have dropped down to a lesser amount. Hold your pain medication if you experience over sedation (sleeping too much), slurred speech, slow breathing, or hallucinations. If these symptoms occur, you will need to get advice on how and when it is safe to resume your pain medication. Please call for advice. Please do not drink alcohol, drive, or operate heavy machinery while taking your pain or anti spasm medication. For surgery related pain medication refills, if needed, please call the spine clinic directly [phone]  (please leave a message for call back) or your usual pain prescribing clinician. Constipation:   Pain medication often causes constipation. Try standing and moving for 1 minute each hour and work up to walking 30 minutes a day.     Drink fluids (32-64 ounces every day) unless patient has cardiac history    Minimize narcotic use    Use natural laxatives such as prune juice, but avoid coffee or caffeinated products that may disturb sleep cycle    Have high fiber diet, have smaller meals throughout the day    Take Miralax mixed with water or juice BID and Senna-S nightly until regular BM. Hold for loose, frequent, or water stools    If no BM, take Senna-s twice a day       Smoking:   Failure of fusion is as high as 65% in smokers and nicotine users. Therefore, spine patients should not smoke or use nicotine for 6 months after surgery. This is your time to quit. Activity:   Walking is encouraged. Avoid heavy lifting (no more than 10 pounds). Do bend at the waist to lift anything. Avoid extreme twisting. You may not drive a car until cleared to do so by the spine team. Please wear a seat belt. Sexual Activity:   After 2 weeks, when comfortable and approved by your surgeon. Stop if causing pain. When should you call the office: Call if  You have increased drainage and/or odor from you wound. You have increased redness/swelling at the incision site or unexplained incisional pain. You have a fever of greater than 101 degrees that lasts for several hours. Keep in mind that elevated temperature is common within the first several days after surgery. If available, take Tylenol and do deep breathing and coughing to reduce the temperature. If the fever persists after 5 days from surgery, then contact your surgeon. You have new or unfamiliar pain or weakness in your arms or legs. You have loss of control of urination or bowel movements, pain or numbness in the rectal, vaginal, or scrotal area. Constipation is very common especially when taking pain medications. If stool softeners, laxatives, and other treatments do not work, contact your PCP or surgeon. You have new tenderness in your calf, redness or discoloration of the leg, new shortness of breath, cough up blood, or have chest pain. These may be signs of a blood clot.      For life threatening emergency including difficulty breathing or chest pain, please call 911. Follow-up: If you have questions regarding your appointment or if an appointment has not been made, please speak with your surgeon's staff [phone].

## 2023-10-30 NOTE — TELEPHONE ENCOUNTER
\"I take them as needed\"    Patient taking medication every 3 hours prn - states \"I don't think these milligrams are working\"    DOS:11/3/23 scheduled  Pain   8/10  - If I take the medication at 8pm I will wake up from pain around midnight.    Tossing and turning and walking around at night due to pain  No change in pain  - no pills left

## 2023-11-03 ENCOUNTER — ANESTHESIA (OUTPATIENT)
Dept: SURGERY | Facility: HOSPITAL | Age: 48
End: 2023-11-03
Payer: COMMERCIAL

## 2023-11-03 ENCOUNTER — HOSPITAL ENCOUNTER (OUTPATIENT)
Facility: HOSPITAL | Age: 48
Setting detail: HOSPITAL OUTPATIENT SURGERY
Discharge: HOME HEALTH | End: 2023-11-03
Attending: STUDENT IN AN ORGANIZED HEALTH CARE EDUCATION/TRAINING PROGRAM | Admitting: STUDENT IN AN ORGANIZED HEALTH CARE EDUCATION/TRAINING PROGRAM
Payer: COMMERCIAL

## 2023-11-03 ENCOUNTER — APPOINTMENT (OUTPATIENT)
Dept: GENERAL RADIOLOGY | Facility: HOSPITAL | Age: 48
End: 2023-11-03
Attending: STUDENT IN AN ORGANIZED HEALTH CARE EDUCATION/TRAINING PROGRAM
Payer: COMMERCIAL

## 2023-11-03 VITALS
DIASTOLIC BLOOD PRESSURE: 76 MMHG | TEMPERATURE: 98 F | WEIGHT: 170.88 LBS | BODY MASS INDEX: 21.25 KG/M2 | HEIGHT: 75 IN | RESPIRATION RATE: 13 BRPM | HEART RATE: 83 BPM | OXYGEN SATURATION: 100 % | SYSTOLIC BLOOD PRESSURE: 149 MMHG

## 2023-11-03 PROCEDURE — 0SB20ZZ EXCISION OF LUMBAR VERTEBRAL DISC, OPEN APPROACH: ICD-10-PCS | Performed by: STUDENT IN AN ORGANIZED HEALTH CARE EDUCATION/TRAINING PROGRAM

## 2023-11-03 PROCEDURE — 76000 FLUOROSCOPY <1 HR PHYS/QHP: CPT | Performed by: STUDENT IN AN ORGANIZED HEALTH CARE EDUCATION/TRAINING PROGRAM

## 2023-11-03 RX ORDER — ACETAMINOPHEN 500 MG
1000 TABLET ORAL EVERY 8 HOURS
Qty: 60 TABLET | Refills: 0 | Status: SHIPPED | OUTPATIENT
Start: 2023-11-03

## 2023-11-03 RX ORDER — MIDAZOLAM HYDROCHLORIDE 1 MG/ML
INJECTION INTRAMUSCULAR; INTRAVENOUS AS NEEDED
Status: DISCONTINUED | OUTPATIENT
Start: 2023-11-03 | End: 2023-11-03 | Stop reason: SURG

## 2023-11-03 RX ORDER — ACETAMINOPHEN 500 MG
1000 TABLET ORAL ONCE
Status: DISCONTINUED | OUTPATIENT
Start: 2023-11-03 | End: 2023-11-03 | Stop reason: HOSPADM

## 2023-11-03 RX ORDER — ONDANSETRON 2 MG/ML
4 INJECTION INTRAMUSCULAR; INTRAVENOUS EVERY 6 HOURS PRN
Status: DISCONTINUED | OUTPATIENT
Start: 2023-11-03 | End: 2023-11-03

## 2023-11-03 RX ORDER — HYDROMORPHONE HYDROCHLORIDE 1 MG/ML
0.4 INJECTION, SOLUTION INTRAMUSCULAR; INTRAVENOUS; SUBCUTANEOUS EVERY 5 MIN PRN
Status: DISCONTINUED | OUTPATIENT
Start: 2023-11-03 | End: 2023-11-03

## 2023-11-03 RX ORDER — NALOXONE HYDROCHLORIDE 0.4 MG/ML
0.08 INJECTION, SOLUTION INTRAMUSCULAR; INTRAVENOUS; SUBCUTANEOUS AS NEEDED
Status: DISCONTINUED | OUTPATIENT
Start: 2023-11-03 | End: 2023-11-03

## 2023-11-03 RX ORDER — OXYCODONE HYDROCHLORIDE 5 MG/1
5 TABLET ORAL EVERY 4 HOURS PRN
Status: DISCONTINUED | OUTPATIENT
Start: 2023-11-03 | End: 2023-11-03

## 2023-11-03 RX ORDER — HYDROMORPHONE HYDROCHLORIDE 1 MG/ML
0.2 INJECTION, SOLUTION INTRAMUSCULAR; INTRAVENOUS; SUBCUTANEOUS EVERY 5 MIN PRN
Status: DISCONTINUED | OUTPATIENT
Start: 2023-11-03 | End: 2023-11-03

## 2023-11-03 RX ORDER — ONDANSETRON 4 MG/1
4 TABLET, FILM COATED ORAL EVERY 8 HOURS PRN
Qty: 20 TABLET | Refills: 0 | Status: SHIPPED | OUTPATIENT
Start: 2023-11-03

## 2023-11-03 RX ORDER — CEFAZOLIN SODIUM/WATER 2 G/20 ML
SYRINGE (ML) INTRAVENOUS
Status: DISCONTINUED
Start: 2023-11-03 | End: 2023-11-03

## 2023-11-03 RX ORDER — KETOROLAC TROMETHAMINE 30 MG/ML
INJECTION, SOLUTION INTRAMUSCULAR; INTRAVENOUS AS NEEDED
Status: DISCONTINUED | OUTPATIENT
Start: 2023-11-03 | End: 2023-11-03 | Stop reason: SURG

## 2023-11-03 RX ORDER — SODIUM CHLORIDE, SODIUM LACTATE, POTASSIUM CHLORIDE, CALCIUM CHLORIDE 600; 310; 30; 20 MG/100ML; MG/100ML; MG/100ML; MG/100ML
INJECTION, SOLUTION INTRAVENOUS CONTINUOUS
Status: DISCONTINUED | OUTPATIENT
Start: 2023-11-03 | End: 2023-11-03

## 2023-11-03 RX ORDER — PROCHLORPERAZINE EDISYLATE 5 MG/ML
5 INJECTION INTRAMUSCULAR; INTRAVENOUS EVERY 8 HOURS PRN
Status: DISCONTINUED | OUTPATIENT
Start: 2023-11-03 | End: 2023-11-03

## 2023-11-03 RX ORDER — HYDROCODONE BITARTRATE AND ACETAMINOPHEN 5; 325 MG/1; MG/1
2 TABLET ORAL ONCE AS NEEDED
Status: DISCONTINUED | OUTPATIENT
Start: 2023-11-03 | End: 2023-11-03

## 2023-11-03 RX ORDER — CEFAZOLIN SODIUM/WATER 2 G/20 ML
2 SYRINGE (ML) INTRAVENOUS ONCE
Status: COMPLETED | OUTPATIENT
Start: 2023-11-03 | End: 2023-11-03

## 2023-11-03 RX ORDER — ROCURONIUM BROMIDE 10 MG/ML
INJECTION, SOLUTION INTRAVENOUS AS NEEDED
Status: DISCONTINUED | OUTPATIENT
Start: 2023-11-03 | End: 2023-11-03 | Stop reason: SURG

## 2023-11-03 RX ORDER — NEOSTIGMINE METHYLSULFATE 1 MG/ML
INJECTION, SOLUTION INTRAVENOUS AS NEEDED
Status: DISCONTINUED | OUTPATIENT
Start: 2023-11-03 | End: 2023-11-03 | Stop reason: SURG

## 2023-11-03 RX ORDER — LIDOCAINE HYDROCHLORIDE 10 MG/ML
INJECTION, SOLUTION EPIDURAL; INFILTRATION; INTRACAUDAL; PERINEURAL AS NEEDED
Status: DISCONTINUED | OUTPATIENT
Start: 2023-11-03 | End: 2023-11-03 | Stop reason: SURG

## 2023-11-03 RX ORDER — SCOLOPAMINE TRANSDERMAL SYSTEM 1 MG/1
1 PATCH, EXTENDED RELEASE TRANSDERMAL ONCE
Status: DISCONTINUED | OUTPATIENT
Start: 2023-11-03 | End: 2023-11-03 | Stop reason: HOSPADM

## 2023-11-03 RX ORDER — HYDROCODONE BITARTRATE AND ACETAMINOPHEN 5; 325 MG/1; MG/1
1 TABLET ORAL ONCE AS NEEDED
Status: DISCONTINUED | OUTPATIENT
Start: 2023-11-03 | End: 2023-11-03

## 2023-11-03 RX ORDER — GLYCOPYRROLATE 0.2 MG/ML
INJECTION, SOLUTION INTRAMUSCULAR; INTRAVENOUS AS NEEDED
Status: DISCONTINUED | OUTPATIENT
Start: 2023-11-03 | End: 2023-11-03 | Stop reason: SURG

## 2023-11-03 RX ORDER — HYDROMORPHONE HYDROCHLORIDE 1 MG/ML
0.6 INJECTION, SOLUTION INTRAMUSCULAR; INTRAVENOUS; SUBCUTANEOUS EVERY 5 MIN PRN
Status: DISCONTINUED | OUTPATIENT
Start: 2023-11-03 | End: 2023-11-03

## 2023-11-03 RX ORDER — ONDANSETRON 2 MG/ML
INJECTION INTRAMUSCULAR; INTRAVENOUS AS NEEDED
Status: DISCONTINUED | OUTPATIENT
Start: 2023-11-03 | End: 2023-11-03 | Stop reason: SURG

## 2023-11-03 RX ORDER — HYDROMORPHONE HYDROCHLORIDE 1 MG/ML
INJECTION, SOLUTION INTRAMUSCULAR; INTRAVENOUS; SUBCUTANEOUS
Status: COMPLETED
Start: 2023-11-03 | End: 2023-11-03

## 2023-11-03 RX ORDER — DIAZEPAM 2 MG/1
2 TABLET ORAL EVERY 6 HOURS PRN
Qty: 20 TABLET | Refills: 0 | Status: SHIPPED | OUTPATIENT
Start: 2023-11-03 | End: 2023-11-07

## 2023-11-03 RX ORDER — OXYCODONE HYDROCHLORIDE 5 MG/1
5 TABLET ORAL EVERY 4 HOURS PRN
Qty: 20 TABLET | Refills: 0 | Status: SHIPPED | OUTPATIENT
Start: 2023-11-03 | End: 2023-11-07

## 2023-11-03 RX ORDER — SENNA AND DOCUSATE SODIUM 50; 8.6 MG/1; MG/1
1 TABLET, FILM COATED ORAL DAILY
Qty: 30 TABLET | Refills: 0 | Status: SHIPPED | OUTPATIENT
Start: 2023-11-03

## 2023-11-03 RX ORDER — PHENYLEPHRINE HCL 10 MG/ML
VIAL (ML) INJECTION AS NEEDED
Status: DISCONTINUED | OUTPATIENT
Start: 2023-11-03 | End: 2023-11-03 | Stop reason: SURG

## 2023-11-03 RX ORDER — ESMOLOL HYDROCHLORIDE 10 MG/ML
INJECTION INTRAVENOUS AS NEEDED
Status: DISCONTINUED | OUTPATIENT
Start: 2023-11-03 | End: 2023-11-03 | Stop reason: SURG

## 2023-11-03 RX ORDER — ACETAMINOPHEN 10 MG/ML
1000 INJECTION, SOLUTION INTRAVENOUS ONCE
Status: COMPLETED | OUTPATIENT
Start: 2023-11-03 | End: 2023-11-03

## 2023-11-03 RX ORDER — MIDAZOLAM HYDROCHLORIDE 1 MG/ML
INJECTION INTRAMUSCULAR; INTRAVENOUS
Status: DISCONTINUED
Start: 2023-11-03 | End: 2023-11-03 | Stop reason: WASHOUT

## 2023-11-03 RX ORDER — DEXAMETHASONE SODIUM PHOSPHATE 4 MG/ML
VIAL (ML) INJECTION AS NEEDED
Status: DISCONTINUED | OUTPATIENT
Start: 2023-11-03 | End: 2023-11-03 | Stop reason: SURG

## 2023-11-03 RX ORDER — ACETAMINOPHEN 500 MG
1000 TABLET ORAL ONCE AS NEEDED
Status: DISCONTINUED | OUTPATIENT
Start: 2023-11-03 | End: 2023-11-03

## 2023-11-03 RX ORDER — ACETAMINOPHEN 10 MG/ML
INJECTION, SOLUTION INTRAVENOUS
Status: COMPLETED
Start: 2023-11-03 | End: 2023-11-03

## 2023-11-03 RX ADMIN — PHENYLEPHRINE HCL 100 MCG: 10 MG/ML VIAL (ML) INJECTION at 12:13:00

## 2023-11-03 RX ADMIN — PHENYLEPHRINE HCL 100 MCG: 10 MG/ML VIAL (ML) INJECTION at 11:32:00

## 2023-11-03 RX ADMIN — ROCURONIUM BROMIDE 50 MG: 10 INJECTION, SOLUTION INTRAVENOUS at 11:06:00

## 2023-11-03 RX ADMIN — PHENYLEPHRINE HCL 100 MCG: 10 MG/ML VIAL (ML) INJECTION at 12:06:00

## 2023-11-03 RX ADMIN — SODIUM CHLORIDE, SODIUM LACTATE, POTASSIUM CHLORIDE, CALCIUM CHLORIDE: 600; 310; 30; 20 INJECTION, SOLUTION INTRAVENOUS at 12:49:00

## 2023-11-03 RX ADMIN — PHENYLEPHRINE HCL 100 MCG: 10 MG/ML VIAL (ML) INJECTION at 12:17:00

## 2023-11-03 RX ADMIN — PHENYLEPHRINE HCL 100 MCG: 10 MG/ML VIAL (ML) INJECTION at 11:40:00

## 2023-11-03 RX ADMIN — KETOROLAC TROMETHAMINE 30 MG: 30 INJECTION, SOLUTION INTRAMUSCULAR; INTRAVENOUS at 12:28:00

## 2023-11-03 RX ADMIN — ONDANSETRON 4 MG: 2 INJECTION INTRAMUSCULAR; INTRAVENOUS at 12:28:00

## 2023-11-03 RX ADMIN — SODIUM CHLORIDE, SODIUM LACTATE, POTASSIUM CHLORIDE, CALCIUM CHLORIDE: 600; 310; 30; 20 INJECTION, SOLUTION INTRAVENOUS at 12:08:00

## 2023-11-03 RX ADMIN — PHENYLEPHRINE HCL 100 MCG: 10 MG/ML VIAL (ML) INJECTION at 11:27:00

## 2023-11-03 RX ADMIN — GLYCOPYRROLATE 0.8 MG: 0.2 INJECTION, SOLUTION INTRAMUSCULAR; INTRAVENOUS at 12:39:00

## 2023-11-03 RX ADMIN — CEFAZOLIN SODIUM/WATER 2 G: 2 G/20 ML SYRINGE (ML) INTRAVENOUS at 11:18:00

## 2023-11-03 RX ADMIN — PHENYLEPHRINE HCL 100 MCG: 10 MG/ML VIAL (ML) INJECTION at 12:02:00

## 2023-11-03 RX ADMIN — PHENYLEPHRINE HCL 100 MCG: 10 MG/ML VIAL (ML) INJECTION at 11:25:00

## 2023-11-03 RX ADMIN — NEOSTIGMINE METHYLSULFATE 4 MG: 1 INJECTION, SOLUTION INTRAVENOUS at 12:39:00

## 2023-11-03 RX ADMIN — MIDAZOLAM HYDROCHLORIDE 2 MG: 1 INJECTION INTRAMUSCULAR; INTRAVENOUS at 11:03:00

## 2023-11-03 RX ADMIN — DEXAMETHASONE SODIUM PHOSPHATE 8 MG: 4 MG/ML VIAL (ML) INJECTION at 11:20:00

## 2023-11-03 RX ADMIN — ESMOLOL HYDROCHLORIDE 20 MG: 10 INJECTION INTRAVENOUS at 11:09:00

## 2023-11-03 RX ADMIN — PHENYLEPHRINE HCL 100 MCG: 10 MG/ML VIAL (ML) INJECTION at 11:58:00

## 2023-11-03 RX ADMIN — LIDOCAINE HYDROCHLORIDE 50 MG: 10 INJECTION, SOLUTION EPIDURAL; INFILTRATION; INTRACAUDAL; PERINEURAL at 11:06:00

## 2023-11-03 NOTE — OPERATIVE REPORT
SURGEON: Katelyn Angeles MD    DATE OF SURGERY: 11/3/2023    PREOPERATIVE DIAGNOSIS:   L4-5 lumbar disc herniation    POSTOPERATIVE DIAGNOSIS:   L4-5 lumbar disc herniation    NAME OF OPERATION:  1. Left L4-L5 laminotomy and microdiscectomy ([id number])    ANESTHESIA: General endotracheal.     ESTIMATED BLOOD LOSS: 20 mL. DISPOSITION: Stable, extubated to PACU. INDICATIONS: This patient is a 50year old-year-old male with a long-standing history of lumbar radiculopathy and has failed non operative management. Imaging demonstrated left paracentral disc herniation at L4-5. After discussing the risks and benefits of surgery and of non operative treatment, the patient elected for the above procedure. Please see clinic notes for details. Prior to surgery I explained in detail the risks of surgery including: risk of nerve injury, persistent and or worsening pain, spinal fluid leak, infection or meningitis, excessive bleeding, paralysis, death, blindness, sexual dysfunction, blood vessel injury, injury to neighboring organs, need for further surgery, bowel and bladder dysfunction, spinal instability, and autonomic nervous system dysfunction as well as unforeseen medical and surgical complications. An understanding that in general spinal surgery is more predictive in improving extremity discomfort than axial spine pain was stressed. DESCRIPTION OF PROCEDURE: The patient was identified in the preoperative holding area. The site of surgery and consent verified with the patient. Patient was then brought to the Operating Room. General anesthesia was administered. The patient was intubated without difficulty. MERY and SCD stockings were placed for mechanical DVT prophylaxis. He was then positioned prone onto a Mike table with bony prominences well padded. Abdomen was hanging free. His back was prepped and draped in the usual sterile standard fashion.  A hard stop surgical timeout performed with all members of the Operating Room staff, and IV Ancef given prior to surgical start. The incision was localized with a lateral fluoroscopic image. The lumbar spine was then prepped and draped in the standard sterile fashion. An incision was made in the skin over the intended surgical levels and dissection was carried down through the subcutaneous tissue down to the level of the deep fascia. The deep fascia was elevated off the posterior elements in a subperiosteal manner. An intra-operative fluoroscopic image was taken to confirm the appropriate spinal level. At this time a Clay retractor was placed and the operating microscope was draped and brought into the field. With a high speed arelis, laminotomy was made at left L4. A curette was used to remove, in a subperiosteal manner, the attachment of the ligamentum flavum from the undersurface of the superior lamina. The ligamentum flavum was then dissected free of its attachment to the pars interarticularis, the medial articular facet and the inferior lamina. The  lateral dura and the traversing nerve root were exposed. After obtaining meticulous hemostasis using a bipolar cautery, the shoulder of the traversing nerve root was mobilized and the disk space was exposed. A nerve root retractor was placed lateral to the traversing nerve root to protect the neural structures. A bipolar was used for hemostasis and was used on top of the disc space as further confirmation there were no neural structures over the disc space. A 15-blade on a long handle was used to make an annulotomy in line with the nerve root. Palpation around the annulotomy with a down-going curette led to extrusion of the disc material. A micropituitary was used to grab and remove herniated fragment in one large piece and other smaller extruded fragments. A nerve hook was used in the disc space to free up any unstable disc material. A micropituitary was used to remove small fragments of disc.      A Penfield 4 and Wing Mowers was then used to palpate ventral to the dura medially, distally, proximally and laterally and all loose disk fragments were removed. The disk space and floor of the canal were flushed with saline to free up loose fragments. At this time a foraminotomy was performed at the left L5 foramen to ensure complete decompression of the nerve root. The nerve root and canal were then checked again with a Layton probe. Being convinced that the nerve root was completely free and mobile, the wound was copiously irrigated and meticulous hemostasis was obtained. The retractors were removed and the side walls were again inspected to ensure no bleeding vessels remained. The fascial layer was closed with 0 vicryl sutures in interrupted, figure-of-8 fashion. The subdermal layer was closed with 2-0 vicryl suture and the skin was closed with a buried subcuticular stitch. Dermabond was placed over the incision. The patient was then carefully placed in the supine position and extubated. I attest I was present for and performed all key and critical aspects of the procedure.

## 2023-11-03 NOTE — CM/SW NOTE
Pt s/p L4-L5 laminectomy and microdiscectomy with Dr. Mohsen Nguyen. He was pre-operatively set up with [facility]. Bebe Jump with [facility] aware of pt and will meet with him to discuss arrangements. CM/SW to remain available as needed to assist with discharge planning.     Deborah Hanna, ILSAN, RN-BC    [id number]

## 2023-11-03 NOTE — ANESTHESIA PROCEDURE NOTES
Airway  Date/Time: 11/3/2023 11:08 AM  Urgency: elective      General Information and Staff    Patient location during procedure: OR  Anesthesiologist: Mattie Brandt MD  Resident/CRNA: Cornelia Villasenor CRNA  Performed: CRNA   Performed by: Cornelia Villasenor CRNA  Authorized by: Mattie Brandt MD      Indications and Patient Condition  Indications for airway management: anesthesia  Sedation level: deep  Preoxygenated: yes  Patient position: sniffing  Mask difficulty assessment: 1 - vent by mask    Final Airway Details  Final airway type: endotracheal airway      Successful airway: ETT  Cuffed: yes   Successful intubation technique: direct laryngoscopy  Facilitating devices/methods: intubating stylet  Endotracheal tube insertion site: oral  Blade: Obed  Blade size: #3  ETT size (mm): 7.5    Cormack-Lehane Classification: grade III - view of epiglottis only  Placement verified by: capnometry   Measured from: lips  ETT to lips (cm): 22  Number of attempts at approach: 1

## 2023-11-03 NOTE — INTERVAL H&P NOTE
Pre-op Diagnosis: Lumbar disc herniation [M51.26]    The above referenced H&P was reviewed by Matthieu Oodm MD on 11/3/2023, the patient was examined and no significant changes have occurred in the patient's condition since the H&P was performed. I discussed with the patient and/or legal representative the potential benefits, risks and side effects of this procedure; the likelihood of the patient achieving goals; and potential problems that might occur during recuperation. I discussed reasonable alternatives to the procedure, including risks, benefits and side effects related to the alternatives and risks related to not receiving this procedure. We will proceed with procedure as planned.

## 2023-11-06 ENCOUNTER — TELEPHONE (OUTPATIENT)
Dept: ORTHOPEDICS CLINIC | Facility: CLINIC | Age: 48
End: 2023-11-06

## 2023-11-06 NOTE — TELEPHONE ENCOUNTER
Patients wife returned call, states he is doing well and is up and walking around. She is wanting to know if it is okay that he takes a shower today and if his bandage can be taken off. Please advise.

## 2023-11-06 NOTE — TELEPHONE ENCOUNTER
LMOM for pt to call back with an update on how he's been doing since being discharged.     If pt calls back with an update pls route to clinical pool and confirm post op appt with pt

## 2023-11-07 ENCOUNTER — PATIENT MESSAGE (OUTPATIENT)
Dept: ORTHOPEDICS CLINIC | Facility: CLINIC | Age: 48
End: 2023-11-07

## 2023-11-07 RX ORDER — OXYBUTYNIN CHLORIDE 10 MG/1
10 TABLET, EXTENDED RELEASE ORAL DAILY
Qty: 90 TABLET | Refills: 3 | OUTPATIENT
Start: 2023-11-07

## 2023-11-07 RX ORDER — DIAZEPAM 2 MG/1
2 TABLET ORAL EVERY 6 HOURS PRN
Qty: 20 TABLET | Refills: 0 | Status: SHIPPED | OUTPATIENT
Start: 2023-11-07

## 2023-11-07 RX ORDER — OXYCODONE HYDROCHLORIDE 5 MG/1
5 TABLET ORAL EVERY 4 HOURS PRN
Qty: 20 TABLET | Refills: 0 | Status: SHIPPED | OUTPATIENT
Start: 2023-11-07

## 2023-11-07 NOTE — TELEPHONE ENCOUNTER
#1 Oxycoodone    DOS: 11/3/23  Left Lumbar 4 Lumbar 5 microdiscectomy  Last OV: 10/12/23  Last refill date: 11/3/23 #/refills: 20/0  Upcoming appt:    Future Appointments   Date Time Provider Lizandro Mack   11/16/2023  8:30 AM SHERITA Alexander [facility]           #2  Diazapam  Last refill date: 11/03/23 #/refills: 20/0

## 2023-11-07 NOTE — TELEPHONE ENCOUNTER
Requested Prescriptions     Signed Prescriptions Disp Refills    oxyCODONE 5 MG Oral Tab 20 tablet 0     Sig: Take 1 tablet (5 mg total) by mouth every 4 (four) hours as needed. Authorizing Provider: Gerri Briseno    diazePAM (VALIUM) 2 MG Oral Tab 20 tablet 0     Sig: Take 1 tablet (2 mg total) by mouth every 6 (six) hours as needed for Anxiety.      Authorizing Provider: Gerri Briseno

## 2023-11-07 NOTE — TELEPHONE ENCOUNTER
Duplicate. Already addressed in todays refill encounter.
From: Duarte Chauhan  To: Topher Palomares  Sent: 11/7/2023 3:46 PM CST  Subject: Prescriptions    Afternoon,    Just wanted to follow up is prescriptions request was filled.     Thank you in advance  Karl Kunz
Cardiac

## 2023-11-14 ENCOUNTER — TELEPHONE (OUTPATIENT)
Dept: ORTHOPEDICS CLINIC | Facility: CLINIC | Age: 48
End: 2023-11-14

## 2023-11-14 NOTE — TELEPHONE ENCOUNTER
Juan José Nino from [place] [address] called stating that they will be discharging pt today from home health as he is no longer homebound. Juan José Nino states that he drove his daughter to the airport today. Juan José Nino would like a callback for a verbal order to discharge pt.      Callback: [phone]    Future Appointments   Date Time Provider Lizandro Martina   11/16/2023 11:30 AM SHERITA Bautista [facility]   11/24/2023  8:45 AM Yoselin De Leon MD [facility]

## 2023-11-16 ENCOUNTER — OFFICE VISIT (OUTPATIENT)
Dept: ORTHOPEDICS CLINIC | Facility: CLINIC | Age: 48
End: 2023-11-16
Payer: COMMERCIAL

## 2023-11-16 VITALS — WEIGHT: 170 LBS | HEIGHT: 75 IN | BODY MASS INDEX: 21.14 KG/M2

## 2023-11-16 DIAGNOSIS — Z98.890 S/P LUMBAR MICRODISCECTOMY: Primary | ICD-10-CM

## 2023-11-16 PROCEDURE — 3008F BODY MASS INDEX DOCD: CPT | Performed by: NURSE PRACTITIONER

## 2023-11-16 PROCEDURE — 99024 POSTOP FOLLOW-UP VISIT: CPT | Performed by: NURSE PRACTITIONER

## 2023-11-16 RX ORDER — CYCLOBENZAPRINE HCL 10 MG
10 TABLET ORAL 3 TIMES DAILY PRN
Qty: 30 TABLET | Refills: 0 | Status: SHIPPED | OUTPATIENT
Start: 2023-11-16 | End: 2023-12-06

## 2023-11-16 RX ORDER — OXYCODONE HYDROCHLORIDE 5 MG/1
TABLET ORAL EVERY 8 HOURS PRN
Qty: 20 TABLET | Refills: 0 | Status: SHIPPED | OUTPATIENT
Start: 2023-11-16

## 2023-11-16 NOTE — PROGRESS NOTES
Post op follow up    CC:   Chief Complaint   Patient presents with    Post-Op     2 week, sx 11/1 L4-5 Microdisc         HPI:   Cathrine Landau is a 50year old male with chief complaint of 2 week post op      Post op: 11/3/2023 Left L4-5 microdiscectomy    Location is low back pain incision area. Radicular leg pain remains resolved. Using continuous ice due to LBP. Stopped oxy 3 days ago due to ran out of script. Would like refill for some pain relief. Severity is 5/10. Frequency of symptoms is daily. Duration is post operative. Quality ache/stiffness  Improved by time/surgery and worsened by sleep. Incision: denies concerns  Taking: tylenol, completed valium and oxy script    Denies CHEST PAIN/SOB/calf pain. Denies fever/chills/nausea/vomiting. Resumed [employer] business. Old records, summarized above in HPI, which were reviewed:  none    Current Medications:  Current Outpatient Medications   Medication Sig Dispense Refill    oxyCODONE 5 MG Oral Tab Take 0.5-1 tablets (2.5-5 mg total) by mouth every 8 (eight) hours as needed. No driving 20 tablet 0    cyclobenzaprine 10 MG Oral Tab Take 1 tablet (10 mg total) by mouth 3 (three) times daily as needed for Muscle spasms. No driving 30 tablet 0    acetaminophen 500 MG Oral Tab Take 2 tablets (1,000 mg total) by mouth every 8 (eight) hours. 60 tablet 0    ondansetron (ZOFRAN) 4 mg tablet Take 1 tablet (4 mg total) by mouth every 8 (eight) hours as needed for Nausea. 20 tablet 0    senna-docusate 8.6-50 MG Oral Tab Take 1 tablet by mouth daily. 30 tablet 0    gabapentin 300 MG Oral Cap Take 1 capsule (300 mg total) by mouth 3 (three) times daily. 90 capsule 1    oxybutynin ER 10 MG Oral Tablet 24 Hr       zolpidem 10 MG Oral Tab Take 1 tablet (10 mg total) by mouth nightly as needed for Sleep. 15 tablet 0    ondansetron 4 MG Oral Tablet Dispersible Take 1 tablet (4 mg total) by mouth every 8 (eight) hours as needed for Nausea.  20 tablet 0    tamsulosin 0.4 MG Oral Cap Take 1 capsule (0.4 mg total) by mouth daily. 90 capsule 3    Mirabegron ER 25 MG Oral Tablet 24 Hr Take 1 tablet (25 mg total) by mouth daily. 90 tablet 5    amLODIPine 10 MG Oral Tab Take 1 tablet (10 mg total) by mouth daily. 90 tablet 3    lisinopril 40 MG Oral Tab Take 1 tablet (40 mg total) by mouth daily. 90 tablet 3    pantoprazole 40 MG Oral Tab EC Take 1 tablet (40 mg total) by mouth before breakfast. 90 tablet 3    ZENPEP 92673-35621 units Oral Cap DR Particles Take 1 capsule (10,000 Units total) by mouth 3 (three) times daily with meals. TAKE 1 CAPSULE BY MOUTH THREE TIMES DAILY DAILY WITH MEALS 810 capsule 0      HISTORY:  Past Medical History:   Diagnosis Date    Alcohol abuse     Back problem     Colon adenoma 05/25/2022    x1    High blood pressure     Microcytosis     Pancreatic cyst     Pancreatitis     Pancreatitis, alcoholic, acute     Pulmonary nodule 2013    Tobacco use disorder     Unspecified essential hypertension       Past Surgical History:   Procedure Laterality Date    COLONOSCOPY  07/12/2023    EGD  07/20/2016    ELECTROCARDIOGRAM, COMPLETE  12/26/2013    scanned to media tab    OTHER SURGICAL HISTORY      pancreatic cyst drainage by Dr Anabelle Tinoco.  then saw Huong Hernandez had procedure done by Dr Lidia Saldivar for the cyst    UPPER GI ENDOSCOPY,BIOPSY  07/2016      Family History   Problem Relation Age of Onset    Hypertension Father     Cancer Father     Diabetes Mother     Hypertension Mother     Heart Disorder Mother     Other (Other) Mother         copd    Other (Other) Brother         motorbke accident      Social History     Socioeconomic History    Marital status:    Tobacco Use    Smoking status: Every Day     Packs/day: 0.50     Years: 20.00     Additional pack years: 0.00     Total pack years: 10.00     Types: Cigarettes     Passive exposure: Current    Smokeless tobacco: Never   Vaping Use    Vaping Use: Some days    Substances: THC, weekly    Devices: Disposable Substance and Sexual Activity    Alcohol use: Not Currently     Comment: 1 pint a week    Drug use: Yes     Frequency: 1.0 times per week     Types: Cannabis     Comment: occasionaly/edibles   Other Topics Concern    Caffeine Concern Yes     Comment: 1 ice coffee daily    Exercise Yes        Exam     A&Ox4 in no acute distress. Seated on exam chair    Non Antalgic gait, able to normal heel and toe walk. Incision is well approximated without drainage, swelling or erythema. Strength is 5/5 bilateral in the lower extremities. SLR is negative bilaterally. Sensation is intact to light touch to the bilateral lower extremities. Denies calf pain. No edema in the lower extremities. Dorsalis pedis pulses are intact bilaterally. Bilateral hamstring tightness    CBC:  Lab Results   Component Value Date    WBC 6.3 10/18/2023    RBC 3.80 (L) 10/18/2023    HGB 12.5 (L) 10/18/2023    HCT 37.9 (L) 10/18/2023    .0 10/18/2023     basic metabolic panel  Lab Results   Component Value Date    A1C 5.5 10/01/2015        Medical Decision Making:   Impression:   1. S/P lumbar microdiscectomy  - oxyCODONE 5 MG Oral Tab; Take 0.5-1 tablets (2.5-5 mg total) by mouth every 8 (eight) hours as needed. No driving  Dispense: 20 tablet; Refill: 0  - cyclobenzaprine 10 MG Oral Tab; Take 1 tablet (10 mg total) by mouth 3 (three) times daily as needed for Muscle spasms. No driving  Dispense: 30 tablet; Refill: 0      Plan: Activity: continue spine precautions. Increase walking as tolerated. Medications: Last refill of oxycodone provided, advised to wean off by next visit. With the resolved radicular leg pain, advised can start to wean off the gabapentin. Instructions provided. Stop valium and start cyclobenzaprine as directed for muscle spasm, risks and benefits of the medication discussed. Physical therapy: Consider start of physical therapy for post operative recovery. He will consider return to physical therapy.  Will continue current HEP and message if he feels he would like out patient PT prior to the next visit. Incision: open to air. Okay to shower normal and remove steri strips as they loosen. Driving: okay to return to driving if off opiate and muscle relaxants. Review treatment plan with the patient. Return in about 4 weeks (around 12/14/2023), or if symptoms worsen or fail to improve. Patient educated and verbalized understanding. The patient indicates understanding of these issues and agrees to the plan.     Ramesh Rios, HANGP-BC, [facility]  Collaborative: Jeanette Cavazos MD  Orthopedic Spine Surgeon  [facility]   [facility], [address] [facility], [facility], [address] [place] [address] Efrain Ghotra [address] Jennifer Mata 72   t: [phone]   f: [phone]

## 2023-11-24 ENCOUNTER — PATIENT MESSAGE (OUTPATIENT)
Dept: SURGERY | Facility: CLINIC | Age: 48
End: 2023-11-24

## 2023-11-24 ENCOUNTER — OFFICE VISIT (OUTPATIENT)
Dept: SURGERY | Facility: CLINIC | Age: 48
End: 2023-11-24
Payer: COMMERCIAL

## 2023-11-24 DIAGNOSIS — N40.1 BPH WITH OBSTRUCTION/LOWER URINARY TRACT SYMPTOMS: ICD-10-CM

## 2023-11-24 DIAGNOSIS — R97.20 ELEVATED PSA: Primary | ICD-10-CM

## 2023-11-24 DIAGNOSIS — N13.8 BPH WITH OBSTRUCTION/LOWER URINARY TRACT SYMPTOMS: ICD-10-CM

## 2023-11-24 PROCEDURE — 51798 US URINE CAPACITY MEASURE: CPT | Performed by: SURGERY

## 2023-11-24 PROCEDURE — 99214 OFFICE O/P EST MOD 30 MIN: CPT | Performed by: SURGERY

## 2023-11-24 RX ORDER — FINASTERIDE 5 MG/1
5 TABLET, FILM COATED ORAL DAILY
Qty: 90 TABLET | Refills: 5 | Status: SHIPPED | OUTPATIENT
Start: 2023-11-24 | End: 2023-11-27

## 2023-11-24 RX ORDER — OXYBUTYNIN CHLORIDE 10 MG/1
10 TABLET, EXTENDED RELEASE ORAL DAILY
Qty: 90 TABLET | Refills: 5 | Status: SHIPPED | OUTPATIENT
Start: 2023-11-24 | End: 2023-11-27

## 2023-11-24 RX ORDER — TAMSULOSIN HYDROCHLORIDE 0.4 MG/1
0.4 CAPSULE ORAL DAILY
Qty: 90 CAPSULE | Refills: 5 | Status: SHIPPED | OUTPATIENT
Start: 2023-11-24 | End: 2023-11-27

## 2023-11-24 NOTE — PROGRESS NOTES
Urology Clinic Note    Primary Care Provider:  Asif Watts MD     Chief Complaint:   Elevated PSA follow-up     HPI:   Bigg Kwok is a 50year old male active smoker with hx of HTN, pancreatitis s/p post exploratory laparotomy, caryn-en-Y lateral pancreaticojejunostomy with duodenum-preserving pancreatic head resection on 9/24/20 at [address], referred for elevated PSA and prior gross hematuria. Gross hematuria work-up is complete with normal cytology, negative CT (only IV contrast, not urogram), on cystoscopy but does have an enlarged prostate with significant trilobar hyperplasia and intravesical protrusion. His lower urinary tract symptoms are both obstructive and overactive and most likely related to his significant BPH. He is on tamsulosin 0.8 mg daily and oxybutynin. His prostate measures approximately 105 g on CT. His prostate appears obstructive with significant intravesical protrusion on cystoscopy. 4K score was 20.8, so I counseled him on prostate biopsy which she underwent with me on 12/29/2022. TRUS volume was 52 g. Fortunately pathology was completely benign. He did have a recent CT scan and I measured his prostate at 90 mL with significant intravesical protrusion. At his last visit with me on 6/29/2023 he was doing well with no further gross hematuria. He denied weak urinary stream or straining to void. He did endorse significant urgency and frequency despite oxybutynin. He drinks 1 cup of coffee per day and occasional soda. Today he says that if he misses his medications he notices trouble voiding and significant urgency and frequency. However if he takes his medications he has a good urinary stream that is only mildly weak, minimal urgency and frequency. He remains on oxybutynin and never switched over to mirabegron. Repeat PSA on 10/18/2023 was stable at 7.06 up from 7.70 prior.     AUA symptom score is 24/terrible with LUTS (if he misses his medications). Post-void residual bladder scan: 127 mL (last void 1 hour prior). PSA:  Lab Results   Component Value Date    PSA 7.06 (H) 10/18/2023    PSA 7.70 (H) 06/29/2023    PSA 10.00 (H) 09/09/2022    PSA 2.98 12/22/2020    PSA 1.6 05/16/2018    PSAS 3.96 08/24/2020        History:     Past Medical History:   Diagnosis Date    Alcohol abuse     Back problem     Colon adenoma 05/25/2022    x1    High blood pressure     Microcytosis     Pancreatic cyst     Pancreatitis     Pancreatitis, alcoholic, acute     Pulmonary nodule 2013    Tobacco use disorder     Unspecified essential hypertension        Past Surgical History:   Procedure Laterality Date    COLONOSCOPY  07/12/2023    EGD  07/20/2016    ELECTROCARDIOGRAM, COMPLETE  12/26/2013    scanned to media tab    OTHER SURGICAL HISTORY      pancreatic cyst drainage by Dr Juve Dc.  then saw [facility] Luna had procedure done by Dr Jeannette Garcia for the cyst    UPPER GI ENDOSCOPY,BIOPSY  07/2016       Family History   Problem Relation Age of Onset    Hypertension Father     Cancer Father     Diabetes Mother     Hypertension Mother     Heart Disorder Mother     Other (Other) Mother         copd    Other (Other) Brother         motorbke accident       Social History     Socioeconomic History    Marital status:    Tobacco Use    Smoking status: Every Day     Packs/day: 0.50     Years: 20.00     Additional pack years: 0.00     Total pack years: 10.00     Types: Cigarettes     Passive exposure: Current    Smokeless tobacco: Never   Vaping Use    Vaping Use: Some days    Substances: THC, weekly    Devices: Disposable   Substance and Sexual Activity    Alcohol use: Not Currently     Comment: 1 pint a week    Drug use: Yes     Frequency: 1.0 times per week     Types: Cannabis     Comment: occasionaly/edibles   Other Topics Concern    Caffeine Concern Yes     Comment: 1 ice coffee daily    Exercise Yes       Medications (Active prior to today's visit):  Current Outpatient Medications   Medication Sig Dispense Refill    oxyCODONE 5 MG Oral Tab Take 0.5-1 tablets (2.5-5 mg total) by mouth every 8 (eight) hours as needed. No driving 20 tablet 0    cyclobenzaprine 10 MG Oral Tab Take 1 tablet (10 mg total) by mouth 3 (three) times daily as needed for Muscle spasms. No driving 30 tablet 0    acetaminophen 500 MG Oral Tab Take 2 tablets (1,000 mg total) by mouth every 8 (eight) hours. 60 tablet 0    ondansetron (ZOFRAN) 4 mg tablet Take 1 tablet (4 mg total) by mouth every 8 (eight) hours as needed for Nausea. 20 tablet 0    senna-docusate 8.6-50 MG Oral Tab Take 1 tablet by mouth daily. 30 tablet 0    gabapentin 300 MG Oral Cap Take 1 capsule (300 mg total) by mouth 3 (three) times daily. 90 capsule 1    oxybutynin ER 10 MG Oral Tablet 24 Hr       zolpidem 10 MG Oral Tab Take 1 tablet (10 mg total) by mouth nightly as needed for Sleep. 15 tablet 0    ondansetron 4 MG Oral Tablet Dispersible Take 1 tablet (4 mg total) by mouth every 8 (eight) hours as needed for Nausea. 20 tablet 0    tamsulosin 0.4 MG Oral Cap Take 1 capsule (0.4 mg total) by mouth daily. 90 capsule 3    Mirabegron ER 25 MG Oral Tablet 24 Hr Take 1 tablet (25 mg total) by mouth daily. 90 tablet 5    amLODIPine 10 MG Oral Tab Take 1 tablet (10 mg total) by mouth daily. 90 tablet 3    lisinopril 40 MG Oral Tab Take 1 tablet (40 mg total) by mouth daily. 90 tablet 3    pantoprazole 40 MG Oral Tab EC Take 1 tablet (40 mg total) by mouth before breakfast. 90 tablet 3    ZENPEP 92204-12271 units Oral Cap DR Particles Take 1 capsule (10,000 Units total) by mouth 3 (three) times daily with meals. TAKE 1 CAPSULE BY MOUTH THREE TIMES DAILY DAILY WITH MEALS 810 capsule 0       Allergies: Allergies   Allergen Reactions    Morphine ITCHING       Review of Systems:   A comprehensive 10-point review of systems was completed. Pertinent positives and negatives are noted in the the HPI.     Physical Exam:   CONSTITUTIONAL: Well developed, well nourished, in no acute distress  NEUROLOGIC: Alert and oriented  HEAD: Normocephalic, atraumatic  EYES: Sclera non-icteric  ENT: Hearing intact, moist mucous membranes  NECK: No obvious goiter or masses  RESPIRATORY: Normal respiratory effort  SKIN: No evident rashes  ABDOMEN: Soft, non-tender, non-distended  DIGITAL RECTAL EXAM: ~80 gram prostate, no nodules or tenderness    Assessment & Plan:   Irena Ruffin is a 50year old male active smoker with hx of HTN, pancreatitis s/p post exploratory laparotomy, caryn-en-Y lateral pancreaticojejunostomy with duodenum-preserving pancreatic head resection on 9/24/20 at [facility], [facility], referred for elevated PSA and prior gross hematuria. Gross hematuria work-up is complete with normal cytology, negative CT (only IV contrast, not urogram), on cystoscopy but does have an enlarged prostate with significant trilobar hyperplasia and intravesical protrusion. His lower urinary tract symptoms are both obstructive and overactive and most likely related to his significant BPH. He is on tamsulosin 0.8 mg daily and oxybutynin. His prostate measures approximately 105 g on CT. His prostate appears obstructive with significant intravesical protrusion on cystoscopy. 4K score was 20.8, so I counseled him on prostate biopsy which she underwent with me on 12/29/2022. TRUS volume was 52 g. Fortunately pathology was completely benign. He did have a recent CT scan and I measured his prostate at 90 mL with significant intravesical protrusion. At his last visit with me on 6/29/2023 he was doing well with no further gross hematuria. He denied weak urinary stream or straining to void. He did endorse significant urgency and frequency despite oxybutynin. He drinks 1 cup of coffee per day and occasional soda. Today he says that if he misses his medications he notices trouble voiding and significant urgency and frequency.   However if he takes his medications he has a good urinary stream that is only mildly weak, minimal urgency and frequency. He remains on oxybutynin and never switched over to mirabegron. Repeat PSA on 10/18/2023 was stable at 7.06 up from 7.70 prior.    -Continue tamsulosin  -Continue oxybutynin  -Start finasteride  -Repeat PSA in 6 months    Medical Decision Making  Elevated PSA: Chronic problem  BPH/LUTS: Chronic, worsening problem    Amount and/or Complexity of Data Reviewed  External Data Reviewed: labs and notes. Labs: ordered. Risk  Prescription drug management. Karlene Das.  Mable Alberts MD  Staff Urologist  [facility]  Office: [phone]

## 2023-11-27 RX ORDER — FINASTERIDE 5 MG/1
5 TABLET, FILM COATED ORAL DAILY
Qty: 90 TABLET | Refills: 5 | Status: SHIPPED | OUTPATIENT
Start: 2023-11-27 | End: 2023-11-27 | Stop reason: CLARIF

## 2023-11-27 RX ORDER — TAMSULOSIN HYDROCHLORIDE 0.4 MG/1
0.4 CAPSULE ORAL DAILY
Qty: 90 CAPSULE | Refills: 5 | Status: SHIPPED | OUTPATIENT
Start: 2023-11-27 | End: 2023-11-27 | Stop reason: CLARIF

## 2023-11-27 RX ORDER — OXYBUTYNIN CHLORIDE 10 MG/1
10 TABLET, EXTENDED RELEASE ORAL DAILY
Qty: 90 TABLET | Refills: 5 | Status: SHIPPED | OUTPATIENT
Start: 2023-11-27 | End: 2023-11-27 | Stop reason: CLARIF

## 2023-11-27 RX ORDER — OXYBUTYNIN CHLORIDE 10 MG/1
10 TABLET, EXTENDED RELEASE ORAL DAILY
Qty: 90 TABLET | Refills: 5 | Status: SHIPPED | OUTPATIENT
Start: 2023-11-27

## 2023-11-27 RX ORDER — FINASTERIDE 5 MG/1
5 TABLET, FILM COATED ORAL DAILY
Qty: 90 TABLET | Refills: 5 | Status: SHIPPED | OUTPATIENT
Start: 2023-11-27

## 2023-11-27 RX ORDER — TAMSULOSIN HYDROCHLORIDE 0.4 MG/1
0.4 CAPSULE ORAL DAILY
Qty: 90 CAPSULE | Refills: 5 | Status: SHIPPED | OUTPATIENT
Start: 2023-11-27

## 2023-12-07 DIAGNOSIS — Z98.890 S/P LUMBAR MICRODISCECTOMY: ICD-10-CM

## 2023-12-08 RX ORDER — OXYCODONE HYDROCHLORIDE 5 MG/1
TABLET ORAL EVERY 8 HOURS PRN
Qty: 10 TABLET | Refills: 0 | Status: SHIPPED | OUTPATIENT
Start: 2023-12-08

## 2023-12-08 NOTE — TELEPHONE ENCOUNTER
Needs to be triaged. See note on script- possible re injury of the back.     Future Appointments   Date Time Provider Department Center   12/14/2023  8:20 AM Mykel Decker MD [facility]   5/24/2024  8:45 AM Tolu Villarreal MD [id number] [facility] 4     Tide over script sent to pharmacy.     Requested Prescriptions     Signed Prescriptions Disp Refills    oxyCODONE 5 MG Oral Tab 10 tablet 0     Sig: Take 0.5-1 tablets (2.5-5 mg total) by mouth every 8 (eight) hours as needed. No driving     Authorizing Provider: MYAH MCLEAN

## 2023-12-08 NOTE — TELEPHONE ENCOUNTER
Oxycodone  DOS: 11/3/23  Last OV: 11/16/23  Last refill date: 11/16/23     #/refills: 20/0  Upcoming appt:   Future Appointments   Date Time Provider Department Center   12/14/2023  8:20 AM Mykel Decker MD [facility]   5/24/2024  8:45 AM Tolu Villarreal MD [id number] EC Nap 4

## 2023-12-13 ENCOUNTER — TELEPHONE (OUTPATIENT)
Dept: ORTHOPEDICS CLINIC | Facility: CLINIC | Age: 48
End: 2023-12-13

## 2023-12-13 DIAGNOSIS — Z98.890 S/P LUMBAR MICRODISCECTOMY: Primary | ICD-10-CM

## 2023-12-13 DIAGNOSIS — M51.26 LUMBAR DISC HERNIATION: ICD-10-CM

## 2023-12-14 ENCOUNTER — HOSPITAL ENCOUNTER (OUTPATIENT)
Dept: GENERAL RADIOLOGY | Age: 48
Discharge: HOME | End: 2023-12-14
Attending: NURSE PRACTITIONER
Payer: COMMERCIAL

## 2023-12-14 ENCOUNTER — OFFICE VISIT (OUTPATIENT)
Dept: ORTHOPEDICS CLINIC | Facility: CLINIC | Age: 48
End: 2023-12-14
Payer: COMMERCIAL

## 2023-12-14 DIAGNOSIS — Z98.890 S/P LUMBAR MICRODISCECTOMY: Primary | ICD-10-CM

## 2023-12-14 DIAGNOSIS — M51.26 LUMBAR DISC HERNIATION: ICD-10-CM

## 2023-12-14 DIAGNOSIS — Z98.890 S/P LUMBAR MICRODISCECTOMY: ICD-10-CM

## 2023-12-14 PROCEDURE — 72100 X-RAY EXAM L-S SPINE 2/3 VWS: CPT | Performed by: NURSE PRACTITIONER

## 2023-12-14 PROCEDURE — 99024 POSTOP FOLLOW-UP VISIT: CPT | Performed by: STUDENT IN AN ORGANIZED HEALTH CARE EDUCATION/TRAINING PROGRAM

## 2023-12-14 NOTE — PROGRESS NOTES
6 weeks status post lumbar decompression, L4-5 microdiscectomy     DOS: 11/3/2023    Patient is now 6 weeks out from lumbar decompression and doing well. Improvement in preoperative leg pain and paresthesias is sustained. Patient is mobilizing well without any ambulatory aids. He is not participating in physiotherapy. Physical examination    Lumbar spine demonstrates a healed surgical incision. Bilateral lower extremity exam demonstrates 5 out of 5 strength in the quadriceps, tibialis anterior, EHL, and gastrocsoleus bilaterally. Sensation is intact to light touch from L2-S1. Negative nerve tension signs    Imaging    XR of the lumbar spine does not demonstrate any instability at the operative levels    Assessment and Plan    6 weeks status post decompression doing well. Resolution in preoperative radiculitis and radiculopathy. Wound is healed. -Offered outpatient lumbar physiotherapy, patient deferred    Christine Fernnadez MD  Orthopedic Spine Surgeon  [facility]   [address] [facility], [address] [facility], [facility], [address] [facility]   [address], [address]   [facility] Karyn. Vaughn Jags. [email or web address]  t: [id number]   f: [phone]        This note was dictated using Dragon software. While it was briefly proofread prior to completion, some grammatical, spelling, and word choice errors due to dictation may still occur.

## 2023-12-23 DIAGNOSIS — Z98.890 S/P LUMBAR MICRODISCECTOMY: ICD-10-CM

## 2023-12-24 RX ORDER — OXYCODONE HYDROCHLORIDE 5 MG/1
TABLET ORAL EVERY 8 HOURS PRN
Qty: 10 TABLET | Refills: 0 | OUTPATIENT
Start: 2023-12-24

## 2023-12-28 DIAGNOSIS — Z98.890 S/P LUMBAR MICRODISCECTOMY: ICD-10-CM

## 2023-12-28 RX ORDER — OXYCODONE HYDROCHLORIDE 5 MG/1
TABLET ORAL EVERY 8 HOURS PRN
Qty: 10 TABLET | Refills: 0 | OUTPATIENT
Start: 2023-12-28

## 2023-12-29 RX ORDER — CYCLOBENZAPRINE HCL 10 MG
10 TABLET ORAL 3 TIMES DAILY PRN
Qty: 20 TABLET | Refills: 0 | Status: SHIPPED | OUTPATIENT
Start: 2023-12-29

## 2023-12-30 NOTE — TELEPHONE ENCOUNTER
Advised to wean off oxycodone with last refill. Should transition to OTC pain medications as needed. Opiate class utilized in the immediate post operative period. Follow up in clinic if with pain or concerns.

## 2024-01-16 ENCOUNTER — HOSPITAL ENCOUNTER (EMERGENCY)
Facility: HOSPITAL | Age: 49
Discharge: HOME | End: 2024-01-16
Attending: EMERGENCY MEDICINE
Payer: COMMERCIAL

## 2024-01-16 VITALS
BODY MASS INDEX: 21 KG/M2 | HEART RATE: 87 BPM | SYSTOLIC BLOOD PRESSURE: 120 MMHG | OXYGEN SATURATION: 98 % | DIASTOLIC BLOOD PRESSURE: 63 MMHG | WEIGHT: 169.75 LBS | TEMPERATURE: 98 F | RESPIRATION RATE: 18 BRPM

## 2024-01-16 DIAGNOSIS — R10.13 ABDOMINAL PAIN, EPIGASTRIC: Primary | ICD-10-CM

## 2024-01-16 LAB
ALBUMIN SERPL-MCNC: 4.2 G/DL (ref 3.2–4.8)
ALBUMIN/GLOB SERPL: 1.8 {RATIO} (ref 1–2)
ALP LIVER SERPL-CCNC: 69 U/L
ALT SERPL-CCNC: 11 U/L
ANION GAP SERPL CALC-SCNC: 5 MMOL/L (ref 0–18)
AST SERPL-CCNC: 14 U/L (ref ?–34)
BASOPHILS # BLD AUTO: 0.01 X10(3) UL (ref 0–0.2)
BASOPHILS NFR BLD AUTO: 0.2 %
BILIRUB SERPL-MCNC: 0.4 MG/DL (ref 0.3–1.2)
BUN BLD-MCNC: 14 MG/DL (ref 9–23)
BUN/CREAT SERPL: 13.3 (ref 10–20)
CALCIUM BLD-MCNC: 8.8 MG/DL (ref 8.7–10.4)
CHLORIDE SERPL-SCNC: 111 MMOL/L (ref 98–112)
CO2 SERPL-SCNC: 25 MMOL/L (ref 21–32)
CREAT BLD-MCNC: 1.05 MG/DL
DEPRECATED RDW RBC AUTO: 52.8 FL (ref 35.1–46.3)
EGFRCR SERPLBLD CKD-EPI 2021: 88 ML/MIN/1.73M2 (ref 60–?)
EOSINOPHIL # BLD AUTO: 0.02 X10(3) UL (ref 0–0.7)
EOSINOPHIL NFR BLD AUTO: 0.4 %
ERYTHROCYTE [DISTWIDTH] IN BLOOD BY AUTOMATED COUNT: 14.6 % (ref 11–15)
GLOBULIN PLAS-MCNC: 2.3 G/DL (ref 2.8–4.4)
GLUCOSE BLD-MCNC: 111 MG/DL (ref 70–99)
HCT VFR BLD AUTO: 37.8 %
HGB BLD-MCNC: 13.3 G/DL
IMM GRANULOCYTES # BLD AUTO: 0.01 X10(3) UL (ref 0–1)
IMM GRANULOCYTES NFR BLD: 0.2 %
LIPASE SERPL-CCNC: 28 U/L (ref 13–75)
LYMPHOCYTES # BLD AUTO: 1.82 X10(3) UL (ref 1–4)
LYMPHOCYTES NFR BLD AUTO: 34.3 %
MCH RBC QN AUTO: 34.2 PG (ref 26–34)
MCHC RBC AUTO-ENTMCNC: 35.2 G/DL (ref 31–37)
MCV RBC AUTO: 97.2 FL
MONOCYTES # BLD AUTO: 0.37 X10(3) UL (ref 0.1–1)
MONOCYTES NFR BLD AUTO: 7 %
NEUTROPHILS # BLD AUTO: 3.08 X10 (3) UL (ref 1.5–7.7)
NEUTROPHILS # BLD AUTO: 3.08 X10(3) UL (ref 1.5–7.7)
NEUTROPHILS NFR BLD AUTO: 57.9 %
OSMOLALITY SERPL CALC.SUM OF ELEC: 293 MOSM/KG (ref 275–295)
PLATELET # BLD AUTO: 304 10(3)UL (ref 150–450)
POTASSIUM SERPL-SCNC: 4 MMOL/L (ref 3.5–5.1)
PROT SERPL-MCNC: 6.5 G/DL (ref 5.7–8.2)
RBC # BLD AUTO: 3.89 X10(6)UL
SODIUM SERPL-SCNC: 141 MMOL/L (ref 136–145)
WBC # BLD AUTO: 5.3 X10(3) UL (ref 4–11)

## 2024-01-16 PROCEDURE — 99284 EMERGENCY DEPT VISIT MOD MDM: CPT

## 2024-01-16 PROCEDURE — 96375 TX/PRO/DX INJ NEW DRUG ADDON: CPT

## 2024-01-16 PROCEDURE — 85025 COMPLETE CBC W/AUTO DIFF WBC: CPT | Performed by: EMERGENCY MEDICINE

## 2024-01-16 PROCEDURE — 80053 COMPREHEN METABOLIC PANEL: CPT | Performed by: EMERGENCY MEDICINE

## 2024-01-16 PROCEDURE — 96374 THER/PROPH/DIAG INJ IV PUSH: CPT

## 2024-01-16 PROCEDURE — 83690 ASSAY OF LIPASE: CPT | Performed by: EMERGENCY MEDICINE

## 2024-01-16 PROCEDURE — 96361 HYDRATE IV INFUSION ADD-ON: CPT

## 2024-01-16 RX ORDER — KETOROLAC TROMETHAMINE 15 MG/ML
15 INJECTION, SOLUTION INTRAMUSCULAR; INTRAVENOUS ONCE
Status: COMPLETED | OUTPATIENT
Start: 2024-01-16 | End: 2024-01-16

## 2024-01-16 RX ORDER — ONDANSETRON 2 MG/ML
4 INJECTION INTRAMUSCULAR; INTRAVENOUS ONCE
Status: COMPLETED | OUTPATIENT
Start: 2024-01-16 | End: 2024-01-16

## 2024-01-16 RX ORDER — ACETAMINOPHEN AND CODEINE PHOSPHATE 300; 30 MG/1; MG/1
1 TABLET ORAL EVERY 6 HOURS PRN
Qty: 12 TABLET | Refills: 0 | Status: SHIPPED | OUTPATIENT
Start: 2024-01-16 | End: 2024-01-21

## 2024-01-16 RX ORDER — SUCRALFATE 1 G/1
1 TABLET ORAL
Qty: 40 TABLET | Refills: 0 | Status: SHIPPED | OUTPATIENT
Start: 2024-01-16 | End: 2024-01-26

## 2024-01-17 NOTE — ED QUICK NOTES
Patient safe to DC home per MD. Able to dress self. DC teaching done, instructions reviewed with patient including when and how to follow up with healthcare providers and when to seek emergency care. The patient verbalizes understanding. Peripheral IV discontinued. Patient ambulatory with steady gait to exit.

## 2024-01-17 NOTE — ED PROVIDER NOTES
Patient Seen in: [facility] Emergency Department    History     Chief Complaint   Patient presents with    Abdomen/Flank Pain     Stated Complaint: Pacreatitis    HPI    Patient complains of mid upper abdominal pain that began last night.  Pain described as sharp.  Pain rated as 9/10.  Modifying factors include: none.  Hx pancreatitis feels similar, had hx of pancreatic cyst and had partial pancreatectomy few years ago..          Past Medical History:   Diagnosis Date    Alcohol abuse     Back problem     Colon adenoma 05/25/2022    x1    High blood pressure     Microcytosis     Pancreatic cyst     Pancreatitis     Pancreatitis, alcoholic, acute     Pulmonary nodule 2013    Tobacco use disorder     Unspecified essential hypertension        Past Surgical History:   Procedure Laterality Date    COLONOSCOPY  07/12/2023    EGD  07/20/2016    ELECTROCARDIOGRAM, COMPLETE  12/26/2013    scanned to media tab    OTHER SURGICAL HISTORY      pancreatic cyst drainage by Dr Munoz. then saw [facility] had procedure done by Dr donohue for the cyst    UPPER GI ENDOSCOPY,BIOPSY  07/2016            Family History   Problem Relation Age of Onset    Hypertension Father     Cancer Father     Diabetes Mother     Hypertension Mother     Heart Disorder Mother     Other (Other) Mother         copd    Other (Other) Brother         motorbke accident       Social History     Socioeconomic History    Marital status:    Tobacco Use    Smoking status: Every Day     Packs/day: 0.50     Years: 20.00     Additional pack years: 0.00     Total pack years: 10.00     Types: Cigarettes     Passive exposure: Current    Smokeless tobacco: Never   Vaping Use    Vaping Use: Some days    Substances: THC, weekly    Devices: Disposable   Substance and Sexual Activity    Alcohol use: Not Currently     Comment: 1 pint a week    Drug use: Yes     Frequency: 1.0 times per week     Types: Cannabis     Comment: occasionaly/edibles   Other Topics  Concern    Caffeine Concern Yes     Comment: 1 ice coffee daily    Exercise Yes       Review of Systems    Positive for stated complaint: Pacreatitis  Other systems are as noted in HPI.  Constitutional and vital signs reviewed.      All other systems reviewed and negative except as noted above.    PSFH elements reviewed from today and agreed except as otherwise stated in HPI.    Physical Exam     ED Triage Vitals [01/16/24 1951]   /75   Pulse 111   Resp 18   Temp 98.2 °F (36.8 °C)   Temp src Oral   SpO2 99 %   O2 Device None (Room air)       Current:/63   Pulse 87   Temp 98.2 °F (36.8 °C) (Oral)   Resp 18   Wt 77 kg   SpO2 98%   BMI 21.22 kg/m²   Pulse ox nl        Physical Exam  General Appearance: alert, no distress  Eyes: pupils equal and round no pallor or injection  ENT, Mouth: mucous membranes moist  Respiratory: there are no retractions, lungs are clear to auscultation  Cardiovascular: regular rate and rhythm    Gastrointestinal: scaphoid, soft tender in epigastric region no masses, no guarding  Neurological: II-XII grossly intact  no focal deficits  Skin: warm and dry, no rashes.  Musculoskeletal: neck is supple non tender        Extremities are symmetrical, full range of motion  Psychiatric: patient is pleasant, there is no agitation    DIFFERENTIAL DIAGNOSIS: After history and physical exam differential diagnosis was considered for  gastritis vs. Enteritis vs. pancreatitis          ED Course     Labs Reviewed   COMP METABOLIC PANEL (14) - Abnormal; Notable for the following components:       Result Value    Glucose 111 (*)     Globulin  2.3 (*)     All other components within normal limits   CBC W/ DIFFERENTIAL - Abnormal; Notable for the following components:    RBC 3.89 (*)     HCT 37.8 (*)     MCH 34.2 (*)     RDW-SD 52.8 (*)     All other components within normal limits   LIPASE - Normal   CBC WITH DIFFERENTIAL WITH PLATELET    Narrative:     The following orders were created for  panel order CBC With Differential With Platelet.  Procedure                               Abnormality         Status                     ---------                               -----------         ------                     CBC W/ DIFFERENTIAL[[id number]]          Abnormal            Final result                 Please view results for these tests on the individual orders.   RAINBOW DRAW BLUE       [facility]         Cardiac Monitor:   Pulse Readings from Last 1 Encounters:   01/16/24 87   , sinus, 9  interpreted by me.    Radiology findings:  I personally reviewed the images. No results found.          Medical Decision Making  Problems Addressed:  Abdominal pain, epigastric: acute illness or injury     Details: Labs reassuring pain down to 3/10 will dc with addition of carafate, bland diet, return if worse.    Amount and/or Complexity of Data Reviewed  Labs: ordered. Decision-making details documented in ED Course.  Discussion of management or test interpretation with external provider(s): Tylenol, motrin recommended.      Risk  OTC drugs.  Prescription drug management.            Disposition and Plan     Clinical Impression:  1. Abdominal pain, epigastric        Disposition:  Discharge    Follow-up:  Norberto Hdez MD  [address] [place] [address]  [phone]    Follow up        Medications Prescribed:  Current Discharge Medication List        START taking these medications    Details   acetaminophen-codeine 300-30 MG Oral Tab Take 1 tablet by mouth every 6 (six) hours as needed.  Qty: 12 tablet, Refills: 0    Associated Diagnoses: Abdominal pain, epigastric      sucralfate 1 g Oral Tab Take 1 tablet (1 g total) by mouth 4 (four) times daily before meals and nightly for 10 days.  Qty: 40 tablet, Refills: 0

## 2024-02-05 ENCOUNTER — PATIENT MESSAGE (OUTPATIENT)
Dept: INTERNAL MEDICINE CLINIC | Facility: CLINIC | Age: 49
End: 2024-02-05

## 2024-02-06 NOTE — TELEPHONE ENCOUNTER
From: Fredy Vogt  To: Norberto Hdez  Sent: 2/5/2024 9:35 AM CST  Subject: Refill     Good morning,    I tried putting in a request for pain medicine. It does not show for Norco in my chart. [place] two [place] and want to make sure all my medicines are refilled and will last.    Thank you in advance  Fredy

## 2024-02-19 NOTE — TELEPHONE ENCOUNTER
Per pharmacy pt is requesting refill for the following medication.      ZENPEP 22546-38201 units Oral Cap DR Particles, Take 1 capsule (10,000 Units total) by mouth 3 (three) times daily with meals. TAKE 1 CAPSULE BY MOUTH THREE TIMES DAILY DAILY WITH MEALS, Disp: 810 capsule, Rfl: 0

## 2024-02-21 RX ORDER — PANCRELIPASE LIPASE, PANCRELIPASE PROTEASE, PANCRELIPASE AMYLASE 10000; 32000; 42000 [USP'U]/1; [USP'U]/1; [USP'U]/1
10000 CAPSULE, DELAYED RELEASE ORAL
Qty: 270 CAPSULE | Refills: 3 | Status: SHIPPED | OUTPATIENT
Start: 2024-02-21

## 2024-02-21 NOTE — TELEPHONE ENCOUNTER
Refill passed per [facility] protocol.  Requested Prescriptions   Pending Prescriptions Disp Refills    ZENPEP [id number] units Oral Cap DR Particles 810 capsule 0     Sig: Take 1 capsule (10,000 Units total) by mouth 3 (three) times daily with meals. TAKE 1 CAPSULE BY MOUTH THREE TIMES DAILY DAILY WITH MEALS       Gastrointestional Medication Protocol Passed - 2/19/2024  5:38 PM        Passed - In person appointment or virtual visit in the past 12 mos or appointment in next 3 mos     Recent Outpatient Visits              2 months ago S/P lumbar microdiscectomy    [facility], [address] [place] [address], Mykel Lassiter MD    Office Visit    2 months ago Elevated PSA    [facility] Tolu Gregory MD    Office Visit    3 months ago S/P lumbar microdiscectomy    [facility], [address] [place] [address], Charla Rojo APRN    Office Visit    3 months ago     [facility] [place]    Nurse Only    4 months ago [facility]    [facility]          Future Appointments         Provider Department Appt Notes    In 3 months Tolu Villarreal MD [facility] [place] 6 month follow up                  Recent Outpatient Visits              2 months ago S/P lumbar microdiscectomy    [facility], [address] [place] [address], Mykel Lassiter MD    Office Visit    2 months ago Elevated PSA    [facility] Tolu Villarreal MD    Office Visit    3 months ago S/P lumbar microdiscectomy    [address], [place]    Office Visit    3 months ago     [facility] [place]    Nurse Only    4 months ago Preop general physical exam    [facility]    Office Visit          Future Appointments         Provider Department Appt Notes    In 3 months Tolu Villarreal MD [facility], [facility] 6 month follow up

## 2024-02-24 RX ORDER — PANCRELIPASE LIPASE, PANCRELIPASE PROTEASE, PANCRELIPASE AMYLASE 10000; 32000; 42000 [USP'U]/1; [USP'U]/1; [USP'U]/1
10000 CAPSULE, DELAYED RELEASE ORAL
Qty: 270 CAPSULE | Refills: 3 | Status: CANCELLED | OUTPATIENT
Start: 2024-02-24

## 2024-02-26 NOTE — TELEPHONE ENCOUNTER
Duplicate refill request, refill sent 02/21/2024 for 1 year supply to [facility] pharmacy.     Requested Prescriptions     Pending Prescriptions Disp Refills    ZENPEP 10025-75745 units Oral Cap DR Particles 270 capsule 3     Sig: Take 1 capsule (10,000 Units total) by mouth 3 (three) times daily with meals.

## 2024-03-13 RX ORDER — GABAPENTIN 300 MG/1
300 CAPSULE ORAL 3 TIMES DAILY
Qty: 90 CAPSULE | Refills: 0 | Status: SHIPPED | OUTPATIENT
Start: 2024-03-13 | End: 2024-03-14

## 2024-03-13 NOTE — TELEPHONE ENCOUNTER
Gabapentin  DOS: 11/3/24  Last OV: 12/14/23  Last refill date: 10/18/23     #/refills: 90/1  Upcoming appt:   Future Appointments   Date Time Provider Department Center   4/17/2024  8:00 AM Norberto Hdez MD [facility] ADO   5/24/2024  8:45 AM Tolu Villarreal MD [id number] EC Nap 4

## 2024-03-13 NOTE — TELEPHONE ENCOUNTER
Please advise patient that we typically only refill this type of medication for up to 3 months after surgery. If he feels he needs this long term, we ask that he reviews with his primary care provider. Or we can also work on weaning off gabapentin.       How to stop gabapentin:  Decrease by 1 pill every 3 days.  For example, if you start with 6 pills per day:  take 5 pills per day on days 1-3,  4 pills per day on days 4-6,  3 pills per day on days 7-9,  2 pills per day on days 10-12,  1 pill per day on days 13-15,  then you can stop taking the pills.      Requested Prescriptions     Signed Prescriptions Disp Refills    gabapentin 300 MG Oral Cap 90 capsule 0     Sig: Take 1 capsule (300 mg total) by mouth 3 (three) times daily. Review long term refill management with primary care provider.     Authorizing Provider: MYAH MCLEAN

## 2024-03-14 RX ORDER — PANTOPRAZOLE SODIUM 40 MG/1
40 TABLET, DELAYED RELEASE ORAL
Qty: 90 TABLET | Refills: 3 | Status: SHIPPED | OUTPATIENT
Start: 2024-03-14

## 2024-03-14 NOTE — TELEPHONE ENCOUNTER
Refill passed per [facility] protocol.  Please see message below for upcoming appointment.    Future Appointments   Date Time Provider Department Center   4/17/2024  8:00 AM Norberto Hdez MD [facility] ADO              Requested Prescriptions   Pending Prescriptions Disp Refills    PANTOPRAZOLE 40 MG Oral Tab EC [Pharmacy Med Name: Pantoprazole Sodium 40 MG Oral Tablet Delayed Release] 90 tablet 3     Sig: TAKE 1 TABLET BY MOUTH BEFORE  BREAKFAST       Gastrointestional Medication Protocol Passed - 3/13/2024  9:09 PM        Passed - In person appointment or virtual visit in the past 12 mos or appointment in next 3 mos     Recent Outpatient Visits              3 months ago S/P lumbar microdiscectomy    [address], Mykel Lassiter MD    Office Visit    3 months [facility]    [facility]    3 months ago S/P lumbar microdiscectomy    [address]    Office Visit    4 months ago     [facility]    Nurse Only    4 months ago Preop general physical exam    [facility] Norberto Hdez MD    Office Visit          Future Appointments         Provider Department Appt Notes    In 1 month Norberto Hdez MD [facility] To go over complete list of medications  last physical 3/9/2023    In 2 months Tolu Villarreal MD [facility] 6 month follow up                  Recent Outpatient Visits              3 months ago S/P lumbar microdiscectomy    [address] Mykel Lassiter MD    Office Visit    3 months ago [facility]vated PSA    [facility]    [facility]    3  months ago S/P lumbar microdiscectomy    [facility], [address] [place] [address] Charla Morales APRN    Office Visit    4 months ago     [facility], [facility] [place]    Nurse Only    4 months ago Preop general physical exam    [facility] Norberto Hdez MD    Office Visit          Future Appointments         Provider Department Appt Notes    In 1 month Norberto Hdez MD [facility] To go over complete list of medications  last physical 3/9/2023    In 2 months Tolu Villarreal MD [facility], [facility] 6 month follow up

## 2024-04-04 RX ORDER — LISINOPRIL 40 MG/1
40 TABLET ORAL DAILY
Qty: 90 TABLET | Refills: 3 | Status: SHIPPED | OUTPATIENT
Start: 2024-04-04

## 2024-04-04 NOTE — TELEPHONE ENCOUNTER
Refill passed per [facility] protocol.     Requested Prescriptions   Pending Prescriptions Disp Refills    LISINOPRIL 40 MG Oral Tab [Pharmacy Med Name: Lisinopril 40 MG Oral Tablet] 90 tablet 3     Sig: TAKE 1 TABLET BY MOUTH DAILY       Hypertension Medications Protocol Passed - 4/2/2024  9:51 PM        Passed - CMP or BMP in past 12 months        Passed - Last BP reading less than 140/90     BP Readings from Last 1 Encounters:   01/16/24 120/63               Passed - In person appointment or virtual visit in the past 12 mos or appointment in next 3 mos     Recent Outpatient Visits              3 months ago S/P lumbar microdiscectomy    [address] Mykel Decker MD    Office Visit    4 months ago Elevated PSA    [facility] Tolu Villarreal MD    Office Visit    4 months ago S/P lumbar microdiscectomy    [address] Charla Morales APRN    Office Visit    5 months ago     [facility]    Nurse Only    5 months ago Preop general physical exam    [facility] Norberto Hdez MD    Office Visit          Future Appointments         Provider Department Appt Notes    In 1 week Norberto Hdez MD [facility] To go over complete list of medications  last physical 3/9/2023    In 1 month Tolu Villarreal MD [facility] 6 month follow up               Passed - EGFRCR or GFRAA > 50     GFR Evaluation  EGFRCR: 88 , resulted on 1/16/2024

## 2024-04-10 ENCOUNTER — APPOINTMENT (OUTPATIENT)
Dept: CT IMAGING | Facility: HOSPITAL | Age: 49
End: 2024-04-10
Attending: EMERGENCY MEDICINE
Payer: COMMERCIAL

## 2024-04-10 ENCOUNTER — HOSPITAL ENCOUNTER (EMERGENCY)
Facility: HOSPITAL | Age: 49
Discharge: HOME | End: 2024-04-10
Attending: EMERGENCY MEDICINE
Payer: COMMERCIAL

## 2024-04-10 VITALS
WEIGHT: 190 LBS | SYSTOLIC BLOOD PRESSURE: 156 MMHG | DIASTOLIC BLOOD PRESSURE: 80 MMHG | BODY MASS INDEX: 23.62 KG/M2 | TEMPERATURE: 98 F | HEIGHT: 75 IN | OXYGEN SATURATION: 99 % | RESPIRATION RATE: 18 BRPM | HEART RATE: 85 BPM

## 2024-04-10 DIAGNOSIS — S06.0X0A CONCUSSION WITHOUT LOSS OF CONSCIOUSNESS, INITIAL ENCOUNTER: Primary | ICD-10-CM

## 2024-04-10 DIAGNOSIS — S09.90XA INJURY OF HEAD, INITIAL ENCOUNTER: ICD-10-CM

## 2024-04-10 PROCEDURE — 99284 EMERGENCY DEPT VISIT MOD MDM: CPT

## 2024-04-10 PROCEDURE — 70450 CT HEAD/BRAIN W/O DYE: CPT | Performed by: EMERGENCY MEDICINE

## 2024-04-10 PROCEDURE — S0119 ONDANSETRON 4 MG: HCPCS | Performed by: EMERGENCY MEDICINE

## 2024-04-10 RX ORDER — ONDANSETRON 4 MG/1
4 TABLET, ORALLY DISINTEGRATING ORAL ONCE
Status: COMPLETED | OUTPATIENT
Start: 2024-04-10 | End: 2024-04-10

## 2024-04-10 RX ORDER — ONDANSETRON 4 MG/1
4 TABLET, ORALLY DISINTEGRATING ORAL EVERY 4 HOURS PRN
Qty: 10 TABLET | Refills: 0 | Status: SHIPPED | OUTPATIENT
Start: 2024-04-10 | End: 2024-04-17

## 2024-04-10 RX ORDER — TRAMADOL HYDROCHLORIDE 50 MG/1
50 TABLET ORAL ONCE
Status: COMPLETED | OUTPATIENT
Start: 2024-04-10 | End: 2024-04-10

## 2024-04-10 RX ORDER — HYDROCODONE BITARTRATE AND ACETAMINOPHEN 5; 325 MG/1; MG/1
1-2 TABLET ORAL EVERY 6 HOURS PRN
Qty: 10 TABLET | Refills: 0 | Status: SHIPPED | OUTPATIENT
Start: 2024-04-10 | End: 2024-04-15

## 2024-04-10 NOTE — ED INITIAL ASSESSMENT (HPI)
Pt arrives through triage with complaints of HA after hitting his head with a freezer door. Pt denies LOC, pt reports he has some blurry/ double vision since he hit his head in the AM. No open abrasions noted       -thinners

## 2024-04-10 NOTE — ED PROVIDER NOTES
Patient Seen in: [facility] Emergency Department      History     Chief Complaint   Patient presents with    Headache     Stated Complaint: bumped head on freezer door this morning/ c/o head pain    Subjective:   49-year-old male with no significant history states about 8 hours ago he was walking out of the freezer and hit his head on the top of the doorway.  He felt stunned but did not lose consciousness.  Ever since he has a throbbing headache, some nausea, slight dizziness and photophobia.  No neck pain.  No weakness numbness or paresthesias.  No vomiting.  No vertigo.  Has tried Tylenol but it is not helping.  No cough or congestion.  No fever or chills.  No urinary symptoms.  No anticoagulation.            Objective:   Past Medical History:    Alcohol abuse    Back problem    Colon adenoma    x1    High blood pressure    Microcytosis    Pancreatic cyst (HCC)    Pancreatitis (HCC)    Pancreatitis, alcoholic, acute (HCC)    Pulmonary nodule    Tobacco use disorder    Unspecified essential hypertension              Past Surgical History:   Procedure Laterality Date    Colonoscopy  07/12/2023    Egd  07/20/2016    Electrocardiogram, complete  12/26/2013    scanned to media tab    Other surgical history      pancreatic cyst drainage by Dr Munoz. then saw [facility] had procedure done by Dr donohue for the cyst    Upper gi endoscopy,biopsy  07/2016                Social History     Socioeconomic History    Marital status:    Tobacco Use    Smoking status: Every Day     Current packs/day: 0.50     Average packs/day: 0.5 packs/day for 20.0 years (10.0 ttl pk-yrs)     Types: Cigarettes     Passive exposure: Current    Smokeless tobacco: Never   Vaping Use    Vaping status: Some Days    Substances: THC, weekly    Devices: Disposable   Substance and Sexual Activity    Alcohol use: Not Currently     Comment: 1 pint a week    Drug use: Yes     Frequency: 1.0 times per week     Types: Cannabis      Comment: occasionaly/edibles   Other Topics Concern    Caffeine Concern Yes     Comment: 1 ice coffee daily    Exercise Yes     Social Determinants of Health     Financial Resource Strain: Low Risk  (4/7/2022)    [facility]    Overall Financial Resource Strain (CARDIA)     Difficulty of Paying Living Expenses: Not hard at all   Transportation Needs: No Transportation Needs (4/7/2022)    [facility]    PRAPARE - Transportation     Lack of Transportation (Medical): No     Lack of Transportation (Non-Medical): No              Review of Systems    Positive for stated complaint: bumped head on freezer door this morning/ c/o head pain  Other systems are as noted in HPI.  Constitutional and vital signs reviewed.      All other systems reviewed and negative except as noted above.    Physical Exam     ED Triage Vitals [04/10/24 1737]   /80   Pulse 85   Resp 18   Temp 97.6 °F (36.4 °C)   Temp src Temporal   SpO2 99 %   O2 Device None (Room air)       Current:/80   Pulse 85   Temp 97.6 °F (36.4 °C) (Temporal)   Resp 18   Ht 190.5 cm (6' 3\")   Wt 86.2 kg   SpO2 99%   BMI 23.75 kg/m²         Physical Exam  HENT:      Head: Normocephalic and atraumatic.      Right Ear: External ear normal.      Left Ear: External ear normal.      Nose: Nose normal.      Mouth/Throat:      Mouth: Mucous membranes are moist.   Eyes:      Extraocular Movements: Extraocular movements intact.      Pupils: Pupils are equal, round, and reactive to light.   Cardiovascular:      Rate and Rhythm: Normal rate and regular rhythm.      Pulses: Normal pulses.   Pulmonary:      Effort: Pulmonary effort is normal.      Breath sounds: Normal breath sounds.   Abdominal:      Palpations: Abdomen is soft.      Tenderness: There is no abdominal tenderness.   Musculoskeletal:         General: No swelling. Normal range of motion.      Cervical back: Normal  range of motion and neck supple. No tenderness.   Lymphadenopathy:      Cervical: No cervical adenopathy.   Skin:     General: Skin is warm.      Capillary Refill: Capillary refill takes less than 2 seconds.   Neurological:      General: No focal deficit present.      Mental Status: He is alert.      Sensory: No sensory deficit.      Motor: No weakness.               ED Course   Labs Reviewed - No data to display       ED Course as of 04/10/24 1904  ------------------------------------------------------------  Time: 04/10 1902  Comment: CT head independently interpreted by me.  No acute findings.  Patient doing a little better on tramadol and ondansetron              MDM      CT BRAIN OR HEAD (90047)    Result Date: 4/10/2024  CONCLUSION:  1. No acute intracranial finding. 2. 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    Dictated by (CST): Arron Edouard MD on 4/10/2024 at 6:47 PM     Finalized by (CST): Arron Edouard MD on 4/10/2024 at 6:50 PM                                            Medical Decision Making  Patient with head injury 8 hours ago here with photophobia, dizziness, headache.  Differential is vast but could include concussion, intracranial hemorrhage, fracture.  Clinically does not appear to have a C-spine injury.  He is neurologically intact.  Pupils round reactive and extraocular motor intact.  Will perform CT head.  Will give tramadol and Zofran since he has had multiple Tylenol's today.    Amount and/or Complexity of Data Reviewed  External Data Reviewed: notes.  Radiology: ordered and independent interpretation performed. Decision-making details documented in ED Course.  Discussion of management or test interpretation with external provider(s): Patient doing well.  Nonfocal.  CT head negative.  He did ask for a few Norco and as well as Zofran.  He has an appointment in a week.  Vitals look unremarkable.  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 to be filed.    Risk  Prescription drug management.  Risk Details: History of pancreatitis history of alcohol use history of hypertension        Disposition and Plan     Clinical Impression:  1. Concussion without loss of consciousness, initial encounter    2. Injury of head, initial encounter         Disposition:  Discharge  4/10/2024  7:02 pm    Follow-up:  Norberto Hdez MD  [address] [place] [address]  [phone]    Follow up            Medications Prescribed:  Current Discharge Medication List        START taking these medications    Details   HYDROcodone-acetaminophen 5-325 MG Oral Tab Take 1-2 tablets by mouth every 6 (six) hours as needed for Pain.  Qty: 10 tablet, Refills: 0    Associated Diagnoses: Concussion without loss of consciousness, initial encounter; Injury of head, initial encounter      !! ondansetron 4 MG Oral Tablet Dispersible Take 1 tablet (4 mg total) by mouth every 4 (four) hours as needed for Nausea.  Qty: 10 tablet, Refills: 0       !! - Potential duplicate medications found. Please discuss with provider.

## 2024-04-11 NOTE — DISCHARGE INSTRUCTIONS
Please keep your appointment with your primary care doctor in 1 week and follow-up.  Ibuprofen every 8 hours with food for the next 2 days as needed.  Norco as needed for pain  Return for changes or worsening in renal instructions for further recommendations.  Continue normal medications.

## 2024-04-15 RX ORDER — GABAPENTIN 300 MG/1
300 CAPSULE ORAL 3 TIMES DAILY
Qty: 90 CAPSULE | Refills: 11 | OUTPATIENT
Start: 2024-04-15

## 2024-04-15 RX ORDER — AMLODIPINE BESYLATE 10 MG/1
10 TABLET ORAL DAILY
Qty: 90 TABLET | Refills: 3 | OUTPATIENT
Start: 2024-04-15

## 2024-04-15 NOTE — TELEPHONE ENCOUNTER
Gabapentin    DOS: 11/1/23 L4-5 Microdiscectomy  Last OV: 12/14/24  Last refill date: 3/26/24 #/refills: 90/0  Upcoming appt: none

## 2024-04-17 ENCOUNTER — LAB ENCOUNTER (OUTPATIENT)
Dept: LAB | Age: 49
End: 2024-04-17
Attending: SURGERY
Payer: COMMERCIAL

## 2024-04-17 ENCOUNTER — OFFICE VISIT (OUTPATIENT)
Dept: INTERNAL MEDICINE CLINIC | Facility: CLINIC | Age: 49
End: 2024-04-17
Payer: COMMERCIAL

## 2024-04-17 VITALS
OXYGEN SATURATION: 97 % | SYSTOLIC BLOOD PRESSURE: 121 MMHG | DIASTOLIC BLOOD PRESSURE: 70 MMHG | HEIGHT: 75 IN | BODY MASS INDEX: 23.17 KG/M2 | HEART RATE: 87 BPM | TEMPERATURE: 97 F | WEIGHT: 186.31 LBS

## 2024-04-17 DIAGNOSIS — F17.200 TOBACCO DEPENDENCE: ICD-10-CM

## 2024-04-17 DIAGNOSIS — N40.1 BENIGN PROSTATIC HYPERPLASIA WITH LOWER URINARY TRACT SYMPTOMS, SYMPTOM DETAILS UNSPECIFIED: ICD-10-CM

## 2024-04-17 DIAGNOSIS — I10 ESSENTIAL HYPERTENSION: ICD-10-CM

## 2024-04-17 DIAGNOSIS — K86.0 ALCOHOL-INDUCED CHRONIC PANCREATITIS (HCC): ICD-10-CM

## 2024-04-17 DIAGNOSIS — Z00.00 ANNUAL PHYSICAL EXAM: Primary | ICD-10-CM

## 2024-04-17 DIAGNOSIS — K86.3 PANCREATIC PSEUDOCYST (HCC): ICD-10-CM

## 2024-04-17 DIAGNOSIS — R97.20 ELEVATED PSA: ICD-10-CM

## 2024-04-17 LAB — PSA SERPL-MCNC: 6.3 NG/ML (ref ?–4)

## 2024-04-17 PROCEDURE — 3074F SYST BP LT 130 MM HG: CPT | Performed by: INTERNAL MEDICINE

## 2024-04-17 PROCEDURE — 3008F BODY MASS INDEX DOCD: CPT | Performed by: INTERNAL MEDICINE

## 2024-04-17 PROCEDURE — 99396 PREV VISIT EST AGE 40-64: CPT | Performed by: INTERNAL MEDICINE

## 2024-04-17 PROCEDURE — 36415 COLL VENOUS BLD VENIPUNCTURE: CPT

## 2024-04-17 PROCEDURE — 3078F DIAST BP <80 MM HG: CPT | Performed by: INTERNAL MEDICINE

## 2024-04-17 PROCEDURE — 96127 BRIEF EMOTIONAL/BEHAV ASSMT: CPT | Performed by: INTERNAL MEDICINE

## 2024-04-17 PROCEDURE — 84153 ASSAY OF PSA TOTAL: CPT

## 2024-04-17 RX ORDER — GABAPENTIN 300 MG/1
CAPSULE ORAL
COMMUNITY
Start: 2024-03-26

## 2024-04-17 NOTE — PROGRESS NOTES
Subjective:     Patient ID: Fredy Vogt is a 49 year old male.    Patient presents today for his annual physical.        History/Other:   Review of Systems   Constitutional: Negative.    HENT: Negative.     Eyes: Negative.    Respiratory: Negative.           No hemoptysis   Cardiovascular:  Negative for chest pain, palpitations and leg swelling.   Gastrointestinal:  Negative for anal bleeding, blood in stool, constipation, diarrhea and rectal pain.   Genitourinary:  Positive for frequency. Negative for dysuria and hematuria.   Allergic/Immunologic: Negative for immunocompromised state.   Neurological:  Negative for syncope.   Hematological: Negative.      Current Outpatient Medications   Medication Sig Dispense Refill    gabapentin 300 MG Oral Cap       lisinopril 40 MG Oral Tab Take 1 tablet (40 mg total) by mouth daily. 90 tablet 3    pantoprazole 40 MG Oral Tab EC Take 1 tablet (40 mg total) by mouth before breakfast. 90 tablet 3    ZENPEP 66009-06630 units Oral Cap DR Particles Take 1 capsule (10,000 Units total) by mouth 3 (three) times daily with meals. 270 capsule 3    HYDROcodone-acetaminophen  MG Oral Tab Take 1 tablet by mouth every 8 (eight) hours as needed for Pain. 20 tablet 0    finasteride 5 MG Oral Tab Take 1 tablet (5 mg total) by mouth daily. 90 tablet 5    tamsulosin 0.4 MG Oral Cap Take 1 capsule (0.4 mg total) by mouth daily. 90 capsule 5    oxybutynin ER 10 MG Oral Tablet 24 Hr Take 1 tablet (10 mg total) by mouth daily. 90 tablet 5    ondansetron (ZOFRAN) 4 mg tablet Take 1 tablet (4 mg total) by mouth every 8 (eight) hours as needed for Nausea. 20 tablet 0    zolpidem 10 MG Oral Tab Take 1 tablet (10 mg total) by mouth nightly as needed for Sleep. 15 tablet 0    ondansetron 4 MG Oral Tablet Dispersible Take 1 tablet (4 mg total) by mouth every 8 (eight) hours as needed for Nausea. 20 tablet 0    amLODIPine 10 MG Oral Tab Take 1 tablet (10 mg total) by mouth daily. 90 tablet 3     ondansetron 4 MG Oral Tablet Dispersible Take 1 tablet (4 mg total) by mouth every 4 (four) hours as needed for Nausea. 10 tablet 0    oxyCODONE 5 MG Oral Tab Take 0.5-1 tablets (2.5-5 mg total) by mouth every 8 (eight) hours as needed. No driving (Patient not taking: Reported on 4/17/2024) 10 tablet 0    acetaminophen 500 MG Oral Tab Take 2 tablets (1,000 mg total) by mouth every 8 (eight) hours. 60 tablet 0     Allergies:  Allergies   Allergen Reactions    Morphine ITCHING       Past Medical History:    Alcohol abuse    Back problem    Colon adenoma    x1    High blood pressure    Microcytosis    Pancreatic cyst (HCC)    Pancreatitis (HCC)    Pancreatitis, alcoholic, acute (HCC)    Pulmonary nodule    Tobacco use disorder    Unspecified essential hypertension      Past Surgical History:   Procedure Laterality Date    Colonoscopy  07/12/2023    Egd  07/20/2016    Electrocardiogram, complete  12/26/2013    scanned to media tab    Lumbar discectomy      2023    Other surgical history      pancreatic cyst drainage by Dr Munoz. then saw [place] had procedure done by Dr donohue for the cyst    Upper gi endoscopy,biopsy  07/2016      Family History   Problem Relation Age of Onset    Hypertension Father     Cancer Father     Diabetes Mother     Hypertension Mother     Heart Disorder Mother     Other (Other) Mother         copd    No Known Problems Daughter     Other (Other) Brother         motorbke accident      Social History:   Social History     Socioeconomic History    Marital status:    Tobacco Use    Smoking status: Every Day     Current packs/day: 0.50     Average packs/day: 0.5 packs/day for 20.0 years (10.0 ttl pk-yrs)     Types: Cigarettes     Passive exposure: Current    Smokeless tobacco: Never   Vaping Use    Vaping status: Some Days    Substances: THC, weekly    Devices: Disposable   Substance and Sexual Activity    Alcohol use: Not Currently     Comment: last drink at least 7mos ago    Drug  use: Not Currently     Frequency: 1.0 times per week     Types: Cannabis     Comment: occasionaly/edibles   Other Topics Concern    Caffeine Concern Yes     Comment: 1 ice coffee daily    Exercise Yes     Social Determinants of Health     Financial Resource Strain: Low Risk  (4/7/2022)    [facility]    Overall Financial Resource Strain (CARDIA)     Difficulty of Paying Living Expenses: Not hard at all   Transportation Needs: No Transportation Needs (4/7/2022)    [facility]    PRAPARE - Transportation     Lack of Transportation (Medical): No     Lack of Transportation (Non-Medical): No        Objective:   Physical Exam  Constitutional:       General: He is not in acute distress.     Appearance: He is well-developed. He is not ill-appearing, toxic-appearing or diaphoretic.   HENT:      Head: Normocephalic and atraumatic.      Right Ear: Tympanic membrane, ear canal and external ear normal.      Left Ear: Tympanic membrane, ear canal and external ear normal.      Nose: Nose normal.      Mouth/Throat:      Pharynx: No oropharyngeal exudate.   Eyes:      General: No scleral icterus.        Right eye: No discharge.         Left eye: No discharge.      Conjunctiva/sclera: Conjunctivae normal.      Pupils: Pupils are equal, round, and reactive to light.   Neck:      Thyroid: No thyromegaly.      Vascular: No carotid bruit or JVD.   Cardiovascular:      Rate and Rhythm: Normal rate and regular rhythm.      Pulses: Normal pulses.      Heart sounds: Normal heart sounds. No murmur heard.     No friction rub. No gallop.   Pulmonary:      Effort: Pulmonary effort is normal. No respiratory distress.      Breath sounds: Normal breath sounds. No wheezing or rales.   Abdominal:      General: Abdomen is flat. Bowel sounds are normal. There is no distension.      Palpations: Abdomen is soft. There is no mass.      Tenderness: There is no  abdominal tenderness. There is no guarding or rebound.   Genitourinary:     Comments: Deferred to uroloigst  Musculoskeletal:         General: Normal range of motion.      Cervical back: Normal range of motion and neck supple. No rigidity or tenderness.      Right lower leg: No edema.      Left lower leg: No edema.   Lymphadenopathy:      Cervical: No cervical adenopathy.   Skin:     General: Skin is warm and dry.      Coloration: Skin is not jaundiced or pale.      Findings: No rash.   Neurological:      Mental Status: He is alert and oriented to person, place, and time.   Psychiatric:         Mood and Affect: Mood normal.         Assessment & Plan:   (Z00.00) Annual physical exam  (primary encounter diagnosis)  Plan: He is current with his colonoscopy.  He is also current with his vaccinations.  He just had some labs done through one of his ER visits.    (I10) Essential hypertension  Plan: Blood pressure remains well-controlled.  CPM.    (N40.1) Benign prostatic hyperplasia with lower urinary tract symptoms, symptom details unspecified  Plan: Is being followed by her urologist.  Last PSA was 7 6 months ago and is due for his repeat PSA per urologist.    (K86.0) Alcohol-induced chronic pancreatitis (HCC)  Plan: Patient been sober from alcohol for the past 7 months.  He will continue with his pancreatic enzyme supplements.    (K86.3) Pancreatic pseudocyst (HCC)  Plan: Patient advised and reminded to follow-up with GI.    (F17.200) Tobacco dependence  Plan: Patient again advised to quit smoking.       No orders of the defined types were placed in this encounter.      Meds This Visit:  Requested Prescriptions      No prescriptions requested or ordered in this encounter       Imaging & Referrals:  None

## 2024-04-22 ENCOUNTER — HOSPITAL ENCOUNTER (EMERGENCY)
Facility: HOSPITAL | Age: 49
Discharge: HOME | End: 2024-04-22
Attending: EMERGENCY MEDICINE
Payer: COMMERCIAL

## 2024-04-22 VITALS
DIASTOLIC BLOOD PRESSURE: 75 MMHG | TEMPERATURE: 98 F | HEART RATE: 81 BPM | OXYGEN SATURATION: 99 % | RESPIRATION RATE: 20 BRPM | SYSTOLIC BLOOD PRESSURE: 145 MMHG

## 2024-04-22 DIAGNOSIS — R10.13 EPIGASTRIC PAIN: Primary | ICD-10-CM

## 2024-04-22 LAB
ALBUMIN SERPL-MCNC: 4.1 G/DL (ref 3.2–4.8)
ALBUMIN/GLOB SERPL: 1.8 {RATIO} (ref 1–2)
ALP LIVER SERPL-CCNC: 65 U/L
ALT SERPL-CCNC: 17 U/L
ANION GAP SERPL CALC-SCNC: 4 MMOL/L (ref 0–18)
AST SERPL-CCNC: 16 U/L (ref ?–34)
BASOPHILS # BLD AUTO: 0.02 X10(3) UL (ref 0–0.2)
BASOPHILS NFR BLD AUTO: 0.3 %
BILIRUB SERPL-MCNC: 0.2 MG/DL (ref 0.3–1.2)
BUN BLD-MCNC: 18 MG/DL (ref 9–23)
BUN/CREAT SERPL: 15.7 (ref 10–20)
CALCIUM BLD-MCNC: 8.8 MG/DL (ref 8.7–10.4)
CHLORIDE SERPL-SCNC: 112 MMOL/L (ref 98–112)
CO2 SERPL-SCNC: 26 MMOL/L (ref 21–32)
CREAT BLD-MCNC: 1.15 MG/DL
DEPRECATED RDW RBC AUTO: 53.1 FL (ref 35.1–46.3)
EGFRCR SERPLBLD CKD-EPI 2021: 78 ML/MIN/1.73M2 (ref 60–?)
EOSINOPHIL # BLD AUTO: 0.04 X10(3) UL (ref 0–0.7)
EOSINOPHIL NFR BLD AUTO: 0.6 %
ERYTHROCYTE [DISTWIDTH] IN BLOOD BY AUTOMATED COUNT: 14.6 % (ref 11–15)
GLOBULIN PLAS-MCNC: 2.3 G/DL (ref 2.8–4.4)
GLUCOSE BLD-MCNC: 159 MG/DL (ref 70–99)
HCT VFR BLD AUTO: 38.1 %
HGB BLD-MCNC: 13.3 G/DL
IMM GRANULOCYTES # BLD AUTO: 0.02 X10(3) UL (ref 0–1)
IMM GRANULOCYTES NFR BLD: 0.3 %
LIPASE SERPL-CCNC: 31 U/L (ref 13–75)
LYMPHOCYTES # BLD AUTO: 1.34 X10(3) UL (ref 1–4)
LYMPHOCYTES NFR BLD AUTO: 18.9 %
MCH RBC QN AUTO: 34.5 PG (ref 26–34)
MCHC RBC AUTO-ENTMCNC: 34.9 G/DL (ref 31–37)
MCV RBC AUTO: 98.7 FL
MONOCYTES # BLD AUTO: 0.48 X10(3) UL (ref 0.1–1)
MONOCYTES NFR BLD AUTO: 6.8 %
NEUTROPHILS # BLD AUTO: 5.19 X10 (3) UL (ref 1.5–7.7)
NEUTROPHILS # BLD AUTO: 5.19 X10(3) UL (ref 1.5–7.7)
NEUTROPHILS NFR BLD AUTO: 73.1 %
OSMOLALITY SERPL CALC.SUM OF ELEC: 299 MOSM/KG (ref 275–295)
PLATELET # BLD AUTO: 281 10(3)UL (ref 150–450)
POTASSIUM SERPL-SCNC: 4 MMOL/L (ref 3.5–5.1)
PROT SERPL-MCNC: 6.4 G/DL (ref 5.7–8.2)
RBC # BLD AUTO: 3.86 X10(6)UL
SODIUM SERPL-SCNC: 142 MMOL/L (ref 136–145)
TROPONIN I SERPL HS-MCNC: 5 NG/L
WBC # BLD AUTO: 7.1 X10(3) UL (ref 4–11)

## 2024-04-22 PROCEDURE — 96375 TX/PRO/DX INJ NEW DRUG ADDON: CPT

## 2024-04-22 PROCEDURE — 99284 EMERGENCY DEPT VISIT MOD MDM: CPT

## 2024-04-22 PROCEDURE — 93005 ELECTROCARDIOGRAM TRACING: CPT

## 2024-04-22 PROCEDURE — 85025 COMPLETE CBC W/AUTO DIFF WBC: CPT | Performed by: EMERGENCY MEDICINE

## 2024-04-22 PROCEDURE — 80053 COMPREHEN METABOLIC PANEL: CPT | Performed by: EMERGENCY MEDICINE

## 2024-04-22 PROCEDURE — 84484 ASSAY OF TROPONIN QUANT: CPT | Performed by: EMERGENCY MEDICINE

## 2024-04-22 PROCEDURE — 96374 THER/PROPH/DIAG INJ IV PUSH: CPT

## 2024-04-22 PROCEDURE — S0028 INJECTION, FAMOTIDINE, 20 MG: HCPCS | Performed by: EMERGENCY MEDICINE

## 2024-04-22 PROCEDURE — 83690 ASSAY OF LIPASE: CPT | Performed by: EMERGENCY MEDICINE

## 2024-04-22 PROCEDURE — 93010 ELECTROCARDIOGRAM REPORT: CPT

## 2024-04-22 RX ORDER — PANTOPRAZOLE SODIUM 40 MG/1
40 TABLET, DELAYED RELEASE ORAL DAILY
Qty: 30 TABLET | Refills: 0 | Status: SHIPPED | OUTPATIENT
Start: 2024-04-22 | End: 2024-05-22

## 2024-04-22 RX ORDER — FAMOTIDINE 10 MG/ML
20 INJECTION, SOLUTION INTRAVENOUS ONCE
Status: COMPLETED | OUTPATIENT
Start: 2024-04-22 | End: 2024-04-22

## 2024-04-22 RX ORDER — DICYCLOMINE HCL 20 MG
20 TABLET ORAL 4 TIMES DAILY PRN
Qty: 20 TABLET | Refills: 0 | Status: SHIPPED | OUTPATIENT
Start: 2024-04-22

## 2024-04-22 RX ORDER — KETOROLAC TROMETHAMINE 15 MG/ML
15 INJECTION, SOLUTION INTRAMUSCULAR; INTRAVENOUS ONCE
Status: COMPLETED | OUTPATIENT
Start: 2024-04-22 | End: 2024-04-22

## 2024-04-22 RX ORDER — SUCRALFATE 1 G/1
1 TABLET ORAL
Qty: 56 TABLET | Refills: 0 | Status: SHIPPED | OUTPATIENT
Start: 2024-04-22 | End: 2024-05-06

## 2024-04-23 DIAGNOSIS — K86.0 ALCOHOL-INDUCED CHRONIC PANCREATITIS (HCC): ICD-10-CM

## 2024-04-23 LAB
ATRIAL RATE: 82 BPM
P AXIS: 76 DEGREES
P-R INTERVAL: 120 MS
Q-T INTERVAL: 354 MS
QRS DURATION: 96 MS
QTC CALCULATION (BEZET): 413 MS
R AXIS: 64 DEGREES
T AXIS: -4 DEGREES
VENTRICULAR RATE: 82 BPM

## 2024-04-23 RX ORDER — ACETAMINOPHEN 500 MG
1000 TABLET ORAL EVERY 8 HOURS
Qty: 60 TABLET | Refills: 0 | Status: CANCELLED | OUTPATIENT
Start: 2024-04-23

## 2024-04-23 RX ORDER — HYDROCODONE BITARTRATE AND ACETAMINOPHEN 10; 325 MG/1; MG/1
1 TABLET ORAL EVERY 8 HOURS PRN
Qty: 20 TABLET | Refills: 0 | Status: SHIPPED | OUTPATIENT
Start: 2024-04-23

## 2024-04-23 NOTE — TELEPHONE ENCOUNTER
Can see me tomorrow Wednesday at 3:pm; I will refill his pain med for now for his chronic pancreatitis

## 2024-04-23 NOTE — TELEPHONE ENCOUNTER
ED for epigastric pain. Needs to review pain medications with primary care provider    Advised GI medications per ED visit notes: pantoprazole and dicyclomine

## 2024-04-23 NOTE — TELEPHONE ENCOUNTER
Wife called in. States patient seen in ER for epigastric pain        HYDROcodone-acetaminophen  MG Oral Tab [Norberto Hdez]      Patient Comment: I was in the ER from a flare up on last night. After i returned home and woke up, I still had a considerable amount of pain. Nothing was prescribed for pain.    Dr. Hdez: RN advised ER follow up recommended. Wife would like to know if you would be able to add on this week (no available appointments) Asking for a few options over the next few days if possible.

## 2024-04-23 NOTE — DISCHARGE INSTRUCTIONS
Take Bentyl as needed for pain.    Take pantoprazole every day as prescribed.    See primary care and gastroenterology for follow-up.    Return to the ER if you develop worsening symptoms or any emergent concerns.

## 2024-04-23 NOTE — ED INITIAL ASSESSMENT (HPI)
This afternoon ate shrimp with BBQ sauce, now having some nausea and diarrhea, took Zofran at home    C/o mid-left side abdominal pain, radiating around the back.  Afebrile.  Last pancreatitis flare-up last year

## 2024-04-23 NOTE — ED PROVIDER NOTES
Patient Seen in: [facility] Emergency Department      History     Chief Complaint   Patient presents with    Pancreatitis     Stated Complaint: abd pain    Subjective:   HPI    49-year-old male presents for evaluation of epigastric pain.  Patient reports epigastric pain that began earlier today.  Reports mild nausea, denies fever, recent trauma, focal weakness or numbness, chest pain or shortness of breath.    Objective:   Past Medical History:    Alcohol abuse    Back problem    Colon adenoma    x1    High blood pressure    Microcytosis    Pancreatic cyst (HCC)    Pancreatitis (HCC)    Pancreatitis, alcoholic, acute (HCC)    Pulmonary nodule    Tobacco use disorder    Unspecified essential hypertension              Past Surgical History:   Procedure Laterality Date    Colonoscopy  07/12/2023    Egd  07/20/2016    Electrocardiogram, complete  12/26/2013    scanned to media tab    Lumbar discectomy      2023    Other surgical history      pancreatic cyst drainage by Dr Munoz. then saw [facility] had procedure done by Dr donohue for the cyst    Upper gi endoscopy,biopsy  07/2016                Social History     Socioeconomic History    Marital status:    Tobacco Use    Smoking status: Every Day     Current packs/day: 0.50     Average packs/day: 0.5 packs/day for 20.0 years (10.0 ttl pk-yrs)     Types: Cigarettes     Passive exposure: Current    Smokeless tobacco: Never   Vaping Use    Vaping status: Some Days    Substances: THC, weekly    Devices: Disposable   Substance and Sexual Activity    Alcohol use: Not Currently     Comment: last drink at least 7mos ago    Drug use: Yes     Frequency: 1.0 times per week     Types: Cannabis     Comment: occasionaly/edibles   Other Topics Concern    Caffeine Concern Yes     Comment: 1 ice coffee daily    Exercise Yes     Social Determinants of Health     Financial Resource Strain: Low Risk  (4/7/2022)    [facility]    Overall Financial Resource Strain (CARDIA)     Difficulty of Paying Living Expenses: Not hard at all   Transportation Needs: No Transportation Needs (4/7/2022)    [facility]     Lack of Transportation (Medical): No     Lack of Transportation (Non-Medical): No              Review of Systems    Positive for stated complaint: abd pain  Other systems are as noted in HPI.  Constitutional and vital signs reviewed.      All other systems reviewed and negative except as noted above.    Physical Exam     ED Triage Vitals [04/22/24 2114]   /71   Pulse 102   Resp 20   Temp 98.3 °F (36.8 °C)   Temp src Oral   SpO2 99 %   O2 Device None (Room air)       Current:/76   Pulse 92   Temp 98.3 °F (36.8 °C) (Oral)   Resp 20   SpO2 99%         Physical Exam  Vitals and nursing note reviewed.   Constitutional:       General: He is not in acute distress.     Appearance: He is well-developed.   HENT:      Head: Normocephalic and atraumatic.   Eyes:      Conjunctiva/sclera: Conjunctivae normal.   Cardiovascular:      Rate and Rhythm: Normal rate and regular rhythm.      Heart sounds: Normal heart sounds.   Pulmonary:      Effort: Pulmonary effort is normal. No respiratory distress.      Breath sounds: Normal breath sounds.   Abdominal:      General: Bowel sounds are normal.      Palpations: Abdomen is soft.      Tenderness: There is no abdominal tenderness.   Musculoskeletal:         General: Normal range of motion.      Cervical back: Normal range of motion and neck supple.   Skin:     General: Skin is warm and dry.      Findings: No rash.   Neurological:      General: No focal deficit present.      Mental Status: He is alert and oriented to person, place, and time.       ED Course     Labs Reviewed   COMP METABOLIC PANEL (14) - Abnormal; Notable for the following components:       Result Value    Glucose 159 (*)     Calculated Osmolality 299  (*)     Bilirubin, Total 0.2 (*)     Globulin  2.3 (*)     All other components within normal limits   CBC W/ DIFFERENTIAL - Abnormal; Notable for the following components:    RBC 3.86 (*)     HCT 38.1 (*)     MCH 34.5 (*)     RDW-SD 53.1 (*)     All other components within normal limits   LIPASE - Normal   TROPONIN I HIGH SENSITIVITY - Normal   CBC WITH DIFFERENTIAL WITH PLATELET    Narrative:     The following orders were created for panel order CBC With Differential With Platelet.  Procedure                               Abnormality         Status                     ---------                               -----------         ------                     CBC W/ DIFFERENTIAL[[id number]]          Abnormal            Final result                 Please view results for these tests on the individual orders.     EKG    Rate, intervals and axes as noted on EKG Report.  Rate: 82  Rhythm: Sinus Rhythm  Reading: Sinus rhythm with normal intervals, no STEMI           Vitals:    04/22/24 2114 04/22/24 2130 04/22/24 2215 04/22/24 2245   BP: 149/71 140/73 129/75 134/76   Pulse: 102 97 92 92   Resp: 20      Temp: 98.3 °F (36.8 °C)      TempSrc: Oral      SpO2: 99% 99% 99% 99%     *I personally reviewed and interpreted all ED vitals.           MDM        Medical Decision Making  Differential diagnosis includes but is not limited to gastritis, pancreatitis, peptic ulcer disease, cholelithiasis, aortic aneurysm or dissection, ACS, GERD    Well-appearing patient, normal vital signs, labs are unrevealing with normal lipase.  Discussed recommendation for outpatient follow-up with primary care and gastroenterology.  Discharged with return precautions.    Problems Addressed:  Epigastric pain: acute illness or injury    Amount and/or Complexity of Data Reviewed  External Data Reviewed: labs.     Details: CBC, CMP, lipase stable compared to 1/16/2024  Labs: ordered.  ECG/medicine tests: ordered and independent interpretation performed.  Decision-making details documented in ED Course.    Risk  Prescription drug management.        Disposition and Plan     Clinical Impression:  1. Epigastric pain         Disposition:  There is no disposition on file for this visit.  There is no disposition time on file for this visit.    Follow-up:  Norberto Hdez MD  [address] [facility] [address]  [facility] [address] [phone]    Follow up      Torito Self MD  [address] [facility] [address] [phone]    Schedule an appointment as soon as possible for a visit  For follow up    Fahad Liu MD  [address] [facility] [address]  [facility] [address] [phone]    Follow up  As needed          Medications Prescribed:  Current Discharge Medication List        START taking these medications    Details   !! pantoprazole 40 MG Oral Tab EC Take 1 tablet (40 mg total) by mouth daily.  Qty: 30 tablet, Refills: 0      dicyclomine 20 MG Oral Tab Take 1 tablet (20 mg total) by mouth 4 (four) times daily as needed.  Qty: 20 tablet, Refills: 0       !! - Potential duplicate medications found. Please discuss with provider.

## 2024-04-23 NOTE — TELEPHONE ENCOUNTER
Acetaminophen   DOS: 11/1/23  Last OV: 12/14/24  Last refill date: 11/3/23     #/refills: 60/0  Upcoming appt:   Future Appointments   Date Time Provider Department Center   5/24/2024 11:15 AM Tolu Villarreal MD [id number]     4/22/24  ALT  10 - 49 U/L 17   AST  <=34 U/L 16       - HYDROcodone-acetaminophen  MG Oral Tab [Norberto Hdez]  Patient Comment: I was in the ER from a flare up on last night. After i returned home and woke up, I still had a considerable amount of pain. Nothing was prescribed for pain.    Hx.     Alcohol-induced chronic pancreatitis (HCC)

## 2024-05-03 RX ORDER — AMLODIPINE BESYLATE 10 MG/1
10 TABLET ORAL DAILY
Qty: 90 TABLET | Refills: 1 | Status: SHIPPED | OUTPATIENT
Start: 2024-05-03

## 2024-05-03 NOTE — TELEPHONE ENCOUNTER
Please Review. Protocol Failed; No Protocol   BP Readings from Last 1 Encounters:   04/22/24 145/75     Requested Prescriptions   Pending Prescriptions Disp Refills    AMLODIPINE 10 MG Oral Tab [Pharmacy Med Name: amLODIPine Besylate 10 MG Oral Tablet] 90 tablet 3     Sig: TAKE 1 TABLET BY MOUTH DAILY       Hypertension Medications Protocol Failed - 5/2/2024  9:35 PM        Failed - Last BP reading less than 140/90     BP Readings from Last 1 Encounters:   04/22/24 145/75               Passed - CMP or BMP in past 12 months        Passed - In person appointment or virtual visit in the past 12 mos or appointment in next 3 mos     Recent Outpatient Visits              2 weeks ago Annual physical exam    [facility], Norberto Mera MD    Office Visit    4 months ago S/P lumbar microdiscectomy    [address] Mykel Decker MD    Office Visit    5 months ago Elevated PSA    [facility] Tolu Villarreal MD    Office Visit    5 months ago S/P lumbar microdiscectomy    [address] APR[address]    Office Visit    6 months ago     [facility]    Nurse Only          Future Appointments         Provider Department Appt Notes    In 3 weeks Tolu Villarreal MD [facility] 6 month follow up                    Passed - EGFRCR or GFRAA > 50     GFR Evaluation  EGFRCR: 78 , resulted on 4/22/2024                 Future Appointments         Provider Department Appt Notes    In 3 weeks Tolu Villarreal MD [facility] 6 month follow up          Recent Outpatient Visits              2 weeks ago Annual physical exam    [facility], Norberto Mera MD    Office Visit    4 months ago S/P  lumbar microdiscectomy    [facility], [address] [place] [address], Mykel Lassiter MD    Office Visit    5 months ago Elevated PSA    [facility], [facility] Tolu Gregory MD    Office Visit    5 months ago S/P lumbar microdiscectomy    [facility], [address] [place] [address], Charla Rojo APRN    Office Visit    6 months ago     [facility]

## 2024-05-08 RX ORDER — TAMSULOSIN HYDROCHLORIDE 0.4 MG/1
0.4 CAPSULE ORAL DAILY
Qty: 90 CAPSULE | Refills: 2 | Status: SHIPPED | OUTPATIENT
Start: 2024-05-08

## 2024-05-24 ENCOUNTER — OFFICE VISIT (OUTPATIENT)
Dept: SURGERY | Facility: CLINIC | Age: 49
End: 2024-05-24

## 2024-05-24 DIAGNOSIS — R97.20 ELEVATED PSA: Primary | ICD-10-CM

## 2024-05-24 DIAGNOSIS — N13.8 BPH WITH OBSTRUCTION/LOWER URINARY TRACT SYMPTOMS: ICD-10-CM

## 2024-05-24 DIAGNOSIS — N40.1 BPH WITH OBSTRUCTION/LOWER URINARY TRACT SYMPTOMS: ICD-10-CM

## 2024-05-24 LAB
APPEARANCE: CLEAR
GLUCOSE (URINE DIPSTICK): NEGATIVE MG/DL
LEUKOCYTES: NEGATIVE
MULTISTIX LOT#: ABNORMAL NUMERIC
NITRITE, URINE: NEGATIVE
OCCULT BLOOD: NEGATIVE
PH, URINE: 5.5 (ref 4.5–8)
SPECIFIC GRAVITY: 1.02 (ref 1–1.03)
URINE-COLOR: YELLOW
UROBILINOGEN,SEMI-QN: 0.2 MG/DL (ref 0–1.9)

## 2024-05-24 PROCEDURE — 99213 OFFICE O/P EST LOW 20 MIN: CPT | Performed by: SURGERY

## 2024-05-24 PROCEDURE — 81003 URINALYSIS AUTO W/O SCOPE: CPT | Performed by: SURGERY

## 2024-05-24 RX ORDER — OXYBUTYNIN CHLORIDE 10 MG/1
10 TABLET, EXTENDED RELEASE ORAL DAILY
Qty: 90 TABLET | Refills: 6 | Status: SHIPPED | OUTPATIENT
Start: 2024-05-24

## 2024-05-24 RX ORDER — TAMSULOSIN HYDROCHLORIDE 0.4 MG/1
0.8 CAPSULE ORAL DAILY
Qty: 180 CAPSULE | Refills: 6 | Status: SHIPPED | OUTPATIENT
Start: 2024-05-24

## 2024-05-24 NOTE — PROGRESS NOTES
Urology Clinic Note    Primary Care Provider:  Norberto Hdez MD     Chief Complaint:   Elevated PSA follow-up     HPI:   Fredy Vogt is a 49 year old male active smoker with hx of HTN, pancreatitis s/p post exploratory laparotomy, caryn-en-Y lateral pancreaticojejunostomy with duodenum-preserving pancreatic head resection on 9/24/20 at [facility], BPH, referred for elevated PSA and prior gross hematuria.     Gross hematuria work-up is complete with normal cytology, negative CT (only IV contrast, not urogram), and cystoscopy but does have an enlarged prostate with significant trilobar hyperplasia and intravesical protrusion.      His lower urinary tract symptoms are both obstructive and overactive and most likely related to his significant BPH.  He is on tamsulosin 0.8 mg daily and oxybutynin.  His prostate measures approximately 105 g on CT.  His prostate appears obstructive with significant intravesical protrusion on cystoscopy.      4K score was 20.8, so I counseled him on prostate biopsy which she underwent with me on 12/29/2022.  TRUS volume was 52 g.  Fortunately pathology was completely benign.  He did have a recent CT scan and I measured his prostate at 90 mL with significant intravesical protrusion.     At his last visit with me on 6/29/2023 he was doing well with no further gross hematuria.  He denied weak urinary stream or straining to void.  He did endorse significant urgency and frequency despite oxybutynin.  He drinks 1 cup of coffee per day and occasional soda.     Today he says that if he misses his medications he notices trouble voiding and significant urgency and frequency.  However if he takes his medications he has a good urinary stream that is only mildly weak, minimal urgency and frequency.  He remains on oxybutynin and never switched over to mirabegron.     Repeat PSA on 10/18/2023 was stable at 7.06 up from 7.70 prior.    PSA on 4/17/2024 was 6.30.  Remains on tamsulosin,  finasteride, oxybutynin.  Overall relatively satisfied with his voiding symptoms at this time.  When he misses his tamsulosin he notices significant weak urinary stream, but when he is on his medications he feels that it is adequate.  He does get urinary and bowel urgency, but the urinary urgency is relatively well-controlled on oxybutynin.  He has since stopped finasteride at least 1 month ago.    AUA symptom score is 9/5 with LUTS.    Urinalysis: Negative    PSA:  Lab Results   Component Value Date    PSA 7.06 (H) 10/18/2023    PSA 7.70 (H) 06/29/2023    PSA 10.00 (H) 09/09/2022    PSA 2.98 12/22/2020    PSA 1.6 05/16/2018    PSAS 3.96 08/24/2020        History:     Past Medical History:    Alcohol abuse    Back problem    Colon adenoma    x1    High blood pressure    Microcytosis    Pancreatic cyst (HCC)    Pancreatitis (HCC)    Pancreatitis, alcoholic, acute (HCC)    Pulmonary nodule    Tobacco use disorder    Unspecified essential hypertension       Past Surgical History:   Procedure Laterality Date    Colonoscopy  07/12/2023    Egd  07/20/2016    Electrocardiogram, complete  12/26/2013    scanned to media tab    Lumbar discectomy      2023    Other surgical history      pancreatic cyst drainage by Dr Munoz. then saw [facility] had procedure done by Dr donohue for the cyst    Upper gi endoscopy,biopsy  07/2016       Family History   Problem Relation Age of Onset    Hypertension Father     Cancer Father     Diabetes Mother     Hypertension Mother     Heart Disorder Mother     Other (Other) Mother         copd    No Known Problems Daughter     Other (Other) Brother         motorbke accident       Social History     Socioeconomic History    Marital status:    Tobacco Use    Smoking status: Every Day     Current packs/day: 0.50     Average packs/day: 0.5 packs/day for 20.0 years (10.0 ttl pk-yrs)     Types: Cigarettes     Passive exposure: Current    Smokeless tobacco: Never   Vaping Use    Vaping  status: Some Days    Substances: THC, weekly    Devices: Disposable   Substance and Sexual Activity    Alcohol use: Not Currently     Comment: last drink at least 7mos ago    Drug use: Yes     Frequency: 1.0 times per week     Types: Cannabis     Comment: occasionaly/edibles   Other Topics Concern    Caffeine Concern Yes     Comment: 1 ice coffee daily    Exercise Yes     Social Determinants of Health     Financial Resource Strain: Low Risk  (4/7/2022)    [facility]    Overall Financial Resource Strain (CARDIA)     Difficulty of Paying Living Expenses: Not hard at all   Transportation Needs: No Transportation Needs (4/7/2022)    [facility]    [facility] - Transportation     Lack of Transportation (Medical): No     Lack of Transportation (Non-Medical): No       Medications (Active prior to today's visit):  Current Outpatient Medications   Medication Sig Dispense Refill    tamsulosin 0.4 MG Oral Cap Take 1 capsule (0.4 mg total) by mouth daily. 90 capsule 2    amLODIPine 10 MG Oral Tab Take 1 tablet (10 mg total) by mouth daily. 90 tablet 1    HYDROcodone-acetaminophen  MG Oral Tab Take 1 tablet by mouth every 8 (eight) hours as needed for Pain. 20 tablet 0    dicyclomine 20 MG Oral Tab Take 1 tablet (20 mg total) by mouth 4 (four) times daily as needed. 20 tablet 0    gabapentin 300 MG Oral Cap       lisinopril 40 MG Oral Tab Take 1 tablet (40 mg total) by mouth daily. 90 tablet 3    pantoprazole 40 MG Oral Tab EC Take 1 tablet (40 mg total) by mouth before breakfast. 90 tablet 3    ZENPEP [address]2234-55327 units Oral Cap DR Particles Take 1 capsule (10,000 Units total) by mouth 3 (three) times daily with meals. 270 capsule 3    oxyCODONE 5 MG Oral Tab Take 0.5-1 tablets (2.5-5 mg total) by mouth every 8 (eight) hours as needed. No driving (Patient not taking: Reported on 4/17/2024) 10 tablet 0    finasteride 5 MG Oral Tab Take 1  tablet (5 mg total) by mouth daily. 90 tablet 5    oxybutynin ER 10 MG Oral Tablet 24 Hr Take 1 tablet (10 mg total) by mouth daily. 90 tablet 5    acetaminophen 500 MG Oral Tab Take 2 tablets (1,000 mg total) by mouth every 8 (eight) hours. 60 tablet 0    ondansetron (ZOFRAN) 4 mg tablet Take 1 tablet (4 mg total) by mouth every 8 (eight) hours as needed for Nausea. 20 tablet 0    zolpidem 10 MG Oral Tab Take 1 tablet (10 mg total) by mouth nightly as needed for Sleep. 15 tablet 0    ondansetron 4 MG Oral Tablet Dispersible Take 1 tablet (4 mg total) by mouth every 8 (eight) hours as needed for Nausea. 20 tablet 0       Allergies:  Allergies   Allergen Reactions    Morphine ITCHING       Review of Systems:   A comprehensive 10-point review of systems was completed.  Pertinent positives and negatives are noted in the the HPI.    Physical Exam:   CONSTITUTIONAL: Well developed, well nourished, in no acute distress  NEUROLOGIC: Alert and oriented  HEAD: Normocephalic, atraumatic  EYES: Sclera non-icteric  ENT: Hearing intact, moist mucous membranes  NECK: No obvious goiter or masses  RESPIRATORY: Normal respiratory effort  SKIN: No evident rashes  ABDOMEN: Soft, non-tender, non-distended  DIGITAL RECTAL EXAM: ~50 gram prostate, no nodules or tenderness    Assessment & Plan:   Fredy Vogt is a 49 year old male active smoker with hx of HTN, pancreatitis s/p post exploratory laparotomy, caryn-en-Y lateral pancreaticojejunostomy with duodenum-preserving pancreatic head resection on 9/24/20 at [facility], [facility], referred for elevated PSA and prior gross hematuria.     Gross hematuria work-up is complete with normal cytology, negative CT (only IV contrast, not urogram), and cystoscopy but does have an enlarged prostate with significant trilobar hyperplasia and intravesical protrusion.      His lower urinary tract symptoms are both obstructive and overactive and most likely related to his significant BPH.  He is on  tamsulosin 0.8 mg daily and oxybutynin.  His prostate measures approximately 105 g on CT.  His prostate appears obstructive with significant intravesical protrusion on cystoscopy.      4K score was 20.8, so I counseled him on prostate biopsy which she underwent with me on 12/29/2022.  TRUS volume was 52 g.  Fortunately pathology was completely benign.  He did have a recent CT scan and I measured his prostate at 90 mL with significant intravesical protrusion.     At his last visit with me on 6/29/2023 he was doing well with no further gross hematuria.  He denied weak urinary stream or straining to void.  He did endorse significant urgency and frequency despite oxybutynin.  He drinks 1 cup of coffee per day and occasional soda.     Today he says that if he misses his medications he notices trouble voiding and significant urgency and frequency.  However if he takes his medications he has a good urinary stream that is only mildly weak, minimal urgency and frequency.  He remains on oxybutynin and never switched over to mirabegron.     Repeat PSA on 10/18/2023 was stable at 7.06 up from 7.70 prior.    PSA on 4/17/2024 was 6.30.  Remains on tamsulosin, finasteride, oxybutynin.  Overall relatively satisfied with his voiding symptoms at this time.  When he misses his tamsulosin he notices significant weak urinary stream, but when he is on his medications he feels that it is adequate.  He does get urinary and bowel urgency, but the urinary urgency is relatively well-controlled on oxybutynin.  He has since stopped finasteride at least 1 month ago.    -Continue tamsulosin and oxybutynin  -Stop finasteride  -Yearly PSA  -Return to clinic in 1 year or sooner as needed    In total, 20 minutes were spent on this patient encounter (including chart review, patient history, physical, and counseling, documentation, and communication).    Tolu Villarreal MD  Staff Urologist  [facility]  Office: [phone]

## 2024-05-25 DIAGNOSIS — K86.0 ALCOHOL-INDUCED CHRONIC PANCREATITIS (HCC): ICD-10-CM

## 2024-05-29 DIAGNOSIS — K86.0 ALCOHOL-INDUCED CHRONIC PANCREATITIS (HCC): ICD-10-CM

## 2024-05-29 RX ORDER — HYDROCODONE BITARTRATE AND ACETAMINOPHEN 10; 325 MG/1; MG/1
1 TABLET ORAL EVERY 8 HOURS PRN
Qty: 20 TABLET | Refills: 0 | Status: SHIPPED | OUTPATIENT
Start: 2024-05-29

## 2024-05-29 NOTE — TELEPHONE ENCOUNTER
Please review; protocol failed/ has no protocol      Recent fill: 04/23/2024  Last Rx written: 04/23/2024  Last Office Visit : 04/17/2024    Requested Prescriptions   Pending Prescriptions Disp Refills    HYDROcodone-acetaminophen  MG Oral Tab 20 tablet 0     Sig: Take 1 tablet by mouth every 8 (eight) hours as needed for Pain.       Controlled Substance Medication Failed - 5/25/2024 11:29 AM        Failed - This medication is a controlled substance - forward to provider to refill           Recent Outpatient Visits              5 days ago [facility]    [facility] Visit    1 month ago Annual physical exam    [facility], [address], Norberto Mera MD    Office Visit    5 months ago S/P lumbar microdiscectomy    [facility], [address] [place] [address] Mykel Lassiter MD    Office Visit    6 months ago Elevated PSA    [facility]    Office Visit    6 months ago S/P lumbar microdiscectomy    [address] Charla Rojo APRN    Office Visit          Future Appointments         Provider Department Appt Notes    In 11 months [facility]

## 2024-05-31 RX ORDER — HYDROCODONE BITARTRATE AND ACETAMINOPHEN 10; 325 MG/1; MG/1
1 TABLET ORAL EVERY 8 HOURS PRN
Qty: 20 TABLET | Refills: 0 | OUTPATIENT
Start: 2024-05-31

## 2024-06-04 DIAGNOSIS — G47.00 INSOMNIA, UNSPECIFIED TYPE: Primary | ICD-10-CM

## 2024-06-08 NOTE — TELEPHONE ENCOUNTER
Please review.  Protocol failed / Has no protocol.     No recent fills  Last written 10/12/2023  Last office visit 4/17/24    Requested Prescriptions   Pending Prescriptions Disp Refills    zolpidem 10 MG Oral Tab 15 tablet 0     Sig: Take 1 tablet (10 mg total) by mouth nightly as needed for Sleep.       Controlled Substance Medication Failed - 6/4/2024  2:20 PM        Failed - This medication is a controlled substance - forward to provider to refill           Future Appointments         Provider Department Appt Notes    In 11 months Tolu Villarreal MD [facility] 1 yr follow up          Recent Out[facility]its              2 weeks ago [facility]    [facility] Visit    1 month ago Annual physical exam    [facility], Norberto Mera MD    Office Visit    5 months ago S/P lumbar microdiscectomy    [address] Mykel Decker MD    Office Visit    6 months ago Elevated PSA    [facility] Tolu Gregory MD    Office Visit    6 months ago S/P lumbar microdiscectomy    [address] APR[address]    Office Visit

## 2024-06-09 RX ORDER — ZOLPIDEM TARTRATE 10 MG/1
10 TABLET ORAL NIGHTLY PRN
Qty: 15 TABLET | Refills: 0 | Status: SHIPPED | OUTPATIENT
Start: 2024-06-09

## 2024-07-09 RX ORDER — PANCRELIPASE LIPASE, PANCRELIPASE PROTEASE, PANCRELIPASE AMYLASE 10000; 32000; 42000 [USP'U]/1; [USP'U]/1; [USP'U]/1
1 CAPSULE, DELAYED RELEASE ORAL
Qty: 300 CAPSULE | Refills: 3 | Status: SHIPPED | OUTPATIENT
Start: 2024-07-09

## 2024-07-09 NOTE — TELEPHONE ENCOUNTER
Refill passed per [facility] protocol.  Requested Prescriptions   Pending Prescriptions Disp Refills    ZENPEP 23715-87858 units Oral Cap DR Particles [Pharmacy Med Name: ZENPEP  CAP  48862] 300 capsule 3     Sig: TAKE 1 CAPSULE BY MOUTH 3 TIMES  DAILY WITH MEALS       Gastrointestional Medication Protocol Passed - 7/4/2024  9:17 PM        Passed - In person appointment or virtual visit in the past 12 mos or appointment in next 3 mos     Recent Outpatient Visits              1 month ago Elevated PSA    [facility] Tolu Gregory MD    Office Visit    2 months ago Annual physical exam    [facility]    [facility] Visit    6 months ago S/P lumbar microdiscectomy    [address], Mykel Lassiter MD    Office Visit    7 months [facility]    [facility]    7 months ago S/P lumbar microdiscectomy    [address] Charla Rojo APRN    Office Visit          Future Appointments         Provider Department Appt Notes    In 10 months Tolu Villarreal MD [facility] [place] 1 yr follow up                       Recent Outpatient Visits              1 month ago [facility]    [facility] Visit    2 months ago Annual physical exam    [facility] [place] Norberto Hdez MD    Office Visit    6 months ago S/P lumbar microdiscectomy    [address], Mykel Lassiter MD    Office Visit    7 months ago Elevated PSA    [facility] Tolu Gregory MD    Office Visit    7 months ago S/P lumbar microdiscectomy    [facility]  [facility], [address] [place] [address] Charla Morales APRN    Office Visit          Future Appointments         Provider Department Appt Notes    In 10 months Tolu Villarreal MD [facility] 1 yr follow up

## 2024-07-19 RX ORDER — MIRABEGRON 25 MG/1
25 TABLET, FILM COATED, EXTENDED RELEASE ORAL DAILY
Qty: 90 TABLET | Refills: 3 | OUTPATIENT
Start: 2024-07-19

## 2024-07-25 ENCOUNTER — HOSPITAL ENCOUNTER (EMERGENCY)
Facility: HOSPITAL | Age: 49
Discharge: HOME | End: 2024-07-25
Attending: EMERGENCY MEDICINE
Payer: COMMERCIAL

## 2024-07-25 ENCOUNTER — APPOINTMENT (OUTPATIENT)
Dept: CT IMAGING | Facility: HOSPITAL | Age: 49
End: 2024-07-25
Attending: EMERGENCY MEDICINE
Payer: COMMERCIAL

## 2024-07-25 ENCOUNTER — PATIENT MESSAGE (OUTPATIENT)
Dept: INTERNAL MEDICINE CLINIC | Facility: CLINIC | Age: 49
End: 2024-07-25

## 2024-07-25 ENCOUNTER — HOSPITAL ENCOUNTER (OUTPATIENT)
Age: 49
Discharge: STILL ADMITTED OR ADMINISTRATIVE | End: 2024-07-25
Payer: COMMERCIAL

## 2024-07-25 VITALS
RESPIRATION RATE: 16 BRPM | TEMPERATURE: 98 F | WEIGHT: 180 LBS | SYSTOLIC BLOOD PRESSURE: 149 MMHG | DIASTOLIC BLOOD PRESSURE: 92 MMHG | BODY MASS INDEX: 23 KG/M2 | OXYGEN SATURATION: 100 % | HEART RATE: 76 BPM

## 2024-07-25 DIAGNOSIS — K86.0 ALCOHOL-INDUCED CHRONIC PANCREATITIS (HCC): ICD-10-CM

## 2024-07-25 DIAGNOSIS — K86.1 ACUTE ON CHRONIC PANCREATITIS (HCC): Primary | ICD-10-CM

## 2024-07-25 DIAGNOSIS — K85.90 ACUTE ON CHRONIC PANCREATITIS (HCC): Primary | ICD-10-CM

## 2024-07-25 DIAGNOSIS — R10.13 ABDOMINAL PAIN, EPIGASTRIC: ICD-10-CM

## 2024-07-25 LAB
ALBUMIN SERPL-MCNC: 4.1 G/DL (ref 3.2–4.8)
ALBUMIN/GLOB SERPL: 1.6 {RATIO} (ref 1–2)
ALP LIVER SERPL-CCNC: 62 U/L
ALT SERPL-CCNC: 17 U/L
ANION GAP SERPL CALC-SCNC: 2 MMOL/L (ref 0–18)
AST SERPL-CCNC: 17 U/L (ref ?–34)
BASOPHILS # BLD AUTO: 0.01 X10(3) UL (ref 0–0.2)
BASOPHILS NFR BLD AUTO: 0.2 %
BILIRUB SERPL-MCNC: 0.2 MG/DL (ref 0.3–1.2)
BUN BLD-MCNC: 10 MG/DL (ref 9–23)
CALCIUM BLD-MCNC: 9 MG/DL (ref 8.7–10.4)
CHLORIDE SERPL-SCNC: 107 MMOL/L (ref 98–112)
CO2 SERPL-SCNC: 29 MMOL/L (ref 21–32)
CREAT BLD-MCNC: 1.08 MG/DL
EGFRCR SERPLBLD CKD-EPI 2021: 84 ML/MIN/1.73M2 (ref 60–?)
EOSINOPHIL # BLD AUTO: 0.02 X10(3) UL (ref 0–0.7)
EOSINOPHIL NFR BLD AUTO: 0.4 %
ERYTHROCYTE [DISTWIDTH] IN BLOOD BY AUTOMATED COUNT: 14.6 %
GLOBULIN PLAS-MCNC: 2.6 G/DL (ref 2–3.5)
GLUCOSE BLD-MCNC: 138 MG/DL (ref 70–99)
HCT VFR BLD AUTO: 37.7 %
HGB BLD-MCNC: 13 G/DL
IMM GRANULOCYTES # BLD AUTO: 0.01 X10(3) UL (ref 0–1)
IMM GRANULOCYTES NFR BLD: 0.2 %
LIPASE SERPL-CCNC: 24 U/L (ref 12–53)
LYMPHOCYTES # BLD AUTO: 1.76 X10(3) UL (ref 1–4)
LYMPHOCYTES NFR BLD AUTO: 31 %
MCH RBC QN AUTO: 33.6 PG (ref 26–34)
MCHC RBC AUTO-ENTMCNC: 34.5 G/DL (ref 31–37)
MCV RBC AUTO: 97.4 FL
MONOCYTES # BLD AUTO: 0.39 X10(3) UL (ref 0.1–1)
MONOCYTES NFR BLD AUTO: 6.9 %
NEUTROPHILS # BLD AUTO: 3.48 X10 (3) UL (ref 1.5–7.7)
NEUTROPHILS # BLD AUTO: 3.48 X10(3) UL (ref 1.5–7.7)
NEUTROPHILS NFR BLD AUTO: 61.3 %
OSMOLALITY SERPL CALC.SUM OF ELEC: 287 MOSM/KG (ref 275–295)
PLATELET # BLD AUTO: 359 10(3)UL (ref 150–450)
PLATELETS.RETICULATED NFR BLD AUTO: 0.6 % (ref 0–7)
POTASSIUM SERPL-SCNC: 3.8 MMOL/L (ref 3.5–5.1)
PROT SERPL-MCNC: 6.7 G/DL (ref 5.7–8.2)
RBC # BLD AUTO: 3.87 X10(6)UL
SODIUM SERPL-SCNC: 138 MMOL/L (ref 136–145)
WBC # BLD AUTO: 5.7 X10(3) UL (ref 4–11)

## 2024-07-25 PROCEDURE — 99285 EMERGENCY DEPT VISIT HI MDM: CPT

## 2024-07-25 PROCEDURE — 96375 TX/PRO/DX INJ NEW DRUG ADDON: CPT

## 2024-07-25 PROCEDURE — 96374 THER/PROPH/DIAG INJ IV PUSH: CPT

## 2024-07-25 PROCEDURE — 85025 COMPLETE CBC W/AUTO DIFF WBC: CPT | Performed by: EMERGENCY MEDICINE

## 2024-07-25 PROCEDURE — 80053 COMPREHEN METABOLIC PANEL: CPT

## 2024-07-25 PROCEDURE — 80053 COMPREHEN METABOLIC PANEL: CPT | Performed by: EMERGENCY MEDICINE

## 2024-07-25 PROCEDURE — 85025 COMPLETE CBC W/AUTO DIFF WBC: CPT

## 2024-07-25 PROCEDURE — 83690 ASSAY OF LIPASE: CPT | Performed by: EMERGENCY MEDICINE

## 2024-07-25 PROCEDURE — 96376 TX/PRO/DX INJ SAME DRUG ADON: CPT

## 2024-07-25 PROCEDURE — 96361 HYDRATE IV INFUSION ADD-ON: CPT

## 2024-07-25 PROCEDURE — 74177 CT ABD & PELVIS W/CONTRAST: CPT | Performed by: EMERGENCY MEDICINE

## 2024-07-25 PROCEDURE — 83690 ASSAY OF LIPASE: CPT

## 2024-07-25 RX ORDER — ONDANSETRON 2 MG/ML
4 INJECTION INTRAMUSCULAR; INTRAVENOUS ONCE
Status: COMPLETED | OUTPATIENT
Start: 2024-07-25 | End: 2024-07-25

## 2024-07-25 RX ORDER — HYDROCODONE BITARTRATE AND ACETAMINOPHEN 10; 325 MG/1; MG/1
1-2 TABLET ORAL EVERY 6 HOURS PRN
Qty: 16 TABLET | Refills: 0 | Status: SHIPPED | OUTPATIENT
Start: 2024-07-25 | End: 2024-07-30

## 2024-07-25 RX ORDER — ONDANSETRON 4 MG/1
4 TABLET, FILM COATED ORAL EVERY 8 HOURS PRN
Qty: 20 TABLET | Refills: 0 | OUTPATIENT
Start: 2024-07-25

## 2024-07-25 RX ORDER — HYDROMORPHONE HYDROCHLORIDE 1 MG/ML
1 INJECTION, SOLUTION INTRAMUSCULAR; INTRAVENOUS; SUBCUTANEOUS EVERY 30 MIN PRN
Status: DISCONTINUED | OUTPATIENT
Start: 2024-07-25 | End: 2024-07-25

## 2024-07-25 RX ORDER — HYDROCODONE BITARTRATE AND ACETAMINOPHEN 10; 325 MG/1; MG/1
1 TABLET ORAL EVERY 4 HOURS PRN
Status: DISCONTINUED | OUTPATIENT
Start: 2024-07-25 | End: 2024-07-25

## 2024-07-25 NOTE — ED PROVIDER NOTES
Patient Seen in: [facility] Emergency Department      History     Chief Complaint   Patient presents with    Abdomen/Flank Pain     Stated Complaint: Upper abdominal pain, hx pancreatitis    Subjective:   HPI    50 yo gentleman with long=standing history of recurrent pancreaitis with hx of pancreatic pseudocyst who takes zenpep daily presents to ED with abdominal pain going into left back consistent with his recurrent pancreatitis.  States that he does not drink alcohol    Objective:   Past Medical History:    Alcohol abuse    Back problem    Colon adenoma    x1    High blood pressure    Microcytosis    Pancreatic cyst (HCC)    Pancreatitis (HCC)    Pancreatitis, alcoholic, acute (HCC)    Pulmonary nodule    Tobacco use disorder    Unspecified essential hypertension              Past Surgical History:   Procedure Laterality Date    Colonoscopy  07/12/2023    Egd  07/20/2016    Electrocardiogram, complete  12/26/2013    scanned to media tab    Lumbar discectomy      2023    Other surgical history      pancreatic cyst drainage by Dr Munoz. then saw [facility] had procedure done by Dr donohue for the cyst    Upper gi endoscopy,biopsy  07/2016                Social History     Socioeconomic History    Marital status:    Tobacco Use    Smoking status: Every Day     Current packs/day: 0.50     Average packs/day: 0.5 packs/day for 20.0 years (10.0 ttl pk-yrs)     Types: Cigarettes     Passive exposure: Current    Smokeless tobacco: Never   Vaping Use    Vaping status: Some Days    Substances: THC, weekly    Devices: Disposable   Substance and Sexual Activity    Alcohol use: Not Currently     Comment: last drink at least 7mos ago    Drug use: Yes     Frequency: 1.0 times per week     Types: Cannabis     Comment: occasionaly/edibles   Other Topics Concern    Caffeine Concern Yes     Comment: 1 ice coffee daily    Exercise Yes     Social Determinants of Health     Financial Resource Strain: Low Risk   (4/7/2022)    [facility]    Overall Financial Resource Strain (CARDIA)     Difficulty of Paying Living Expenses: Not hard at all   Transportation Needs: No Transportation Needs (4/7/2022)    [facility]    PRAPARE - Transportation     Lack of Transportation (Medical): No     Lack of Transportation (Non-Medical): No              Review of Systems    Positive for stated Chief Complaint: Abdomen/Flank Pain    Other systems are as noted in HPI.  Constitutional and vital signs reviewed.      All other systems reviewed and negative except as noted above.    Physical Exam     ED Triage Vitals [07/25/24 1709]   /75   Pulse 90   Resp 18   Temp 97.8 °F (36.6 °C)   Temp src    SpO2 99 %   O2 Device None (Room air)       Current Vitals:   Vital Signs  BP: (!) 149/92  Pulse: 76  Resp: 16  Temp: 97.8 °F (36.6 °C)  MAP (mmHg): (!) 107    Oxygen Therapy  SpO2: 100 %  O2 Device: None (Room air)            Physical Exam  Vitals and nursing note reviewed.   Constitutional:       General: He is not in acute distress.     Appearance: He is well-developed. He is not diaphoretic.      Comments: Very uncomfortable irritable gentleman lying on ED bed with significant other at bedside    HENT:      Head: Atraumatic.      Right Ear: External ear normal.      Left Ear: External ear normal.      Nose: Nose normal.   Eyes:      General: No scleral icterus.        Right eye: No discharge.         Left eye: No discharge.      Conjunctiva/sclera: Conjunctivae normal.      Pupils: Pupils are equal, round, and reactive to light.   Neck:      Vascular: No JVD.   Cardiovascular:      Rate and Rhythm: Normal rate and regular rhythm.      Heart sounds: Normal heart sounds. No murmur heard.     No friction rub. No gallop.   Pulmonary:      Effort: Pulmonary effort is normal. No respiratory distress.      Breath sounds: Normal breath sounds. No stridor. No  wheezing.   Chest:      Chest wall: No tenderness.   Abdominal:      General: Bowel sounds are normal. There is no distension.      Palpations: Abdomen is soft.      Tenderness: There is abdominal tenderness in the epigastric area. There is no guarding or rebound.   Musculoskeletal:         General: No tenderness or deformity. Normal range of motion.      Cervical back: Normal range of motion and neck supple.   Lymphadenopathy:      Cervical: No cervical adenopathy.   Skin:     General: Skin is warm and dry.      Coloration: Skin is not pale.      Findings: No erythema or rash.   Neurological:      Mental Status: He is alert.      Coordination: Coordination normal.               ED Course     Labs Reviewed   COMP METABOLIC PANEL (14) - Abnormal; Notable for the following components:       Result Value    Glucose 138 (*)     Bilirubin, Total 0.2 (*)     All other components within normal limits   CBC W/ DIFFERENTIAL - Abnormal; Notable for the following components:    RBC 3.87 (*)     HCT 37.7 (*)     All other components within normal limits   LIPASE - Normal   CBC WITH DIFFERENTIAL WITH PLATELET    Narrative:     The following orders were created for panel order CBC With Differential With Platelet.  Procedure                               Abnormality         Status                     ---------                               -----------         ------                     CBC W/ DIFFERENTIAL[[id number]]          Abnormal            Final result                 Please view results for these tests on the individual orders.   RAINBOW DRAW LAVENDER   RAINBOW DRAW LIGHT GREEN   RAINBOW DRAW BLUE     Gastyroenterology janett gutiérrez- states he is here at [place]?  Chart review would suggest that he is at [place]    Patent has a history of undergoing a Anurag procedure at [facility] in the past        CT of the abdomen pelvis was done to evaluate for pancreatic pseudocyst, bowel obstructions, severe pancreatic  stranding, masses, other significant abnormalities.  Patient wife says that she thinks that he gets these flareups at least every month patient states that the severe ones are about every 6 months.         MDM      49-year-old male presents to the emergency department with upper abdominal pain that he states is very typical of his pancreatitis pain which she has had many times off-and-on over multiple years.  He states that he needs Dilaudid Zofran and fluids and that is what helps with his pain.    He denies any recent illnesses, alcohol use or new medications that could cause his pancreatitis he has not had significant vomiting as of yet that would suggest a bowel obstruction    He had a colonoscopy but it has been quite sometime since he had an endoscopy he takes Zenpep daily and has not run out    He denies any significant fatty food ingestion that could have caused the pancreatitis    CT of the abdomen pelvis done to evaluate for possible ductal stenosis that could be causing the pancreatitis as it does seem to be happening very commonly    CT of the abdomen pelvis is essentially unchanged from May.  Patient feeling much improved after pain meds- home meds refilled- asked for GI in [facility]     Patient stable for discharge home- life threateing illness evaluated and ruled out                                   Medical Decision Making      Disposition and Plan     Clinical Impression:  1. Acute on chronic pancreatitis (HCC)    2. Abdominal pain, epigastric         Disposition:  Discharge  7/25/2024  9:00 pm    Follow-up:  Morgan Tovar MD  [address] [place] [address]  [phone]    Call  As needed          Medications Prescribed:  Discharge Medication List as of 7/25/2024  9:12 PM        START taking these medications    Details   !! HYDROcodone-acetaminophen  MG Oral Tab Take 1-2 tablets by mouth every 6 (six) hours as needed for Pain., Normal, Disp-16 tablet, R-0       !! - Potential  duplicate medications found. Please discuss with provider.

## 2024-07-25 NOTE — TELEPHONE ENCOUNTER
From: Fredy Vogt  To: Norberto Hdez  Sent: 7/25/2024 8:54 AM CDT  Subject: Refill medications     Good morning,  I put in a medication refill on yesterday and this morning for norco and zolfran. They went to the wrong doctor(Mykel Decker)

## 2024-07-25 NOTE — TELEPHONE ENCOUNTER
Zofran 4 mg  DOS: 11/3/24  Last OV: 12/14/23  Last refill date: 11/03/23     #/refills: 20/0  Upcoming appt:   Future Appointments   Date Time Provider Department Center   5/23/2025  8:30 AM Tolu Villarreal MD [id number]     Refill not appropriate - post surgical use only    04/22/24  Narrative  Performed by: [facility]  Normal sinus rhythm  Nonspecific ST and T wave abnormality  Abnormal ECG  When compared with ECG of 18-OCT-2023 14:34,  Criteria for Septal infarct are no longer Present  Confirmed by ZAYNAB FLORES, BRITTA ([id number]) on 4/23/2024 7:39:45 AM     3 mo ago  (4/22/24) 9 mo ago  (10/18/23) 1 yr ago  (5/23/23) 1 yr ago  (1/25/23)       Ventricular rate  BPM 82 76 87       Atrial rate  BPM 82 76 87       P-R Interval  ms 120 120 120       QRS Duration  ms 96 70 86       Q-T Interval  ms 354 360 350       QTC Calculation (Bezet)  ms 413 405 421       P Axis  degrees 76 78 75       R Axis  degrees 64 68 64       T Axis  degrees -4 6 35

## 2024-07-26 NOTE — TELEPHONE ENCOUNTER
Medication Zofran pended. Routing for protocol.      See encounter, patient went to ED yesterday 7/25/24 and was prescribed Norco 10.     MEDICATION RECORD :  HYDROcodone-acetaminophen  MG Oral Tab 16 tablet 0 7/25/2024 7/30/2024    Sig - Route: Take 1-2 tablets by mouth every 6 (six) hours as needed for Pain. - Oral    Sent to pharmacy as: HYDROcodone-Acetaminophen  MG Oral Tablet (Norco)    Earliest Fill Date: 7/25/2024    E-Prescribing Status: Receipt confirmed by pharmacy (7/25/2024  9:10 PM CDT)    Renewals    Renewal requests to authorizing provider (Archana Alfaro MD) prohibited         Associated Diagnoses    Acute on chronic pancreatitis (HCC)  - Primary        Pharmacy    [id number]  [place] - [address] AT [facility] [address], [phone], [phone]

## 2024-07-29 RX ORDER — ONDANSETRON 4 MG/1
4 TABLET, FILM COATED ORAL EVERY 8 HOURS PRN
Qty: 20 TABLET | Refills: 0
Start: 2024-07-29

## 2024-07-29 NOTE — TELEPHONE ENCOUNTER
Please review. Protocol Failed; No Protocol      Recent fills: 4/10/2024, 4/23/2024, 5/29/2024  Last Rx written: 7/25/2024 quantity 16 from ER visit/  5/29/2024 quantity 20  Last office visit: 4/17/2024          Requested Prescriptions   Pending Prescriptions Disp Refills    HYDROcodone-acetaminophen  MG Oral Tab 20 tablet 0     Sig: Take 1 tablet by mouth every 8 (eight) hours as needed for Pain.       Controlled Substance Medication Failed - 7/25/2024  6:39 AM        Failed - This medication is a controlled substance - forward to provider to refill               Future Appointments         Provider Department Appt Notes    In 9 months Tolu Villarreal MD [facility] 1 yr follow up          Recent Outpatient Visits              2 months ago [facility]    [facility]    3 months ago Annual physical exam    [facility], Norberto Mera MD    Office Visit    7 months ago S/P lumbar microdiscectomy    [address]    Office Visit    8 months ago [facility]    [facility] Visit    8 months ago S/P lumbar microdiscectomy    [address]    Office Visit

## 2024-08-15 RX ORDER — HYDROCODONE BITARTRATE AND ACETAMINOPHEN 10; 325 MG/1; MG/1
1 TABLET ORAL EVERY 8 HOURS PRN
Qty: 20 TABLET | Refills: 0 | OUTPATIENT
Start: 2024-08-15

## 2024-09-04 ENCOUNTER — APPOINTMENT (OUTPATIENT)
Dept: CT IMAGING | Facility: HOSPITAL | Age: 49
End: 2024-09-04
Attending: EMERGENCY MEDICINE
Payer: COMMERCIAL

## 2024-09-04 ENCOUNTER — HOSPITAL ENCOUNTER (EMERGENCY)
Facility: HOSPITAL | Age: 49
Discharge: HOME | End: 2024-09-04
Attending: EMERGENCY MEDICINE
Payer: COMMERCIAL

## 2024-09-04 VITALS
TEMPERATURE: 97 F | DIASTOLIC BLOOD PRESSURE: 78 MMHG | HEART RATE: 79 BPM | BODY MASS INDEX: 22.38 KG/M2 | WEIGHT: 180 LBS | SYSTOLIC BLOOD PRESSURE: 147 MMHG | OXYGEN SATURATION: 100 % | HEIGHT: 75 IN | RESPIRATION RATE: 16 BRPM

## 2024-09-04 DIAGNOSIS — R10.9 ABDOMINAL PAIN, ACUTE: Primary | ICD-10-CM

## 2024-09-04 LAB
ALBUMIN SERPL-MCNC: 3.9 G/DL (ref 3.2–4.8)
ALP LIVER SERPL-CCNC: 55 U/L
ALT SERPL-CCNC: 18 U/L
ANION GAP SERPL CALC-SCNC: 2 MMOL/L (ref 0–18)
AST SERPL-CCNC: 19 U/L (ref ?–34)
BASOPHILS # BLD AUTO: 0.02 X10(3) UL (ref 0–0.2)
BASOPHILS NFR BLD AUTO: 0.3 %
BILIRUB DIRECT SERPL-MCNC: 0.1 MG/DL (ref ?–0.3)
BILIRUB SERPL-MCNC: 0.4 MG/DL (ref 0.3–1.2)
BUN BLD-MCNC: 14 MG/DL (ref 9–23)
BUN/CREAT SERPL: 14 (ref 10–20)
CALCIUM BLD-MCNC: 8.9 MG/DL (ref 8.7–10.4)
CHLORIDE SERPL-SCNC: 112 MMOL/L (ref 98–112)
CO2 SERPL-SCNC: 27 MMOL/L (ref 21–32)
CREAT BLD-MCNC: 1 MG/DL
DEPRECATED RDW RBC AUTO: 53.1 FL (ref 35.1–46.3)
EGFRCR SERPLBLD CKD-EPI 2021: 92 ML/MIN/1.73M2 (ref 60–?)
EOSINOPHIL # BLD AUTO: 0.03 X10(3) UL (ref 0–0.7)
EOSINOPHIL NFR BLD AUTO: 0.5 %
ERYTHROCYTE [DISTWIDTH] IN BLOOD BY AUTOMATED COUNT: 14.5 % (ref 11–15)
GLUCOSE BLD-MCNC: 135 MG/DL (ref 70–99)
HCT VFR BLD AUTO: 37.8 %
HGB BLD-MCNC: 12.9 G/DL
IMM GRANULOCYTES # BLD AUTO: 0.02 X10(3) UL (ref 0–1)
IMM GRANULOCYTES NFR BLD: 0.3 %
LIPASE SERPL-CCNC: 38 U/L (ref 12–53)
LYMPHOCYTES # BLD AUTO: 1.8 X10(3) UL (ref 1–4)
LYMPHOCYTES NFR BLD AUTO: 31.2 %
MCH RBC QN AUTO: 34 PG (ref 26–34)
MCHC RBC AUTO-ENTMCNC: 34.1 G/DL (ref 31–37)
MCV RBC AUTO: 99.7 FL
MONOCYTES # BLD AUTO: 0.39 X10(3) UL (ref 0.1–1)
MONOCYTES NFR BLD AUTO: 6.8 %
NEUTROPHILS # BLD AUTO: 3.51 X10 (3) UL (ref 1.5–7.7)
NEUTROPHILS # BLD AUTO: 3.51 X10(3) UL (ref 1.5–7.7)
NEUTROPHILS NFR BLD AUTO: 60.9 %
OSMOLALITY SERPL CALC.SUM OF ELEC: 295 MOSM/KG (ref 275–295)
PLATELET # BLD AUTO: 298 10(3)UL (ref 150–450)
POTASSIUM SERPL-SCNC: 3.9 MMOL/L (ref 3.5–5.1)
PROT SERPL-MCNC: 6.1 G/DL (ref 5.7–8.2)
RBC # BLD AUTO: 3.79 X10(6)UL
SODIUM SERPL-SCNC: 141 MMOL/L (ref 136–145)
WBC # BLD AUTO: 5.8 X10(3) UL (ref 4–11)

## 2024-09-04 PROCEDURE — 80076 HEPATIC FUNCTION PANEL: CPT | Performed by: EMERGENCY MEDICINE

## 2024-09-04 PROCEDURE — 83690 ASSAY OF LIPASE: CPT | Performed by: EMERGENCY MEDICINE

## 2024-09-04 PROCEDURE — 99284 EMERGENCY DEPT VISIT MOD MDM: CPT

## 2024-09-04 PROCEDURE — S0028 INJECTION, FAMOTIDINE, 20 MG: HCPCS | Performed by: EMERGENCY MEDICINE

## 2024-09-04 PROCEDURE — 99285 EMERGENCY DEPT VISIT HI MDM: CPT

## 2024-09-04 PROCEDURE — 96361 HYDRATE IV INFUSION ADD-ON: CPT

## 2024-09-04 PROCEDURE — 74177 CT ABD & PELVIS W/CONTRAST: CPT | Performed by: EMERGENCY MEDICINE

## 2024-09-04 PROCEDURE — 96375 TX/PRO/DX INJ NEW DRUG ADDON: CPT

## 2024-09-04 PROCEDURE — 85025 COMPLETE CBC W/AUTO DIFF WBC: CPT | Performed by: EMERGENCY MEDICINE

## 2024-09-04 PROCEDURE — 96374 THER/PROPH/DIAG INJ IV PUSH: CPT

## 2024-09-04 PROCEDURE — 80048 BASIC METABOLIC PNL TOTAL CA: CPT | Performed by: EMERGENCY MEDICINE

## 2024-09-04 RX ORDER — KETOROLAC TROMETHAMINE 30 MG/ML
30 INJECTION, SOLUTION INTRAMUSCULAR; INTRAVENOUS ONCE
Status: COMPLETED | OUTPATIENT
Start: 2024-09-04 | End: 2024-09-04

## 2024-09-04 RX ORDER — FAMOTIDINE 20 MG/1
20 TABLET, FILM COATED ORAL 2 TIMES DAILY PRN
Qty: 30 TABLET | Refills: 0 | Status: SHIPPED | OUTPATIENT
Start: 2024-09-04 | End: 2024-10-04

## 2024-09-04 RX ORDER — FAMOTIDINE 10 MG/ML
20 INJECTION, SOLUTION INTRAVENOUS ONCE
Status: COMPLETED | OUTPATIENT
Start: 2024-09-04 | End: 2024-09-04

## 2024-09-04 NOTE — ED PROVIDER NOTES
CONCLUSION:      Moderate colonic stool and multiple segments of fecalized mid-distal small bowel. This likely indicates constipation.  Stable 4.5 cm dilation of the small bowel anastomosis which is now diffusely fecalized. There is no transition point to suggest SBO      Chronic calcified pancreatitis with stable mildly expansile hypoenhancing tissue in the pancreatic head which is likely fibrotic parenchyma.     On reevaluation his pain is improved after GI cocktail.  He will follow-up with his gastroenterologist as an outpatient.  Return precautions and follow-up instructions were discussed with patient who voiced understanding and agreement the plan.  All questions were answered to patient satisfaction.

## 2024-09-04 NOTE — ED INITIAL ASSESSMENT (HPI)
Patient presents to ER with complaints of mid abdominal/back pain.  Patient notes \"pancreatitis flare up\"   Pain began Monday night. + nausea, no vomiting but patient is taking zofran.

## 2024-09-18 ENCOUNTER — HOSPITAL ENCOUNTER (EMERGENCY)
Age: 49
Discharge: HOME | End: 2024-09-19
Attending: EMERGENCY MEDICINE
Payer: COMMERCIAL

## 2024-09-18 VITALS
SYSTOLIC BLOOD PRESSURE: 149 MMHG | HEIGHT: 75 IN | WEIGHT: 185 LBS | RESPIRATION RATE: 17 BRPM | TEMPERATURE: 99 F | DIASTOLIC BLOOD PRESSURE: 72 MMHG | OXYGEN SATURATION: 98 % | HEART RATE: 98 BPM | BODY MASS INDEX: 23 KG/M2

## 2024-09-18 DIAGNOSIS — L72.3 INFECTED SEBACEOUS CYST: Primary | ICD-10-CM

## 2024-09-18 DIAGNOSIS — L08.9 INFECTED SEBACEOUS CYST: Primary | ICD-10-CM

## 2024-09-18 PROCEDURE — 10060 I&D ABSCESS SIMPLE/SINGLE: CPT

## 2024-09-18 PROCEDURE — 99284 EMERGENCY DEPT VISIT MOD MDM: CPT

## 2024-09-18 PROCEDURE — 96372 THER/PROPH/DIAG INJ SC/IM: CPT

## 2024-09-18 PROCEDURE — 99283 EMERGENCY DEPT VISIT LOW MDM: CPT

## 2024-09-18 RX ORDER — HYDROXYZINE HYDROCHLORIDE 25 MG/1
50 TABLET, FILM COATED ORAL ONCE
Status: COMPLETED | OUTPATIENT
Start: 2024-09-18 | End: 2024-09-18

## 2024-09-18 RX ORDER — DEXAMETHASONE SODIUM PHOSPHATE 10 MG/ML
10 INJECTION, SOLUTION INTRAMUSCULAR; INTRAVENOUS ONCE
Status: COMPLETED | OUTPATIENT
Start: 2024-09-18 | End: 2024-09-18

## 2024-09-18 RX ORDER — CLINDAMYCIN HCL 300 MG
300 CAPSULE ORAL 3 TIMES DAILY
Qty: 30 CAPSULE | Refills: 0 | Status: SHIPPED | OUTPATIENT
Start: 2024-09-18 | End: 2024-09-28

## 2024-09-19 NOTE — ED PROVIDER NOTES
Patient Seen in: [place] [facility] [place]      History     Chief Complaint   Patient presents with    Swelling     Stated Complaint: Swelling on right side of face - denies trauma - denies ear pain - co numbness *    Subjective:   HPI    Patient is a 49-year-old male presenting to the ED with swelling to the right side of his face.  Patient states it started at 3 AM yet states he was picking at it yesterday because he thought it was a pimple.  He was seen in immediate care earlier today and was prescribed amoxicillin but felt like his swelling was worsening prompting him to come to the ED.  There is associated pain over the area.  It is tender to touch.  There is been no associated drainage.  Fevers or chills.     Objective:   Past Medical History:    Alcohol abuse    Back problem    Colon adenoma    x1    High blood pressure    Microcytosis    Pancreatic cyst (HCC)    Pancreatitis (HCC)    Pancreatitis, alcoholic, acute (HCC)    Pulmonary nodule    Tobacco use disorder    Unspecified essential hypertension              Past Surgical History:   Procedure Laterality Date    Colonoscopy  07/12/2023    Egd  07/20/2016    Electrocardiogram, complete  12/26/2013    scanned to media tab    Lumbar discectomy      2023    Other surgical history      pancreatic cyst drainage by Dr Munoz. then saw [facility] had procedure done by Dr donohue for the cyst    Upper gi endoscopy,biopsy  07/2016                Social History     Socioeconomic History    Marital status:    Tobacco Use    Smoking status: Every Day     Current packs/day: 0.50     Average packs/day: 0.5 packs/day for 20.0 years (10.0 ttl pk-yrs)     Types: Cigarettes     Passive exposure: Current    Smokeless tobacco: Never   Vaping Use    Vaping status: Some Days    Substances: THC, weekly    Devices: Disposable   Substance and Sexual Activity    Alcohol use: Yes    Drug use: Not Currently     Frequency: 1.0 times per week     Types:  Cannabis     Comment: occasionaly/edibles   Other Topics Concern    Caffeine Concern Yes     Comment: 1 ice coffee daily    Exercise Yes     Social Determinants of Health     Financial Resource Strain: Low Risk  (4/7/2022)    [facility]    Overall Financial Resource Strain (CARDIA)     Difficulty of Paying Living Expenses: Not hard at all   Transportation Needs: No Transportation Needs (4/7/2022)    [facility]    PRAPARE - Transportation     Lack of Transportation (Medical): No     Lack of Transportation (Non-Medical): No              Review of Systems    Positive for stated Chief Complaint: Swelling    Other systems are as noted in HPI.  Constitutional and vital signs reviewed.      All other systems reviewed and negative except as noted above.    Physical Exam     ED Triage Vitals [09/18/24 2137]   /77   Pulse 96   Resp 16   Temp 98.7 °F (37.1 °C)   Temp src    SpO2 98 %   O2 Device None (Room air)       Current Vitals:   Vital Signs  BP: 134/77  Pulse: 96  Resp: 16  Temp: 98.7 °F (37.1 °C)    Oxygen Therapy  SpO2: 98 %  O2 Device: None (Room air)            Physical Exam  Vitals and nursing note reviewed.   Constitutional:       General: He is not in acute distress.     Appearance: Normal appearance. He is not ill-appearing.   HENT:      Head: Normocephalic and atraumatic.        Right Ear: External ear normal.      Left Ear: External ear normal.      Nose: Nose normal.      Mouth/Throat:      Mouth: Mucous membranes are moist.      Pharynx: Oropharynx is clear. No posterior oropharyngeal erythema.   Eyes:      Conjunctiva/sclera: Conjunctivae normal.   Cardiovascular:      Rate and Rhythm: Normal rate and regular rhythm.   Pulmonary:      Effort: Pulmonary effort is normal. No respiratory distress.      Breath sounds: Normal breath sounds.   Musculoskeletal:      Right lower leg: No edema.      Left lower leg: No  edema.   Skin:     General: Skin is warm.      Capillary Refill: Capillary refill takes less than 2 seconds.      Findings: No rash.   Neurological:      General: No focal deficit present.      Mental Status: He is alert and oriented to person, place, and time.   Psychiatric:         Mood and Affect: Mood normal.         Behavior: Behavior normal.               ED Course   Labs Reviewed - No data to display                   MDM      History obtained from patient.     Differential diagnosis includes infected sebaceous cyst, cystic acne, abscess, cellulitis.    Previous records reviewed.  Patient contacted dermatology trying to schedule a visit with a dermatologist for swelling to the right side of his face which he believes was from a \"cyst.\"      Patient was requesting something for anxiety stating that sometimes this can cause his \"pancreatitis\" to flareup.      Interventions in care included discussion of incision and drainage versus warm compresses and antibiotics.  Patient would like to proceed with I&D after discussion of risks versus benefits.      Incision and drainage  Area was cleansed with Betadine swabs x 3.  Local anesthetic 1% without lidocaine was used with adequate anesthesia.  A small incision with a #11 scalpel was made with return of small amount of purulent drainage as well as sebaceous cyst material.  The area was irrigated with normal saline approximately 20 mL.  Dressing was applied.    Patient states he is taking amoxicillin.  Discussed plan to switch to clindamycin.  Discontinue amoxicillin and start clindamycin as prescribed.  Continue warm compresses.  Will still require outpatient follow-up for definitive management of sebaceous cyst to the face.  Return to ED if any symptoms worsen, persist, or new symptoms develop.  Discussed wound care.  Follow-up with primary care provider or dermatology.                                       Medical Decision Making      Disposition and Plan      Clinical Impression:  1. Infected sebaceous cyst         Disposition:  Discharge  9/18/2024 11:38 pm    Follow-up:  Norberto Hdez MD  [address] [facility] [address]  [facility] [address]  [phone]    Schedule an appointment as soon as possible for a visit in 2 day(s)      [facility] in [address] [address]  [phone]  Follow up  IF SYMPTOMS WORSEN, PERSIST, OR NEW SYMPTOMS DEVEL          Medications Prescribed:  Current Discharge Medication List        START taking these medications    Details   clindamycin 300 MG Oral Cap Take 1 capsule (300 mg total) by mouth 3 (three) times daily for 10 days.  Qty: 30 capsule, Refills: 0

## 2024-09-19 NOTE — ED INITIAL ASSESSMENT (HPI)
Swelling to right side of face over cheek bone. Denies dental problems, ear pain, fever or recent illness. States first noticed it around 3 am.

## 2024-10-09 RX ORDER — AMLODIPINE BESYLATE 10 MG/1
10 TABLET ORAL DAILY
Qty: 90 TABLET | Refills: 0 | Status: SHIPPED | OUTPATIENT
Start: 2024-10-09

## 2024-10-09 NOTE — TELEPHONE ENCOUNTER
Please review; protocol failed/No Protocol    Requested Prescriptions   Pending Prescriptions Disp Refills    AMLODIPINE 10 MG Oral Tab [Pharmacy Med Name: amLODIPine Besylate 10 MG Oral Tablet] 90 tablet 3     Sig: TAKE 1 TABLET BY MOUTH DAILY       Hypertension Medications Protocol Failed - 10/9/2024 12:34 PM        Failed - Last BP reading less than 140/90     BP Readings from Last 1 Encounters:   09/18/24 149/72               Passed - CMP or BMP in past 12 months        Passed - In person appointment or virtual visit in the past 12 mos or appointment in next 3 mos     Recent Outpatient Visits              4 months ago Elevated PSA    [facility] Tolu Villarreal MD    Office Visit    5 months ago Annual physical exam    [facility], Norberto Mera MD    Office Visit    10 months ago S/P lumbar microdiscectomy    [address] Mykel Decker MD    Office Visit    10 months ago Elevated PSA    [facility] Tolu Villarreal MD    Office Visit    10 months ago S/P lumbar microdiscectomy    [address] Charla Morales APRN    Office Visit          Future Appointments         Provider Department Appt Notes    In 2 days Tolu Villarreal MD [facility] Weak urination stream    In 5 days [facility] CT MAIN [address] CT QA PS  [place]   [address]  9/25/24/SCHEDULED W/WIFE  Status Of Case is NONE ON FILE.  Attempted to reach out to patient by phone and/or Mychart.  Insurance will not cover without approval.  PATIENT CANNOT PROCEED.  Please have patient reach out to provider for next steps.    In 7 months Tolu Villarreal MD [facility] 1 yr follow up                    Passed - EGFRCR or GFRAA > 50     GFR Evaluation  EGFRCR: 92 ,  resulted on 9/4/2024             Future Appointments         Provider Department Appt Notes    In 2 days Tolu Villarreal MD [facility] Weak urination stream    In 5 days EH CT MAIN [facility] CT QA PS  [place]   [address]  9/25/24/SCHEDULED W/WIFE  Status Of Case is NONE ON FILE.  Attempted to reach out to patient by phone and/or Mychart.  Insurance will not cover without approval.  PATIENT CANNOT PROCEED.  Please have patient reach out to provider for next steps.    In 7 months Tolu Villarreal MD [facility] 1 yr follow up          Recent Outpatient Visits              [facility]    Office Visit    5 months ago Annual physical exam    [facility] Norberto Hdez MD    Office Visit    10 months ago S/P lumbar microdiscectomy    [facility], [address] [place] [address] Mykel Decker MD    Office Visit    10 months ago [facility]    10 months ago S/P lumbar microdiscectomy    [address] Charla Morales APRN    Office Visit

## 2024-10-11 ENCOUNTER — OFFICE VISIT (OUTPATIENT)
Dept: SURGERY | Facility: CLINIC | Age: 49
End: 2024-10-11
Payer: COMMERCIAL

## 2024-10-11 DIAGNOSIS — N13.8 BPH WITH OBSTRUCTION/LOWER URINARY TRACT SYMPTOMS: Primary | ICD-10-CM

## 2024-10-11 DIAGNOSIS — R97.20 ELEVATED PSA: ICD-10-CM

## 2024-10-11 DIAGNOSIS — N40.1 BPH WITH OBSTRUCTION/LOWER URINARY TRACT SYMPTOMS: Primary | ICD-10-CM

## 2024-10-11 LAB
APPEARANCE: CLEAR
BILIRUBIN: NEGATIVE
GLUCOSE (URINE DIPSTICK): NEGATIVE MG/DL
KETONES (URINE DIPSTICK): NEGATIVE MG/DL
LEUKOCYTES: NEGATIVE
MULTISTIX LOT#: NORMAL NUMERIC
NITRITE, URINE: NEGATIVE
OCCULT BLOOD: NEGATIVE
PH, URINE: 5.5 (ref 4.5–8)
PROTEIN (URINE DIPSTICK): NEGATIVE MG/DL
SPECIFIC GRAVITY: 1.02 (ref 1–1.03)
URINE-COLOR: YELLOW
UROBILINOGEN,SEMI-QN: 0.2 MG/DL (ref 0–1.9)

## 2024-10-11 PROCEDURE — 99214 OFFICE O/P EST MOD 30 MIN: CPT | Performed by: SURGERY

## 2024-10-11 PROCEDURE — 81003 URINALYSIS AUTO W/O SCOPE: CPT | Performed by: SURGERY

## 2024-10-11 RX ORDER — TAMSULOSIN HYDROCHLORIDE 0.4 MG/1
0.8 CAPSULE ORAL DAILY
Qty: 180 CAPSULE | Refills: 6 | Status: SHIPPED | OUTPATIENT
Start: 2024-10-11

## 2024-10-11 RX ORDER — FINASTERIDE 5 MG/1
5 TABLET, FILM COATED ORAL DAILY
Qty: 90 TABLET | Refills: 5 | Status: SHIPPED | OUTPATIENT
Start: 2024-10-11

## 2024-10-11 RX ORDER — OXYBUTYNIN CHLORIDE 10 MG/1
10 TABLET, EXTENDED RELEASE ORAL DAILY
Qty: 90 TABLET | Refills: 6 | Status: SHIPPED | OUTPATIENT
Start: 2024-10-11

## 2024-10-11 NOTE — TELEPHONE ENCOUNTER
2nd attempt/first below/Left voice mail message for patient to call back. Please, see message below when the call is returned.

## 2024-10-11 NOTE — PROGRESS NOTES
Urology Clinic Note    Primary Care Provider:  Norberto Hdez MD     Chief Complaint:   Elevated PSA, BPH/LUTS     HPI:   Fredy Vogt is a 49 year old male active smoker with hx of HTN, pancreatitis s/p post exploratory laparotomy, caryn-en-Y lateral pancreaticojejunostomy with duodenum-preserving pancreatic head resection on 9/24/20 at [facility], BPH, referred for elevated PSA and prior gross hematuria.     Gross hematuria work-up is complete with normal cytology, negative CT (only IV contrast, not urogram), and cystoscopy but does have an enlarged prostate with significant trilobar hyperplasia and intravesical protrusion.      His lower urinary tract symptoms are both obstructive and overactive and most likely related to his significant BPH.  He is on tamsulosin 0.8 mg daily and oxybutynin.  His prostate measures approximately 105 g on CT.  His prostate appears obstructive with significant intravesical protrusion on cystoscopy.      4K score was 20.8, so I counseled him on prostate biopsy which she underwent with me on 12/29/2022.  TRUS volume was 52 g.  Fortunately pathology was completely benign.  He did have a recent CT scan and I measured his prostate at 90 mL with significant intravesical protrusion.     At his last visit with me on 6/29/2023 he was doing well with no further gross hematuria.  He denied weak urinary stream or straining to void.  He did endorse significant urgency and frequency despite oxybutynin.  He drinks 1 cup of coffee per day and occasional soda.     Today he says that if he misses his medications he notices trouble voiding and significant urgency and frequency.  However if he takes his medications he has a good urinary stream that is only mildly weak, minimal urgency and frequency.  He remains on oxybutynin and never switched over to mirabegron.     Repeat PSA on 10/18/2023 was stable at 7.06 up from 7.70 prior.     PSA on 4/17/2024 was 6.30.  Remains on tamsulosin,  finasteride, oxybutynin.  Overall relatively satisfied with his voiding symptoms at this time.  When he misses his tamsulosin he notices significant weak urinary stream, but when he is on his medications he feels that it is adequate.  He does get urinary and bowel urgency, but the urinary urgency is relatively well-controlled on oxybutynin.  He has since stopped finasteride at least 1 month ago.    He remains on tamsulosin, finasteride, oxybutynin.  When he is taking his medications his symptoms are well-controlled and he is satisfied.    AUA symptom score is 13/5 with LUTS.    Post-void residual bladder scan: 2 mL    Urinalysis: Negative    PSA:  Lab Results   Component Value Date    PSA 6.30 (H) 04/17/2024    PSA 7.06 (H) 10/18/2023    PSA 7.70 (H) 06/29/2023    PSA 10.00 (H) 09/09/2022    PSA 2.98 12/22/2020    PSA 1.6 05/16/2018    PSAS 3.96 08/24/2020        History:     Past Medical History:    Alcohol abuse    Back problem    Colon adenoma    x1    High blood pressure    Microcytosis    Pancreatic cyst (HCC)    Pancreatitis (HCC)    Pancreatitis, alcoholic, acute (HCC)    Pulmonary nodule    Tobacco use disorder    Unspecified essential hypertension       Past Surgical History:   Procedure Laterality Date    Colonoscopy  07/12/2023    Egd  07/20/2016    Electrocardiogram, complete  12/26/2013    scanned to media tab    Lumbar discectomy      2023    Other surgical history      pancreatic cyst drainage by Dr Munoz. then saw [facility] had procedure done by Dr donohue for the cyst    Upper gi endoscopy,biopsy  07/2016       Family History   Problem Relation Age of Onset    Hypertension Father     Cancer Father     Diabetes Mother     Hypertension Mother     Heart Disorder Mother     Other (Other) Mother         copd    No Known Problems Daughter     Other (Other) Brother         motorbke accident       Social History     Socioeconomic History    Marital status:    Tobacco Use    Smoking status:  Every Day     Current packs/day: 0.50     Average packs/day: 0.5 packs/day for 20.0 years (10.0 ttl pk-yrs)     Types: Cigarettes     Passive exposure: Current    Smokeless tobacco: Never   Vaping Use    Vaping status: Some Days    Substances: THC, weekly    Devices: Disposable   Substance and Sexual Activity    Alcohol use: Yes    Drug use: Not Currently     Frequency: 1.0 times per week     Types: Cannabis     Comment: occasionaly/edibles   Other Topics Concern    Caffeine Concern Yes     Comment: 1 ice coffee daily    Exercise Yes     Social Drivers of Health     Financial Resource Strain: Low Risk  (4/7/2022)    [facility]    Overall Financial Resource Strain (CARDIA)     Difficulty of Paying Living Expenses: Not hard at all   Transportation Needs: No Transportation Needs (4/7/2022)    [facility]    PRAPARE - Transportation     Lack of Transportation (Medical): No     Lack of Transportation (Non-Medical): No       Medications (Active prior to today's visit):  Current Outpatient Medications   Medication Sig Dispense Refill    amLODIPine 10 MG Oral Tab Take 1 tablet (10 mg total) by mouth daily. 90 tablet 0    ZENPEP 33596-67568 units Oral Cap DR Particles Take 1 capsule by mouth 3 (three) times daily with meals. 300 capsule 3    zolpidem 10 MG Oral Tab Take 1 tablet (10 mg total) by mouth nightly as needed for Sleep. 15 tablet 0    HYDROcodone-acetaminophen  MG Oral Tab Take 1 tablet by mouth every 8 (eight) hours as needed for Pain. 20 tablet 0    tamsulosin 0.4 MG Oral Cap Take 2 capsules (0.8 mg total) by mouth daily. 180 capsule 6    oxybutynin ER 10 MG Oral Tablet 24 Hr Take 1 tablet (10 mg total) by mouth daily. 90 tablet 6    dicyclomine 20 MG Oral Tab Take 1 tablet (20 mg total) by mouth 4 (four) times daily as needed. 20 tablet 0    gabapentin 300 MG Oral Cap       lisinopril 40 MG Oral Tab Take 1 tablet (40  mg total) by mouth daily. 90 tablet 3    pantoprazole 40 MG Oral Tab EC Take 1 tablet (40 mg total) by mouth before breakfast. 90 tablet 3    oxyCODONE 5 MG Oral Tab Take 0.5-1 tablets (2.5-5 mg total) by mouth every 8 (eight) hours as needed. No driving (Patient not taking: Reported on 4/17/2024) 10 tablet 0    acetaminophen 500 MG Oral Tab Take 2 tablets (1,000 mg total) by mouth every 8 (eight) hours. 60 tablet 0    ondansetron (ZOFRAN) 4 mg tablet Take 1 tablet (4 mg total) by mouth every 8 (eight) hours as needed for Nausea. 20 tablet 0    ondansetron 4 MG Oral Tablet Dispersible Take 1 tablet (4 mg total) by mouth every 8 (eight) hours as needed for Nausea. 20 tablet 0       Allergies:  Allergies[1]    Review of Systems:   A comprehensive 10-point review of systems was completed.  Pertinent positives and negatives are noted in the the HPI.    Physical Exam:   CONSTITUTIONAL: Well developed, well nourished, in no acute distress  NEUROLOGIC: Alert and oriented  HEAD: Normocephalic, atraumatic  EYES: Sclera non-icteric  ENT: Hearing intact, moist mucous membranes  NECK: No obvious goiter or masses  RESPIRATORY: Normal respiratory effort  SKIN: No evident rashes  ABDOMEN: Soft, non-tender, non-distended  DIGITAL RECTAL EXAM: ~60 gram prostate, no nodules or tenderness    Assessment & Plan:   Fredy Vogt is a 49 year old male active smoker with hx of HTN, pancreatitis s/p post exploratory laparotomy, caryn-en-Y lateral pancreaticojejunostomy with duodenum-preserving pancreatic head resection on 9/24/20 at [facility], referred for elevated PSA and prior gross hematuria.     Gross hematuria work-up is complete with normal cytology, negative CT (only IV contrast, not urogram), and cystoscopy but does have an enlarged prostate with significant trilobar hyperplasia and intravesical protrusion.      His lower urinary tract symptoms are both obstructive and overactive and most likely related to his significant  BPH.  He is on tamsulosin 0.8 mg daily and oxybutynin.  His prostate measures approximately 105 g on CT.  His prostate appears obstructive with significant intravesical protrusion on cystoscopy.      4K score was 20.8, so I counseled him on prostate biopsy which she underwent with me on 12/29/2022.  TRUS volume was 52 g.  Fortunately pathology was completely benign.  He did have a recent CT scan and I measured his prostate at 90 mL with significant intravesical protrusion.     At his last visit with me on 6/29/2023 he was doing well with no further gross hematuria.  He denied weak urinary stream or straining to void.  He did endorse significant urgency and frequency despite oxybutynin.  He drinks 1 cup of coffee per day and occasional soda.     Today he says that if he misses his medications he notices trouble voiding and significant urgency and frequency.  However if he takes his medications he has a good urinary stream that is only mildly weak, minimal urgency and frequency.  He remains on oxybutynin and never switched over to mirabegron.     Repeat PSA on 10/18/2023 was stable at 7.06 up from 7.70 prior.     PSA on 4/17/2024 was 6.30.  Remains on tamsulosin, finasteride, oxybutynin.  Overall relatively satisfied with his voiding symptoms at this time.  When he misses his tamsulosin he notices significant weak urinary stream, but when he is on his medications he feels that it is adequate.  He does get urinary and bowel urgency, but the urinary urgency is relatively well-controlled on oxybutynin.  He has since stopped finasteride at least 1 month ago.    He remains on tamsulosin, finasteride, oxybutynin.  When he is taking his medications his symptoms are well-controlled and he is satisfied.    -Continue tamsulosin, finasteride, oxybutynin  -Repeat PSA  -If PSA is stable, repeat another PSA in 6 months and return to clinic then    In total, 30 minutes were spent on this patient encounter (including chart review,  patient history, physical, and counseling, documentation, and communication).    Tolu Villarreal MD  Staff Urologist  [facility]  Office: [phone]             [1]   Allergies  Allergen Reactions    Morphine ITCHING

## 2024-10-11 NOTE — TELEPHONE ENCOUNTER
Patient called back - he was made aware of Rx sent and I assisted with scheduling advised visit--> agreeable. Patient verbalized understanding. No further questions or concerns at this time.    Future Appointments   Date Time Provider Department Center   10/14/2024 11:15 AM Norberto Hdez MD [facility]

## 2024-10-15 ENCOUNTER — OFFICE VISIT (OUTPATIENT)
Dept: INTERNAL MEDICINE CLINIC | Facility: CLINIC | Age: 49
End: 2024-10-15
Payer: COMMERCIAL

## 2024-10-15 VITALS
BODY MASS INDEX: 22.13 KG/M2 | SYSTOLIC BLOOD PRESSURE: 120 MMHG | HEIGHT: 75 IN | WEIGHT: 178 LBS | DIASTOLIC BLOOD PRESSURE: 76 MMHG | HEART RATE: 92 BPM

## 2024-10-15 DIAGNOSIS — R63.4 WEIGHT LOSS: ICD-10-CM

## 2024-10-15 DIAGNOSIS — I10 ESSENTIAL HYPERTENSION: Primary | ICD-10-CM

## 2024-10-15 DIAGNOSIS — G47.00 INSOMNIA, UNSPECIFIED TYPE: ICD-10-CM

## 2024-10-15 DIAGNOSIS — K86.0 ALCOHOL-INDUCED CHRONIC PANCREATITIS (HCC): ICD-10-CM

## 2024-10-15 PROCEDURE — 90656 IIV3 VACC NO PRSV 0.5 ML IM: CPT | Performed by: INTERNAL MEDICINE

## 2024-10-15 PROCEDURE — 90471 IMMUNIZATION ADMIN: CPT | Performed by: INTERNAL MEDICINE

## 2024-10-15 PROCEDURE — 3078F DIAST BP <80 MM HG: CPT | Performed by: INTERNAL MEDICINE

## 2024-10-15 PROCEDURE — 99214 OFFICE O/P EST MOD 30 MIN: CPT | Performed by: INTERNAL MEDICINE

## 2024-10-15 PROCEDURE — 3008F BODY MASS INDEX DOCD: CPT | Performed by: INTERNAL MEDICINE

## 2024-10-15 PROCEDURE — 3074F SYST BP LT 130 MM HG: CPT | Performed by: INTERNAL MEDICINE

## 2024-10-15 RX ORDER — ZOLPIDEM TARTRATE 10 MG/1
10 TABLET ORAL NIGHTLY PRN
Qty: 15 TABLET | Refills: 0 | Status: SHIPPED | OUTPATIENT
Start: 2024-10-15

## 2024-10-15 NOTE — PROGRESS NOTES
Subjective:     Patient ID: Fredy Vogt is a 49 year old male.    Hypertension  This is a chronic problem. The current episode started more than 1 year ago. The problem has been gradually improving since onset. The problem is controlled. Pertinent negatives include no chest pain, peripheral edema or shortness of breath. There are no associated agents to hypertension. Risk factors for coronary artery disease include male gender. Past treatments include ACE inhibitors and calcium channel blockers. The current treatment provides significant improvement. There are no compliance problems.  Hypertensive end-organ damage includes CAD/MI. There is no history of angina, kidney disease, CVA, heart failure or PVD. There is no history of chronic renal disease, a hypertension causing med or a thyroid problem.     History/Other:   Review of Systems   Respiratory:  Negative for shortness of breath.    Cardiovascular:  Negative for chest pain.   Gastrointestinal:  Positive for blood in stool. Negative for abdominal pain, constipation, diarrhea and vomiting.        Oil on stools     Genitourinary: Negative.      Current Outpatient Medications   Medication Sig Dispense Refill    oxybutynin ER 10 MG Oral Tablet 24 Hr Take 1 tablet (10 mg total) by mouth daily. 90 tablet 6    tamsulosin 0.4 MG Oral Cap Take 2 capsules (0.8 mg total) by mouth daily. 180 capsule 6    finasteride 5 MG Oral Tab Take 1 tablet (5 mg total) by mouth daily. 90 tablet 5    amLODIPine 10 MG Oral Tab Take 1 tablet (10 mg total) by mouth daily. 90 tablet 0    ZENPEP 09299-46174 units Oral Cap DR Particles Take 1 capsule by mouth 3 (three) times daily with meals. 300 capsule 3    zolpidem 10 MG Oral Tab Take 1 tablet (10 mg total) by mouth nightly as needed for Sleep. 15 tablet 0    HYDROcodone-acetaminophen  MG Oral Tab Take 1 tablet by mouth every 8 (eight) hours as needed for Pain. 20 tablet 0    dicyclomine 20 MG Oral Tab Take 1 tablet (20 mg total)  by mouth 4 (four) times daily as needed. 20 tablet 0    gabapentin 300 MG Oral Cap       lisinopril 40 MG Oral Tab Take 1 tablet (40 mg total) by mouth daily. 90 tablet 3    pantoprazole 40 MG Oral Tab EC Take 1 tablet (40 mg total) by mouth before breakfast. 90 tablet 3    acetaminophen 500 MG Oral Tab Take 2 tablets (1,000 mg total) by mouth every 8 (eight) hours. 60 tablet 0    ondansetron (ZOFRAN) 4 mg tablet Take 1 tablet (4 mg total) by mouth every 8 (eight) hours as needed for Nausea. 20 tablet 0    ondansetron 4 MG Oral Tablet Dispersible Take 1 tablet (4 mg total) by mouth every 8 (eight) hours as needed for Nausea. 20 tablet 0    oxyCODONE 5 MG Oral Tab Take 0.5-1 tablets (2.5-5 mg total) by mouth every 8 (eight) hours as needed. No driving (Patient not taking: Reported on 10/15/2024) 10 tablet 0     Allergies:Allergies[1]    Past Medical History:    Alcohol abuse    Back problem    Colon adenoma    x1    High blood pressure    Microcytosis    Pancreatic cyst (HCC)    Pancreatitis (HCC)    Pancreatitis, alcoholic, acute (HCC)    Pulmonary nodule    Tobacco use disorder    Unspecified essential hypertension      Past Surgical History:   Procedure Laterality Date    Colonoscopy  07/12/2023    Egd  07/20/2016    Electrocardiogram, complete  12/26/2013    scanned to media tab    Lumbar discectomy      2023    Other surgical history      pancreatic cyst drainage by Dr Munoz. then saw [facility] had procedure done by Dr donohue for the cyst    Upper gi endoscopy,biopsy  07/2016      Family History   Problem Relation Age of Onset    Hypertension Father     Cancer Father     Diabetes Mother     Hypertension Mother     Heart Disorder Mother     Other (Other) Mother         copd    No Known Problems Daughter     Other (Other) Brother         motorbke accident      Social History:   Social History     Socioeconomic History    Marital status:    Tobacco Use    Smoking status: Every Day     Current  packs/day: 0.50     Average packs/day: 0.5 packs/day for 20.0 years (10.0 ttl pk-yrs)     Types: Cigarettes     Passive exposure: Current    Smokeless tobacco: Never   Vaping Use    Vaping status: Some Days    Substances: THC, weekly    Devices: Disposable   Substance and Sexual Activity    Alcohol use: Yes    Drug use: Not Currently     Frequency: 1.0 times per week     Types: Cannabis     Comment: occasionaly/edibles   Other Topics Concern    Caffeine Concern Yes     Comment: 1 ice coffee daily    Exercise Yes     Social Drivers of Health     Financial Resource Strain: Low Risk  (4/7/2022)    [facility]    Overall Financial Resource Strain (CARDIA)     Difficulty of Paying Living Expenses: Not hard at all   Transportation Needs: No Transportation Needs (4/7/2022)    [facility]    PRAPARE - Transportation     Lack of Transportation (Medical): No     Lack of Transportation (Non-Medical): No        Objective:   Physical Exam  Constitutional:       General: He is not in acute distress.     Appearance: He is well-developed. He is not ill-appearing, toxic-appearing or diaphoretic.   HENT:      Head: Normocephalic and atraumatic.      Right Ear: External ear normal.      Left Ear: External ear normal.      Nose: Nose normal.      Mouth/Throat:      Pharynx: No oropharyngeal exudate.   Eyes:      General:         Right eye: No discharge.         Left eye: No discharge.      Conjunctiva/sclera: Conjunctivae normal.      Pupils: Pupils are equal, round, and reactive to light.   Neck:      Vascular: No JVD.   Cardiovascular:      Rate and Rhythm: Normal rate and regular rhythm.      Heart sounds: Normal heart sounds. No murmur heard.  Pulmonary:      Effort: Pulmonary effort is normal. No respiratory distress.      Breath sounds: Normal breath sounds. No wheezing or rales.   Abdominal:      General: Bowel sounds are normal. There is  no distension.      Palpations: Abdomen is soft. There is no mass.      Tenderness: There is no abdominal tenderness. There is no guarding or rebound.   Musculoskeletal:         General: No tenderness.      Cervical back: Normal range of motion and neck supple.      Right lower leg: No edema.      Left lower leg: No edema.   Lymphadenopathy:      Cervical: No cervical adenopathy.   Skin:     General: Skin is warm and dry.      Findings: No rash.   Neurological:      Mental Status: He is alert and oriented to person, place, and time.         Assessment & Plan:   (I10) Essential hypertension  (primary encounter diagnosis)  Plan: Blood pressure controlled with current blood pressure meds.  CPM.    (K86.0) Alcohol-induced chronic pancreatitis (HCC)  Plan: Patient states that he has been sober from alcohol for a few months.  Most recent flareup was last month when he was also seen in [facility] emergency room.  Has not had any flareups since then.    (R63.4) Weight loss  Plan: Patient had been complaining of some weight loss decreased 8 pounds for the past 2 to 3 months.  This is not intentional.  He does says that he had noticed that sometimes his stools will contain some oil droplets as well as ring of oil that he can see in the toilet bowl after having a bowel movement.  I told him this is likely due to malabsorption given the fact that he has chronic pancreatitis as well as pancreatic resection done before however the patient is ready taking pancreatic enzymes.  I told him he needs to follow-up with his gastroenterologist Dr. Self and the patient agreed and we will make an appointment.  I told him if no GI source of weight loss and if it is persistent, he needs to see me back for reevaluation.  Patient agreed.    (G47.00) Insomnia, unspecified type  Plan: zolpidem 10 MG Oral Tab        Patient asked for refill for ambien       Orders Placed This Encounter   Procedures    Fluzone trivalent vaccine, PF  0.5mL, 6mo+ ([id number])       Meds This Visit:  Requested Prescriptions     Pending Prescriptions Disp Refills    zolpidem 10 MG Oral Tab 15 tablet 0     Sig: Take 1 tablet (10 mg total) by mouth nightly as needed for Sleep.       Imaging & Referrals:  INFLUENZA VACCINE, TRI, PRESERV FREE, 0.5 ML          [1]   Allergies  Allergen Reactions    Morphine ITCHING

## 2024-11-12 DIAGNOSIS — K86.0 ALCOHOL-INDUCED CHRONIC PANCREATITIS (HCC): ICD-10-CM

## 2024-11-12 RX ORDER — ONDANSETRON 4 MG/1
4 TABLET, FILM COATED ORAL EVERY 8 HOURS PRN
Qty: 20 TABLET | Refills: 0 | OUTPATIENT
Start: 2024-11-12

## 2024-11-12 NOTE — TELEPHONE ENCOUNTER
Zofran 4 mg  DOS: 11/3/23  Last OV: 12/14/23  Last refill date:     #/refills:   Upcoming appt:   Future Appointments   Date Time Provider Department Center   5/23/2025  8:30 AM Tolu Villarreal MD [id number]       Refill not appropriate

## 2024-11-14 ENCOUNTER — PATIENT MESSAGE (OUTPATIENT)
Dept: INTERNAL MEDICINE CLINIC | Facility: CLINIC | Age: 49
End: 2024-11-14

## 2024-11-15 ENCOUNTER — APPOINTMENT (OUTPATIENT)
Dept: CT IMAGING | Facility: HOSPITAL | Age: 49
End: 2024-11-15
Attending: EMERGENCY MEDICINE
Payer: COMMERCIAL

## 2024-11-15 ENCOUNTER — HOSPITAL ENCOUNTER (EMERGENCY)
Facility: HOSPITAL | Age: 49
Discharge: HOME | End: 2024-11-15
Attending: EMERGENCY MEDICINE
Payer: COMMERCIAL

## 2024-11-15 VITALS
HEART RATE: 96 BPM | SYSTOLIC BLOOD PRESSURE: 160 MMHG | DIASTOLIC BLOOD PRESSURE: 95 MMHG | TEMPERATURE: 97 F | BODY MASS INDEX: 22.38 KG/M2 | HEIGHT: 75 IN | RESPIRATION RATE: 16 BRPM | OXYGEN SATURATION: 100 % | WEIGHT: 180 LBS

## 2024-11-15 DIAGNOSIS — K86.1 ACUTE ON CHRONIC PANCREATITIS (HCC): Primary | ICD-10-CM

## 2024-11-15 DIAGNOSIS — K85.90 ACUTE ON CHRONIC PANCREATITIS (HCC): Primary | ICD-10-CM

## 2024-11-15 LAB
ALBUMIN SERPL-MCNC: 3.8 G/DL (ref 3.2–4.8)
ALBUMIN/GLOB SERPL: 1.5 {RATIO} (ref 1–2)
ALP LIVER SERPL-CCNC: 68 U/L
ALT SERPL-CCNC: 11 U/L
ANION GAP SERPL CALC-SCNC: 4 MMOL/L (ref 0–18)
AST SERPL-CCNC: 14 U/L (ref ?–34)
BASOPHILS # BLD AUTO: 0.02 X10(3) UL (ref 0–0.2)
BASOPHILS NFR BLD AUTO: 0.3 %
BILIRUB SERPL-MCNC: 0.4 MG/DL (ref 0.3–1.2)
BILIRUB UR QL STRIP.AUTO: NEGATIVE
BUN BLD-MCNC: 11 MG/DL (ref 9–23)
CALCIUM BLD-MCNC: 9 MG/DL (ref 8.7–10.4)
CHLORIDE SERPL-SCNC: 109 MMOL/L (ref 98–112)
CLARITY UR REFRACT.AUTO: CLEAR
CO2 SERPL-SCNC: 26 MMOL/L (ref 21–32)
CREAT BLD-MCNC: 0.96 MG/DL
EGFRCR SERPLBLD CKD-EPI 2021: 97 ML/MIN/1.73M2 (ref 60–?)
EOSINOPHIL # BLD AUTO: 0.04 X10(3) UL (ref 0–0.7)
EOSINOPHIL NFR BLD AUTO: 0.6 %
ERYTHROCYTE [DISTWIDTH] IN BLOOD BY AUTOMATED COUNT: 13.7 %
GLOBULIN PLAS-MCNC: 2.6 G/DL (ref 2–3.5)
GLUCOSE BLD-MCNC: 142 MG/DL (ref 70–99)
GLUCOSE UR STRIP.AUTO-MCNC: NORMAL MG/DL
HCT VFR BLD AUTO: 36.4 %
HGB BLD-MCNC: 12.7 G/DL
IMM GRANULOCYTES # BLD AUTO: 0.02 X10(3) UL (ref 0–1)
IMM GRANULOCYTES NFR BLD: 0.3 %
KETONES UR STRIP.AUTO-MCNC: 10 MG/DL
LEUKOCYTE ESTERASE UR QL STRIP.AUTO: NEGATIVE
LIPASE SERPL-CCNC: 25 U/L (ref 12–53)
LYMPHOCYTES # BLD AUTO: 1.76 X10(3) UL (ref 1–4)
LYMPHOCYTES NFR BLD AUTO: 26.5 %
MCH RBC QN AUTO: 33.8 PG (ref 26–34)
MCHC RBC AUTO-ENTMCNC: 34.9 G/DL (ref 31–37)
MCV RBC AUTO: 96.8 FL
MONOCYTES # BLD AUTO: 0.41 X10(3) UL (ref 0.1–1)
MONOCYTES NFR BLD AUTO: 6.2 %
NEUTROPHILS # BLD AUTO: 4.38 X10 (3) UL (ref 1.5–7.7)
NEUTROPHILS # BLD AUTO: 4.38 X10(3) UL (ref 1.5–7.7)
NEUTROPHILS NFR BLD AUTO: 66.1 %
NITRITE UR QL STRIP.AUTO: NEGATIVE
OSMOLALITY SERPL CALC.SUM OF ELEC: 290 MOSM/KG (ref 275–295)
PH UR STRIP.AUTO: 6 [PH] (ref 5–8)
PLATELET # BLD AUTO: 293 10(3)UL (ref 150–450)
POTASSIUM SERPL-SCNC: 3.5 MMOL/L (ref 3.5–5.1)
PROT SERPL-MCNC: 6.4 G/DL (ref 5.7–8.2)
PROT UR STRIP.AUTO-MCNC: NEGATIVE MG/DL
RBC # BLD AUTO: 3.76 X10(6)UL
RBC UR QL AUTO: NEGATIVE
SODIUM SERPL-SCNC: 139 MMOL/L (ref 136–145)
SP GR UR STRIP.AUTO: 1.02 (ref 1–1.03)
UROBILINOGEN UR STRIP.AUTO-MCNC: NORMAL MG/DL
WBC # BLD AUTO: 6.6 X10(3) UL (ref 4–11)

## 2024-11-15 PROCEDURE — 83690 ASSAY OF LIPASE: CPT

## 2024-11-15 PROCEDURE — 74177 CT ABD & PELVIS W/CONTRAST: CPT | Performed by: EMERGENCY MEDICINE

## 2024-11-15 PROCEDURE — 96376 TX/PRO/DX INJ SAME DRUG ADON: CPT

## 2024-11-15 PROCEDURE — 81003 URINALYSIS AUTO W/O SCOPE: CPT | Performed by: EMERGENCY MEDICINE

## 2024-11-15 PROCEDURE — 99284 EMERGENCY DEPT VISIT MOD MDM: CPT

## 2024-11-15 PROCEDURE — 96374 THER/PROPH/DIAG INJ IV PUSH: CPT

## 2024-11-15 PROCEDURE — 99285 EMERGENCY DEPT VISIT HI MDM: CPT

## 2024-11-15 PROCEDURE — 80053 COMPREHEN METABOLIC PANEL: CPT | Performed by: EMERGENCY MEDICINE

## 2024-11-15 PROCEDURE — 96375 TX/PRO/DX INJ NEW DRUG ADDON: CPT

## 2024-11-15 PROCEDURE — 85025 COMPLETE CBC W/AUTO DIFF WBC: CPT | Performed by: EMERGENCY MEDICINE

## 2024-11-15 PROCEDURE — 85025 COMPLETE CBC W/AUTO DIFF WBC: CPT

## 2024-11-15 PROCEDURE — 80053 COMPREHEN METABOLIC PANEL: CPT

## 2024-11-15 PROCEDURE — 83690 ASSAY OF LIPASE: CPT | Performed by: EMERGENCY MEDICINE

## 2024-11-15 RX ORDER — HYDROCODONE BITARTRATE AND ACETAMINOPHEN 10; 325 MG/1; MG/1
1 TABLET ORAL EVERY 6 HOURS PRN
Qty: 10 TABLET | Refills: 0 | Status: SHIPPED | OUTPATIENT
Start: 2024-11-15 | End: 2024-11-20

## 2024-11-15 RX ORDER — ONDANSETRON 4 MG/1
4 TABLET, ORALLY DISINTEGRATING ORAL EVERY 4 HOURS PRN
Qty: 10 TABLET | Refills: 0 | Status: SHIPPED | OUTPATIENT
Start: 2024-11-15 | End: 2024-11-22

## 2024-11-15 RX ORDER — HYDROMORPHONE HYDROCHLORIDE 1 MG/ML
1 INJECTION, SOLUTION INTRAMUSCULAR; INTRAVENOUS; SUBCUTANEOUS ONCE
Status: COMPLETED | OUTPATIENT
Start: 2024-11-15 | End: 2024-11-15

## 2024-11-15 RX ORDER — ONDANSETRON 2 MG/ML
4 INJECTION INTRAMUSCULAR; INTRAVENOUS ONCE
Status: COMPLETED | OUTPATIENT
Start: 2024-11-15 | End: 2024-11-15

## 2024-11-15 NOTE — TELEPHONE ENCOUNTER
Please see patient's 11/14/24 mychart encounter asking for status of  Norco 10 mg  refill.  Medication  pended to run through protocol.

## 2024-11-15 NOTE — ED PROVIDER NOTES
Patient Seen in: [facility] Emergency Department      History     Chief Complaint   Patient presents with    Abdomen/Flank Pain     Stated Complaint: abdominal pain, hx pancreatitis    Subjective:   HPI      49-year-old male with a past medical history as below including pancreatitis with pseudocyst presents with epigastric abdominal pain that started yesterday morning.  Patient states pain feels similar to previous flareups of pancreatitis.  She reports nausea with 1 episode of nonbloody/nonbilious emesis.  Denies diarrhea.  Denies any recent alcohol use.    Objective:     Past Medical History:    Alcohol abuse    Back problem    Colon adenoma    x1    High blood pressure    Microcytosis    Pancreatic cyst (HCC)    Pancreatitis (HCC)    Pancreatitis, alcoholic, acute (HCC)    Pulmonary nodule    Tobacco use disorder    Unspecified essential hypertension              Past Surgical History:   Procedure Laterality Date    Colonoscopy  07/12/2023    Egd  07/20/2016    Electrocardiogram, complete  12/26/2013    scanned to media tab    Lumbar discectomy      2023    Other surgical history      pancreatic cyst drainage by Dr Munoz. then saw [facility] had procedure done by Dr donohue for the cyst    Upper gi endoscopy,biopsy  07/2016                Social History     Socioeconomic History    Marital status:    Tobacco Use    Smoking status: Every Day     Current packs/day: 0.50     Average packs/day: 0.5 packs/day for 20.0 years (10.0 ttl pk-yrs)     Types: Cigarettes     Passive exposure: Current    Smokeless tobacco: Never   Vaping Use    Vaping status: Some Days    Substances: THC, weekly    Devices: Disposable   Substance and Sexual Activity    Alcohol use: Not Currently    Drug use: Not Currently   Other Topics Concern    Caffeine Concern Yes     Comment: 1 ice coffee daily    Exercise Yes     Social Drivers of Health     Financial Resource Strain: Low Risk  (4/7/2022)    Received from [facility]    Overall Financial Resource Strain (CARDIA)     Difficulty of Paying Living Expenses: Not hard at all   Transportation Needs: No Transportation Needs (4/7/2022)    Received from [facility], [facility]    PRAPARE - Transportation     Lack of Transportation (Medical): No     Lack of Transportation (Non-Medical): No                  Physical Exam     ED Triage Vitals [11/15/24 1143]   BP 94/61   Pulse 101   Resp 16   Temp 97.4 °F (36.3 °C)   Temp src Temporal   SpO2 98 %   O2 Device None (Room air)       Current Vitals:   Vital Signs  BP: (!) 160/95  Pulse: 96  Resp: 16  Temp: 97.4 °F (36.3 °C)  Temp src: Temporal  MAP (mmHg): (!) 112    Oxygen Therapy  SpO2: 100 %  O2 Device: None (Room air)        Physical Exam  Vitals and nursing note reviewed.   Constitutional:       General: He is not in acute distress.     Appearance: He is well-developed. He is not ill-appearing.   HENT:      Head: Normocephalic and atraumatic.      Mouth/Throat:      Mouth: Mucous membranes are moist.   Eyes:      General: No scleral icterus.     Extraocular Movements: Extraocular movements intact.   Cardiovascular:      Rate and Rhythm: Normal rate and regular rhythm.   Pulmonary:      Effort: Pulmonary effort is normal.      Breath sounds: Normal breath sounds.   Abdominal:      General: Bowel sounds are normal. There is no distension.      Palpations: Abdomen is soft.      Tenderness: There is abdominal tenderness. There is no guarding or rebound.      Comments: Epigastric tenderness   Musculoskeletal:      Cervical back: Neck supple.   Skin:     General: Skin is warm and dry.      Capillary Refill: Capillary refill takes less than 2 seconds.   Neurological:      Mental Status: He is alert and oriented to person, place, and time.      GCS: GCS eye subscore is 4. GCS verbal subscore is 5. GCS motor subscore is 6.   Psychiatric:         Mood and Affect: Mood normal.         Behavior:  Behavior normal.             ED Course     Labs Reviewed   COMP METABOLIC PANEL (14) - Abnormal; Notable for the following components:       Result Value    Glucose 142 (*)     All other components within normal limits   CBC WITH DIFFERENTIAL WITH PLATELET - Abnormal; Notable for the following components:    RBC 3.76 (*)     HGB 12.7 (*)     HCT 36.4 (*)     All other components within normal limits   URINALYSIS, ROUTINE - Abnormal; Notable for the following components:    Ketones Urine 10 (*)     All other components within normal limits   LIPASE - Normal            CT ABDOMEN+PELVIS(CONTRAST ONLY)(CPT=74177)    Result Date: 11/15/2024  CONCLUSION:  1. There are findings of chronic pancreatitis which are similar to the prior study.  There is also mild increased conspicuity of decreased attenuation and enhancement in the pancreatic head which is favored to represent acute interstitial edematous pancreatitis superimposed on chronic pancreatitis.  A more focal area of decreased enhancement in the uncinate process could represent a more focal area of intraparenchymal edema.  Follow-up studies with CT or MRI are recommended to confirm expected evolution and exclude underlying mass.  There is no evidence of an acute necrotic collection.  2. There is moderate enlargement of the prostate.   LOCATION:  [facility]   Dictated by (CST): Rahul Pham MD on 11/15/2024 at 4:29 PM     Finalized by (CST): Rahul Pham MD on 11/15/2024 at 4:38 PM             MDM      49-year-old male with a past medical history as below including pancreatitis with pseudocyst presents with epigastric abdominal pain that started yesterday morning.     Differential includes but is not limited to acute versus chronic pancreatitis, gastritis, PUD, less likely cholecystitis    Labs are unremarkable with normal WBC, mild chronic anemia with hemoglobin of 12.7, similar to previous on 9/4/2024.  Lipase and LFTs are normal.    Independent trepidation of CT  abdomen/pelvis shows minimal inflammatory changes around the pancreas.  Radiology report reviewed as above noting findings of chronic pancreatitis which are similar to previous study with possible acute changes superimposed on chronic pancreatitis.    Patient was treated with Dilaudid, Zofran and IV fluids with significant improvement in symptoms.  He states he is comfortable being discharged home.  He states he ran out of Norco 10 which she was taking for pancreatitis.  Will give Rx for short supply along with Zofran.  Instructed to follow-up with his GI specialist for further outpatient management.  Return precaution discussed.          Medical Decision Making  Amount and/or Complexity of Data Reviewed  Labs: ordered. Decision-making details documented in ED Course.  Radiology: ordered and independent interpretation performed. Decision-making details documented in ED Course.    Risk  Prescription drug management.  Parenteral controlled substances.        Disposition and Plan     Clinical Impression:  1. Acute on chronic pancreatitis (HCC)         Disposition:  Discharge  11/15/2024  5:00 pm    Follow-up:  Johnnie Munoz MD    Schedule an appointment as soon as possible for a visit in 3 day(s)      Norberto Hdez MD  [address] [place] [address]  [phone]    Schedule an appointment as soon as possible for a visit            Medications Prescribed:  Current Discharge Medication List        START taking these medications    Details   !! ondansetron 4 MG Oral Tablet Dispersible Take 1 tablet (4 mg total) by mouth every 4 (four) hours as needed for Nausea.  Qty: 10 tablet, Refills: 0      !! HYDROcodone-acetaminophen  MG Oral Tab Take 1 tablet by mouth every 6 (six) hours as needed for Pain.  Qty: 10 tablet, Refills: 0    Associated Diagnoses: Acute on chronic pancreatitis (HCC)       !! - Potential duplicate medications found. Please discuss with provider.               Supplementary Documentation:

## 2024-11-15 NOTE — ED INITIAL ASSESSMENT (HPI)
PT states that he began to experience abdominal pain since yesterday, current abdominal pain level is 8/10. PT adds that he has a history of pancreatitis.

## 2024-11-16 ENCOUNTER — TELEPHONE (OUTPATIENT)
Dept: INTERNAL MEDICINE CLINIC | Facility: CLINIC | Age: 49
End: 2024-11-16

## 2024-11-16 RX ORDER — HYDROCODONE BITARTRATE AND ACETAMINOPHEN 10; 325 MG/1; MG/1
1 TABLET ORAL EVERY 8 HOURS PRN
Qty: 20 TABLET | Refills: 0 | OUTPATIENT
Start: 2024-11-16

## 2024-11-16 NOTE — TELEPHONE ENCOUNTER
Patient's wife called requesting for hydrocodone refill.  I reviewed the chart.  I informed her that medicine was refilled on November 15th.  She will contact pharmacy.

## 2024-11-29 ENCOUNTER — HOSPITAL ENCOUNTER (OUTPATIENT)
Dept: CT IMAGING | Age: 49
Discharge: HOME | End: 2024-11-29
Attending: OPHTHALMOLOGY
Payer: COMMERCIAL

## 2024-11-29 DIAGNOSIS — D49.2 EAR TUMORS: ICD-10-CM

## 2024-12-17 ENCOUNTER — OFFICE VISIT (OUTPATIENT)
Dept: INTERNAL MEDICINE CLINIC | Facility: CLINIC | Age: 49
End: 2024-12-17
Payer: COMMERCIAL

## 2024-12-17 ENCOUNTER — LAB ENCOUNTER (OUTPATIENT)
Dept: LAB | Age: 49
End: 2024-12-17
Attending: INTERNAL MEDICINE
Payer: COMMERCIAL

## 2024-12-17 VITALS
DIASTOLIC BLOOD PRESSURE: 74 MMHG | WEIGHT: 173 LBS | HEART RATE: 99 BPM | BODY MASS INDEX: 22 KG/M2 | TEMPERATURE: 97 F | SYSTOLIC BLOOD PRESSURE: 124 MMHG

## 2024-12-17 DIAGNOSIS — K86.1 ACUTE ON CHRONIC PANCREATITIS (HCC): Primary | ICD-10-CM

## 2024-12-17 DIAGNOSIS — G47.00 INSOMNIA, UNSPECIFIED TYPE: ICD-10-CM

## 2024-12-17 DIAGNOSIS — Z11.3 SCREENING EXAMINATION FOR STD (SEXUALLY TRANSMITTED DISEASE): ICD-10-CM

## 2024-12-17 DIAGNOSIS — K85.90 ACUTE ON CHRONIC PANCREATITIS (HCC): Primary | ICD-10-CM

## 2024-12-17 DIAGNOSIS — K86.89 PANCREATIC MASS (HCC): ICD-10-CM

## 2024-12-17 DIAGNOSIS — K86.0 ALCOHOL-INDUCED CHRONIC PANCREATITIS (HCC): ICD-10-CM

## 2024-12-17 LAB
HBV SURFACE AG SER-ACNC: <0.1 [IU]/L
HBV SURFACE AG SERPL QL IA: NONREACTIVE
T PALLIDUM AB SER QL IA: NONREACTIVE

## 2024-12-17 PROCEDURE — 87389 HIV-1 AG W/HIV-1&-2 AB AG IA: CPT

## 2024-12-17 PROCEDURE — 87491 CHLMYD TRACH DNA AMP PROBE: CPT

## 2024-12-17 PROCEDURE — 87591 N.GONORRHOEAE DNA AMP PROB: CPT

## 2024-12-17 PROCEDURE — 86780 TREPONEMA PALLIDUM: CPT

## 2024-12-17 PROCEDURE — 87340 HEPATITIS B SURFACE AG IA: CPT

## 2024-12-17 PROCEDURE — 36415 COLL VENOUS BLD VENIPUNCTURE: CPT

## 2024-12-17 PROCEDURE — 99214 OFFICE O/P EST MOD 30 MIN: CPT | Performed by: INTERNAL MEDICINE

## 2024-12-17 PROCEDURE — 3078F DIAST BP <80 MM HG: CPT | Performed by: INTERNAL MEDICINE

## 2024-12-17 PROCEDURE — 3074F SYST BP LT 130 MM HG: CPT | Performed by: INTERNAL MEDICINE

## 2024-12-17 RX ORDER — ONDANSETRON 4 MG/1
4 TABLET, FILM COATED ORAL EVERY 8 HOURS PRN
Qty: 20 TABLET | Refills: 0 | Status: SHIPPED | OUTPATIENT
Start: 2024-12-17

## 2024-12-17 RX ORDER — HYDROCODONE BITARTRATE AND ACETAMINOPHEN 10; 325 MG/1; MG/1
1 TABLET ORAL EVERY 8 HOURS PRN
Qty: 20 TABLET | Refills: 0 | Status: SHIPPED | OUTPATIENT
Start: 2024-12-17

## 2024-12-17 RX ORDER — ZOLPIDEM TARTRATE 10 MG/1
10 TABLET ORAL NIGHTLY PRN
Qty: 15 TABLET | Refills: 0 | Status: SHIPPED | OUTPATIENT
Start: 2024-12-17

## 2024-12-18 LAB
C TRACH DNA SPEC QL NAA+PROBE: NEGATIVE
N GONORRHOEA DNA SPEC QL NAA+PROBE: NEGATIVE

## 2024-12-18 NOTE — PROGRESS NOTES
Subjective:     Patient ID: Fredy Vogt is a 49 year old male.    Pt here for post ER/hospital ffup; was seen in [place] ER for abdominal pain and diagnosed with acute on chronic pancreatitis. Discharged home on pain meds and continued on pancreatic enzymes supplement.  Went to [facility] and got admitted due to persistent abd pain and stayed for 3 days then discharge on pain meds. Pt states abd pain miminal currently;  no other symtpoms. Pt states last alcohol drink was 6mos ago.  Has hx of alcohol induced pancreatitis before.         History/Other:   Review of Systems  Current Outpatient Medications   Medication Sig Dispense Refill    ondansetron (ZOFRAN) 4 mg tablet Take 1 tablet (4 mg total) by mouth every 8 (eight) hours as needed for Nausea. 20 tablet 0    zolpidem 10 MG Oral Tab Take 1 tablet (10 mg total) by mouth nightly as needed for Sleep. 15 tablet 0    HYDROcodone-acetaminophen  MG Oral Tab Take 1 tablet by mouth every 8 (eight) hours as needed for Pain. 20 tablet 0    Naloxone HCl 4 MG/0.1ML Nasal Liquid 4 mg by Nasal route as needed. If patient remains unresponsive, repeat dose in other nostril 2-5 minutes after first dose. 1 kit 0    oxybutynin ER 10 MG Oral Tablet 24 Hr Take 1 tablet (10 mg total) by mouth daily. 90 tablet 6    tamsulosin 0.4 MG Oral Cap Take 2 capsules (0.8 mg total) by mouth daily. 180 capsule 6    finasteride 5 MG Oral Tab Take 1 tablet (5 mg total) by mouth daily. 90 tablet 5    amLODIPine 10 MG Oral Tab Take 1 tablet (10 mg total) by mouth daily. 90 tablet 0    ZENPEP 06429-30409 units Oral Cap DR Particles Take 1 capsule by mouth 3 (three) times daily with meals. 300 capsule 3    dicyclomine 20 MG Oral Tab Take 1 tablet (20 mg total) by mouth 4 (four) times daily as needed. 20 tablet 0    gabapentin 300 MG Oral Cap       lisinopril 40 MG Oral Tab Take 1 tablet (40 mg total) by mouth daily. 90 tablet 3    pantoprazole 40 MG Oral Tab EC Take 1 tablet (40 mg  total) by mouth before breakfast. 90 tablet 3    acetaminophen 500 MG Oral Tab Take 2 tablets (1,000 mg total) by mouth every 8 (eight) hours. 60 tablet 0    ondansetron 4 MG Oral Tablet Dispersible Take 1 tablet (4 mg total) by mouth every 8 (eight) hours as needed for Nausea. 20 tablet 0    oxyCODONE 5 MG Oral Tab Take 0.5-1 tablets (2.5-5 mg total) by mouth every 8 (eight) hours as needed. No driving (Patient not taking: Reported on 12/17/2024) 10 tablet 0     Allergies:Allergies[1]    Past Medical History:    Alcohol abuse    Back problem    Colon adenoma    x1    High blood pressure    Microcytosis    Pancreatic cyst (HCC)    Pancreatitis (HCC)    Pancreatitis, alcoholic, acute (HCC)    Pulmonary nodule    Tobacco use disorder    Unspecified essential hypertension      Past Surgical History:   Procedure Laterality Date    Colonoscopy  07/12/2023    Egd  07/20/2016    Electrocardiogram, complete  12/26/2013    scanned to media tab    Lumbar discectomy      2023    Other surgical history      pancreatic cyst drainage by Dr Munoz. then saw [facility] had procedure done by Dr donohue for the cyst    Upper gi endoscopy,biopsy  07/2016      Family History   Problem Relation Age of Onset    Hypertension Father     Cancer Father     Diabetes Mother     Hypertension Mother     Heart Disorder Mother     Other (Other) Mother         copd    No Known Problems Daughter     Other (Other) Brother         motorbke accident      Social History:   Social History     Socioeconomic History    Marital status:    Tobacco Use    Smoking status: Every Day     Current packs/day: 0.50     Average packs/day: 0.5 packs/day for 20.0 years (10.0 ttl pk-yrs)     Types: Cigarettes     Passive exposure: Current    Smokeless tobacco: Never   Vaping Use    Vaping status: Some Days    Substances: THC, weekly    Devices: Disposable   Substance and Sexual Activity    Alcohol use: Not Currently    Drug use: Not Currently   Other Topics  Concern    Caffeine Concern Yes     Comment: 1 ice coffee daily    Exercise Yes     Social Drivers of Health     Financial Resource Strain: Low Risk  (4/7/2022)    Received from [facility], [facility]    Overall Financial Resource Strain (CARDIA)     Difficulty of Paying Living Expenses: Not hard at all   Food Insecurity: Low Risk  (11/18/2024)    Received from [facility]     Within the past 12 months, the food you bought just didn't last and you didn't have money to get more.: 3     Within the past 12 months, you worried that your food would run out before you got money to buy more.: 3   Transportation Needs: Not At Risk (11/18/2024)    Received from [facility] Transportation Needs     In the past 12 months, has lack of reliable transportation kept you from medical appointments, meetings, work or from getting things needed for daily living?: No   Housing Stability: Not At Risk (11/18/2024)    Received from [facility] Housing     What is your living situation today?: I have a steady place to live     Think about the place you live. Do you have problems with any of the following?: None of the above        Objective:   Physical Exam  Constitutional:       General: He is not in acute distress.     Appearance: He is well-developed. He is not ill-appearing, toxic-appearing or diaphoretic.   HENT:      Head: Normocephalic and atraumatic.      Right Ear: External ear normal.      Left Ear: External ear normal.      Nose: Nose normal.      Mouth/Throat:      Pharynx: No oropharyngeal exudate.   Eyes:      General:         Right eye: No discharge.         Left eye: No discharge.      Conjunctiva/sclera: Conjunctivae normal.      Pupils: Pupils are equal, round, and reactive to light.   Neck:      Vascular: No JVD.   Cardiovascular:      Rate and Rhythm: Normal rate and regular rhythm.      Heart sounds: Normal heart sounds. No murmur heard.  Pulmonary:      Effort:  Pulmonary effort is normal. No respiratory distress.      Breath sounds: Normal breath sounds. No wheezing or rales.   Abdominal:      General: Bowel sounds are normal. There is no distension.      Palpations: Abdomen is soft. There is no mass.      Tenderness: There is abdominal tenderness in the epigastric area. There is no right CVA tenderness, left CVA tenderness, guarding or rebound. Negative signs include Layton's sign and McBurney's sign.      Comments: Minimal tenderness on epigastric area   Musculoskeletal:         General: No tenderness. Normal range of motion.      Cervical back: Normal range of motion and neck supple. No rigidity or tenderness.      Right lower leg: No edema.      Left lower leg: No edema.   Lymphadenopathy:      Cervical: No cervical adenopathy.   Skin:     General: Skin is warm and dry.      Coloration: Skin is not jaundiced or pale.      Findings: No rash.   Neurological:      Mental Status: He is alert and oriented to person, place, and time.         Assessment & Plan:   (K85.90,  K86.1) Acute on chronic pancreatitis (HCC)  (primary encounter diagnosis)  Plan: pt abdominal pain much improved now. He has not drink alcohol since  6mos; again emphasized to pt importance of abstinence from alcohol compleletely.     (Z11.3) Screening examination for STD (sexually transmitted disease)  Plan: Chlamydia/Gc Amplification, T Pallidum         Screening [place], Hepatitis B Surface Antigen,        HIV AG AB Combo [E]        Pt reqeuted std screening; he doesn't have symptoms.     (G47.00) Insomnia, unspecified type  Plan: zolpidem 10 MG Oral Tab        Ambein refilled.     (K86.89) Pancreatic mass (HCC)  Plan: MRI ABDOMEN AND MRCP W/3D (KRG=[id number])        Ct abd done in ER showed focal enhancement in uncinate process of pancreas, possibly edema but need to rule out mass. Will check mri abd.     (K86.0) Alcohol-induced chronic pancreatitis (HCC)  Plan: HYDROcodone-acetaminophen  MG  Oral Tab        See above.        Orders Placed This Encounter   Procedures    T Pallidum Screening Cascade    Hepatitis B Surface Antigen    HIV AG AB Combo [E]    Chlamydia/Gc Amplification       Meds This Visit:  Requested Prescriptions     Signed Prescriptions Disp Refills    ondansetron (ZOFRAN) 4 mg tablet 20 tablet 0     Sig: Take 1 tablet (4 mg total) by mouth every 8 (eight) hours as needed for Nausea.    zolpidem 10 MG Oral Tab 15 tablet 0     Sig: Take 1 tablet (10 mg total) by mouth nightly as needed for Sleep.    HYDROcodone-acetaminophen  MG Oral Tab 20 tablet 0     Sig: Take 1 tablet by mouth every 8 (eight) hours as needed for Pain.    Naloxone HCl 4 MG/0.1ML Nasal Liquid 1 kit 0     Si mg by Nasal route as needed. If patient remains unresponsive, repeat dose in other nostril 2-5 minutes after first dose.       Imaging & Referrals:  MRI ABDOMEN AND MRCP W/3D (HZL=[id number])            [1]   Allergies  Allergen Reactions    Morphine ITCHING

## 2024-12-26 ENCOUNTER — TELEPHONE (OUTPATIENT)
Dept: INTERNAL MEDICINE CLINIC | Facility: CLINIC | Age: 49
End: 2024-12-26

## 2024-12-26 NOTE — TELEPHONE ENCOUNTER
Left message for patient to call back to inform that a prior authorization has been obtained for MRI MRCP that was ordered by Dr Hdez. The authorization is valid 12/18/24-2/15/25. Patient may contact central scheduling at [phone] to schedule this exam prior to the expiration date. [employer] message sent to patient as well informing of above information.

## 2025-01-02 ENCOUNTER — HOSPITAL ENCOUNTER (OUTPATIENT)
Dept: MRI IMAGING | Age: 50
Discharge: HOME | End: 2025-01-02
Attending: INTERNAL MEDICINE
Payer: COMMERCIAL

## 2025-01-02 ENCOUNTER — HOSPITAL ENCOUNTER (OUTPATIENT)
Dept: MRI IMAGING | Facility: HOSPITAL | Age: 50
Discharge: HOME | End: 2025-01-02
Attending: INTERNAL MEDICINE
Payer: COMMERCIAL

## 2025-01-02 DIAGNOSIS — K85.90 ACUTE ON CHRONIC PANCREATITIS (HCC): ICD-10-CM

## 2025-01-02 DIAGNOSIS — K86.1 ACUTE ON CHRONIC PANCREATITIS (HCC): ICD-10-CM

## 2025-01-02 DIAGNOSIS — K86.89 PANCREATIC MASS (HCC): ICD-10-CM

## 2025-01-02 PROCEDURE — 74181 MRI ABDOMEN W/O CONTRAST: CPT | Performed by: INTERNAL MEDICINE

## 2025-01-03 RX ORDER — AMLODIPINE BESYLATE 10 MG/1
10 TABLET ORAL DAILY
Qty: 90 TABLET | Refills: 3 | Status: SHIPPED | OUTPATIENT
Start: 2025-01-03 | End: 2025-03-10

## 2025-01-04 NOTE — TELEPHONE ENCOUNTER
Refill passed per [facility] protocol.   Requested Prescriptions   Pending Prescriptions Disp Refills    AMLODIPINE 10 MG Oral Tab [Pharmacy Med Name: amLODIPine Besylate 10 MG Oral Tablet] 90 tablet 3     Sig: TAKE 1 TABLET BY MOUTH DAILY       Hypertension Medications Protocol Passed - 1/3/2025  7:41 PM        Passed - CMP or BMP in past 12 months        Passed - Last BP reading less than 140/90     BP Readings from Last 1 Encounters:   12/17/24 124/74               Passed - In person appointment or virtual visit in the past 12 mos or appointment in next 3 mos     Recent Outpatient Visits              2 weeks ago Acute on chronic pancreatitis (HCC)    [facility] Norberto Hdez MD    Office Visit    2 months ago Essential hypertension    [facility] Norberto Mera MD    Office Visit    2 months ago BPH with obstruction/lower urinary tract symptoms    [facility] Tolu Gregory MD    Office Visit    7 months ago Elevated PSA    [facility] Tolu Villarreal MD    Office Visit    8 months ago Annual physical exam    [facility] Norberto Mera MD    Office Visit          Future Appointments         Provider Department Appt Notes    In 1 month Torito Self MD [facility] Pancreas inflamed    In 4 months Tolu Villarreal MD [facility] 1 yr follow up                    Passed - EGFRCR or GFRAA > 50     GFR Evaluation  EGFRCR: 97 , resulted on 11/15/2024

## 2025-01-08 ENCOUNTER — HOSPITAL ENCOUNTER (EMERGENCY)
Age: 50
Discharge: LEFT BEFORE DISCHARGE | End: 2025-01-09
Attending: EMERGENCY MEDICINE
Payer: COMMERCIAL

## 2025-01-08 DIAGNOSIS — K85.90 ACUTE PANCREATITIS, UNSPECIFIED COMPLICATION STATUS, UNSPECIFIED PANCREATITIS TYPE (HCC): Primary | ICD-10-CM

## 2025-01-08 LAB
ALBUMIN SERPL-MCNC: 3.7 G/DL (ref 3.2–4.8)
ALBUMIN/GLOB SERPL: 1.5 {RATIO} (ref 1–2)
ALP LIVER SERPL-CCNC: 66 U/L
ALT SERPL-CCNC: 10 U/L
ANION GAP SERPL CALC-SCNC: 6 MMOL/L (ref 0–18)
AST SERPL-CCNC: 10 U/L (ref ?–34)
BASOPHILS # BLD AUTO: 0.02 X10(3) UL (ref 0–0.2)
BASOPHILS NFR BLD AUTO: 0.1 %
BILIRUB SERPL-MCNC: 1 MG/DL (ref 0.3–1.2)
BUN BLD-MCNC: 16 MG/DL (ref 9–23)
CALCIUM BLD-MCNC: 9.3 MG/DL (ref 8.7–10.4)
CHLORIDE SERPL-SCNC: 108 MMOL/L (ref 98–112)
CO2 SERPL-SCNC: 28 MMOL/L (ref 21–32)
CREAT BLD-MCNC: 1.05 MG/DL
EGFRCR SERPLBLD CKD-EPI 2021: 87 ML/MIN/1.73M2 (ref 60–?)
EOSINOPHIL # BLD AUTO: 0.02 X10(3) UL (ref 0–0.7)
EOSINOPHIL NFR BLD AUTO: 0.1 %
ERYTHROCYTE [DISTWIDTH] IN BLOOD BY AUTOMATED COUNT: 14.5 %
ETHANOL SERPL-MCNC: <3 MG/DL (ref ?–3)
GLOBULIN PLAS-MCNC: 2.4 G/DL (ref 2–3.5)
GLUCOSE BLD-MCNC: 125 MG/DL (ref 70–99)
HCT VFR BLD AUTO: 38.1 %
HGB BLD-MCNC: 13.4 G/DL
IMM GRANULOCYTES # BLD AUTO: 0.11 X10(3) UL (ref 0–1)
IMM GRANULOCYTES NFR BLD: 0.7 %
LIPASE SERPL-CCNC: 24 U/L (ref 12–53)
LYMPHOCYTES # BLD AUTO: 1.03 X10(3) UL (ref 1–4)
LYMPHOCYTES NFR BLD AUTO: 7 %
MCH RBC QN AUTO: 34.5 PG (ref 26–34)
MCHC RBC AUTO-ENTMCNC: 35.2 G/DL (ref 31–37)
MCV RBC AUTO: 98.2 FL
MONOCYTES # BLD AUTO: 0.74 X10(3) UL (ref 0.1–1)
MONOCYTES NFR BLD AUTO: 5 %
NEUTROPHILS # BLD AUTO: 12.89 X10 (3) UL (ref 1.5–7.7)
NEUTROPHILS # BLD AUTO: 12.89 X10(3) UL (ref 1.5–7.7)
NEUTROPHILS NFR BLD AUTO: 87.1 %
OSMOLALITY SERPL CALC.SUM OF ELEC: 297 MOSM/KG (ref 275–295)
PLATELET # BLD AUTO: 257 10(3)UL (ref 150–450)
POTASSIUM SERPL-SCNC: 3.7 MMOL/L (ref 3.5–5.1)
PROT SERPL-MCNC: 6.1 G/DL (ref 5.7–8.2)
RBC # BLD AUTO: 3.88 X10(6)UL
SODIUM SERPL-SCNC: 142 MMOL/L (ref 136–145)
WBC # BLD AUTO: 14.8 X10(3) UL (ref 4–11)

## 2025-01-08 PROCEDURE — 80053 COMPREHEN METABOLIC PANEL: CPT | Performed by: EMERGENCY MEDICINE

## 2025-01-08 PROCEDURE — 99285 EMERGENCY DEPT VISIT HI MDM: CPT

## 2025-01-08 PROCEDURE — 96374 THER/PROPH/DIAG INJ IV PUSH: CPT

## 2025-01-08 PROCEDURE — 96375 TX/PRO/DX INJ NEW DRUG ADDON: CPT

## 2025-01-08 PROCEDURE — 82077 ASSAY SPEC XCP UR&BREATH IA: CPT | Performed by: EMERGENCY MEDICINE

## 2025-01-08 PROCEDURE — 85025 COMPLETE CBC W/AUTO DIFF WBC: CPT | Performed by: EMERGENCY MEDICINE

## 2025-01-08 PROCEDURE — 83690 ASSAY OF LIPASE: CPT | Performed by: EMERGENCY MEDICINE

## 2025-01-08 PROCEDURE — 96361 HYDRATE IV INFUSION ADD-ON: CPT

## 2025-01-08 RX ORDER — HYDROMORPHONE HYDROCHLORIDE 1 MG/ML
0.5 INJECTION, SOLUTION INTRAMUSCULAR; INTRAVENOUS; SUBCUTANEOUS ONCE
Status: COMPLETED | OUTPATIENT
Start: 2025-01-08 | End: 2025-01-08

## 2025-01-08 RX ORDER — ONDANSETRON 2 MG/ML
4 INJECTION INTRAMUSCULAR; INTRAVENOUS ONCE
Status: COMPLETED | OUTPATIENT
Start: 2025-01-08 | End: 2025-01-08

## 2025-01-09 ENCOUNTER — HOSPITAL ENCOUNTER (INPATIENT)
Facility: HOSPITAL | Age: 50
LOS: 2 days | Discharge: HOME | End: 2025-01-11
Attending: EMERGENCY MEDICINE | Admitting: INTERNAL MEDICINE
Payer: COMMERCIAL

## 2025-01-09 ENCOUNTER — APPOINTMENT (OUTPATIENT)
Dept: CT IMAGING | Age: 50
End: 2025-01-09
Attending: EMERGENCY MEDICINE
Payer: COMMERCIAL

## 2025-01-09 VITALS
RESPIRATION RATE: 16 BRPM | HEIGHT: 75 IN | OXYGEN SATURATION: 100 % | DIASTOLIC BLOOD PRESSURE: 64 MMHG | HEART RATE: 86 BPM | WEIGHT: 170 LBS | SYSTOLIC BLOOD PRESSURE: 118 MMHG | TEMPERATURE: 99 F | BODY MASS INDEX: 21.14 KG/M2

## 2025-01-09 DIAGNOSIS — K85.80 OTHER ACUTE PANCREATITIS, UNSPECIFIED COMPLICATION STATUS (HCC): ICD-10-CM

## 2025-01-09 DIAGNOSIS — K85.90 ACUTE PANCREATITIS, UNSPECIFIED COMPLICATION STATUS, UNSPECIFIED PANCREATITIS TYPE (HCC): Primary | ICD-10-CM

## 2025-01-09 LAB
ALBUMIN SERPL-MCNC: 4.1 G/DL (ref 3.2–4.8)
ALBUMIN/GLOB SERPL: 1.6 {RATIO} (ref 1–2)
ALP LIVER SERPL-CCNC: 66 U/L
ALT SERPL-CCNC: 9 U/L
ANION GAP SERPL CALC-SCNC: 2 MMOL/L (ref 0–18)
AST SERPL-CCNC: 9 U/L (ref ?–34)
BASOPHILS # BLD AUTO: 0.02 X10(3) UL (ref 0–0.2)
BASOPHILS NFR BLD AUTO: 0.1 %
BILIRUB SERPL-MCNC: 0.5 MG/DL (ref 0.3–1.2)
BILIRUB UR QL STRIP.AUTO: NEGATIVE
BUN BLD-MCNC: 12 MG/DL (ref 9–23)
CALCIUM BLD-MCNC: 8.9 MG/DL (ref 8.7–10.4)
CHLORIDE SERPL-SCNC: 108 MMOL/L (ref 98–112)
CLARITY UR REFRACT.AUTO: CLEAR
CO2 SERPL-SCNC: 30 MMOL/L (ref 21–32)
COLOR UR AUTO: YELLOW
CREAT BLD-MCNC: 0.88 MG/DL
EGFRCR SERPLBLD CKD-EPI 2021: 105 ML/MIN/1.73M2 (ref 60–?)
EOSINOPHIL # BLD AUTO: 0.02 X10(3) UL (ref 0–0.7)
EOSINOPHIL NFR BLD AUTO: 0.1 %
ERYTHROCYTE [DISTWIDTH] IN BLOOD BY AUTOMATED COUNT: 14.6 %
GLOBULIN PLAS-MCNC: 2.6 G/DL (ref 2–3.5)
GLUCOSE BLD-MCNC: 92 MG/DL (ref 70–99)
GLUCOSE UR STRIP.AUTO-MCNC: NORMAL MG/DL
HCT VFR BLD AUTO: 37.7 %
HGB BLD-MCNC: 12.8 G/DL
IMM GRANULOCYTES # BLD AUTO: 0.09 X10(3) UL (ref 0–1)
IMM GRANULOCYTES NFR BLD: 0.6 %
LEUKOCYTE ESTERASE UR QL STRIP.AUTO: NEGATIVE
LIPASE SERPL-CCNC: 19 U/L (ref 12–53)
LYMPHOCYTES # BLD AUTO: 1.34 X10(3) UL (ref 1–4)
LYMPHOCYTES NFR BLD AUTO: 9.3 %
MCH RBC QN AUTO: 33.2 PG (ref 26–34)
MCHC RBC AUTO-ENTMCNC: 34 G/DL (ref 31–37)
MCV RBC AUTO: 97.9 FL
MONOCYTES # BLD AUTO: 0.45 X10(3) UL (ref 0.1–1)
MONOCYTES NFR BLD AUTO: 3.1 %
NEUTROPHILS # BLD AUTO: 12.42 X10 (3) UL (ref 1.5–7.7)
NEUTROPHILS # BLD AUTO: 12.42 X10(3) UL (ref 1.5–7.7)
NEUTROPHILS NFR BLD AUTO: 86.8 %
NITRITE UR QL STRIP.AUTO: NEGATIVE
OSMOLALITY SERPL CALC.SUM OF ELEC: 289 MOSM/KG (ref 275–295)
PH UR STRIP.AUTO: 6 [PH] (ref 5–8)
PLATELET # BLD AUTO: 250 10(3)UL (ref 150–450)
POTASSIUM SERPL-SCNC: 3.6 MMOL/L (ref 3.5–5.1)
PROT SERPL-MCNC: 6.7 G/DL (ref 5.7–8.2)
PROT UR STRIP.AUTO-MCNC: 30 MG/DL
RBC # BLD AUTO: 3.85 X10(6)UL
RBC UR QL AUTO: NEGATIVE
SODIUM SERPL-SCNC: 140 MMOL/L (ref 136–145)
SP GR UR STRIP.AUTO: >1.03 (ref 1–1.03)
TRIGL SERPL-MCNC: 57 MG/DL (ref 30–149)
UROBILINOGEN UR STRIP.AUTO-MCNC: 2 MG/DL
WBC # BLD AUTO: 14.3 X10(3) UL (ref 4–11)

## 2025-01-09 PROCEDURE — 96376 TX/PRO/DX INJ SAME DRUG ADON: CPT

## 2025-01-09 PROCEDURE — 74177 CT ABD & PELVIS W/CONTRAST: CPT | Performed by: EMERGENCY MEDICINE

## 2025-01-09 PROCEDURE — 99223 1ST HOSP IP/OBS HIGH 75: CPT | Performed by: INTERNAL MEDICINE

## 2025-01-09 RX ORDER — ONDANSETRON 4 MG/1
4 TABLET, ORALLY DISINTEGRATING ORAL EVERY 4 HOURS PRN
Qty: 10 TABLET | Refills: 0 | Status: SHIPPED | OUTPATIENT
Start: 2025-01-09 | End: 2025-01-09

## 2025-01-09 RX ORDER — HYDROMORPHONE HYDROCHLORIDE 1 MG/ML
0.8 INJECTION, SOLUTION INTRAMUSCULAR; INTRAVENOUS; SUBCUTANEOUS EVERY 2 HOUR PRN
Status: DISCONTINUED | OUTPATIENT
Start: 2025-01-09 | End: 2025-01-11

## 2025-01-09 RX ORDER — ONDANSETRON 2 MG/ML
4 INJECTION INTRAMUSCULAR; INTRAVENOUS EVERY 6 HOURS PRN
Status: DISCONTINUED | OUTPATIENT
Start: 2025-01-09 | End: 2025-01-11

## 2025-01-09 RX ORDER — HYDROMORPHONE HYDROCHLORIDE 1 MG/ML
0.4 INJECTION, SOLUTION INTRAMUSCULAR; INTRAVENOUS; SUBCUTANEOUS EVERY 2 HOUR PRN
Status: DISCONTINUED | OUTPATIENT
Start: 2025-01-09 | End: 2025-01-11

## 2025-01-09 RX ORDER — ONDANSETRON 4 MG/1
4 TABLET, ORALLY DISINTEGRATING ORAL EVERY 4 HOURS PRN
Qty: 10 TABLET | Refills: 0 | Status: SHIPPED | OUTPATIENT
Start: 2025-01-09 | End: 2025-01-16

## 2025-01-09 RX ORDER — IOPAMIDOL 755 MG/ML
100 INJECTION, SOLUTION INTRAVASCULAR
Status: COMPLETED | OUTPATIENT
Start: 2025-01-09 | End: 2025-01-09

## 2025-01-09 RX ORDER — PROCHLORPERAZINE EDISYLATE 5 MG/ML
5 INJECTION INTRAMUSCULAR; INTRAVENOUS EVERY 8 HOURS PRN
Status: DISCONTINUED | OUTPATIENT
Start: 2025-01-09 | End: 2025-01-11

## 2025-01-09 RX ORDER — SODIUM CHLORIDE, SODIUM LACTATE, POTASSIUM CHLORIDE, CALCIUM CHLORIDE 600; 310; 30; 20 MG/100ML; MG/100ML; MG/100ML; MG/100ML
INJECTION, SOLUTION INTRAVENOUS CONTINUOUS
Status: DISCONTINUED | OUTPATIENT
Start: 2025-01-09 | End: 2025-01-11

## 2025-01-09 RX ORDER — HYDROMORPHONE HYDROCHLORIDE 1 MG/ML
0.2 INJECTION, SOLUTION INTRAMUSCULAR; INTRAVENOUS; SUBCUTANEOUS EVERY 2 HOUR PRN
Status: DISCONTINUED | OUTPATIENT
Start: 2025-01-09 | End: 2025-01-11

## 2025-01-09 RX ORDER — HYDROCODONE BITARTRATE AND ACETAMINOPHEN 10; 325 MG/1; MG/1
1-2 TABLET ORAL EVERY 6 HOURS PRN
Qty: 10 TABLET | Refills: 0 | Status: SHIPPED | OUTPATIENT
Start: 2025-01-09 | End: 2025-01-11

## 2025-01-09 RX ORDER — HYDROMORPHONE HYDROCHLORIDE 1 MG/ML
0.5 INJECTION, SOLUTION INTRAMUSCULAR; INTRAVENOUS; SUBCUTANEOUS ONCE
Status: COMPLETED | OUTPATIENT
Start: 2025-01-09 | End: 2025-01-09

## 2025-01-09 RX ORDER — ONDANSETRON 2 MG/ML
4 INJECTION INTRAMUSCULAR; INTRAVENOUS ONCE
Status: COMPLETED | OUTPATIENT
Start: 2025-01-09 | End: 2025-01-09

## 2025-01-09 RX ORDER — ENOXAPARIN SODIUM 100 MG/ML
40 INJECTION SUBCUTANEOUS DAILY
Status: DISCONTINUED | OUTPATIENT
Start: 2025-01-09 | End: 2025-01-11

## 2025-01-09 RX ORDER — HYDROCODONE BITARTRATE AND ACETAMINOPHEN 10; 325 MG/1; MG/1
1 TABLET ORAL EVERY 6 HOURS PRN
Qty: 10 TABLET | Refills: 0 | Status: SHIPPED | OUTPATIENT
Start: 2025-01-09 | End: 2025-01-09

## 2025-01-09 RX ORDER — HYDROMORPHONE HYDROCHLORIDE 1 MG/ML
1 INJECTION, SOLUTION INTRAMUSCULAR; INTRAVENOUS; SUBCUTANEOUS EVERY 30 MIN PRN
Status: COMPLETED | OUTPATIENT
Start: 2025-01-09 | End: 2025-01-09

## 2025-01-09 NOTE — ED QUICK NOTES
Orders for admission, patient is aware of plan and ready to go upstairs. Any questions, please call ED RN Lynette at extension [phone].     Patient Covid vaccination status: Fully vaccinated     COVID Test Ordered in ED: None    COVID Suspicion at Admission: N/A    Running Infusions:  None    Mental Status/LOC at time of transport: AOx4    Other pertinent information:   CIWA score: N/A   NIH score:  N/A

## 2025-01-09 NOTE — ED INITIAL ASSESSMENT (HPI)
Pt to ER with complaints of pancreatitis. Seen at PED. Patient refused admission d/t family things. No change. Wanting admission to hospital today.

## 2025-01-09 NOTE — PROGRESS NOTES
Th pt is feeling sl better at this time. The Heplock was dc'd with the tip intact. DC instr re pancreatitis were given. He expressed an understanding of the risks of signing AMA and signed the AMA form. To take the meds as prescribed. To push fluids in small,freq amts and to gradually advance the diet as tolerated. To return to the nearest ER if worse. He expressed an understanding and left AMA with his wife with a decrease in his pain level,compared to his arrival.

## 2025-01-09 NOTE — ED PROVIDER NOTES
Patient Seen in: [facility] [place]      History     Chief Complaint   Patient presents with    Abdomen/Flank Pain     Stated Complaint: abdominal pain    Subjective:   HPI      49-year-old male past medical history of hypertension and pancreatic cyst  now with complaints of generalized abdominal pain.  No nausea no vomiting no constipation diarrhea no fever or chills.  Pain began today took Norco without improvement.    Objective:     Past Medical History:    Alcohol abuse    Back problem    Colon adenoma    x1    High blood pressure    Microcytosis    Pancreatic cyst (HCC)    Pancreatitis (HCC)    Pancreatitis, alcoholic, acute (HCC)    Pulmonary nodule    Tobacco use disorder    Unspecified essential hypertension              Past Surgical History:   Procedure Laterality Date    Colonoscopy  07/12/2023    Egd  07/20/2016    Electrocardiogram, complete  12/26/2013    scanned to media tab    Lumbar discectomy      2023    Other surgical history      pancreatic cyst drainage by Dr Munoz. then saw [facility] had procedure done by Dr donohue for the cyst    Upper gi endoscopy,biopsy  07/2016                Social History     Socioeconomic History    Marital status:    Tobacco Use    Smoking status: Every Day     Current packs/day: 0.50     Average packs/day: 0.5 packs/day for 20.0 years (10.0 ttl pk-yrs)     Types: Cigarettes     Passive exposure: Current    Smokeless tobacco: Never   Vaping Use    Vaping status: Some Days    Substances: THC, weekly    Devices: Disposable   Substance and Sexual Activity    Alcohol use: Not Currently    Drug use: Not Currently   Other Topics Concern    Caffeine Concern Yes     Comment: 1 ice coffee daily    Exercise Yes     Social Drivers of Health     Financial Resource Strain: Low Risk  (4/7/2022)    Received from [facility], [facility]    Overall Financial Resource Strain (CARDIA)     Difficulty of Paying Living Expenses: Not  hard at all   Food Insecurity: Low Risk  (11/18/2024)    Received from [facility]     Within the past 12 months, the food you bought just didn't last and you didn't have money to get more.: 3     Within the past 12 months, you worried that your food would run out before you got money to buy more.: 3   Transportation Needs: Not At Risk (11/18/2024)    Received from [facility] Transportation Needs     In the past 12 months, has lack of reliable transportation kept you from medical appointments, meetings, work or from getting things needed for daily living?: No   Housing Stability: Not At Risk (11/18/2024)    Received from [facility] Housing     What is your living situation today?: I have a steady place to live     Think about the place you live. Do you have problems with any of the following?: None of the above                  Physical Exam     ED Triage Vitals [01/08/25 2155]   /65   Pulse 119   Resp 18   Temp 99.1 °F (37.3 °C)   Temp src Temporal   SpO2 100 %   O2 Device None (Room air)       Current Vitals:   Vital Signs  BP: 103/59  Pulse: 94  Resp: 16  Temp: 99.1 °F (37.3 °C)  Temp src: Oral    Oxygen Therapy  SpO2: 100 %  O2 Device: None (Room air)        Physical Exam  Vitals and nursing note reviewed.   Constitutional:       Appearance: He is well-developed.   HENT:      Head: Normocephalic and atraumatic.   Eyes:      Pupils: Pupils are equal, round, and reactive to light.   Cardiovascular:      Rate and Rhythm: Normal rate and regular rhythm.   Pulmonary:      Effort: Pulmonary effort is normal.      Breath sounds: Normal breath sounds.   Abdominal:      General: Bowel sounds are normal.      Palpations: Abdomen is soft.      Comments: Positive guarding in the epigastric region no rigidity increased bowel gas pattern diffusely grossly tender to palpation   Musculoskeletal:         General: No deformity.      Cervical back: Normal range of motion and neck  supple.   Skin:     General: Skin is warm and dry.      Capillary Refill: Capillary refill takes less than 2 seconds.   Neurological:      Mental Status: He is alert and oriented to person, place, and time.             ED Course     Labs Reviewed   COMP METABOLIC PANEL (14) - Abnormal; Notable for the following components:       Result Value    Glucose 125 (*)     Calculated Osmolality 297 (*)     All other components within normal limits   CBC WITH DIFFERENTIAL WITH PLATELET - Abnormal; Notable for the following components:    WBC 14.8 (*)     RBC 3.88 (*)     HCT 38.1 (*)     MCH 34.5 (*)     Neutrophil Absolute Prelim 12.89 (*)     Neutrophil Absolute 12.89 (*)     All other components within normal limits   LIPASE - Normal   ETHYL ALCOHOL - Normal   URINALYSIS WITH CULTURE REFLEX            CT abdomen and pelvis with contrast      IMPRESSION:  No priors.    -There is fluid and fat stranding surrounding the pancreas, particularly around the pancreatic head and neck.  There are also extensive calcifications throughout the pancreas, with a large calcification seen in the pancreatic head measuring about 15 x 17 mm.  There is atrophy of the pancreatic tail.  The pancreatic duct in the tail is mildly dilated. The findings are compatible with acute-on-chronic pancreatitis.      -The pancreatic head appears somewhat hypodense and enlarged, which may represent edema in the setting of pancreatitis, however the possibility of a neoplastic process is not excluded.  Note is made of effacement of the portosplenic venous confluence, without thrombosis seen presently.  The splenic vein is also narrowed but patent.      Additional findings:  There is mild intrahepatic duct dilation.  The gallbladder is full but shows no CT evidence of cholecystitis, and no calcified gallstones.  The common bile duct is not dilated.  The liver shows multiple scattered well-defined hypodense lesions compatible with biliary cysts.  The spleen,  adrenals, and kidneys show no acute process.  The stomach appears normal.  The small bowel is within normal limits.  The appendix is slightly prominent and contains some dense material likely representing inspissated oral contrast, however there is air within the lumen and no periappendiceal phlegmon.  The overall pattern favors a normal appendix.  The colon is stool-filled and tortuous, possibly indicating constipation.  The prostate is mildly enlarged.  The urinary bladder is full but otherwise unremarkable.  No ascites.  Mild atelectasis in the lung bases.  Unremarkable bone structures.    Report sent at 02:52 AM ET       MDM      This is a 49-year-old male whose past medical history of hypertension presents ED with complaints of abdominal pain.  Vital stable arrival patient appears comfortable on examination tender palpation diffusely with increased bowel gas pattern.  Differential diagnosis of SBO versus diverticulitis versus colitis versus acute appendicitis versus pancreatitis.  Will obtain basic blood work as well as lipase and will obtain CT abdomen pelvis.  IV placed fluids given ondansetron for nausea and Dilaudid for pain control.  CT scan findings consistent with acute pancreatitis.  Will continue IV fluids.  Pain control with Dilaudid.  Discussed case with hospitalist admitted in stable condition.      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  Discussed with patient that he has acute pancreatitis needs to be admitted for IV fluids and close monitoring of his electrolytes.  Patient is aware that his condition can deteriorate rapidly and can lead to life-threatening consequences.  Patient is aware of this.  Urged patient to find alternative care for his mother he reports he cannot find anybody else to take care of his mother.  Will give him a course of Norco to have for pain control at home as well as  ondansetron.  Patient left AMA        Medical Decision Making      Disposition and Plan     Clinical Impression:  1. Acute pancreatitis, unspecified complication status, unspecified pancreatitis type (HCC)         Disposition:  [facility]  1/9/2025  2:33 am    Follow-up:  Nobrerto Hdez MD  [address] [place] [id number]  [phone]    Call in 1 day(s)      Eric Jean MD  [address] [place] [address]  [phone]    Call in 1 day(s)            Medications Prescribed:  Current Discharge Medication List              Supplementary Documentation:

## 2025-01-09 NOTE — DISCHARGE INSTRUCTIONS
Please follow-up with your primary care physician 1-2 days return to the ER if your symptoms worsen progress or if you have any further concerns.  Please take ondansetron as prescribed as needed for nausea and vomiting take Norco as prescribed only as needed for severe pain.  Norco will make you drowsy do not do any heavy lifting driving or operating any heavy machinery while taking this medication.    If you have the ability to want to have your mom situated please return to the ER at [facility] for admission.  Try to refrain from eating food and drinking lots of fluids with electrolytes like Gatorade.

## 2025-01-09 NOTE — ED PROVIDER NOTES
Patient Seen in: [facility] Emergency Department      History     Chief Complaint   Patient presents with    Abdomen/Flank Pain     Stated Complaint: abd pain, dx with pancreatitis at PED yesterday, pain back    Subjective:   HPI    49-year-old male patient comes to the hospital [facility] having difficulty with abdominal pain.  The patient's pain is greatest in his epigastric area.  He had nausea and vomiting.  He has a history of having acute on top of chronic pancreatitis.  He has had calcifications in his pancreas that the cause that he see if he needed surgery for in the past.  The patient earlier was at [place] emergency department was diagnosed with pancreatitis by CT and was advised to be admitted but signed out AGAINST MEDICAL ADVICE.  He has come to this emergency room to be admitted.  He denies any fevers or chills.  He is denying any other specific complaint this time.    Objective:     Past Medical History:    Alcohol abuse    Back problem    Colon adenoma    x1    High blood pressure    Microcytosis    Pancreatic cyst (HCC)    Pancreatitis (HCC)    Pancreatitis, alcoholic, acute (HCC)    Pulmonary nodule    Tobacco use disorder    Unspecified essential hypertension              Past Surgical History:   Procedure Laterality Date    Colonoscopy  07/12/2023    Egd  07/20/2016    Electrocardiogram, complete  12/26/2013    scanned to media tab    Lumbar discectomy      2023    Other surgical history      pancreatic cyst drainage by Dr Munoz. then saw [facility] had procedure done by Dr donohue for the cyst    Upper gi endoscopy,biopsy  07/2016                Social History     Socioeconomic History    Marital status:    Tobacco Use    Smoking status: Every Day     Current packs/day: 0.50     Average packs/day: 0.5 packs/day for 20.0 years (10.0 ttl pk-yrs)     Types: Cigarettes     Passive exposure: Current    Smokeless tobacco: Never   Vaping Use    Vaping status: Some Days    Substances:  THC, weekly    Devices: Disposable   Substance and Sexual Activity    Alcohol use: Not Currently    Drug use: Not Currently   Other Topics Concern    Caffeine Concern Yes     Comment: 1 ice coffee daily    Exercise Yes     Social Drivers of Health     Financial Resource Strain: Low Risk  (4/7/2022)    [facility], [facility]    Overall Financial Resource Strain (CARDIA)     Difficulty of Paying Living Expenses: Not hard at all   Food Insecurity: Low Risk  (11/18/2024)    Received from [facility]     Within the past 12 months, the food you bought just didn't last and you didn't have money to get more.: 3     Within the past 12 months, you worried that your food would run out before you got money to buy more.: 3   Transportation Needs: Not At Risk (11/18/2024)    Received from [facility] Transportation Needs     In the past 12 months, has lack of reliable transportation kept you from medical appointments, meetings, work or from getting things needed for daily living?: No   Housing Stability: Not At Risk (11/18/2024)    Received from [facility]     What is your living situation today?: I have a steady place to live     Think about the place you live. Do you have problems with any of the following?: None of the above                  Physical Exam     ED Triage Vitals [01/09/25 1203]   /68   Pulse 86   Resp 18   Temp 97.1 °F (36.2 °C)   Temp src Temporal   SpO2 99 %   O2 Device None (Room air)       Current Vitals:   Vital Signs  BP: 106/68  Pulse: 86  Resp: 18  Temp: 97.1 °F (36.2 °C)  Temp src: Temporal    Oxygen Therapy  SpO2: 99 %  O2 Device: None (Room air)        Physical Exam  HEENT : NCAT, EOMI, PEERL,  neck supple, no JVD, trachea midline, No LAD  Heart: S1S2 normal. No murmurs, regular rate and rhythm  Lungs: Clear to auscultation bilaterally  Abdomen: Soft epigastric region is tender nondistended normal active bowel sounds  without rebound, guarding or masses noted  Back nontender without CVA tenderness  Extremity no clubbing, cyanosis or edema noted.  Full range of motion noted without tenderness  Neuro: No focal deficits noted    All measures to prevent infection transmission during my interaction with the patient were taken.  The patient was already wearing droplet mask on my arrival to the room.  Personal protective equipment including a droplet mask as well as gloves were worn throughout the duration of my exam.  Hand washing was performed prior to and after the exam.  Stethoscope and equipment used during my examination was cleaned with a super Sani cloth germicidal wipe following the exam.    ED Course     Labs Reviewed   COMP METABOLIC PANEL (14) - Abnormal; Notable for the following components:       Result Value    ALT 9 (*)     All other components within normal limits   CBC WITH DIFFERENTIAL WITH PLATELET - Abnormal; Notable for the following components:    WBC 14.3 (*)     RBC 3.85 (*)     HGB 12.8 (*)     HCT 37.7 (*)     Neutrophil Absolute Prelim 12.42 (*)     Neutrophil Absolute 12.42 (*)     All other components within normal limits   URINALYSIS WITH CULTURE REFLEX - Abnormal; Notable for the following components:    Spec Gravity >1.030 (*)     Ketones Urine Trace (*)     Protein Urine 30 (*)     Urobilinogen Urine 2 (*)     Squamous Epi. Cells Few (*)     All other components within normal limits   LIPASE - Normal            While I did review the patient's prior workup including CT and chemistries looking consistent with an acute on chronic pancreatitis.    Medications   HYDROmorphone (Dilaudid) 1 MG/ML injection 1 mg (1 mg Intravenous Given 1/9/25 1350)   sodium chloride 0.9 % IV bolus 1,000 mL (1,000 mL Intravenous New Bag 1/9/25 1350)   ondansetron (Zofran) 4 MG/2ML injection 4 mg (4 mg Intravenous Given 1/9/25 1350)            MDM      Differential diagnosis included ulcer disease, reflux, pancreatitis but not  limited such.  At this time the patient will be admitted the hospital for his acute on chronic pancreatitis      This note was prepared using Dragon Medical voice recognition dictation software.  As a result errors may occur.  When identified to these areas have been corrected.  While every attempt is made to correct errors during dictation discrepancies may still exist.  Please contact if there are any errors.    Admission disposition: 1/9/2025  2:08 PM           MDM    Disposition and Plan     Clinical Impression:  1. Acute pancreatitis, unspecified complication status, unspecified pancreatitis type (HCC)         Disposition:  Admit  1/9/2025  2:08 pm    Follow-up:  No follow-up provider specified.        Medications Prescribed:  Current Discharge Medication List              Supplementary Documentation:         Hospital Problems       Present on Admission  Date Reviewed: 12/18/2024            ICD-10-CM Noted POA    * (Principal) Acute pancreatitis, unspecified complication status, unspecified pancreatitis type (HCC) K85.90 4/7/2022 Unknown

## 2025-01-09 NOTE — ED QUICK NOTES
To ER for eval of abdominal pain as per the triage note. The pt states he had a recent MRI with pancreatitis noted. He denies any blood in his stool or emesis. The Zofran has been unsuccessful. Generalized tenderness is noted,

## 2025-01-09 NOTE — H&P
[facility]     Fredy Vogt Patient Status:  Observation    2/3/1975 MRN [id number]   Location [facility] 0SW-A Attending Lucero Kauffman MD   Hosp Day # 0 PCP Norberto Hdez MD     Chief Complaint: abd pain     Subjective:    History of Present Illness:     Fredy oVgt is a 49 year old male with history of alcohol abuse and pancreatitis presented to the emergency room with ongoing epigastric abdominal pain, nausea and vomiting over the last 1 week.  He denies any fever or chills, diarrhea.  No chest pain or shortness of breath.  No cough or fever.  Had multiple admissions and ER visits over the last few months for similar abdominal pain.  He recently had an MRCP as outpatient done.    History/Other:    Past Medical History:  Past Medical History:    Alcohol abuse    Back problem    Colon adenoma    x1    High blood pressure    Microcytosis    Pancreatic cyst (HCC)    Pancreatitis (HCC)    Pancreatitis, alcoholic, acute (HCC)    Pulmonary nodule    Tobacco use disorder    Unspecified essential hypertension     Past Surgical History:   Past Surgical History:   Procedure Laterality Date    Colonoscopy  2023    Egd  2016    Electrocardiogram, complete  2013    scanned to media tab    Lumbar discectomy          Other surgical history      pancreatic cyst drainage by Dr Munoz. then saw [facility] had procedure done by Dr donohue for the cyst    Upper gi endoscopy,biopsy  2016      Family History:   Family History   Problem Relation Age of Onset    Hypertension Father     Cancer Father     Diabetes Mother     Hypertension Mother     Heart Disorder Mother     Other (Other) Mother         copd    No Known Problems Daughter     Other (Other) Brother         motorbke accident     Social History:    reports that he has been smoking cigarettes. He has a 10 pack-year smoking history. He has been exposed to tobacco smoke. He has never used smokeless  tobacco. He reports that he does not currently use alcohol. He reports that he does not currently use drugs.     Allergies: Allergies[1]    Medications:  Medications Ordered Prior to Encounter[2]    Review of Systems:   A comprehensive review of systems was completed.    Pertinent positives and negatives noted in the HPI.    Objective:   Physical Exam:    /77 (BP Location: Left arm)   Pulse 89   Temp 98.6 °F (37 °C) (Oral)   Resp 12   Ht 6' 3\" (1.905 m)   Wt 175 lb (79.4 kg)   SpO2 97%   BMI 21.87 kg/m²   General: No acute distress, Alert  Respiratory: No rhonchi, no wheezes  Cardiovascular: S1, S2. Regular rate and rhythm  Abdomen: Soft, Non-tender, non-distended, positive bowel sounds  Neuro: No new focal deficits  Extremities: No edema      Results:    Labs:      Labs Last 24 Hours:    Recent Labs   Lab 01/08/25 2302 01/09/25  1233   RBC 3.88* 3.85*   HGB 13.4 12.8*   HCT 38.1* 37.7*   MCV 98.2 97.9   MCH 34.5* 33.2   MCHC 35.2 34.0   RDW 14.5 14.6   NEPRELIM 12.89* 12.42*   WBC 14.8* 14.3*   .0 250.0       Recent Labs   Lab 01/08/25 2302 01/09/25  1233   * 92   BUN 16 12   CREATSERUM 1.05 0.88   EGFRCR 87 105   CA 9.3 8.9   ALB 3.7 4.1    140   K 3.7 3.6    108   CO2 28.0 30.0   ALKPHO 66 66   AST 10 9   ALT 10 9*   BILT 1.0 0.5   TP 6.1 6.7       Estimated Glomerular Filtration Rate: 105.4 mL/min/1.73m2 (by CKD-EPI based on SCr of 0.88 mg/dL).    Lab Results   Component Value Date    INR 1.04 10/18/2023    INR 1.22 (H) 01/11/2022       No results for input(s): \"TROP\", \"TROPHS\", \"CK\" in the last 168 hours.    No results for input(s): \"TROP\", \"PBNP\" in the last 168 hours.    No results for input(s): \"PCT\" in the last 168 hours.    Imaging: Imaging data reviewed in Epic.    Assessment & Plan:      # Chronic abdominal pain due to acute on chronic pancreatitis  - NPO  - IVF  - antiemetic   - TG  - CT and MRCP reviewed    # Leukocytosis, monitor     # HTN  # BPH  - hold oral  meds while NPO    Plan of care discussed with patient , ANTONINO.     Lucero Kauffman MD    Supplementary Documentation:     The 21st Century Cures Act makes medical notes like these available to patients in the interest of transparency. Please be advised this is a medical document. Medical documents are intended to carry relevant information, facts as evident, and the clinical opinion of the practitioner. The medical note is intended as peer to peer communication and may appear blunt or direct. It is written in medical language and may contain abbreviations or verbiage that are unfamiliar.                                       [1]   Allergies  Allergen Reactions    Morphine ITCHING   [2]   Current Facility-Administered Medications on File Prior to Encounter   Medication Dose Route Frequency Provider Last Rate Last Admin    [COMPLETED] HYDROmorphone (Dilaudid) 1 MG/ML injection 0.5 mg  0.5 mg Intravenous Once Ifrah Wayne Sonido, DO   0.5 mg at 01/09/25 0047    [COMPLETED] iopamidol (ISOVUE-370) 76 % injection 100 mL  100 mL Intravenous ONCE PRN Ifrah Wayne Sonido, DO   100 mL at 01/09/25 0101    [COMPLETED] HYDROmorphone (Dilaudid) 1 MG/ML injection 0.5 mg  0.5 mg Intravenous Once BeverlyiRupaa Sonido, DO   0.5 mg at 01/09/25 0238    [COMPLETED] HYDROmorphone (Dilaudid) 1 MG/ML injection 0.5 mg  0.5 mg Intravenous Once Ifrah Wayne Sonido, DO   0.5 mg at 01/08/25 2319    [COMPLETED] ondansetron (Zofran) 4 MG/2ML injection 4 mg  4 mg Intravenous Once Ifrah Wayne Sonido, DO   4 mg at 01/08/25 2317    [COMPLETED] sodium chloride 0.9 % IV bolus 1,000 mL  1,000 mL Intravenous Once BeverlyiRupaa Sonido, DO   Stopped at 01/09/25 0047    [COMPLETED] HYDROmorphone (Dilaudid) 1 MG/ML injection 1 mg  1 mg Intravenous Once Oswaldo Beltrán MD   1 mg at 11/15/24 1420    [COMPLETED] ondansetron (Zofran) 4 MG/2ML injection 4 mg  4 mg Intravenous Once Oswaldo Beltrán MD   4 mg at 11/15/24 1420    [COMPLETED] pantoprazole (Protonix)  40 mg in sodium chloride 0.9% PF 10 mL IV push  40 mg Intravenous Once Oswaldo Beltrán MD   40 mg at 11/15/24 1419    [COMPLETED] sodium chloride 0.9 % IV bolus 1,000 mL  1,000 mL Intravenous Once Oswaldo Beltrán MD   Stopped at 11/15/24 1631    [COMPLETED] iopamidol 76% (ISOVUE-370) injection for power injector  100 mL Intravenous ONCE PRN Oswaldo Beltrán MD   100 mL at 11/15/24 1620    [COMPLETED] HYDROmorphone (Dilaudid) 1 MG/ML injection 1 mg  1 mg Intravenous Once Oswaldo Beltrán MD   1 mg at 11/15/24 1640     Current Outpatient Medications on File Prior to Encounter   Medication Sig Dispense Refill    HYDROcodone-acetaminophen  MG Oral Tab Take 1-2 tablets by mouth every 6 (six) hours as needed for Pain. 10 tablet 0    ondansetron 4 MG Oral Tablet Dispersible Take 1 tablet (4 mg total) by mouth every 4 (four) hours as needed for Nausea. 10 tablet 0    amLODIPine 10 MG Oral Tab Take 1 tablet (10 mg total) by mouth daily. 90 tablet 3    ondansetron (ZOFRAN) 4 mg tablet Take 1 tablet (4 mg total) by mouth every 8 (eight) hours as needed for Nausea. 20 tablet 0    zolpidem 10 MG Oral Tab Take 1 tablet (10 mg total) by mouth nightly as needed for Sleep. 15 tablet 0    HYDROcodone-acetaminophen  MG Oral Tab Take 1 tablet by mouth every 8 (eight) hours as needed for Pain. 20 tablet 0    oxybutynin ER 10 MG Oral Tablet 24 Hr Take 1 tablet (10 mg total) by mouth daily. 90 tablet 6    tamsulosin 0.4 MG Oral Cap Take 2 capsules (0.8 mg total) by mouth daily. 180 capsule 6    finasteride 5 MG Oral Tab Take 1 tablet (5 mg total) by mouth daily. 90 tablet 5    dicyclomine 20 MG Oral Tab Take 1 tablet (20 mg total) by mouth 4 (four) times daily as needed. 20 tablet 0    gabapentin 300 MG Oral Cap       lisinopril 40 MG Oral Tab Take 1 tablet (40 mg total) by mouth daily. 90 tablet 3    pantoprazole 40 MG Oral Tab EC Take 1 tablet (40 mg total) by mouth before breakfast. 90 tablet 3    ondansetron 4 MG Oral Tablet  Dispersible Take 1 tablet (4 mg total) by mouth every 8 (eight) hours as needed for Nausea. 20 tablet 0    Naloxone HCl 4 MG/0.1ML Nasal Liquid 4 mg by Nasal route as needed. If patient remains unresponsive, repeat dose in other nostril 2-5 minutes after first dose. 1 kit 0    [] ondansetron 4 MG Oral Tablet Dispersible Take 1 tablet (4 mg total) by mouth every 4 (four) hours as needed for Nausea. 10 tablet 0    [] HYDROcodone-acetaminophen  MG Oral Tab Take 1 tablet by mouth every 6 (six) hours as needed for Pain. 10 tablet 0    ZENPEP [id number] units Oral Cap DR Particles Take 1 capsule by mouth 3 (three) times daily with meals. 300 capsule 3    oxyCODONE 5 MG Oral Tab Take 0.5-1 tablets (2.5-5 mg total) by mouth every 8 (eight) hours as needed. No driving 10 tablet 0    acetaminophen 500 MG Oral Tab Take 2 tablets (1,000 mg total) by mouth every 8 (eight) hours. 60 tablet 0

## 2025-01-09 NOTE — ED QUICK NOTES
Dr Wayne spoke with the pt and informed him of the need for admission d/t pancreatitis and he is agreeable to it.

## 2025-01-09 NOTE — ED QUICK NOTES
The pt was informed of the need for admission d/t the pancreatitis result on the  c/t scan. He asked if he could receive another dose of pain meds and be dc'd home,as he is his moms primary caregiver and she was just dc'd from the hospital at 2030 last night. Dr Wayne was notified of this and will speak with the pt.

## 2025-01-09 NOTE — ED INITIAL ASSESSMENT (HPI)
Patient complains of pain in umbilical area that began yesterday. Patient has history of pancreatitis and states, \"this is probably that.\" Patient denies any N+V, but states, \"I took a Zofran.\"

## 2025-01-10 LAB
ALBUMIN SERPL-MCNC: 3.1 G/DL (ref 3.2–4.8)
ALBUMIN/GLOB SERPL: 1.4 {RATIO} (ref 1–2)
ALP LIVER SERPL-CCNC: 60 U/L
ALT SERPL-CCNC: 10 U/L
ANION GAP SERPL CALC-SCNC: 6 MMOL/L (ref 0–18)
AST SERPL-CCNC: 13 U/L (ref ?–34)
BILIRUB SERPL-MCNC: 0.4 MG/DL (ref 0.3–1.2)
BUN BLD-MCNC: 7 MG/DL (ref 9–23)
CALCIUM BLD-MCNC: 8.5 MG/DL (ref 8.7–10.4)
CHLORIDE SERPL-SCNC: 107 MMOL/L (ref 98–112)
CO2 SERPL-SCNC: 27 MMOL/L (ref 21–32)
CREAT BLD-MCNC: 0.76 MG/DL
EGFRCR SERPLBLD CKD-EPI 2021: 110 ML/MIN/1.73M2 (ref 60–?)
ERYTHROCYTE [DISTWIDTH] IN BLOOD BY AUTOMATED COUNT: 14.3 %
GLOBULIN PLAS-MCNC: 2.2 G/DL (ref 2–3.5)
GLUCOSE BLD-MCNC: 81 MG/DL (ref 70–99)
HCT VFR BLD AUTO: 32.7 %
HGB BLD-MCNC: 11.4 G/DL
MCH RBC QN AUTO: 33.8 PG (ref 26–34)
MCHC RBC AUTO-ENTMCNC: 34.9 G/DL (ref 31–37)
MCV RBC AUTO: 97 FL
OSMOLALITY SERPL CALC.SUM OF ELEC: 287 MOSM/KG (ref 275–295)
PLATELET # BLD AUTO: 218 10(3)UL (ref 150–450)
POTASSIUM SERPL-SCNC: 3.6 MMOL/L (ref 3.5–5.1)
PROT SERPL-MCNC: 5.3 G/DL (ref 5.7–8.2)
RBC # BLD AUTO: 3.37 X10(6)UL
SODIUM SERPL-SCNC: 140 MMOL/L (ref 136–145)
WBC # BLD AUTO: 11.6 X10(3) UL (ref 4–11)

## 2025-01-10 PROCEDURE — 99232 SBSQ HOSP IP/OBS MODERATE 35: CPT | Performed by: INTERNAL MEDICINE

## 2025-01-10 RX ORDER — HYDROCODONE BITARTRATE AND ACETAMINOPHEN 5; 325 MG/1; MG/1
1-2 TABLET ORAL EVERY 4 HOURS PRN
Qty: 15 TABLET | Refills: 0 | Status: SHIPPED | OUTPATIENT
Start: 2025-01-10

## 2025-01-10 RX ORDER — ONDANSETRON 4 MG/1
4 TABLET, FILM COATED ORAL EVERY 8 HOURS PRN
Qty: 20 TABLET | Refills: 0 | Status: SHIPPED | OUTPATIENT
Start: 2025-01-10

## 2025-01-10 RX ORDER — HYDROCODONE BITARTRATE AND ACETAMINOPHEN 5; 325 MG/1; MG/1
2 TABLET ORAL EVERY 4 HOURS PRN
Status: DISCONTINUED | OUTPATIENT
Start: 2025-01-10 | End: 2025-01-11

## 2025-01-10 RX ORDER — HYDROCODONE BITARTRATE AND ACETAMINOPHEN 5; 325 MG/1; MG/1
1 TABLET ORAL EVERY 4 HOURS PRN
Status: DISCONTINUED | OUTPATIENT
Start: 2025-01-10 | End: 2025-01-11

## 2025-01-10 NOTE — CONSULTS
[facility], [place]  Report of Consultation    Fredy Vogt Patient Status:  Observation    2/3/1975 MRN [id number]   Location [facility] 0SW-A Attending Lucero Kauffman MD   Hosp Day # 0 PCP Norberto Hdez MD     Reason for Consultation:  pancreattiis    Fredy Vogt is a a(n) 49 year old male.      Some prior eval    From Dr Munoz's 22 note ...\"  Patient presents today for follow up. History of acute pancreatitis secondary to alcohol use with pseudocyst s/p aspiration in the past.  Endoscopic ultrasound evaluation  with chronic pancreatitis changes throughout the gland with a small hypoechoic area in the head of the pancreas consistent with pseudocyst. Follow up imaging with decrease in size of focus in the head of the pancreas.      Last seen 2020 after attack of acute pancreatitis with evidence of progression of chronic calcific pancreatitis changes with intraductal stones and pseudocyst.  He was seen by Dr. Carlos Cardenas at [facility] and underwent exploratory laparotomy, caryn-en-Y lateral pancreaticojejunostomy with duodenum-preserving pancreatic head resection on 20 for chronic calcific pancreatitis. Final pathology showed fragments of pancreatic parenchyma with chronic pancreatitis and a portion of inflamed fibrous tissue consistent with the wall of a pseudocyst. According to review of records, patient was last seen by Dr. Cardenas in 2022. He had a CT scan while hospitalized an MRCP on 22 and a CT scan on 22 which showed chronic pancreatitis multiple pancreatic pseudocysts. Imaging was reviewed and noted that most of his calcific disease is in the head of the pancreas which would require a whipple to remove. Discussed with the patient that surgery would be a big next step and is it not certain it would completely resolve his pain issues. It was also discussed the importance of smoking cessation and avoiding alcohol as both contribute to  worsening his pancreatitis symptoms. We educated him on how to more effectively take his pancreas enzymes when he eats which could help with the pain. It was recommended to follow up in six months.\"      Mult admits or er visits for pain and chronic pancreatitis.  Follows with Dr Self at [place]    Mri 1/2/25 w/out acute changes (see report), no contrast , ct w/ contrast 1/9/25 w/ acute on chronic panc changes but no fluid collections and mild reactve changes on duodenum (see report)    States chronic intermittent pain.  On panc enzymes as outpt    denies asa or nsaids  but does take ppi    States has intermittent flares.  Feels same as prior, mid abd.  Deneis f/c/s, overt gi bleeding, sob.  Some n/v prior to admit, states these have improved    denies new symptoms , states feels like chronic symptoms just recurring    Etoh use he states stopped 5-6 months ago completely.  States still smoking , risk of panc issues w/ smoking discussed w/ him    This admit TGs 57, nl lipase, wbc 14k and hg 12.8, nl lfts (alt <9). 1/8/25 etoh <3.          Patient Active Problem List   Diagnosis    Pancreatic pseudocyst (HCC)    Alcohol-induced chronic pancreatitis (HCC)    Intractable abdominal pain    Upper abdominal pain    Sciatica of left side    Erectile dysfunction    Hypertension    Annual physical exam    Acute on chronic pancreatitis (HCC)    Shortened MI interval    Pulmonary nodule    Acute necrotizing pancreatitis (HCC)    Spasm of back muscles    Chronic scapular pain    Anxiety state    Alcohol abuse    Tobacco dependence    Tachycardia    Chronic pancreatitis, unspecified pancreatitis type (HCC)    Constipation, unspecified constipation type    Hyponatremia    Acute pancreatitis (HCC)    Other acute pancreatitis, unspecified complication status (HCC)    Acute pancreatitis, unspecified complication status, unspecified pancreatitis type (HCC)    Marijuana use         History:  Past Medical History:    Alcohol abuse     Back problem    Colon adenoma    x1    High blood pressure    Microcytosis    Pancreatic cyst (HCC)    Pancreatitis (HCC)    Pancreatitis, alcoholic, acute (HCC)    Pulmonary nodule    Tobacco use disorder    Unspecified essential hypertension       Past Surgical History:   Procedure Laterality Date    Colonoscopy  07/12/2023    Egd  07/20/2016    Electrocardiogram, complete  12/26/2013    scanned to media tab    Lumbar discectomy      2023    Other surgical history      pancreatic cyst drainage by Dr Munoz. then saw [facility] had procedure done by Dr donohue for the cyst    Upper gi endoscopy,biopsy  07/2016       Family History   Problem Relation Age of Onset    Hypertension Father     Cancer Father     Diabetes Mother     Hypertension Mother     Heart Disorder Mother     Other (Other) Mother         copd    No Known Problems Daughter     Other (Other) Brother         motorbke accident        reports that he has been smoking cigarettes. He has a 10 pack-year smoking history. He has been exposed to tobacco smoke. He has never used smokeless tobacco. He reports that he does not currently use alcohol. He reports that he does not currently use drugs.    Allergies:  Allergies[1]    Medications:  Current Facility-Administered Medications   Medication Dose Route Frequency    lactated ringers infusion   Intravenous Continuous    enoxaparin (Lovenox) 40 MG/0.4ML SUBQ injection 40 mg  40 mg Subcutaneous Daily    HYDROmorphone (Dilaudid) 1 MG/ML injection 0.2 mg  0.2 mg Intravenous Q2H PRN    Or    HYDROmorphone (Dilaudid) 1 MG/ML injection 0.4 mg  0.4 mg Intravenous Q2H PRN    Or    HYDROmorphone (Dilaudid) 1 MG/ML injection 0.8 mg  0.8 mg Intravenous Q2H PRN    ondansetron (Zofran) 4 MG/2ML injection 4 mg  4 mg Intravenous Q6H PRN    prochlorperazine (Compazine) 10 MG/2ML injection 5 mg  5 mg Intravenous Q8H PRN       Medications Ordered Prior to Encounter[2]      Review of Systems:   States ros negative/unhcnaged  otherwise  GENERAL HEALTH: otherwise feels well, no change in energy level     RESPIRATORY: denies new/changing shortness of breath    CARDIOVASCULAR: denies chest pain or BENZ; no palpitations    GI: as above  GENITAL//GYN: denies acute change in frequency   MUSCULOSKELETAL: denies new joint issues  NEURO: denies new sensory or motor complaint  PSYCHE: mood is unchanged a except as stated above  HEMATOLOGY: denies bruising or excessive bleeding except as stated  ENDOCRINE: denies unexpected wt gain or wt loss except as stated  ALLERGY/IMM.:medication allergies as above        PHYSICAL EXAM   /70 (BP Location: Left arm)   Pulse 83   Temp 98.3 °F (36.8 °C) (Oral)   Resp 16   Ht 6' 3\" (1.905 m)   Wt 175 lb (79.4 kg)   SpO2 96%   BMI 21.87 kg/m²     GENERAL: well developed, well nourished, in no apparent distress.  Non toxic appearing, appears comfortable  HEENT: normocephalic, mucous membranes moist.  EYES: eomi    NECK:non tender, supple  RESPIRATORY: clear to percussion and auscultation  CARDIOVASCULAR: reg rate    ABDOMEN: normal, active bowel sounds, soft, nondistended, no G/R/R but states mild tenderness w/ deep palp but abd is soft  EXTREMITIES: no le edema  NEURO:  Oriented x 3   BACK: no CVA tenderness  PSYCH: appropriate for the exam           LAB/IMAGING RESULTS:        Recent Labs   Lab 01/08/25 2302 01/09/25  1233   * 92   BUN 16 12   CREATSERUM 1.05 0.88   CA 9.3 8.9    140   K 3.7 3.6    108   CO2 28.0 30.0       Recent Labs   Lab 01/08/25 2302 01/09/25  1233   RBC 3.88* 3.85*   HGB 13.4 12.8*   HCT 38.1* 37.7*   MCV 98.2 97.9   MCH 34.5* 33.2   MCHC 35.2 34.0   RDW 14.5 14.6   NEPRELIM 12.89* 12.42*   WBC 14.8* 14.3*   .0 250.0       Recent Labs   Lab 01/08/25 2302 01/09/25  1233   ALKPHO 66 66   BILT 1.0 0.5   TP 6.1 6.7   ALB 3.7 4.1   ALT 10 9*   AST 10 9       Recent Labs   Lab 01/08/25  2302 01/09/25  1233   LIP 24 19       Narrative   PROCEDURE: MRI MRCP  (CPT=74181)  MRCP       COMPARISON: [facility], MRI MRCP ABDOMEN WWO CONTRAST, 4/30/2015, 3:21 PM.     INDICATIONS: K86.1 Acute on chronic pancreatitis (HCC) K85.90 Acute on chronic pancreatitis (HCC) K86.89 Pancreatic mass (HCC)     TECHNIQUE: A comprehensive examination was performed utilizing a variety of imaging planes and imaging parameters to optimize visualization of suspected pathology.  Images were obtained without contrast.       Magnetic resonance cholangiopancreatography was also performed.  3D post processed images were obtained on separate workstation.       MRCP FINDINGS:    GALLBLADDER:   No cholelithiasis.  BILE DUCTS:   No evidence of intrahepatic or extrahepatic biliary ductal dilatation.  No definite filling defect within the common bile duct.  PANCREAS:   There is some generalized volume loss of the distal half of the pancreas.  There is diffuse mild prominence and irregularity of the main pancreatic duct.  Probable small dilated side branch radicles noted.  Areas of low T1 and T2 signal in  the region the pancreatic head correspond to pancreatic calcification including on the recent CT November 2024 likely within the pancreatic duct.  No significant pancreatic edema or surrounding inflammatory change.  There is suggestive of a small area of   T2 hyperintense signal in the pancreatic head which may correspond with the nonspecific hypodensity CT described on prior CT abdomen pelvis November 2024.     OTHER FINDINGS:  LIVER: Several small T2 hyperintensities incompletely characterized possibly a cyst.  SPLEEN: Unremarkable.  ADRENALS: Unremarkable.  KIDNEYS:   No hydronephrosis.  Small T2 hypodensities in both kidneys incompletely evaluated but may reflect small cysts.  BOWEL:   Visualized bowel is nondilated.  VASCULAR: Abdominal aorta is normal in caliber.    ADENOPATHY:   None.    ASCITES: None.    BONES: Suboptimally evaluated but grossly unremarkable.  OTHER: No significant  signal abnormality in the lung bases.               Impression   CONCLUSION:     Limited examination due to lack of intravenous contrast.  Findings of chronic pancreatitis redemonstrated.  No definite evidence to suggest acute pancreatitis.  The area of focal decreased enhancement in the pancreatic uncinate process described on prior   CT abdomen pelvis 11/15/2024 is incompletely/inadequately evaluated due to lack of intravenous contrast.  Follow-up contrast enhanced MRCP advised.     Lesser incidental findings as above.           Dictated by (CST): Reed Cuellar MD on 1/06/2025 at 10:39 AM            Narrative   PROCEDURE:  CT ABDOMEN+PELVIS (CONTRAST ONLY) (CPT=74177)     COMPARISON:  [facility] , CT, CT ABDOMEN+PELVIS(CONTRAST ONLY)(CPT=74177), 11/15/2024, 4:11 PM.     INDICATIONS:  abdominal pain     TECHNIQUE:  CT scanning was performed from the dome of the diaphragm to the pubic symphysis with non-ionic intravenous contrast material. Post contrast coronal MPR imaging was performed.  Dose reduction techniques were used. Dose information is  transmitted to the ACR (American College of Radiology) NRDR (National Radiology Data Registry) which includes the Dose Index Registry.     PATIENT STATED HISTORY:(As transcribed by Technologist)  periumbilical pain x 1 day, hx pancreatitis      CONTRAST USED:  100cc of Isovue 370     FINDINGS:    LIVER:  Normal morphology with lobar hepatic cysts and additional hypodensities that are too small to characterize.    BILIARY:  Mild gallbladder distention or wall thickening.  No appreciable cholelithiasis.  Normal caliber of the bile ducts.  PANCREAS:  Enlargement and inflammatory stranding of the pancreatic head and uncinate process.  These findings are on a background of scattered pancreatic parenchymal calcifications, collectively consistent with acute on chronic pancreatitis.  No ductal  dilation.  No organized regional fluid collections.  SPLEEN:  No enlargement or focal  lesion.    KIDNEYS:  No mass, obstruction, or calcification.    ADRENALS:  No mass or enlargement.    AORTA/VASCULAR:  No abdominal aortic aneurysm or dissection.  Progressive moderate vessel narrowing of the portal splenic confluence upper SMV (series 2, image 39).  No discrete vessel thrombosis.  RETROPERITONEUM:  No mass or adenopathy.    BOWEL/MESENTERY:  Mild reactive inflammation of the proximal duodenum.  Small bowel suture material of the left mid abdomen consistent with partial resection.  No evidence of bowel obstruction.  Normal appendix with multiple intraluminal fecaliths.    Colonic diverticulosis without evidence of acute diverticulitis.  ABDOMINAL WALL:  No mass or hernia.    URINARY BLADDER:  Mild distention of the urinary bladder.  PELVIC NODES:  No adenopathy.    PELVIC ORGANS:  Prostatomegaly.  BONES:  No bony lesion or fracture.    LUNG BASES:  No visible pulmonary or pleural disease.    OTHER:  Negative.                     Impression   CONCLUSION:       1. Imaging features of acute on chronic pancreatitis.  No organized regional fluid collections.     2. Mild reactive inflammation of the proximal duodenum.     3. Pancreatic inflammation results in progressive moderate narrowing of the portal splenic confluence and upper SMV.  No vessel thrombosis identified.        LOCATION:  [facility]        Dictated by (CST): Smith Fox MD on 1/09/2025 at 5:47 AM               ASSESSMENT AND PLAN:        1acute on chronic pancreatitis --chronic recurrent symtpoms.  CT w/ acute on chronic changes    --ivf  --pain control  --await am labs  --panc enzymes when taking po  --can attempt clears if pain starts improve, nausea already started to improve  --outpt f/u w/ primary gi for ongoing care              pt demonstrated understanding of risks of morbidity/mortality if diagnoses and/or treatments were delayed balanced against risks of further investigation and/or treatment.     --the pt demonstrated understanding  of the results and recommendations and options and the risk/benefit/limitations and alternatives to the plan.  All of their questions and concerns were addressed and were agreeable to the plan as listed      Radhames Shoemaker MD  1/10/2025  5:46 AM           [1]   Allergies  Allergen Reactions    Morphine ITCHING   [2]   Current Facility-Administered Medications on File Prior to Encounter   Medication Dose Route Frequency Provider Last Rate Last Admin    [COMPLETED] HYDROmorphone (Dilaudid) 1 MG/ML injection 0.5 mg  0.5 mg Intravenous Once Jeelani, Ifrah Sonido, DO   0.5 mg at 01/09/25 0047    [COMPLETED] iopamidol (ISOVUE-370) 76 % injection 100 mL  100 mL Intravenous ONCE PRN Tone, Ifrah Sonido, DO   100 mL at 01/09/25 0101    [COMPLETED] HYDROmorphone (Dilaudid) 1 MG/ML injection 0.5 mg  0.5 mg Intravenous Once Jeelani, Ifrah Sonido, DO   0.5 mg at 01/09/25 0238    [COMPLETED] HYDROmorphone (Dilaudid) 1 MG/ML injection 0.5 mg  0.5 mg Intravenous Once Jeelani, Ifrah Sonido, DO   0.5 mg at 01/08/25 2319    [COMPLETED] ondansetron (Zofran) 4 MG/2ML injection 4 mg  4 mg Intravenous Once Jeelani, Ifrah Sonido, DO   4 mg at 01/08/25 2317    [COMPLETED] sodium chloride 0.9 % IV bolus 1,000 mL  1,000 mL Intravenous Once Beverlyi, Ifrah Sonido, DO   Stopped at 01/09/25 0047    [COMPLETED] HYDROmorphone (Dilaudid) 1 MG/ML injection 1 mg  1 mg Intravenous Once Oswaldo Beltrán MD   1 mg at 11/15/24 1420    [COMPLETED] ondansetron (Zofran) 4 MG/2ML injection 4 mg  4 mg Intravenous Once Oswaldo Beltrán MD   4 mg at 11/15/24 1420    [COMPLETED] pantoprazole (Protonix) 40 mg in sodium chloride 0.9% PF 10 mL IV push  40 mg Intravenous Once Oswaldo Beltrán MD   40 mg at 11/15/24 1419    [COMPLETED] sodium chloride 0.9 % IV bolus 1,000 mL  1,000 mL Intravenous Once Oswaldo Beltrán MD   Stopped at 11/15/24 1631    [COMPLETED] iopamidol 76% (ISOVUE-370) injection for power injector  100 mL Intravenous ONCE PRN Oswaldo Beltrán MD   100 mL  at 11/15/24 1620    [COMPLETED] HYDROmorphone (Dilaudid) 1 MG/ML injection 1 mg  1 mg Intravenous Once Oswaldo Beltrán MD   1 mg at 11/15/24 1640     Current Outpatient Medications on File Prior to Encounter   Medication Sig Dispense Refill    HYDROcodone-acetaminophen  MG Oral Tab Take 1-2 tablets by mouth every 6 (six) hours as needed for Pain. 10 tablet 0    ondansetron 4 MG Oral Tablet Dispersible Take 1 tablet (4 mg total) by mouth every 4 (four) hours as needed for Nausea. 10 tablet 0    amLODIPine 10 MG Oral Tab Take 1 tablet (10 mg total) by mouth daily. 90 tablet 3    ondansetron (ZOFRAN) 4 mg tablet Take 1 tablet (4 mg total) by mouth every 8 (eight) hours as needed for Nausea. 20 tablet 0    zolpidem 10 MG Oral Tab Take 1 tablet (10 mg total) by mouth nightly as needed for Sleep. 15 tablet 0    HYDROcodone-acetaminophen  MG Oral Tab Take 1 tablet by mouth every 8 (eight) hours as needed for Pain. 20 tablet 0    oxybutynin ER 10 MG Oral Tablet 24 Hr Take 1 tablet (10 mg total) by mouth daily. 90 tablet 6    tamsulosin 0.4 MG Oral Cap Take 2 capsules (0.8 mg total) by mouth daily. 180 capsule 6    finasteride 5 MG Oral Tab Take 1 tablet (5 mg total) by mouth daily. 90 tablet 5    dicyclomine 20 MG Oral Tab Take 1 tablet (20 mg total) by mouth 4 (four) times daily as needed. 20 tablet 0    gabapentin 300 MG Oral Cap       lisinopril 40 MG Oral Tab Take 1 tablet (40 mg total) by mouth daily. 90 tablet 3    pantoprazole 40 MG Oral Tab EC Take 1 tablet (40 mg total) by mouth before breakfast. 90 tablet 3    ondansetron 4 MG Oral Tablet Dispersible Take 1 tablet (4 mg total) by mouth every 8 (eight) hours as needed for Nausea. 20 tablet 0    Naloxone HCl 4 MG/0.1ML Nasal Liquid 4 mg by Nasal route as needed. If patient remains unresponsive, repeat dose in other nostril 2-5 minutes after first dose. 1 kit 0    [] ondansetron 4 MG Oral Tablet Dispersible Take 1 tablet (4 mg total) by mouth every  4 (four) hours as needed for Nausea. 10 tablet 0    [] HYDROcodone-acetaminophen  MG Oral Tab Take 1 tablet by mouth every 6 (six) hours as needed for Pain. 10 tablet 0    ZENPEP 10694-17663 units Oral Cap DR Particles Take 1 capsule by mouth 3 (three) times daily with meals. 300 capsule 3    oxyCODONE 5 MG Oral Tab Take 0.5-1 tablets (2.5-5 mg total) by mouth every 8 (eight) hours as needed. No driving 10 tablet 0    acetaminophen 500 MG Oral Tab Take 2 tablets (1,000 mg total) by mouth every 8 (eight) hours. 60 tablet 0

## 2025-01-10 NOTE — PLAN OF CARE
Received pt alert and orientated x4. VSS. No sob on RA. Afebrile. C/o pain, PRN given with relief. IVF infusing. Tolerating CLD. Fall precautions in place, call light within reach.     Problem: PAIN - ADULT  Goal: Verbalizes/displays adequate comfort level or patient's stated pain goal  Description: INTERVENTIONS:  - Encourage pt to monitor pain and request assistance  - Assess pain using appropriate pain scale  - Administer analgesics based on type and severity of pain and evaluate response  - Implement non-pharmacological measures as appropriate and evaluate response  - Consider cultural and social influences on pain and pain management  - Manage/alleviate anxiety  - Utilize distraction and/or relaxation techniques  - Monitor for opioid side effects  - Notify MD/LIP if interventions unsuccessful or patient reports new pain  - Anticipate increased pain with activity and pre-medicate as appropriate  Outcome: Progressing

## 2025-01-10 NOTE — PLAN OF CARE
NURSING ADMISSION NOTE    Patient admitted via Cart  Oriented to room.  Safety precautions initiated.  Bed in low position.  Call light in reach.    Navigators completed. Received pt alert and orientated x4. VSS. No sob on RA. Afebrile. C/o pain, PRN given with relief. IVF infusing. NPO.  GI consult called. Fall precautions in place, call light within reach.     Problem: PAIN - ADULT  Goal: Verbalizes/displays adequate comfort level or patient's stated pain goal  Description: INTERVENTIONS:  - Encourage pt to monitor pain and request assistance  - Assess pain using appropriate pain scale  - Administer analgesics based on type and severity of pain and evaluate response  - Implement non-pharmacological measures as appropriate and evaluate response  - Consider cultural and social influences on pain and pain management  - Manage/alleviate anxiety  - Utilize distraction and/or relaxation techniques  - Monitor for opioid side effects  - Notify MD/LIP if interventions unsuccessful or patient reports new pain  - Anticipate increased pain with activity and pre-medicate as appropriate  Outcome: Progressing

## 2025-01-10 NOTE — PROGRESS NOTES
[facility]   part of [facility]     Hospitalist Progress Note     Fredy Vogt Patient Status:  Observation    2/3/1975 MRN [id number]   Location [facility] 0SW-A Attending Lucero Kauffman MD   Hosp Day # 0 PCP Norberto Hdez MD     Chief Complaint: abd pain     Subjective:     Patient with improvement in abd pain, no further N/V    Objective:    Review of Systems:   A comprehensive review of systems was completed; pertinent positive and negatives stated in subjective.    Vital signs:  Temp:  [97.1 °F (36.2 °C)-98.8 °F (37.1 °C)] 98.3 °F (36.8 °C)  Pulse:  [72-98] 83  Resp:  [12-21] 16  BP: ()/(58-77) 133/70  SpO2:  [95 %-99 %] 96 %    Physical Exam:    General: No acute distress  Respiratory: No wheezes, no rhonchi  Cardiovascular: S1, S2, regular rate and rhythm  Abdomen: Soft, Non-tender, non-distended, positive bowel sounds  Neuro: No new focal deficits.   Extremities: No edema      Diagnostic Data:    Labs:  Recent Labs   Lab 252 25  1233   WBC 14.8* 14.3*   HGB 13.4 12.8*   MCV 98.2 97.9   .0 250.0       Recent Labs   Lab 25  2302 25  1233   * 92   BUN 16 12   CREATSERUM 1.05 0.88   CA 9.3 8.9   ALB 3.7 4.1    140   K 3.7 3.6    108   CO2 28.0 30.0   ALKPHO 66 66   AST 10 9   ALT 10 9*   BILT 1.0 0.5   TP 6.1 6.7       Estimated Glomerular Filtration Rate: 105.4 mL/min/1.73m2 (by CKD-EPI based on SCr of 0.88 mg/dL).    No results for input(s): \"TROP\", \"TROPHS\", \"CK\" in the last 168 hours.    No results for input(s): \"PTP\", \"INR\" in the last 168 hours.               Microbiology    No results found for this visit on 25.      Imaging: Reviewed in Epic.    Medications:    enoxaparin  40 mg Subcutaneous Daily       Assessment & Plan:      # abdominal pain- multiple factorial -> chronic pancreatitis, marijuana induced hyperemesis and gastritis   # Chronic abdominal pain due to acute on chronic pancreatitis, multiple ER  visits and admissions   - CLD- ADAT  - IVF  - antiemetic   - TG normal   - CT and MRCP reviewed  - GI rec appreciated   - marijuana cessation     # intractable N/V, as above   # Leukocytosis, monitor      # HTN  # BPH  - hold oral meds for now      Possible dc planning today vs tomorrow       Lucero Kauffman MD    Supplementary Documentation:     Quality:  DVT Mechanical Prophylaxis:   SCDs,    DVT Pharmacologic Prophylaxis   Medication    enoxaparin (Lovenox) 40 MG/0.4ML SUBQ injection 40 mg                Code Status: Prior  Britton: No urinary catheter in place  Britton Duration (in days):   Central line:    JEREMIE:     Discharge is dependent on: course  At this point Mr. Vogt is expected to be discharge to: home    The 21st Century Cures Act makes medical notes like these available to patients in the interest of transparency. Please be advised this is a medical document. Medical documents are intended to carry relevant information, facts as evident, and the clinical opinion of the practitioner. The medical note is intended as peer to peer communication and may appear blunt or direct. It is written in medical language and may contain abbreviations or verbiage that are unfamiliar.

## 2025-01-10 NOTE — PLAN OF CARE
Alert and oriented x 4. VSS. Abdominal pain controlled with prn dilaudid. Voiding without difficulty. Last BM 1/9. IVF running, pt NPO. Ambulating with steady gait. Plan is NPO and fluids for bowel rest, and pain control.  Plan of care discussed with patient.

## 2025-01-11 VITALS
SYSTOLIC BLOOD PRESSURE: 125 MMHG | DIASTOLIC BLOOD PRESSURE: 70 MMHG | TEMPERATURE: 98 F | HEART RATE: 69 BPM | WEIGHT: 175 LBS | BODY MASS INDEX: 21.76 KG/M2 | RESPIRATION RATE: 12 BRPM | OXYGEN SATURATION: 97 % | HEIGHT: 75 IN

## 2025-01-11 LAB
ALBUMIN SERPL-MCNC: 3.3 G/DL (ref 3.2–4.8)
ALBUMIN/GLOB SERPL: 1.3 {RATIO} (ref 1–2)
ALP LIVER SERPL-CCNC: 68 U/L
ALT SERPL-CCNC: 12 U/L
ANION GAP SERPL CALC-SCNC: 4 MMOL/L (ref 0–18)
AST SERPL-CCNC: 15 U/L (ref ?–34)
BASOPHILS # BLD AUTO: 0.02 X10(3) UL (ref 0–0.2)
BASOPHILS NFR BLD AUTO: 0.2 %
BILIRUB SERPL-MCNC: 0.4 MG/DL (ref 0.3–1.2)
BUN BLD-MCNC: 6 MG/DL (ref 9–23)
CALCIUM BLD-MCNC: 8.9 MG/DL (ref 8.7–10.4)
CHLORIDE SERPL-SCNC: 106 MMOL/L (ref 98–112)
CO2 SERPL-SCNC: 28 MMOL/L (ref 21–32)
CREAT BLD-MCNC: 0.72 MG/DL
EGFRCR SERPLBLD CKD-EPI 2021: 112 ML/MIN/1.73M2 (ref 60–?)
EOSINOPHIL # BLD AUTO: 0.02 X10(3) UL (ref 0–0.7)
EOSINOPHIL NFR BLD AUTO: 0.2 %
ERYTHROCYTE [DISTWIDTH] IN BLOOD BY AUTOMATED COUNT: 13.8 %
GLOBULIN PLAS-MCNC: 2.6 G/DL (ref 2–3.5)
GLUCOSE BLD-MCNC: 78 MG/DL (ref 70–99)
HCT VFR BLD AUTO: 35.1 %
HGB BLD-MCNC: 12.4 G/DL
IMM GRANULOCYTES # BLD AUTO: 0.06 X10(3) UL (ref 0–1)
IMM GRANULOCYTES NFR BLD: 0.6 %
LYMPHOCYTES # BLD AUTO: 1.14 X10(3) UL (ref 1–4)
LYMPHOCYTES NFR BLD AUTO: 11 %
MCH RBC QN AUTO: 34.1 PG (ref 26–34)
MCHC RBC AUTO-ENTMCNC: 35.3 G/DL (ref 31–37)
MCV RBC AUTO: 96.4 FL
MONOCYTES # BLD AUTO: 0.77 X10(3) UL (ref 0.1–1)
MONOCYTES NFR BLD AUTO: 7.4 %
NEUTROPHILS # BLD AUTO: 8.36 X10 (3) UL (ref 1.5–7.7)
NEUTROPHILS # BLD AUTO: 8.36 X10(3) UL (ref 1.5–7.7)
NEUTROPHILS NFR BLD AUTO: 80.6 %
OSMOLALITY SERPL CALC.SUM OF ELEC: 282 MOSM/KG (ref 275–295)
PLATELET # BLD AUTO: 243 10(3)UL (ref 150–450)
POTASSIUM SERPL-SCNC: 3.8 MMOL/L (ref 3.5–5.1)
POTASSIUM SERPL-SCNC: 3.8 MMOL/L (ref 3.5–5.1)
PROT SERPL-MCNC: 5.9 G/DL (ref 5.7–8.2)
RBC # BLD AUTO: 3.64 X10(6)UL
SODIUM SERPL-SCNC: 138 MMOL/L (ref 136–145)
WBC # BLD AUTO: 10.4 X10(3) UL (ref 4–11)

## 2025-01-11 PROCEDURE — 99239 HOSP IP/OBS DSCHRG MGMT >30: CPT | Performed by: INTERNAL MEDICINE

## 2025-01-11 RX ORDER — POTASSIUM CHLORIDE 1500 MG/1
40 TABLET, EXTENDED RELEASE ORAL ONCE
Status: COMPLETED | OUTPATIENT
Start: 2025-01-11 | End: 2025-01-11

## 2025-01-11 NOTE — PLAN OF CARE
0040: Pt VSS, AOx4. Tele continued; NSR on monitor. Pt pain managed with IV dilaudid in day. After discussion with pt on PO meds, pt agreeable to switch. Pain seems to be managed successful with PO norco at this time. Pt clear diet tolerated well; also had crackers and panera bread food items brought from outside source/pt visitor. No emesis, no nausea, seems to have tolerated well. Abdomen soft, passing gas/belching. No BM so far this shift. IV fluids continued per orders. Voids without difficulty. Up ad adarsh, steady gait. Encouraged to ambulate. Fall & safety precautions in place. POC discussed.     0454: Pt pain increased a bit, pt reports he cannot wait until time for Norco. Dilaudid IV for pain at this time.

## 2025-01-11 NOTE — DISCHARGE SUMMARY
[place] [facility]PITAL[facility]  DISCHARGE SUMMARY     Fredy Vogt Patient Status:  Inpatient    2/3/1975 MRN [id number]   Location [facility] 0SW-A Attending No att. providers found   Hosp Day # 2 PCP Norberto Hdez MD     Date of Admission: 2025  Date of Discharge:  2025     Discharge Disposition: Home or Self Care    Discharge Diagnosis:    # Acute on chronic abdominal pain- multiple factorial -> chronic pancreatitis, marijuana induced hyperemesis and gastritis     # intractable N/V, as above   # Leukocytosis, monitor      # HTN  # BPH    History of Present Illness:   Fredy Vogt is a 49 year old male with history of alcohol abuse and pancreatitis presented to the emergency room with ongoing epigastric abdominal pain, nausea and vomiting over the last 1 week.  He denies any fever or chills, diarrhea.  No chest pain or shortness of breath.  No cough or fever.  Had multiple admissions and ER visits over the last few months for similar abdominal pain.  He recently had an MRCP as outpatient done.          Brief Synopsis: patient admitted and kept NPO, IVF< antiemetic and pain control provided. His pain has improved and N/V resolved. He has tolerated diet.     Lace+ Score: 38  59-90 High Risk  29-58 Medium Risk  0-28   Low Risk       TCM Follow-Up Recommendation:  LACE 29-58: Moderate Risk of readmission after discharge from the hospital.      Consultants:  GI    Discharge Medication List:     Discharge Medications        START taking these medications        Instructions Prescription details   HYDROcodone-acetaminophen 5-325 MG Tabs  Commonly known as: Norco  Replaces: HYDROcodone-acetaminophen  MG Tabs      Take 1-2 tablets by mouth every 4 (four) hours as needed for Pain.   Quantity: 15 tablet  Refills: 0            CHANGE how you take these medications        Instructions Prescription details   ondansetron 4 MG Tbdp  Commonly known as: Zofran-ODT  What changed: Another medication with the  same name was removed. Continue taking this medication, and follow the directions you see here.      Take 1 tablet (4 mg total) by mouth every 8 (eight) hours as needed for Nausea.   Quantity: 20 tablet  Refills: 0     ondansetron 4 MG Tbdp  Commonly known as: Zofran-ODT  What changed: Another medication with the same name was removed. Continue taking this medication, and follow the directions you see here.      Take 1 tablet (4 mg total) by mouth every 4 (four) hours as needed for Nausea.   Stop taking on: January 16, 2025  Quantity: 10 tablet  Refills: 0            CONTINUE taking these medications        Instructions Prescription details   acetaminophen 500 MG Tabs  Commonly known as: Tylenol Extra Strength      Take 2 tablets (1,000 mg total) by mouth every 8 (eight) hours.   Quantity: 60 tablet  Refills: 0     amLODIPine 10 MG Tabs  Commonly known as: Norvasc      Take 1 tablet (10 mg total) by mouth daily.   Quantity: 90 tablet  Refills: 3     dicyclomine 20 MG Tabs  Commonly known as: Bentyl      Take 1 tablet (20 mg total) by mouth 4 (four) times daily as needed.   Quantity: 20 tablet  Refills: 0     finasteride 5 MG Tabs  Commonly known as: Proscar      Take 1 tablet (5 mg total) by mouth daily.   Quantity: 90 tablet  Refills: 5     gabapentin 300 MG Caps  Commonly known as: Neurontin       Refills: 0     lisinopril 40 MG Tabs  Commonly known as: Prinivil; Zestril      Take 1 tablet (40 mg total) by mouth daily.   Quantity: 90 tablet  Refills: 3     Naloxone HCl 4 MG/0.1ML Liqd      4 mg by Nasal route as needed. If patient remains unresponsive, repeat dose in other nostril 2-5 minutes after first dose.   Quantity: 1 kit  Refills: 0     ondansetron 4 mg tablet  Commonly known as: Zofran      Take 1 tablet (4 mg total) by mouth every 8 (eight) hours as needed for Nausea.   Quantity: 20 tablet  Refills: 0     oxybutynin ER 10 MG Tb24  Commonly known as: Ditropan-XL      Take 1 tablet (10 mg total) by mouth  daily.   Quantity: 90 tablet  Refills: 6     pantoprazole 40 MG Tbec  Commonly known as: Protonix      Take 1 tablet (40 mg total) by mouth before breakfast.   Quantity: 90 tablet  Refills: 3     tamsulosin 0.4 MG Caps  Commonly known as: Flomax      Take 2 capsules (0.8 mg total) by mouth daily.   Quantity: 180 capsule  Refills: 6     Zenpep [id number] units Cpep  Generic drug: pancrelipase (Lip-Prot-Amyl)      Take 1 capsule by mouth 3 (three) times daily with meals.   Quantity: 300 capsule  Refills: 3     zolpidem 10 MG Tabs  Commonly known as: Ambien      Take 1 tablet (10 mg total) by mouth nightly as needed for Sleep.   Quantity: 15 tablet  Refills: 0            STOP taking these medications      HYDROcodone-acetaminophen  MG Tabs  Commonly known as: Norco  Replaced by: HYDROcodone-acetaminophen 5-325 MG Tabs        oxyCODONE 5 MG Tabs                  Where to Get Your Medications        These medications were sent to [address], [place] [address] [phone], [phone]  [address] [place] [address]      Phone: [phone]   HYDROcodone-acetaminophen 5-325 MG Tabs  ondansetron 4 mg tablet         [place]    Follow-up appointment:   GI specialist    Schedule an appointment as soon as possible for a visit in 1 week(s)      Appointments for Next 30 Days 1/14/2025 - 2/13/2025        Date Arrival Time Visit Type Length Department Provider     2/11/2025 11:30 AM  [facility] FOLLOW UP SPECIALTY [[id number]] 30 min [facility] Torito Self MD    Patient Instructions:     Contact your primary care provider if your insurance requires a referral.    Please arrive 15 minutes prior to your scheduled appointment. Be sure to bring your current Insurance card, Photo ID, and medication bottles or a list of your current medications.      A 24 hour notice is required to cancel any appointment or you may be  charged a $40 No Show Fee.     Important: 24 hour notice is required to cancel any appointment or you may be charged a $40 No Show Fee. Please notify your physician office.         Location Instructions:     Your appointment is located at [address] [facility] in [place], [place].&nbsp; Please park in the Yellow lot, enter through the [facility], and go to [address].  Masks are optional for all patients and visitors, unless otherwise indicated.                      Vital signs:       Physical Exam:    General: No acute distress   Lungs: clear to auscultation  Cardiovascular: S1, S2  Abdomen: Soft NTND    -----------------------------------------------------------------------------------------------  PATIENT DISCHARGE INSTRUCTIONS: See electronic chart    Lucero Kauffman MD    Total time spent on discharge plannin minutes     The  [place] Cures Act makes medical notes like these available to patients in the interest of transparency. Please be advised this is a medical document. Medical documents are intended to carry relevant information, facts as evident, and the clinical opinion of the practitioner. The medical note is intended as peer to peer communication and may appear blunt or direct. It is written in medical language and may contain abbreviations or verbiage that are unfamiliar.

## 2025-01-11 NOTE — PROGRESS NOTES
[facility]  Progress Note    Fredy Vogt Patient Status:  Observation    2/3/1975 MRN [id number]   Location [facility] 0SW-A Attending Lucero Kauffman MD   Hosp Day # 0 PCP Norberto Hdez MD     Subjective:  Fredy Vogt is a(n) 49 year old male.         Current complaints: none currently    Was able to start oral pain meds overnight, then recurrent symptoms this morning, was able to start clears yesterday.    This morning  had more reucrrence, improved currently though    Wants to try to eat though    Denies new symptoms    Current Facility-Administered Medications   Medication Dose Route Frequency    HYDROcodone-acetaminophen (Norco) 5-325 MG per tab 1 tablet  1 tablet Oral Q4H PRN    Or    HYDROcodone-acetaminophen (Norco) 5-325 MG per tab 2 tablet  2 tablet Oral Q4H PRN    lactated ringers infusion   Intravenous Continuous    enoxaparin (Lovenox) 40 MG/0.4ML SUBQ injection 40 mg  40 mg Subcutaneous Daily    HYDROmorphone (Dilaudid) 1 MG/ML injection 0.2 mg  0.2 mg Intravenous Q2H PRN    Or    HYDROmorphone (Dilaudid) 1 MG/ML injection 0.4 mg  0.4 mg Intravenous Q2H PRN    Or    HYDROmorphone (Dilaudid) 1 MG/ML injection 0.8 mg  0.8 mg Intravenous Q2H PRN    ondansetron (Zofran) 4 MG/2ML injection 4 mg  4 mg Intravenous Q6H PRN    prochlorperazine (Compazine) 10 MG/2ML injection 5 mg  5 mg Intravenous Q8H PRN        Objective:    /72 (BP Location: Right arm)   Pulse 72   Temp 98.1 °F (36.7 °C) (Oral)   Resp 16   Ht 6' 3\" (1.905 m)   Wt 175 lb (79.4 kg)   SpO2 97%   BMI 21.87 kg/m²   General appearance: alert, appears stated age, and cooperative  Lungs: clear to auscultation bilaterally  Heart: reg rate    Abdomen: soft, non-tender; bowel sounds normal; no masses,  no organomegaly , no michi/guard/rigidity and abd is soft. Nl bs. Mild tender w/ deep palp. Non tense  Extremities: no le  edema  Neurologic: Grossly normal           Labs:     Recent Labs   Lab 25  2302  01/09/25  1233 01/10/25  0916   RBC 3.88* 3.85* 3.37*   HGB 13.4 12.8* 11.4*   HCT 38.1* 37.7* 32.7*   MCV 98.2 97.9 97.0   MCH 34.5* 33.2 33.8   MCHC 35.2 34.0 34.9   RDW 14.5 14.6 14.3   NEPRELIM 12.89* 12.42*  --    WBC 14.8* 14.3* 11.6*   .0 250.0 218.0       Lab Results   Component Value Date    CREATSERUM 0.76 01/10/2025    BUN 7 01/10/2025     01/10/2025    K 3.6 01/10/2025     01/10/2025    CO2 27.0 01/10/2025    GLU 81 01/10/2025    CA 8.5 01/10/2025       Lab Results   Component Value Date    ALB 3.1 01/10/2025    ALKPHO 60 01/10/2025    BILT 0.4 01/10/2025    TP 5.3 01/10/2025    AST 13 01/10/2025    ALT 10 01/10/2025        )             Assessment and Plan:  Patient Active Problem List   Diagnosis    Pancreatic pseudocyst (HCC)    Alcohol-induced chronic pancreatitis (HCC)    Intractable abdominal pain    Upper abdominal pain    Sciatica of left side    Erectile dysfunction    Hypertension    Annual physical exam    Acute on chronic pancreatitis (HCC)    Shortened GA interval    Pulmonary nodule    Acute necrotizing pancreatitis (HCC)    Spasm of back muscles    Chronic scapular pain    Anxiety state    Alcohol abuse    Tobacco dependence    Tachycardia    Chronic pancreatitis, unspecified pancreatitis type (HCC)    Constipation, unspecified constipation type    Hyponatremia    Acute pancreatitis (HCC)    Other acute pancreatitis, unspecified complication status (HCC)    Acute pancreatitis, unspecified complication status, unspecified pancreatitis type (HCC)    Marijuana use            1acute on chronic pancreatitis --chronic recurrent symtpoms.  CT w/ acute on chronic changes     Symptoms improving    Appears to have hemodilution    --ivf  --pain control  --am labs pending  --if does well, can try adv diet to fulls. If ongoing or worsening pain, then would not adv diet for now  --as oupt, should f/u w/ his outpt established gi      Risk of recurrence reviewed w/ him      pt  demonstrated understanding of risks of morbidity/mortality if diagnoses and/or treatments were delayed balanced against risks of further investigation and/or treatment.     --patient demonstrated understanding of the results and recommendations and risks, benefits, limitations, and alternatives to the plan. All of their questions and concerns were addressed and were agreeable to the plan as listed      Radhames Shoemaker MD

## 2025-01-11 NOTE — PLAN OF CARE
Assumed pt care at 0730. A&O x 4. On room air. Reports abdominal pain, pain medications given. Vital signs stable, NSR on tele. Up independently.     Plan of care: pain management, IVF.    Plan of care updated with patient. Questions answered, pt verbalized understanding. Call light is within reach. All needs met at this time.    1315: Patient states that he wants to leave today. GI paged, Dr Shoemaker ok with DC today as long as patient is doing well without IV pain medications. Patient updated and agrees with plan. Hospitalist to see soon.    1345: Hospitalist at bedside. Ok to DC today if tolerating a soft diet.     1520: Patient states that he feels ok after eating, no nausea and no worsening pain. Patient feels ready to DC.    Problem: PAIN - ADULT  Goal: Verbalizes/displays adequate comfort level or patient's stated pain goal  Description: INTERVENTIONS:  - Encourage pt to monitor pain and request assistance  - Assess pain using appropriate pain scale  - Administer analgesics based on type and severity of pain and evaluate response  - Implement non-pharmacological measures as appropriate and evaluate response  - Consider cultural and social influences on pain and pain management  - Manage/alleviate anxiety  - Utilize distraction and/or relaxation techniques  - Monitor for opioid side effects  - Notify MD/LIP if interventions unsuccessful or patient reports new pain  - Anticipate increased pain with activity and pre-medicate as appropriate  Outcome: Progressing     Problem: SAFETY ADULT - FALL  Goal: Free from fall injury  Description: INTERVENTIONS:  - Assess pt frequently for physical needs  - Identify cognitive and physical deficits and behaviors that affect risk of falls.  - [place] fall precautions as indicated by assessment.  - Educate pt/family on patient safety including physical limitations  - Instruct pt to call for assistance with activity based on assessment  - Modify environment to reduce risk of  injury  - Provide assistive devices as appropriate  - Consider OT/PT consult to assist with strengthening/mobility  - Encourage toileting schedule  Outcome: Progressing     Problem: DISCHARGE PLANNING  Goal: Discharge to home or other facility with appropriate resources  Description: INTERVENTIONS:  - Identify barriers to discharge w/pt and caregiver  - Include patient/family/discharge partner in discharge planning  - Arrange for needed discharge resources and transportation as appropriate  - Identify discharge learning needs (meds, wound care, etc)  - Arrange for interpreters to assist at discharge as needed  - Consider post-discharge preferences of patient/family/discharge partner  - Complete POLST form as appropriate  - Assess patient's ability to be responsible for managing their own health  - Refer to Case Management Department for coordinating discharge planning if the patient needs post-hospital services based on physician/LIP order or complex needs related to functional status, cognitive ability or social support system  Outcome: Progressing

## 2025-01-11 NOTE — PLAN OF CARE
Discharge Note    Patient discharged at 1540. Explained discharge instructions and follow ups to patient and wife. Verbalizes understanding, IV removed, tele monitor discontinued. Patient transported via wheelchair to [facility].

## 2025-01-11 NOTE — PROGRESS NOTES
[facility]   part of [facility]     Hospitalist Progress Note     Fredy Vogt Patient Status:  Observation    2/3/1975 MRN [id number]   Location [facility] 0SW-A Attending Lucero Kauffman MD   Hosp Day # 0 PCP Norberto Hdez MD     Chief Complaint: abd pain     Subjective:     Patient with improvement in abd pain, no further N/V    Objective:    Review of Systems:   A comprehensive review of systems was completed; pertinent positive and negatives stated in subjective.    Vital signs:  Temp:  [98.1 °F (36.7 °C)-98.8 °F (37.1 °C)] 98.1 °F (36.7 °C)  Pulse:  [67-78] 72  Resp:  [12-16] 16  BP: (116-142)/(60-72) 142/72  SpO2:  [95 %-99 %] 97 %    Physical Exam:    General: No acute distress  Respiratory: No wheezes, no rhonchi  Cardiovascular: S1, S2, regular rate and rhythm  Abdomen: Soft, Non-tender, non-distended, positive bowel sounds  Neuro: No new focal deficits.   Extremities: No edema      Diagnostic Data:    Labs:  Recent Labs   Lab 25  2302 25  1233 01/10/25  0916   WBC 14.8* 14.3* 11.6*   HGB 13.4 12.8* 11.4*   MCV 98.2 97.9 97.0   .0 250.0 218.0       Recent Labs   Lab 25  2302 25  1233 01/10/25  0916   * 92 81   BUN 16 12 7*   CREATSERUM 1.05 0.88 0.76   CA 9.3 8.9 8.5*   ALB 3.7 4.1 3.1*    140 140   K 3.7 3.6 3.6    108 107   CO2 28.0 30.0 27.0   ALKPHO 66 66 60   AST 10 9 13   ALT 10 9* 10   BILT 1.0 0.5 0.4   TP 6.1 6.7 5.3*       Estimated Glomerular Filtration Rate: 110.2 mL/min/1.73m2 (by CKD-EPI based on SCr of 0.76 mg/dL).    No results for input(s): \"TROP\", \"TROPHS\", \"CK\" in the last 168 hours.    No results for input(s): \"PTP\", \"INR\" in the last 168 hours.               Microbiology    No results found for this visit on 25.      Imaging: Reviewed in Epic.    Medications:    enoxaparin  40 mg Subcutaneous Daily       Assessment & Plan:      # abdominal pain- multiple factorial -> chronic pancreatitis, marijuana  induced hyperemesis and gastritis   # Chronic abdominal pain due to acute on chronic pancreatitis, multiple ER visits and admissions   - CLD- ADAT  - IVF  - antiemetic   - TG normal   - CT and MRCP reviewed  - GI rec appreciated   - marijuana cessation     # intractable N/V, as above   # Leukocytosis, monitor      # HTN  # BPH  - hold oral meds for now      Possible dc planning today vs tomorrow       Lucero Kauffman MD    Supplementary Documentation:     Quality:  DVT Mechanical Prophylaxis:   SCDs,    DVT Pharmacologic Prophylaxis   Medication    enoxaparin (Lovenox) 40 MG/0.4ML SUBQ injection 40 mg                Code Status: Prior  Britton: No urinary catheter in place  Britton Duration (in days):   Central line:    JEREMIE: 1/11/2025    Discharge is dependent on: course  At this point Mr. Vogt is expected to be discharge to: home    The 21st Century Cures Act makes medical notes like these available to patients in the interest of transparency. Please be advised this is a medical document. Medical documents are intended to carry relevant information, facts as evident, and the clinical opinion of the practitioner. The medical note is intended as peer to peer communication and may appear blunt or direct. It is written in medical language and may contain abbreviations or verbiage that are unfamiliar.

## 2025-01-14 NOTE — PAYOR COMM NOTE
--------------  ADMISSION REVIEW     Payor: [employer]  Subscriber #:  [id number]  Authorization Number: [id number]    Admit date: 1/9/25  Admit time:  5:18 PM       REVIEW DOCUMENTATION:     ED Provider Notes        ED Provider Notes signed by Torito Avendaño MD at 1/9/2025  2:10 PM        Patient Seen in: [facility] Emergency Department      History     Chief Complaint   Patient presents with    Abdomen/Flank Pain     Stated Complaint: abd pain, dx with pancreatitis at PED yesterday, pain back    Subjective:   HPI    49-year-old male patient comes to the hospital [facility] having difficulty with abdominal pain.  The patient's pain is greatest in his epigastric area.  He had nausea and vomiting.  He has a history of having acute on top of chronic pancreatitis.  He has had calcifications in his pancreas that the cause that he see if he needed surgery for in the past.  The patient earlier was at [place] emergency department was diagnosed with pancreatitis by CT and was advised to be admitted but signed out AGAINST MEDICAL ADVICE.  He has come to this emergency room to be admitted.  He denies any fevers or chills.  He is denying any other specific complaint this time.    Objective:     Past Medical History:    Alcohol abuse    Back problem    Colon adenoma    x1    High blood pressure    Microcytosis    Pancreatic cyst (HCC)    Pancreatitis (HCC)    Pancreatitis, alcoholic, acute (HCC)    Pulmonary nodule    Tobacco use disorder    Unspecified essential hypertension         Physical Exam     ED Triage Vitals [01/09/25 1203]   /68   Pulse 86   Resp 18   Temp 97.1 °F (36.2 °C)   Temp src Temporal   SpO2 99 %   O2 Device None (Room air)       Current Vitals:   Vital Signs  BP: 106/68  Pulse: 86  Resp: 18  Temp: 97.1 °F (36.2 °C)  Temp src: Temporal    Oxygen Therapy  SpO2: 99 %  O2 Device: None (Room air)        Physical Exam  HEENT : NCAT, EOMI, PEERL,  neck supple, no JVD, trachea midline, No  LAD  Heart: S1S2 normal. No murmurs, regular rate and rhythm  Lungs: Clear to auscultation bilaterally  Abdomen: Soft epigastric region is tender nondistended normal active bowel sounds without rebound, guarding or masses noted  Back nontender without CVA tenderness  Extremity no clubbing, cyanosis or edema noted.  Full range of motion noted without tenderness  Neuro: No focal deficits noted    All measures to prevent infection transmission during my interaction with the patient were taken.  The patient was already wearing droplet mask on my arrival to the room.  Personal protective equipment including a droplet mask as well as gloves were worn throughout the duration of my exam.  Hand washing was performed prior to and after the exam.  Stethoscope and equipment used during my examination was cleaned with a super Sani cloth germicidal wipe following the exam.    ED Course     Labs Reviewed   COMP METABOLIC PANEL (14) - Abnormal; Notable for the following components:       Result Value    ALT 9 (*)     All other components within normal limits   CBC WITH DIFFERENTIAL WITH PLATELET - Abnormal; Notable for the following components:    WBC 14.3 (*)     RBC 3.85 (*)     HGB 12.8 (*)     HCT 37.7 (*)     Neutrophil Absolute Prelim 12.42 (*)     Neutrophil Absolute 12.42 (*)     All other components within normal limits   URINALYSIS WITH CULTURE REFLEX - Abnormal; Notable for the following components:    Spec Gravity >1.030 (*)     Ketones Urine Trace (*)     Protein Urine 30 (*)     Urobilinogen Urine 2 (*)     Squamous Epi. Cells Few (*)     All other components within normal limits   LIPASE - Normal     While I did review the patient's prior workup including CT and chemistries looking consistent with an acute on chronic pancreatitis.    Medications   HYDROmorphone (Dilaudid) 1 MG/ML injection 1 mg (1 mg Intravenous Given 1/9/25 1350)   sodium chloride 0.9 % IV bolus 1,000 mL (1,000 mL Intravenous New Bag 1/9/25 1350)    ondansetron (Zofran) 4 MG/2ML injection 4 mg (4 mg Intravenous Given 1/9/25 1350)           [facility]      Differential diagnosis included ulcer disease, reflux, pancreatitis but not limited such.  At this time the patient will be admitted the hospital for his acute on chronic pancreatitis      This note was prepared using Dragon Medical voice recognition dictation software.  As a result errors may occur.  When identified to these areas have been corrected.  While every attempt is made to correct errors during dictation discrepancies may still exist.  Please contact if there are any errors.    Admission disposition: 1/9/2025  2:08 PM           [facility]    Disposition and Plan     Clinical Impression:  1. Acute pancreatitis, unspecified complication status, unspecified pancreatitis type (HCC)         Disposition:  Admit  1/9/2025  2:08 pm     Hospital Problems       Present on Admission  Date Reviewed: 12/18/2024            ICD-10-CM Noted POA    * (Principal) Acute pancreatitis, unspecified complication status, unspecified pancreatitis type (HCC) K85.90 4/7/2022 Unknown              Signed by Tortio Avendaño MD on 1/9/2025  2:10 PM         History and Physical    H&P signed by Lucero Kauffman MD at 1/9/2025  7:09 PM       [facility]  History and Physical        Chief Complaint: abd pain      Subjective:    History of Present Illness:      Fredy Vogt is a 49 year old male with history of alcohol abuse and pancreatitis presented to the emergency room with ongoing epigastric abdominal pain, nausea and vomiting over the last 1 week.  He denies any fever or chills, diarrhea.  No chest pain or shortness of breath.  No cough or fever.  Had multiple admissions and ER visits over the last few months for similar abdominal pain.  He recently had an MRCP as outpatient done.   Assessment & Plan:       # Chronic abdominal pain due to acute on chronic pancreatitis  - NPO  - IVF  - antiemetic   - TG  - CT and MRCP reviewed      # Leukocytosis, monitor      # HTN  # BPH  - hold oral meds while NPO     Plan of care discussed with patient , ER.      Lucero Kauffman MD               CONSULT  ASSESSMENT AND PLAN:         1acute on chronic pancreatitis --chronic recurrent symtpoms.  CT w/ acute on chronic changes     --ivf  --pain control  --await am labs  --panc enzymes when taking po  --can attempt clears if pain starts improve, nausea already started to improve  --outpt f/u w/ primary gi for ongoing care                   pt demonstrated understanding of risks of morbidity/mortality if diagnoses and/or treatments were delayed balanced against risks of further investigation and/or treatment.      --the pt demonstrated understanding of the results and recommendations and options and the risk/benefit/limitations and alternatives to the plan.  All of their questions and concerns were addressed and were agreeable to the plan as listed        Radhames Shoemaker MD  1/10/2025  5:46 AM       1/10          Date of Service: 1/10/2025  7:38 AM             Chief Complaint: abd pain      Subjective:      Patient with improvement in abd pain, no further N/V     Objective:    Review of Systems:   A comprehensive review of systems was completed; pertinent positive and negatives stated in subjective.     Vital signs:  Temp:  [97.1 °F (36.2 °C)-98.8 °F (37.1 °C)] 98.3 °F (36.8 °C)  Pulse:  [72-98] 83  Resp:  [12-21] 16  BP: ()/(58-77) 133/70  SpO2:  [95 %-99 %] 96 %     Physical Exam:    General: No acute distress  Respiratory: No wheezes, no rhonchi  Cardiovascular: S1, S2, regular rate and rhythm  Abdomen: Soft, Non-tender, non-distended, positive bowel sounds  Neuro: No new focal deficits.   Extremities: No edema        Diagnostic Data:    Labs:       Recent Labs   Lab 01/08/25 2302 01/09/25  1233   WBC 14.8* 14.3*   HGB 13.4 12.8*   MCV 98.2 97.9   .0 250.0              Recent Labs   Lab 01/08/25 2302 01/09/25  1233   * 92   BUN 16 12    CREATSERUM 1.05 0.88   CA 9.3 8.9   ALB 3.7 4.1    140   K 3.7 3.6    108   CO2 28.0 30.0   ALKPHO 66 66   AST 10 9   ALT 10 9*   BILT 1.0 0.5   TP 6.1 6.7       Medications:    enoxaparin  40 mg Subcutaneous Daily         Assessment & Plan:       # abdominal pain- multiple factorial -> chronic pancreatitis, marijuana induced hyperemesis and gastritis   # Chronic abdominal pain due to acute on chronic pancreatitis, multiple ER visits and admissions   - CLD- ADAT  - IVF  - antiemetic   - TG normal   - CT and MRCP reviewed  - GI rec appreciated   - marijuana cessation      # intractable N/V, as above   # Leukocytosis, monitor      # HTN  # BPH  - hold oral meds for now      Possible dc planning today vs tomorrow         Lucero Kauffman MD                   1/11 DISCHARGED          Date of Service: 1/11/2025  6:21 AM     Signed         [facility]  Progress Note        Subjective:  Fredy Vogt is a(n) 49 year old male.           Current complaints: none currently     Was able to start oral pain meds overnight, then recurrent symptoms this morning, was able to start clears yesterday.     This morning  had more reucrrence, improved currently though     Wants to try to eat though     Denies new symptoms            Current Facility-Administered Medications   Medication Dose Route Frequency    HYDROcodone-acetaminophen (Norco) 5-325 MG per tab 1 tablet  1 tablet Oral Q4H PRN     Or    HYDROcodone-acetaminophen (Norco) 5-325 MG per tab 2 tablet  2 tablet Oral Q4H PRN    lactated ringers infusion   Intravenous Continuous    enoxaparin (Lovenox) 40 MG/0.4ML SUBQ injection 40 mg  40 mg Subcutaneous Daily    HYDROmorphone (Dilaudid) 1 MG/ML injection 0.2 mg  0.2 mg Intravenous Q2H PRN     Or    HYDROmorphone (Dilaudid) 1 MG/ML injection 0.4 mg  0.4 mg Intravenous Q2H PRN     Or    HYDROmorphone (Dilaudid) 1 MG/ML injection 0.8 mg  0.8 mg Intravenous Q2H PRN    ondansetron (Zofran) 4 MG/2ML injection 4 mg  4  mg Intravenous Q6H PRN    prochlorperazine (Compazine) 10 MG/2ML injection 5 mg  5 mg Intravenous Q8H PRN         Objective:     /72 (BP Location: Right arm)   Pulse 72   Temp 98.1 °F (36.7 °C) (Oral)   Resp 16   Ht 6' 3\" (1.905 m)   Wt 175 lb (79.4 kg)   SpO2 97%   BMI 21.87 kg/m²   General appearance: alert, appears stated age, and cooperative  Lungs: clear to auscultation bilaterally  Heart: reg rate    Abdomen: soft, non-tender; bowel sounds normal; no masses,  no organomegaly , no michi/guard/rigidity and abd is soft. Nl bs. Mild tender w/ deep palp. Non tense  Extremities: no le  edema  Neurologic: Grossly normal              Labs:            Recent Labs   Lab 01/08/25  2302 01/09/25  1233 01/10/25  0916   RBC 3.88* 3.85* 3.37*   HGB 13.4 12.8* 11.4*   HCT 38.1* 37.7* 32.7*   MCV 98.2 97.9 97.0   MCH 34.5* 33.2 33.8   MCHC 35.2 34.0 34.9   RDW 14.5 14.6 14.3   NEPRELIM 12.89* 12.42*  --    WBC 14.8* 14.3* 11.6*   .0 250.0 218.0               Lab Results   Component Value Date     CREATSERUM 0.76 01/10/2025     BUN 7 01/10/2025      01/10/2025     K 3.6 01/10/2025      01/10/2025     CO2 27.0 01/10/2025     GLU 81 01/10/2025     CA 8.5 01/10/2025               Lab Results   Component Value Date     ALB 3.1 01/10/2025     ALKPHO 60 01/10/2025     BILT 0.4 01/10/2025     TP 5.3 01/10/2025     AST 13 01/10/2025     ALT 10 01/10/2025          )              Assessment and Plan:      Patient Active Problem List   Diagnosis    Pancreatic pseudocyst (HCC)    Alcohol-induced chronic pancreatitis (HCC)    Intractable abdominal pain    Upper abdominal pain    Sciatica of left side    Erectile dysfunction    Hypertension    Annual physical exam    Acute on chronic pancreatitis (HCC)    Shortened PA interval    Pulmonary nodule    Acute necrotizing pancreatitis (HCC)    Spasm of back muscles    Chronic scapular pain    Anxiety state    Alcohol abuse    Tobacco dependence    Tachycardia     Chronic pancreatitis, unspecified pancreatitis type (HCC)    Constipation, unspecified constipation type    Hyponatremia    Acute pancreatitis (HCC)    Other acute pancreatitis, unspecified complication status (HCC)    Acute pancreatitis, unspecified complication status, unspecified pancreatitis type (HCC)    Marijuana use              1acute on chronic pancreatitis --chronic recurrent symtpoms.  CT w/ acute on chronic changes     Symptoms improving     Appears to have hemodilution     --ivf  --pain control  --am labs pending  --if does well, can try adv diet to fulls. If ongoing or worsening pain, then would not adv diet for now  --as oupt, should f/u w/ his outpt established gi        Risk of recurrence reviewed w/ him        pt demonstrated understanding of risks of morbidity/mortality if diagnoses and/or treatments were delayed balanced against risks of further investigation and/or treatment.      --patient demonstrated understanding of the results and recommendations and risks, benefits, limitations, and alternatives to the plan. All of their questions and concerns were addressed and were agreeable to the plan as listed        Radhames Shoemaker MD                         Vitals (last day) before discharge       Date/Time Temp Pulse Resp BP SpO2 Weight O2 Device O2 Flow Rate (L/min) [facility]    01/11/25 1452 98 °F (36.7 °C) 69 12 125/70 97 % -- None (Room air) -- [place]    01/11/25 1130 97.7 °F (36.5 °C) 63 13 126/75 96 % -- None (Room air) -- [place]    01/11/25 0941 -- 79 12 -- 97 % -- -- -- MO    01/11/25 0903 -- 76 10 -- 97 % -- -- -- MO    01/11/25 0730 98.7 °F (37.1 °C) 82 15 152/84 98 % -- None (Room air) -- MO    01/11/25 0448 98.1 °F (36.7 °C) 72 16 142/72 97 % -- None (Room air) -- AS    01/10/25 2330 98.3 °F (36.8 °C) 67 13 116/65 95 % -- None (Room air) -- AS    01/10/25 2014 98.8 °F (37.1 °C) 78 12 129/67 95 % -- None (Room air) -- AS    01/10/25 1537 98.4 °F (36.9 °C) -- -- -- -- -- None (Room air) -- IS     01/10/25 1120 98.3 °F (36.8 °C) -- -- -- -- -- None (Room air) -- IS    01/10/25 1120 -- 71 13 126/70 99 % -- -- -- AF    01/10/25 0905 98.7 °F (37.1 °C) -- -- 117/60 -- -- None (Room air) -- IS    01/10/25 0900 -- 68 14 117/60 96 % -- -- -- AF    01/10/25 0543 98.3 °F (36.8 °C) 83 16 133/70 96 % -- None (Room air) -- LR    01/10/25 0021 -- -- -- -- -- -- None (Room air) -- LR    01/10/25 0021 98.2 °F (36.8 °C) 72 14 117/66 95 % -- -- -- OG          Medication Administration Report  for Fredy Vogt as of 01/02/25 through 01/11/25  Legend:       Medications 01/02 01/03 01/04 01/05 01/06 01/07 01/08 01/09 01/10 01/11   Completed Medications   HYDROmorphone (Dilaudid) 1 MG/ML injection 1 mg  Dose: 1 mg  Freq: Every 30 min PRN Route: IV  PRN Reason: severe pain  Start: 01/09/25 1316 End: 01/09/25 1747           [id number]     46774     16882     [id number]          ondansetron (Zofran) 4 MG/2ML injection 4 mg  Dose: 4 mg  Freq: Once Route: IV  Start: 01/09/25 1317 End: 01/09/25 1350           [id number]          potassium chloride (Klor-Con M20) tab 40 mEq  Dose: 40 mEq  Freq: Once Route: OR  Start: 01/11/25 0945 End: 01/11/25 1124   Admin Instructions:   Do not crush             [id number]        potassium chloride 40 mEq in 250mL sodium chloride 0.9% IVPB premix  Dose: 40 mEq  Freq: Once Route: IV  Start: 01/10/25 1045 End: 01/10/25 1428            [id number]     09551         sodium chloride 0.9 % IV bolus 1,000 mL  Dose: 1,000 mL  Freq: Once Route: IV  Start: 01/09/25 1317 End: 01/09/25 1514           [id number]     [id number]          Discontinued Medications   Medications 01/02 01/03 01/04 01/05 01/06 01/07 01/08 01/09 01/10 01/11   enoxaparin (Lovenox) 40 MG/0.4ML SUBQ injection 40 mg  Dose: 40 mg  Freq: Daily Route: SC  Start: 01/09/25 2100 End: 01/11/25 1747   Admin Instructions:   Only administer Enoxaparin subcutaneously into the abdomen unless directed otherwise by a physician. Alternate injection sites between the left and right  anterolateral and left and right posterolateral abdominal wall.           [id number]      [id number]                   Or   HYDROcodone-acetaminophen (Norco) 5-325 MG per tab 2 tablet  Dose: 2 tablet  Freq: Every 4 hours PRN Route: OR  PRN Reason: moderate pain  Start: 01/10/25 2138 End: 01/11/25 1747            [id number]      [id number]     616856     [id number]     [id number]                 Or   HYDROmorphone (Dilaudid) 1 MG/ML injection 0.4 mg  Dose: 0.4 mg  Freq: Every 2 hour PRN Route: IV  PRN Reason: moderate pain  Start: 01/09/25 1721 End: 01/11/25 1747   Admin Instructions:   Use PRN reason as a guide and follow range order policy. If oral pain meds are ordered and patient can tolerate oral intake, start with PRN oral pain medications first.           32      33     34     35     36     37     38      [id number]     [id number]        Or   HYDROmorphone (Dilaudid) 1 MG/ML injection 0.8 mg  Dose: 0.8 mg  Freq: Every 2 hour PRN Route: IV  PRN Reason: severe pain  Start: 01/09/25 1721 End: 01/11/25 1747   Admin Instructions:   Use PRN reason as a guide and follow range order policy. If oral pain meds are ordered and patient can tolerate oral intake, start with PRN oral pain medications first.           [id number]      [id number]     899706     091540     308887     438284     025385      48     49        lactated ringers infusion  Rate: 150 mL/hr  Freq: Continuous Route: IV  Start: 01/09/25 1730 End: 01/11/25 1747           [id number]     021229      100078     448913     293468      907362     [id number]        ondansetron (Zofran) 4 MG/2ML injection 4 mg  Dose: 4 mg  Freq: Every 6 hours PRN Route: IV  PRN Reasons: Nausea,vomiting  Start: 01/09/25 1721 End: 01/11/25 1747   Admin Instructions:   Default antiemetic sequence (unless otherwise preferred by patient):  1. ondansetron (Zofran)  2. prochlorperazine (Compazine). Wait 15 minutes before proceeding to next medication in sequence.  Follow therapeutic duplication policy.             [id number]                      Medications 01/02 01/03 01/04 01/05 01/06 01/07 01/08 01/09 01/10 01/11

## 2025-02-05 ENCOUNTER — PATIENT MESSAGE (OUTPATIENT)
Dept: INTERNAL MEDICINE CLINIC | Facility: CLINIC | Age: 50
End: 2025-02-05

## 2025-02-05 DIAGNOSIS — K85.90 ACUTE ON CHRONIC PANCREATITIS (HCC): Primary | ICD-10-CM

## 2025-02-05 DIAGNOSIS — K86.1 ACUTE ON CHRONIC PANCREATITIS (HCC): Primary | ICD-10-CM

## 2025-02-05 NOTE — TELEPHONE ENCOUNTER
Patient was hospitalized at [facility] for sepsis with acute organ dysfunction.    Patient has upcoming hospital follow up appointment with Dr. Hdez on 2/11/25- requesting refill of Oxycodone that was prescribed in hospital prior to discharge.    Medication pended for your review and approval.      Future Appointments   Date Time Provider Department Center   2/11/2025 11:30 AM Torito Self MD [id number]   2/11/2025  3:00 PM Norberto Hdez MD [facility]   5/23/2025  8:30 AM Tolu Villarreal MD [id number]         oxyCODONE HCl     Dispensed Written Strength Quantity Refills Days Supply Provider Pharmacy   OXYCODONE HYDROCHLORIDE  5 MG TABS 01/30/2025 01/30/2025  15 tablet  5 [address] [facility]...

## 2025-02-06 RX ORDER — OXYCODONE HYDROCHLORIDE 5 MG/1
5 TABLET ORAL EVERY 8 HOURS PRN
Qty: 15 TABLET | Refills: 0 | Status: SHIPPED | OUTPATIENT
Start: 2025-02-06

## 2025-02-09 NOTE — PROGRESS NOTES
[facility] - Gastroenterology                                                                                                  Clinic Progress Note    Chief Complaint   Patient presents with    Follow - Up     [facility] F/U     HPI:   Fredy Vogt is a 50 year old man with history of BMI 21, hypertension, tobacco use, chronic pancreatitis here for follow up    Says he is here for follow-up.  Was recently hospitalized at [facility] in January eventually transferred to the [facility].  Says he eventually improved.  Has been doing okay.  Says has some pain still taking oxycodone.  Also was diagnosed with infection finishing course of antibiotics.  Is taking Paco Pap.  Also notes taking pantoprazole once a day.  Denies any usual often reflux symptoms.  Does have famotidine at home.  Last alcohol was in November 2024.  Last tobacco was about a month ago.  Tried using nicotine patches but wants to take.  Did lose significant weight about 20 pounds.  Says he is using Ensure nutritional supplements 2-3 times a day.  Medications, Allergies, ROS:      Past Medical History:    Alcohol abuse    Back problem    Colon adenoma    x1    High blood pressure    Microcytosis    Pancreatic cyst (HCC)    Pancreatitis (HCC)    Pancreatitis, alcoholic, acute (HCC)    Pulmonary nodule    Tobacco use disorder    Unspecified essential hypertension      Past Surgical History:   Procedure Laterality Date    Colonoscopy  07/12/2023    Egd  07/20/2016    Electrocardiogram, complete  12/26/2013    scanned to media tab    Lumbar discectomy      2023    Other surgical history      pancreatic cyst drainage by Dr Munoz. then saw [facility] had procedure done by Dr donohue for the cyst    Upper gi endoscopy,biopsy  07/2016      Family Hx:   Family History   Problem Relation Age of Onset    Hypertension Father     Cancer Father      Diabetes Mother     Hypertension Mother     Heart Disorder Mother     Other (Other) Mother         copd    No Known Problems Daughter     Other (Other) Brother         motorbke accident      Social History:   Social History     Socioeconomic History    Marital status:    Tobacco Use    Smoking status: Every Day     Current packs/day: 0.50     Average packs/day: 0.5 packs/day for 20.0 years (10.0 ttl pk-yrs)     Types: Cigarettes     Passive exposure: Current    Smokeless tobacco: Never   Vaping Use    Vaping status: Some Days    Substances: THC, weekly    Devices: Disposable   Substance and Sexual Activity    Alcohol use: Not Currently    Drug use: Not Currently   Other Topics Concern    Caffeine Concern Yes     Comment: 1 ice coffee daily    Exercise Yes     Social Drivers of Health     Food Insecurity: Patient Declined (1/22/2025)    Received from [facility]    Hunger Vital Sign     Worried About Running Out of Food in the Last Year: Patient declined     Ran Out of Food in the Last Year: Patient declined   Transportation Needs: Patient Declined (1/22/2025)    Received from [facility]    PRAPARE - Transportation     Lack of Transportation (Medical): Patient declined     Lack of Transportation (Non-Medical): Patient declined   Stress: Patient Declined (1/22/2025)    Received from [facility]    [employer] [place] [employer] - Occupational Stress Questionnaire     Feeling of Stress : Patient declined   Housing Stability: Patient Declined (1/22/2025)    Received from [facility]    Housing Stability Vital Sign     Unable to Pay for Housing in the Last Year: Patient declined     Number of Times Moved in the Last Year: 0     Homeless in the Last Year: Patient declined        Medications (Active prior to today's visit):  Current Outpatient Medications   Medication Sig Dispense Refill    oxyCODONE 5 MG Oral Tab Take 1 tablet (5  mg total) by mouth every 8 (eight) hours as needed for Pain. 15 tablet 0    oxyCODONE 5 MG Oral Tab Take 1 tablet (5 mg total) by mouth.      HYDROcodone-acetaminophen 5-325 MG Oral Tab Take 1-2 tablets by mouth every 4 (four) hours as needed for Pain. 15 tablet 0    ondansetron (ZOFRAN) 4 mg tablet Take 1 tablet (4 mg total) by mouth every 8 (eight) hours as needed for Nausea. 20 tablet 0    amLODIPine 10 MG Oral Tab Take 1 tablet (10 mg total) by mouth daily. 90 tablet 3    zolpidem 10 MG Oral Tab Take 1 tablet (10 mg total) by mouth nightly as needed for Sleep. 15 tablet 0    Naloxone HCl 4 MG/0.1ML Nasal Liquid 4 mg by Nasal route as needed. If patient remains unresponsive, repeat dose in other nostril 2-5 minutes after first dose. 1 kit 0    oxybutynin ER 10 MG Oral Tablet 24 Hr Take 1 tablet (10 mg total) by mouth daily. 90 tablet 6    tamsulosin 0.4 MG Oral Cap Take 2 capsules (0.8 mg total) by mouth daily. 180 capsule 6    finasteride 5 MG Oral Tab Take 1 tablet (5 mg total) by mouth daily. 90 tablet 5    ZENPEP 66352-24181 units Oral Cap DR Particles Take 1 capsule by mouth 3 (three) times daily with meals. 300 capsule 3    dicyclomine 20 MG Oral Tab Take 1 tablet (20 mg total) by mouth 4 (four) times daily as needed. 20 tablet 0    gabapentin 300 MG Oral Cap       lisinopril 40 MG Oral Tab Take 1 tablet (40 mg total) by mouth daily. 90 tablet 3    pantoprazole 40 MG Oral Tab EC Take 1 tablet (40 mg total) by mouth before breakfast. 90 tablet 3    acetaminophen 500 MG Oral Tab Take 2 tablets (1,000 mg total) by mouth every 8 (eight) hours. 60 tablet 0    ondansetron 4 MG Oral Tablet Dispersible Take 1 tablet (4 mg total) by mouth every 8 (eight) hours as needed for Nausea. 20 tablet 0       Allergies:  Allergies[1]    ROS:   Systems were reviewed and were negative except as noted in the HPI    PHYSICAL EXAM:   Blood pressure 98/66, pulse 109, height 6' 3\" (1.905 m), weight 149 lb (67.6  kg).    General:awake, cooperative, no acute distress  HEENT: EOMI, no scleral icterus, MMM; oral pharnyx is without exudates or lesions  Neck: no lymphadenopathy; thyroid is not enlarged and without nodules  CV: RRR  Resp: non-labored breathing  Abd: soft, non-tender, non-distended  Ext: no lower extremity swelling  Neuro: Alert, Oriented X 3  Skin: no rashes, bruises  Psych: normal affect    Labs/Imaging:     Reviewed as noted in the HPI and A/P    ASSESSMENT/PLAN:   Fredy Vogt is a 50 year old man with history of BMI 21, hypertension, tobacco use, chronic pancreatitis here for follow up    Multiple episodes and hospitalizations for acute pancreatitis in the past attributed to ETOH as well as diagnosis of chronic pancreatitis. Prior EUS procedures including FNA of a pseudocyst with Dr. Munoz in the past. Noted to have eventually been referred to the [facility] where he is noted to have undergone surgery ([facility] procedure/surgery) in September 2020.    He is recently noted to have been hospitalized at the [facility] in January 2025 (multiple notes reviewed) indicating presentation with abdominal pain, nausea/vomiting and e.coli bacteremia, recently positive for c.diff, abnormal imaging of the gallbladder, pancreas enlargement with calcifications and pancreatic fluid collections, found to have portal vein thrombus started on anti-coagulation, undergoing diagnostic EUS 1/28/25 (reviewed the procedure EGD/EUS report) evaluation of the pancreas. I also reviewed the report of the MRI/MRCP from 1/27/25    Complicated history of acute and chronic pancreatitis with prior surgical management.  However noted continued tobacco and alcohol use until the last 1 to 3 months respectively.  We discussed continuing alcohol abstinence.  We also discussed further discussion of smoking cessation with his primary care physician today.  Discussed based on his history and review of his recent upper endoscopy  discussed recommendation to stop his pantoprazole and use famotidine as needed for any reflux symptoms.  Also discussed continuing follow-up with tertiary pancreas specialist that they are safe [place] as planned.    Recommend:  -stop pantoprazole  -famotidine prn  -follow up at [facility]  -continue tobacco cessation  -continue alcohol cessation  -continue pancreas enzymes  -continue oral nutritional supplementation  -pain management    I spent 30 minutes obtaining history, evaluating the patient including a medically appropriate exam, discussing treatment options and counseling and educating the patient, reviewing the patient's chart, ordering tests/medications/procedures, and completing documentation     Orders This Visit:  No orders of the defined types were placed in this encounter.    Meds This Visit:  Requested Prescriptions      No prescriptions requested or ordered in this encounter     Imaging & Referrals:  None     MD Anshul [facility] - Gastroenterology  2/11/2025         [1]   Allergies  Allergen Reactions    Morphine ITCHING

## 2025-02-11 ENCOUNTER — OFFICE VISIT (OUTPATIENT)
Facility: CLINIC | Age: 50
End: 2025-02-11
Payer: COMMERCIAL

## 2025-02-11 ENCOUNTER — OFFICE VISIT (OUTPATIENT)
Dept: INTERNAL MEDICINE CLINIC | Facility: CLINIC | Age: 50
End: 2025-02-11
Payer: COMMERCIAL

## 2025-02-11 VITALS
SYSTOLIC BLOOD PRESSURE: 98 MMHG | BODY MASS INDEX: 18.53 KG/M2 | HEIGHT: 75 IN | DIASTOLIC BLOOD PRESSURE: 66 MMHG | HEART RATE: 109 BPM | WEIGHT: 149 LBS

## 2025-02-11 VITALS
DIASTOLIC BLOOD PRESSURE: 64 MMHG | BODY MASS INDEX: 19 KG/M2 | WEIGHT: 149.44 LBS | SYSTOLIC BLOOD PRESSURE: 104 MMHG | OXYGEN SATURATION: 99 % | HEART RATE: 109 BPM

## 2025-02-11 DIAGNOSIS — K86.1 CHRONIC PANCREATITIS, UNSPECIFIED PANCREATITIS TYPE (HCC): Primary | ICD-10-CM

## 2025-02-11 DIAGNOSIS — Z71.6 ENCOUNTER FOR TOBACCO USE CESSATION COUNSELING: ICD-10-CM

## 2025-02-11 DIAGNOSIS — K86.1 ACUTE ON CHRONIC PANCREATITIS (HCC): ICD-10-CM

## 2025-02-11 DIAGNOSIS — I10 ESSENTIAL HYPERTENSION: ICD-10-CM

## 2025-02-11 DIAGNOSIS — N40.1 BENIGN PROSTATIC HYPERPLASIA WITH LOWER URINARY TRACT SYMPTOMS, SYMPTOM DETAILS UNSPECIFIED: ICD-10-CM

## 2025-02-11 DIAGNOSIS — I81 PORTAL VEIN THROMBOSIS: ICD-10-CM

## 2025-02-11 DIAGNOSIS — B00.50 OPHTHALMIC HERPES SIMPLEX: ICD-10-CM

## 2025-02-11 DIAGNOSIS — F17.200 TOBACCO DEPENDENCE: ICD-10-CM

## 2025-02-11 DIAGNOSIS — R78.81 BACTEREMIA: Primary | ICD-10-CM

## 2025-02-11 DIAGNOSIS — K85.90 ACUTE ON CHRONIC PANCREATITIS (HCC): ICD-10-CM

## 2025-02-11 PROCEDURE — 3008F BODY MASS INDEX DOCD: CPT | Performed by: INTERNAL MEDICINE

## 2025-02-11 PROCEDURE — 99406 BEHAV CHNG SMOKING 3-10 MIN: CPT | Performed by: INTERNAL MEDICINE

## 2025-02-11 PROCEDURE — 3078F DIAST BP <80 MM HG: CPT | Performed by: INTERNAL MEDICINE

## 2025-02-11 PROCEDURE — 99214 OFFICE O/P EST MOD 30 MIN: CPT | Performed by: INTERNAL MEDICINE

## 2025-02-11 PROCEDURE — 3074F SYST BP LT 130 MM HG: CPT | Performed by: INTERNAL MEDICINE

## 2025-02-11 RX ORDER — HYDROCODONE BITARTRATE AND ACETAMINOPHEN 7.5; 325 MG/1; MG/1
1 TABLET ORAL EVERY 6 HOURS PRN
Qty: 20 TABLET | Refills: 0 | Status: SHIPPED | OUTPATIENT
Start: 2025-02-11

## 2025-02-11 RX ORDER — DULOXETIN HYDROCHLORIDE 60 MG/1
60 CAPSULE, DELAYED RELEASE ORAL DAILY
COMMUNITY
Start: 2025-02-04

## 2025-02-11 RX ORDER — VALACYCLOVIR HYDROCHLORIDE 1 G/1
2000 TABLET, FILM COATED ORAL
COMMUNITY
Start: 2025-01-30 | End: 2025-02-14

## 2025-02-11 NOTE — PATIENT INSTRUCTIONS
Stop your pantoprazole  You can use famotidine (which is the generic form of Pepcid) needed for any reflux or heartburn symptoms.  Follow-up with your primary doctor see if there are other options for smoking cessation.  Continue to avoid any alcohol.  Continue your nutritional supplements every day  Follow up with the pancreas specialist as planned at the [facility]

## 2025-02-11 NOTE — PROGRESS NOTES
Subjective:     Patient ID: Fredy Vogt is a 50 year old male.    Pt presents today for posthopsital ffup. Was admitted at [facility] about 3 weeks ago, he was admitted with compolaint of abdominal pain, lethargy and found to have  E coli and Bacteroides fragilis bacteremia and acute on chronic pancreatitiis. He was also found to have portal vein thrombosis and treated with anticoagulation.  Workup for source of bacteremia was done including MRCP and EUS. No pancreatic abscess or infected pseudocyst found and was felt by ID specialist that bacteremia was due to translocation of bacteremia from the gut.   Pt was also diagnosed with Herpes keratitis and treated with IV acyclovir.         History/Other:   Review of Systems   Constitutional:  Positive for appetite change and fatigue. Negative for fever.   Respiratory: Negative.     Cardiovascular: Negative.    Gastrointestinal:  Positive for abdominal pain. Negative for anal bleeding, blood in stool, constipation, diarrhea, nausea and vomiting.        Upper abdominal pain   Genitourinary: Negative.      Current Outpatient Medications   Medication Sig Dispense Refill    amoxicillin clavulanate 875-125 MG Oral Tab Take 1 tablet (875 mg total) by mouth every 8 (eight) hours.      DULoxetine 60 MG Oral Cap DR Particles Take 1 capsule (60 mg total) by mouth daily.      valACYclovir 1 G Oral Tab Take 2 tablets (2,000 mg total) by mouth.      apixaban 5 MG Oral Tab Take by mouth.      oxyCODONE 5 MG Oral Tab Take 1 tablet (5 mg total) by mouth.      HYDROcodone-acetaminophen 5-325 MG Oral Tab Take 1-2 tablets by mouth every 4 (four) hours as needed for Pain. 15 tablet 0    ondansetron (ZOFRAN) 4 mg tablet Take 1 tablet (4 mg total) by mouth every 8 (eight) hours as needed for Nausea. 20 tablet 0    amLODIPine 10 MG Oral Tab Take 1 tablet (10 mg total) by mouth daily. 90 tablet 3    zolpidem 10 MG Oral Tab Take 1 tablet (10 mg total) by mouth nightly as  needed for Sleep. 15 tablet 0    oxybutynin ER 10 MG Oral Tablet 24 Hr Take 1 tablet (10 mg total) by mouth daily. 90 tablet 6    tamsulosin 0.4 MG Oral Cap Take 2 capsules (0.8 mg total) by mouth daily. 180 capsule 6    finasteride 5 MG Oral Tab Take 1 tablet (5 mg total) by mouth daily. 90 tablet 5    ZENPEP 67960-37995 units Oral Cap DR Particles Take 1 capsule by mouth 3 (three) times daily with meals. 300 capsule 3    dicyclomine 20 MG Oral Tab Take 1 tablet (20 mg total) by mouth 4 (four) times daily as needed. 20 tablet 0    gabapentin 300 MG Oral Cap       lisinopril 40 MG Oral Tab Take 1 tablet (40 mg total) by mouth daily. 90 tablet 3    acetaminophen 500 MG Oral Tab Take 2 tablets (1,000 mg total) by mouth every 8 (eight) hours. 60 tablet 0    Naloxone HCl 4 MG/0.1ML Nasal Liquid 4 mg by Nasal route as needed. If patient remains unresponsive, repeat dose in other nostril 2-5 minutes after first dose. (Patient not taking: Reported on 2/11/2025) 1 kit 0     Allergies:Allergies[1]    Past Medical History:    Alcohol abuse    Back problem    Colon adenoma    x1    High blood pressure    Microcytosis    Pancreatic cyst (HCC)    Pancreatitis (HCC)    Pancreatitis, alcoholic, acute (HCC)    Pulmonary nodule    Tobacco use disorder    Unspecified essential hypertension      Past Surgical History:   Procedure Laterality Date    Colonoscopy  07/12/2023    Egd  07/20/2016    Electrocardiogram, complete  12/26/2013    scanned to media tab    Lumbar discectomy      2023    Other surgical history      pancreatic cyst drainage by Dr Munoz. then saw [facility] had procedure done by Dr donohue for the cyst    Upper gi endoscopy,biopsy  07/2016      Family History   Problem Relation Age of Onset    Hypertension Father     Cancer Father     Diabetes Mother     Hypertension Mother     Heart Disorder Mother     Other (Other) Mother         copd    No Known Problems Daughter     Other (Other) Brother         motorbke  accident      Social History:   Social History     Socioeconomic History    Marital status:    Tobacco Use    Smoking status: Every Day     Current packs/day: 0.50     Average packs/day: 0.5 packs/day for 20.0 years (10.0 ttl pk-yrs)     Types: Cigarettes     Passive exposure: Current    Smokeless tobacco: Never   Vaping Use    Vaping status: Some Days    Substances: THC, weekly    Devices: Disposable   Substance and Sexual Activity    Alcohol use: Not Currently    Drug use: Not Currently   Other Topics Concern    Caffeine Concern Yes     Comment: 1 ice coffee daily    Exercise Yes     Social Drivers of Health     Food Insecurity: Patient Declined (1/22/2025)    Received from [facility]    Hunger Vital Sign     Worried About Running Out of Food in the Last Year: Patient declined     Ran Out of Food in the Last Year: Patient declined   Transportation Needs: Patient Declined (1/22/2025)    Received from [facility]    PRAPARE - Transportation     Lack of Transportation (Medical): Patient declined     Lack of Transportation (Non-Medical): Patient declined   Stress: Patient Declined (1/22/2025)    Received from [facility]    [employer] [place] [employer] - Occupational Stress Questionnaire     Feeling of Stress : Patient declined   Housing Stability: Patient Declined (1/22/2025)    Received from [facility]    Housing Stability Vital Sign     Unable to Pay for Housing in the Last Year: Patient declined     Number of Times Moved in the Last Year: 0     Homeless in the Last Year: Patient declined        Objective:   Physical Exam  Constitutional:       General: He is not in acute distress.     Appearance: He is not ill-appearing, toxic-appearing or diaphoretic.   HENT:      Right Ear: Tympanic membrane, ear canal and external ear normal.      Left Ear: Tympanic membrane, ear canal and external ear normal.   Neck:      Vascular:  No carotid bruit.   Cardiovascular:      Rate and Rhythm: Normal rate and regular rhythm.      Heart sounds: Normal heart sounds. No murmur heard.     No gallop.   Pulmonary:      Effort: Pulmonary effort is normal. No respiratory distress.      Breath sounds: Normal breath sounds. No stridor. No wheezing or rales.   Abdominal:      General: Abdomen is flat. Bowel sounds are normal. There is no distension.      Palpations: Abdomen is soft. There is no mass.      Tenderness: There is no abdominal tenderness. There is no guarding or rebound. Negative signs include Layton's sign and McBurney's sign.   Musculoskeletal:      Cervical back: Normal range of motion and neck supple. No rigidity or tenderness.      Right lower leg: No edema.      Left lower leg: No edema.   Lymphadenopathy:      Cervical: No cervical adenopathy.   Skin:     Coloration: Skin is not jaundiced or pale.      Findings: No bruising or erythema.   Neurological:      Mental Status: He is alert.         Assessment & Plan:   (R78.81) Bacteremia  (primary encounter diagnosis)  Plan: pt had E coli and Bateroides fragilis bacteremia and treated with IV back dischaged home on augmentin which pt will be finishing soon.   Source of bacteremia unclear per discharge note but believed to be translocation of bacteria from the gut and not pacreatic abscess.   Pt  has ffup apptment with ID specialist.     (K85.90,  K86.1) Acute on chronic pancreatitis (HCC)  Plan: HYDROcodone-acetaminophen (NORCO) 7.5-325 MG         Oral Tab        Pt to continue with zenpep, PPI ; he already ahd ffup with GI Dr Self.   Pt still having abdominal pain and had been taking oxycodone 5mg and not helping much. He did better on norco but taking 10mg before but I told him we can try norco 7.5mg Q 6hr prn. I also told pt he needs to see pain specialist to help manage his abdominal pain and pt states he does have apptment set up already .    (I81) Portal vein thrombosis  Plan: pt was  found to have portal vein thrombosis on MRCP done while in [facility], was started on lovenox and swtiched to eliquis but pt states he doesn't recall taking it.  Discharge med list shown to pt and listed eliquis 5mg bid, and would need to be continued for 3mos per discharge note. Pt told he needs to start eliquis 5mg bid and gave him coupon for free eliquis.     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 He is now being ff by optha.     (I10) Essential hypertension  Plan: bp on lower side today; I told him ot hold his amlipdine for now ; continue lisinopril; monitor bp at home. If bp starts going at 130sbp then may restart amlodipine at 5mg daily instead of 10mg daily.     (N40.1) Benign prostatic hyperplasia with lower urinary tract symptoms, symptom details unspecified  Plan: he is on finasteride and had been ff by urologist.     (F17.200) Tobacco dependence  Plan: pt will use nicotine patches to help hm quit.        No orders of the defined types were placed in this encounter.      Meds This Visit:  Requested Prescriptions      No prescriptions requested or ordered in this encounter       Imaging & Referrals:  None            [1]   Allergies  Allergen Reactions    Morphine ITCHING

## 2025-02-12 ENCOUNTER — TELEPHONE (OUTPATIENT)
Dept: INTERNAL MEDICINE CLINIC | Facility: CLINIC | Age: 50
End: 2025-02-12

## 2025-02-12 NOTE — TELEPHONE ENCOUNTER
Duplicate request, previously addressed.      Requested Prescriptions   Pending Prescriptions Disp Refills    apixaban 5 MG Oral Tab  0     Sig: Take by mouth.       There is no refill protocol information for this order

## 2025-02-13 NOTE — TELEPHONE ENCOUNTER
Patient was notified with understanding.  He will notify clinic when he is getting low so remaining refills can be sent.

## 2025-02-13 NOTE — TELEPHONE ENCOUNTER
Pls call and notify pt  I had read med records from recent hospitalization at [facility] and for the treatment of his portal vein thrombosis found during his hospiutalization, he will need to be on eliquis  5mg BID for 3mos.   I had given him coupoin for free eliquis for 60 tabs on ov yesterday and told to start to take  it 5mg bid.  He will need another 2mos of refill though to complete the 3mos of anticougulation.    He should call us back in 4 weeks for the additonal 2mos refill of eliquis 5mg bid.

## 2025-02-17 ENCOUNTER — NURSE TRIAGE (OUTPATIENT)
Dept: INTERNAL MEDICINE CLINIC | Facility: CLINIC | Age: 50
End: 2025-02-17

## 2025-02-17 ENCOUNTER — OFFICE VISIT (OUTPATIENT)
Dept: INTERNAL MEDICINE CLINIC | Facility: CLINIC | Age: 50
End: 2025-02-17
Payer: COMMERCIAL

## 2025-02-17 VITALS
BODY MASS INDEX: 20 KG/M2 | WEIGHT: 157 LBS | HEART RATE: 99 BPM | SYSTOLIC BLOOD PRESSURE: 124 MMHG | DIASTOLIC BLOOD PRESSURE: 70 MMHG

## 2025-02-17 DIAGNOSIS — K86.1 CHRONIC PANCREATITIS, UNSPECIFIED PANCREATITIS TYPE (HCC): Primary | ICD-10-CM

## 2025-02-17 DIAGNOSIS — F41.1 ANXIETY STATE: ICD-10-CM

## 2025-02-17 DIAGNOSIS — F43.23 ADJUSTMENT DISORDER WITH MIXED ANXIETY AND DEPRESSED MOOD: ICD-10-CM

## 2025-02-17 DIAGNOSIS — G89.29 OTHER CHRONIC PAIN: ICD-10-CM

## 2025-02-17 DIAGNOSIS — F10.10 ALCOHOL ABUSE: ICD-10-CM

## 2025-02-17 PROCEDURE — 99215 OFFICE O/P EST HI 40 MIN: CPT | Performed by: STUDENT IN AN ORGANIZED HEALTH CARE EDUCATION/TRAINING PROGRAM

## 2025-02-17 PROCEDURE — 3078F DIAST BP <80 MM HG: CPT | Performed by: STUDENT IN AN ORGANIZED HEALTH CARE EDUCATION/TRAINING PROGRAM

## 2025-02-17 PROCEDURE — 3074F SYST BP LT 130 MM HG: CPT | Performed by: STUDENT IN AN ORGANIZED HEALTH CARE EDUCATION/TRAINING PROGRAM

## 2025-02-17 RX ORDER — HYDROCODONE BITARTRATE AND ACETAMINOPHEN 10; 325 MG/1; MG/1
1 TABLET ORAL EVERY 4 HOURS PRN
Qty: 21 TABLET | Refills: 0 | Status: SHIPPED | OUTPATIENT
Start: 2025-02-17

## 2025-02-17 NOTE — PROGRESS NOTES
OFFICE NOTE       Patient ID: Fredy Vogt is a 50 year old male.  Today's Date: 02/17/25  Chief Complaint: Depression, Anxiety, and Pain (Pain management, pain in pancreas and back. Requesting referrals for PT)    Pt is a 49y/o male with PMHx of chronic pancreatitis (followed by Gi Dr. Self last seen 2/11/2025), portal vein thrombus on HTN tobacco use. From November thru January.   Jan 17th was really seek seen in [facility], and went to [facility]    Pt is followed by infectious disease today and extended antibiotics.   But still having pain from pancrease.     Recent hospitalization [facility],     Seen by Dr. Self recently, stopp PPI, famotidine PRN, tobacco/alcohol cessation, pancrease enzymes, oral nutiritional supplementation  Also discussed continuing follow-up with tertiary pancreas specialist that they are safe [place] as planned.      Vitals:    02/17/25 1540   BP: 124/70   Pulse: 99   Weight: 157 lb (71.2 kg)     body mass index is 19.62 kg/m².  BP Readings from Last 3 Encounters:   02/17/25 124/70   02/11/25 104/64   02/11/25 98/66     The 10-year ASCVD risk score (Jarrod DK, et al., 2019) is: 11.7%    Values used to calculate the score:      Age: 50 years      Sex: Male      Is Non- : Yes      Diabetic: No      Tobacco smoker: Yes      Systolic Blood Pressure: 124 mmHg      Is BP treated: Yes      HDL Cholesterol: 74 mg/dL      Total Cholesterol: 163 mg/dL  Results         Medications reviewed:  Current Outpatient Medications   Medication Sig Dispense Refill    HYDROcodone-acetaminophen  MG Oral Tab Take 1 tablet by mouth every 4 (four) hours as needed for Pain. 21 tablet 0    amoxicillin clavulanate 875-125 MG Oral Tab Take 1 tablet (875 mg total) by mouth every 8 (eight) hours.      DULoxetine 60 MG Oral Cap DR Particles Take 1 capsule (60 mg total) by mouth daily.      apixaban (ELIQUIS) 5 MG Oral Tab Take 1 tablet (5 mg total)  by mouth 2 (two) times daily. 60 tablet 0    HYDROcodone-acetaminophen (NORCO) 7.5-325 MG Oral Tab Take 1 tablet by mouth every 6 (six) hours as needed for Pain. 20 tablet 0    ondansetron (ZOFRAN) 4 mg tablet Take 1 tablet (4 mg total) by mouth every 8 (eight) hours as needed for Nausea. 20 tablet 0    oxybutynin ER 10 MG Oral Tablet 24 Hr Take 1 tablet (10 mg total) by mouth daily. 90 tablet 6    tamsulosin 0.4 MG Oral Cap Take 2 capsules (0.8 mg total) by mouth daily. 180 capsule 6    finasteride 5 MG Oral Tab Take 1 tablet (5 mg total) by mouth daily. 90 tablet 5    ZENPEP 43574-38073 units Oral Cap DR Particles Take 1 capsule by mouth 3 (three) times daily with meals. 300 capsule 3    dicyclomine 20 MG Oral Tab Take 1 tablet (20 mg total) by mouth 4 (four) times daily as needed. 20 tablet 0    gabapentin 300 MG Oral Cap       lisinopril 40 MG Oral Tab Take 1 tablet (40 mg total) by mouth daily. 90 tablet 3    apixaban 5 MG Oral Tab Take by mouth. (Patient not taking: Reported on 2/17/2025)      oxyCODONE 5 MG Oral Tab Take 1 tablet (5 mg total) by mouth. (Patient not taking: Reported on 2/17/2025)      amLODIPine 10 MG Oral Tab Take 1 tablet (10 mg total) by mouth daily. (Patient not taking: Reported on 2/17/2025) 90 tablet 3    zolpidem 10 MG Oral Tab Take 1 tablet (10 mg total) by mouth nightly as needed for Sleep. (Patient not taking: Reported on 2/17/2025) 15 tablet 0    Naloxone HCl 4 MG/0.1ML Nasal Liquid 4 mg by Nasal route as needed. If patient remains unresponsive, repeat dose in other nostril 2-5 minutes after first dose. (Patient not taking: Reported on 2/17/2025) 1 kit 0    acetaminophen 500 MG Oral Tab Take 2 tablets (1,000 mg total) by mouth every 8 (eight) hours. (Patient not taking: Reported on 2/17/2025) 60 tablet 0         Assessment & Plan    1. Chronic pancreatitis, unspecified pancreatitis type (HCC) (Primary)  -     Cancel: Pain Management Referral - [place] Location  -     Cancel: Pain  Management Referral - In Network  -     Cancel: Pain Management Referral - In [facility]  -     Pain Management Referral - In [facility]  -     HYDROcodone-Acetaminophen; Take 1 tablet by mouth every 4 (four) hours as needed for Pain.  Dispense: 21 tablet; Refill: 0  2. Other chronic pain  -     Cancel: Pain Management Referral - [place] Location  -     PHYSICAL THERAPY - INTERNAL  -     HYDROcodone-Acetaminophen; Take 1 tablet by mouth every 4 (four) hours as needed for Pain.  Dispense: 21 tablet; Refill: 0  3. Adjustment disorder with mixed anxiety and depressed mood  -     Cancel: [facility] Referral - In [facility]  -     Cancel: [facility] Referral - In [facility]  -     [facility] Referral - In [facility]  -     Behavioral Health Consultation  4. Anxiety state  -     Behavioral Health Consultation  5. Alcohol abuse   Pt with Pmhx of EtOH and pancreatitis with Adjustment disorder anxiety and depression  Plan  -increase norco to 10-325mg po q4hrs PRN #21 0 refills  -follow up with pain management for further pain Rx  -follow up with  for psychiatrist for Rx mgmt and psychologist for therapy     Follow Up: As needed/if symptoms worsen or No follow-ups on file..     I spent 41minutes obtaining pertitent medical history, reviewing pertinent imaging/labs and specialists notes, evaluating patient, discussing differential diagnosis' and various treatment options, reinforcing importance of compliance with treatment plan, and completing documentation.     Encounter Times  PreChartin minutes    Reviewing/Obtainin minutes      Medical Exam: 5 minutes    Plan: 6 minutes      Notes: 6 minutes    Counseling/Education: 6 minutes      Referring/Communicating:   minutes    Ind Interpretation:   minutes      Care Coordination:   minutes       My total time spent caring for the patient on the day of the encounter: 41 minutes.            Objective/ Results:   Physical Exam  Constitutional:        Appearance: He is well-developed.   Cardiovascular:      Rate and Rhythm: Normal rate and regular rhythm.      Heart sounds: Normal heart sounds.   Pulmonary:      Effort: Pulmonary effort is normal.      Breath sounds: Normal breath sounds.   Abdominal:      General: Bowel sounds are normal.      Palpations: Abdomen is soft.      Tenderness: There is abdominal tenderness.   Skin:     General: Skin is warm and dry.   Neurological:      Mental Status: He is alert and oriented to person, place, and time.      Deep Tendon Reflexes: Reflexes are normal and symmetric.        Physical Exam       Reviewed:    Patient Active Problem List    Diagnosis    Marijuana use    Acute pancreatitis, unspecified complication status, unspecified pancreatitis type (HCC)    Acute pancreatitis (HCC)    Other acute pancreatitis, unspecified complication status (HCC)    Chronic pancreatitis, unspecified pancreatitis type (HCC)    Constipation, unspecified constipation type    Hyponatremia    Tachycardia    Chronic scapular pain    Spasm of back muscles    Acute necrotizing pancreatitis (HCC)    Pulmonary nodule    Shortened MN interval    Acute on chronic pancreatitis (HCC)    Sciatica of left side    Erectile dysfunction    Hypertension    Annual physical exam    Upper abdominal pain    Intractable abdominal pain    Alcohol-induced chronic pancreatitis (HCC)    Pancreatic pseudocyst (HCC)    Anxiety state    Alcohol abuse    Tobacco dependence      Allergies[1]     Social History     Socioeconomic History    Marital status:    Tobacco Use    Smoking status: Every Day     Current packs/day: 0.50     Average packs/day: 0.5 packs/day for 20.0 years (10.0 ttl pk-yrs)     Types: Cigarettes     Passive exposure: Current    Smokeless tobacco: Never    Tobacco comments:     About 7 cigarettes a days   Vaping Use    Vaping status: Never Used   Substance and Sexual Activity    Alcohol use: Not Currently    Drug use: Not Currently    Other Topics Concern    Caffeine Concern Yes     Comment: 1 ice coffee daily    Exercise Yes     Social Drivers of Health     Food Insecurity: Patient Declined (1/22/2025)    Received from [facility]    Hunger Vital Sign     Worried About Running Out of Food in the Last Year: Patient declined     Ran Out of Food in the Last Year: Patient declined   Transportation Needs: Patient Declined (1/22/2025)    Received from [facility]    PRAPARE - Transportation     Lack of Transportation (Medical): Patient declined     Lack of Transportation (Non-Medical): Patient declined   Stress: Patient Declined (1/22/2025)    Received from [facility]    [employer] - Occupational Stress Questionnaire     Feeling of Stress : Patient declined   Housing Stability: Patient Declined (1/22/2025)    Received from [facility]    Housing Stability Vital Sign     Unable to Pay for Housing in the Last Year: Patient declined     Number of Times Moved in the Last Year: 0     Homeless in the Last Year: Patient declined      Review of Systems   Gastrointestinal:  Positive for abdominal distention and abdominal pain.       All other systems negative unless otherwise stated in ROS or HPI above.       Josh Enamorado MD  Internal Medicine       Call office with any questions or seek emergency care if necessary.   Patient understands and agrees to follow directions and advice.      ----------------------------------------- PATIENT INSTRUCTIONS-----------------------------------------     Patient Instructions   Pain management only does injections/interventional based care, for opioid medication maintance please call the following number  Callers to the Helpline ([phone]/   ([phone]) For all insurance types   can ask for MAR NOW, or any form of immediate treatment for opioid or alcohol use disorder and will be connected to a MAR NOW care  coordinator for immediate access to medication and counseling services for OUD/AUD, including telephonic prescription and home induction medications and opioid prescription management. For all insurance types     Local center to aid with Opioid prescription management     Dr. Faith Guzman (Addiction Medicine MD) (Only for Medicare, Medicaid or Un-insured patients)  [address] [address]  Please Call or Visit  MAT intake and Engagement specalist Rahul [phone] and can get you in within 1-2 days if urgent. (Only for Medicare, Medicaid or Uninsured patients)      Services: Substance use treatment; Mental health treatment; Treatment for co-occurring substance use plus either serious mental health illness in adults/serious emotional disturbance in children    Payment Accepted: [facility] or [facility] funds; Medicare; Medicaid; Other State funds; Cash or self-payment; State mental health agency (or equivalent) funds         The 21st Century Cures Act makes medical notes available to patients in the interest of transparency.  However, please be advised that this is a medical document.  It is intended as a peer to peer communication.  It is written in medical language and may contain abbreviations or verbiage that are technical and unfamiliar.  It may appear blunt or direct.  Medical documents are intended to carry relevant information, facts as evident, and the clinical opinion of the practitioner.          [1]   Allergies  Allergen Reactions    Morphine ITCHING

## 2025-02-17 NOTE — PATIENT INSTRUCTIONS
Pain management only does injections/interventional based care, for opioid medication maintance please call the following number  Callers to the Helpline ([phone]/   ([phone]) For all insurance types   can ask for MAR NOW, or any form of immediate treatment for opioid or alcohol use disorder and will be connected to a MAR NOW care coordinator for immediate access to medication and counseling services for OUD/AUD, including telephonic prescription and home induction medications and opioid prescription management. For all insurance types     Local center to aid with Opioid prescription management     Dr. Faith Guzman (Addiction Medicine MD) (Only for Medicare, Medicaid or Un-insured patients)  [address] [address]  Please Call or Visit  MAT intake and Engagement juanjo Kay [phone] and can get you in within 1-2 days if urgent. (Only for Medicare, Medicaid or Uninsured patients)      Services: Substance use treatment; Mental health treatment; Treatment for co-occurring substance use plus either serious mental health illness in adults/serious emotional disturbance in children    Payment Accepted: [facility] or local government funds; Medicare; Medicaid; Other State funds; Cash or self-payment; State mental health agency (or equivalent) funds

## 2025-02-17 NOTE — TELEPHONE ENCOUNTER
I called patient's wife (Last signed Verbal Release verified) who is with patient; made aware of Dr. Hdez's recommendations. She was agreeable in scheduling with Dr. Enamorado today--> scheduled. She verbalized understanding. No further questions or concerns at this time.    Future Appointments   Date Time Provider Department Center   2/17/2025  3:45 PM Josh Enamorado MD [facility]

## 2025-02-17 NOTE — TELEPHONE ENCOUNTER
Dr. Hdez - please advise time for same day visit for new onset of Depression/Anxiety; also asking for referral for pain management and PT.     Please reply to pool: EM RN TRIAGE  Action Requested: Summary for Provider     []  Critical Lab, Recommendations Needed  [x] Need Additional Advice  []   FYI    []   Need Orders  [] Need Medications Sent to Pharmacy  []  Other     SUMMARY: Depression and anxiety since discharged from hospital, triggered by recent health disparities--> requesting Rx to help with symptoms. CSSR low risk. Also asking for referrals for pain management and PT. Advised same day visit advised; requesting same day visit to be added to PCP's schedule.     Reason for call: Depression and Anxiety  Onset: 1-1.5 weeks    Reason for Disposition   Sometimes has thoughts of suicide    Protocols used: Depression-A-OH      Germika /Spouse called states patient had a visit with infectious disease/Dr. Manzanares and provider had suggested that patient get a referral for pain management as he had been experiencing pain, low dose of Norco that was prescribed for pancreatitis previously by PCP has not helped; he also advised PT. Also advised that PCP give Gabapentin. Patient also expressed that he has anxiety and depression to infectious disease and provider advised that he has PCP for Rx for both. I spoke with patient and he states that he has had some depression and anxiety since discharged from hospital. He states his recent health disparitites have sparked his depression and anxiety. He states that every time he has to go to providers office he feels anxious. CSSR answered--> low risk. 24/7 Crisis # provided. Same day office visit advised -->agreeable and no visit available with PCP--> I made patient aware I will convey the above to Dr. Hdez requesting a same day visit. Home Care Advice discussed, per protocol. Patient instructed any new or worsening symptoms [reviewed] seek immediate medical  attention--> Emergency Department/911. Patient verbalized understanding. No further questions or concerns at this time.    [Last office visit was 2/11/25]

## 2025-02-22 DIAGNOSIS — K86.1 CHRONIC PANCREATITIS, UNSPECIFIED PANCREATITIS TYPE (HCC): ICD-10-CM

## 2025-02-22 DIAGNOSIS — G89.29 OTHER CHRONIC PAIN: ICD-10-CM

## 2025-02-24 DIAGNOSIS — K85.90 ACUTE ON CHRONIC PANCREATITIS (HCC): ICD-10-CM

## 2025-02-24 DIAGNOSIS — K86.1 ACUTE ON CHRONIC PANCREATITIS (HCC): ICD-10-CM

## 2025-02-26 RX ORDER — HYDROCODONE BITARTRATE AND ACETAMINOPHEN 10; 325 MG/1; MG/1
1 TABLET ORAL EVERY 6 HOURS PRN
Qty: 21 TABLET | Refills: 0 | Status: SHIPPED | OUTPATIENT
Start: 2025-02-26

## 2025-02-26 RX ORDER — GABAPENTIN 300 MG/1
CAPSULE ORAL
Refills: 0 | OUTPATIENT
Start: 2025-02-26

## 2025-02-26 RX ORDER — PANTOPRAZOLE SODIUM 40 MG/1
40 TABLET, DELAYED RELEASE ORAL
Qty: 90 TABLET | Refills: 3 | OUTPATIENT
Start: 2025-02-26

## 2025-02-26 NOTE — TELEPHONE ENCOUNTER
Please review. Refill failed protocol / no protocol.     Recent fills each # 21 : 02/17/2025 by Dr. Enamorado, #20 : 02/11/2025  Last prescription written: 02/17/2025  Last office visit:  2/17/2025    Future Appointments   Date Time Provider Department Center   2/27/2025  3:30 PM Audrey Easton, PhD [facility] [place]   5/23/2025  8:30 AM Tolu Villarreal MD [id number]      Requested Prescriptions     Pending Prescriptions Disp Refills    HYDROcodone-acetaminophen  MG Oral Tab 21 tablet 0     Sig: Take 1 tablet by mouth every 4 (four) hours as needed for Pain.     Sent [employer] message to patient that request was forwarded to provider to review.

## 2025-02-26 NOTE — TELEPHONE ENCOUNTER
Spoke to patients wife Torey (FABY) states she received message to schedule appointment regarding gabapentin refill. States that patient needs [place]. Advised that patient would need to be seen in because gabapentin was not prescribed by our provider. States patient doesn't need gabapentin patient needs [place]. Advised that request has went to provider for approval. Has upcoming appointment with Pain management.     Wife states patient was just seen by Dr Enamorado who prescribed Norco . Patient was given 21 day supply.

## 2025-02-26 NOTE — TELEPHONE ENCOUNTER
Spoke with patient's wife,  Date of Birth verified.  She wants to follow up on patient med refill.   pls advise, thanks in advance.             Requested Prescriptions     Pending Prescriptions Disp Refills    HYDROcodone-acetaminophen (NORCO) 7.5-325 MG Oral Tab 20 tablet 0     Sig: Take 1 tablet by mouth every 6 (six) hours as needed for Pain.       Last Office Visit with PCP: 2/11/2025

## 2025-02-26 NOTE — TELEPHONE ENCOUNTER
Please review. Refill failed protocol. Provider never prescribed medication.     Sent [employer] message to patient contact previous prescriber.

## 2025-02-27 RX ORDER — HYDROCODONE BITARTRATE AND ACETAMINOPHEN 7.5; 325 MG/1; MG/1
1 TABLET ORAL EVERY 6 HOURS PRN
Qty: 20 TABLET | Refills: 0 | OUTPATIENT
Start: 2025-02-27

## 2025-03-06 DIAGNOSIS — G89.29 OTHER CHRONIC PAIN: ICD-10-CM

## 2025-03-06 DIAGNOSIS — K86.1 CHRONIC PANCREATITIS, UNSPECIFIED PANCREATITIS TYPE (HCC): ICD-10-CM

## 2025-03-06 DIAGNOSIS — G47.00 INSOMNIA, UNSPECIFIED TYPE: ICD-10-CM

## 2025-03-07 DIAGNOSIS — K86.1 ACUTE ON CHRONIC PANCREATITIS (HCC): ICD-10-CM

## 2025-03-07 DIAGNOSIS — K85.90 ACUTE ON CHRONIC PANCREATITIS (HCC): ICD-10-CM

## 2025-03-07 RX ORDER — DULOXETIN HYDROCHLORIDE 60 MG/1
60 CAPSULE, DELAYED RELEASE ORAL DAILY
Qty: 90 CAPSULE | Refills: 0 | Status: SHIPPED | OUTPATIENT
Start: 2025-03-07

## 2025-03-07 NOTE — TELEPHONE ENCOUNTER
Please kindly review this medication    [x] FAILS PROTOCOL    [] HAS NO PROTOCOL ATTACHED    Medication was initiated 1/30/2025 for pain from Dr. Mcarthur    Patient was hospitalized at [facility] and was discharged with Duloxetine.    This medication is listed as patient reported.

## 2025-03-07 NOTE — TELEPHONE ENCOUNTER
Discharge Summary 1/30/25 ([facility] Medicine Admission):  Eloise Mcarthur M.D.  Discharge Summary     Signed  Date of Service: 1/30/2025  1:19 PM  Note Received: 2/27/2025  3:42 PM  Discharge Recs:  - Pain management:                - Tylenol 1,000 mg PO q8H                - Duloxetine 30 mg PO QD (1/28 - 2/4), then increase to 60 mg PO QD

## 2025-03-10 ENCOUNTER — OFFICE VISIT (OUTPATIENT)
Dept: PAIN CLINIC | Facility: CLINIC | Age: 50
End: 2025-03-10
Payer: COMMERCIAL

## 2025-03-10 VITALS
WEIGHT: 163 LBS | DIASTOLIC BLOOD PRESSURE: 78 MMHG | HEART RATE: 95 BPM | SYSTOLIC BLOOD PRESSURE: 132 MMHG | OXYGEN SATURATION: 99 % | BODY MASS INDEX: 20 KG/M2

## 2025-03-10 DIAGNOSIS — G89.29 OTHER CHRONIC PAIN: ICD-10-CM

## 2025-03-10 DIAGNOSIS — K86.1 CHRONIC PANCREATITIS, UNSPECIFIED PANCREATITIS TYPE (HCC): ICD-10-CM

## 2025-03-10 PROCEDURE — G2211 COMPLEX E/M VISIT ADD ON: HCPCS | Performed by: ANESTHESIOLOGY

## 2025-03-10 PROCEDURE — 3078F DIAST BP <80 MM HG: CPT | Performed by: ANESTHESIOLOGY

## 2025-03-10 PROCEDURE — 3075F SYST BP GE 130 - 139MM HG: CPT | Performed by: ANESTHESIOLOGY

## 2025-03-10 PROCEDURE — 99215 OFFICE O/P EST HI 40 MIN: CPT | Performed by: ANESTHESIOLOGY

## 2025-03-10 RX ORDER — GABAPENTIN 300 MG/1
CAPSULE ORAL
Refills: 0 | Status: CANCELLED | OUTPATIENT
Start: 2025-03-10

## 2025-03-10 RX ORDER — HYDROCODONE BITARTRATE AND ACETAMINOPHEN 10; 325 MG/1; MG/1
1 TABLET ORAL 2 TIMES DAILY PRN
Qty: 30 TABLET | Refills: 0 | Status: SHIPPED | OUTPATIENT
Start: 2025-03-10

## 2025-03-10 RX ORDER — OXYBUTYNIN CHLORIDE 5 MG/1
5 TABLET, EXTENDED RELEASE ORAL DAILY
COMMUNITY

## 2025-03-10 RX ORDER — HYDROCODONE BITARTRATE AND ACETAMINOPHEN 10; 325 MG/1; MG/1
1 TABLET ORAL EVERY 6 HOURS PRN
Qty: 21 TABLET | Refills: 0 | OUTPATIENT
Start: 2025-03-10

## 2025-03-10 NOTE — TELEPHONE ENCOUNTER
Please review; protocol failed/No Protocol      Recent Fills: 10/15/2024, 12/17/2024    Last Rx Written: 12/17/2024    Last Office Visit: 02/17/2025

## 2025-03-10 NOTE — TELEPHONE ENCOUNTER
Norco: 30 tablets sent to [facility] in [place] on 03/10/2025 by Dr. Michael Morales    Gabapentin 300mg: per refill request dated 02/24/2025 patient needs to schedule an appointment to discuss Gabapentin since it was not written by Dr. Hdez

## 2025-03-10 NOTE — PROGRESS NOTES
Patient presents in office today with reported pain in pancreas pain    Current pain level reported = 8/10    Last reported dose of None      Narcotic Contract renewal None    Urine Drug screen None              Airway patent, nasal mucosa clear, mouth with normal mucosa. Throat has no vesicles, no oropharyngeal exudates and uvula is midline. Clear tympanic membranes bilaterally.

## 2025-03-10 NOTE — PROGRESS NOTES
Name: Fredy oVgt   : 2/3/1975   DOS: 3/10/2025     Pain Clinic Follow Up Visit:     Chief Complaint   Patient presents with    Follow - Up     Pain, LOV        Fredy Vogt is a 50 year old male with a complex past medical history including acute on chronic pancreatitis and recent hospital discharge due to bacteremia and sepsis.  The patient complains of chronic epigastric abdominal pain.  This has been a complex condition and manage requiring multi specialty care.  Today, rates the pain as 8 out of 10.  Has been using hydrocodone after hospital discharge for management of his chronic abdominal pain.  The patient is using the medication sparingly.  Generally takes 1 tablet at night to help with easing pain and sleep.  He is also engaged in cog behavior therapy.    Pt denies any chills, fever, or weakness. There is no bladder or bowel incontinence associated with the pain.    REVIEW OF SYSTEMS:  A ten point review of systems was performed with pertinent positives and negatives in the HPI.    Allergies[1]    Current Outpatient Medications   Medication Sig Dispense Refill    oxybutynin ER 5 MG Oral Tablet 24 Hr Take 1 tablet (5 mg total) by mouth daily.      HYDROcodone-acetaminophen  MG Oral Tab Take 1 tablet by mouth 2 (two) times daily as needed for Pain. 30 tablet 0    DULoxetine 60 MG Oral Cap DR Particles Take 1 capsule (60 mg total) by mouth daily. 90 capsule 0    apixaban (ELIQUIS) 5 MG Oral Tab Take 1 tablet (5 mg total) by mouth 2 (two) times daily. 60 tablet 0    ondansetron (ZOFRAN) 4 mg tablet Take 1 tablet (4 mg total) by mouth every 8 (eight) hours as needed for Nausea. 20 tablet 0    Naloxone HCl 4 MG/0.1ML Nasal Liquid 4 mg by Nasal route as needed. If patient remains unresponsive, repeat dose in other nostril 2-5 minutes after first dose. 1 kit 0    tamsulosin 0.4 MG Oral Cap Take 2 capsules (0.8 mg total) by mouth daily. 180 capsule 6    finasteride 5 MG Oral Tab Take 1 tablet (5  mg total) by mouth daily. 90 tablet 5    ZENPEP [id number] units Oral Cap DR Particles Take 1 capsule by mouth 3 (three) times daily with meals. 300 capsule 3    dicyclomine 20 MG Oral Tab Take 1 tablet (20 mg total) by mouth 4 (four) times daily as needed. 20 tablet 0    lisinopril 40 MG Oral Tab Take 1 tablet (40 mg total) by mouth daily. 90 tablet 3         EXAM:   /78 (BP Location: Left arm, Patient Position: Sitting, Cuff Size: adult)   Pulse 95   Wt 163 lb (73.9 kg)   SpO2 99%   BMI 20.37 kg/m²   General:  Patient is a(n) 50 year old year old male in no acute distress.  Neurologic:: WNL-Orientation to time, place and person, normal mood & affect, concentration & attention span intact.   Inspection:  Ambulates with well-coordinated, fluid, non-antalgic gait.  Gait is normal.  Neck: Full range of motion  Back: Gait intact  Cranial nerve: Grossly intact  Respiratory: Nonlabored  IMAGES:     CT abdomen reviewed with features of acute on chronic pancreatitis.  This pancreatic information with progressive narrowing of the portal splenic confluence    ASSESSMENT AND PLAN:     1. Chronic pancreatitis, unspecified pancreatitis type (HCC)    2. Other chronic pain      The patient is a pleasant 50-year-old gentleman with a history of chronic pancreatitis.  This was exacerbated by acute pancreatitis and bacteremia leading to hospital and ICU admission.  Patient complains of chronic epigastric pain.  Had a detailed and candid discussion with the patient regarding management.  He realizes that his management of the chronic abdominal pain will be challenging.  He will likely always have some degree of abdominal pain.  Strongly support the patient as the anxiety to minimize pain medication usage as this can lead to worsening pancreatic issues.  Patient will be following with his GI team at [facility] to optimize long-term care.  In the short-term, I did provide him 30 tablets of hydrocodone.  If he is  taking the medication once or twice daily, this can certainly be managed by his primary care physician as this is well below the prescribing requirements for chronic pain management.  Encourage patient to discuss possible celiac plexus block with his GI team.  This can be done with endoscopic ultrasound or through my office.  Of note, he would need to hold his Eliquis for the procedure.  Patient has GI follow-up in 6 weeks.  He will call the office to determine if [facility]  is okay with him moving forward with GI plexus block.      Pain is  limiting functional status. Failed conservative treatment consisting of medications, activity modification, and  PT.  1.Risks of long term narcotic use d/w pt including dependence, abuse, and death from overdose and mixing narcotic with alcohol. Pt verbalized understanding.     Medications filled today:  Requested Prescriptions     Signed Prescriptions Disp Refills    HYDROcodone-acetaminophen  MG Oral Tab 30 tablet 0     Sig: Take 1 tablet by mouth 2 (two) times daily as needed for Pain.     Orders:No orders of the defined types were placed in this encounter.        Radiology orders and consultations:None  The patient indicates understanding of these issues and agrees to the plan.  No follow-ups on file.    Michael Morales MD, 3/10/2025, 9:26 AM              [1]   Allergies  Allergen Reactions    Morphine ITCHING

## 2025-03-10 NOTE — PATIENT INSTRUCTIONS
Refill policies:    Allow 2-3 business days for refills; controlled substances may take longer.  Contact your pharmacy at least 5 days prior to running out of medication and have them send an electronic request or submit request through the “request refill” option in your [employer] account.  Refills are not addressed on weekends; covering physicians do not authorize routine medications on weekends.  No narcotics or controlled substances are refilled after noon on Fridays or by on call physicians.  By law, narcotics must be electronically prescribed.  A 30 day supply with no refills is the maximum allowed.  If your prescription is due for a refill, you may be due for a follow up appointment.  To best provide you care, patients receiving routine medications need to be seen at least once a year.  Patients receiving narcotic/controlled substance medications need to be seen at least once every 3 months.  In the event that your preferred pharmacy does not have the requested medication in stock (e.g. Backordered), it is your responsibility to find another pharmacy that has the requested medication available.  We will gladly send a new prescription to that pharmacy at your request.    Scheduling Tests:    If your physician has ordered radiology tests such as MRI or CT scans, please contact Central Scheduling at [phone] right away to schedule the test.  Once scheduled, the [facility] Centralized Referral Team will work with your insurance carrier to obtain pre-certification or prior authorization.  Depending on your insurance carrier, approval may take 3-10 days.  It is highly recommended patients assure they have received an authorization before having a test performed.  If test is done without insurance authorization, patient may be responsible for the entire amount billed.      Precertification and Prior Authorizations:  If your physician has recommended that you have a procedure or additional testing performed the [facility]  Centralized Referral Team will contact your insurance carrier to obtain pre-certification or prior authorization.    You are strongly encouraged to contact your insurance carrier to verify that your procedure/test has been approved and is a COVERED benefit.  Although the [facility] Centralized Referral Team does its due diligence, the insurance carrier gives the disclaimer that \"Although the procedure is authorized, this does not guarantee payment.\"    Ultimately the patient is responsible for payment.   Thank you for your understanding in this matter.  Paperwork Completion:  If you require FMLA or disability paperwork for your recovery, please make sure to either drop it off or have it faxed to our office at [phone]. Be sure the form has your name and date of birth on it.  The form will be faxed to our Forms Department and they will complete it for you.  There is a 25$ fee for all forms that need to be filled out.  Please be aware there is a 10-14 day turnaround time.  You will need to sign a release of information (FABY) form if your paperwork does not come with one.  You may call the Forms Department with any questions at [phone].  Their fax number is [phone].

## 2025-03-11 RX ORDER — HYDROCODONE BITARTRATE AND ACETAMINOPHEN 7.5; 325 MG/1; MG/1
1 TABLET ORAL EVERY 6 HOURS PRN
Qty: 20 TABLET | Refills: 0 | OUTPATIENT
Start: 2025-03-11

## 2025-03-21 DIAGNOSIS — K86.1 CHRONIC PANCREATITIS, UNSPECIFIED PANCREATITIS TYPE (HCC): ICD-10-CM

## 2025-03-21 DIAGNOSIS — G89.29 OTHER CHRONIC PAIN: ICD-10-CM

## 2025-03-21 RX ORDER — HYDROCODONE BITARTRATE AND ACETAMINOPHEN 10; 325 MG/1; MG/1
1 TABLET ORAL 2 TIMES DAILY PRN
Qty: 30 TABLET | Refills: 0 | OUTPATIENT
Start: 2025-03-21

## 2025-03-21 NOTE — TELEPHONE ENCOUNTER
Medication:   HYDROcodone-acetaminophen  MG Oral Tab     Date of last refill: 3/10/25  [place]10/25    Last office visit: 3/10/25  Due back to clinic per last office note:  NA  Date next office visit scheduled:  None    Last URINE Screen: NA  Screen Results:  NA      Narcotic Contract EXPIRATION date: NA    Last OV note recommendation: The patient is a pleasant 50-year-old gentleman with a history of chronic pancreatitis.  This was exacerbated by acute pancreatitis and bacteremia leading to hospital and ICU admission.  Patient complains of chronic epigastric pain.  Had a detailed and candid discussion with the patient regarding management.  He realizes that his management of the chronic abdominal pain will be challenging.  He will likely always have some degree of abdominal pain.  Strongly support the patient as the anxiety to minimize pain medication usage as this can lead to worsening pancreatic issues.  Patient will be following with his GI team at [facility] to optimize long-term care.  In the short-term, I did provide him 30 tablets of hydrocodone.  If he is taking the medication once or twice daily, this can certainly be managed by his primary care physician as this is well below the prescribing requirements for chronic pain management.  Encourage patient to discuss possible celiac plexus block with his GI team.  This can be done with endoscopic ultrasound or through my office.  Of note, he would need to hold his Eliquis for the procedure.  Patient has GI follow-up in 6 weeks.  He will call the office to determine if his GI team at [facility] is okay with him moving forward with GI plexus block.

## 2025-03-21 NOTE — TELEPHONE ENCOUNTER
According to plan below, this did not seem like something we were going to be managing for him long term.  Discussed with Dr. Morales who confirmed that we are not managing this medication for him, and he was to discuss with his GI team or PCP.  As such, refill denied.

## 2025-03-24 ENCOUNTER — PATIENT MESSAGE (OUTPATIENT)
Dept: PAIN CLINIC | Facility: CLINIC | Age: 50
End: 2025-03-24

## 2025-03-26 RX ORDER — LISINOPRIL 40 MG/1
40 TABLET ORAL DAILY
Qty: 90 TABLET | Refills: 3 | Status: SHIPPED | OUTPATIENT
Start: 2025-03-26

## 2025-03-27 ENCOUNTER — TELEPHONE (OUTPATIENT)
Dept: INTERNAL MEDICINE CLINIC | Facility: CLINIC | Age: 50
End: 2025-03-27

## 2025-03-27 DIAGNOSIS — G89.29 OTHER CHRONIC PAIN: ICD-10-CM

## 2025-03-27 DIAGNOSIS — K86.1 CHRONIC PANCREATITIS, UNSPECIFIED PANCREATITIS TYPE (HCC): ICD-10-CM

## 2025-03-27 RX ORDER — HYDROCODONE BITARTRATE AND ACETAMINOPHEN 10; 325 MG/1; MG/1
1 TABLET ORAL 2 TIMES DAILY PRN
Qty: 30 TABLET | Refills: 0 | Status: SHIPPED | OUTPATIENT
Start: 2025-03-27

## 2025-03-27 NOTE — TELEPHONE ENCOUNTER
Patient's wife called requesting to speak with nurse regarding medication refill. Ok to transfer call to RN.

## 2025-03-27 NOTE — TELEPHONE ENCOUNTER
We can direct the PCP to refer to our opiate prescribing policy or speak with myself or Dr Yen if there is confusion. I will send a note to the PCP

## 2025-03-27 NOTE — TELEPHONE ENCOUNTER
Michael Morales MD [employer] [facility] (8:36 AM)       We can direct the PCP to refer to our opiate prescribing policy or speak with myself or Dr Yen if there is confusion. I will send a note to the PCP

## 2025-03-27 NOTE — TELEPHONE ENCOUNTER
Problem: Pain  Goal: Verbalizes/displays adequate comfort level or baseline comfort level  Outcome: Progressing     Problem: Safety - Adult  Goal: Free from fall injury  Outcome: Progressing      Patient wife calling, she and patient have been called by Dr. Morales's office and was informed that they will no longer be seeing the patient for pain management. Wife says that Fredy does not meet requirements for their treatment since it's GI related.  Julia from Dr. Morales's office told wife/patient that they only seen nerve or neurological conditions and they would need to reach out to PCP for next steps and plan.    Patient is out of pain meds, wife says that only way PCP has filled these for fredy up until now is with visit.  I did book for first available but asking if can be seen sooner or what is plan for management for Leeroy recommended by Dr. Hdez?

## 2025-03-27 NOTE — TELEPHONE ENCOUNTER
Received a call from the patient's wife in regards to the message sent to her  about the requested refill for Norco. Advised Torey that per Dr. Morales, the patient was only given a short course of Norco for his pain, and the patient was advised to reach out to his PCP and GI provider. Per Torey, the patient's PCP and GI referred him to the pain clinic to take care of his pain and advised Torey that her  only takes 1-2 tabs of Norco, and this does not meet the criteria for chronic pain management. This is something PT's PCP can manage or his GI. Per Torey, they need a letter explaining this to the patient's PCP so they can provide him the pain medication. Advised Torey, if pt's pcp is also from [place] they should be aware of the criteria for chronic pain management. Troey asked what the criteria are to be able to be considered a patient of our pain clinic. Advised the patient that we are an intervention-based pain clinic, where we cater to spine problems, back pain, etc where we do injections and certain amount of pain medication that pt is taking. Torey vu and no further questions at this time.

## 2025-03-27 NOTE — TELEPHONE ENCOUNTER
FABY shows general information only for wife, Torey.     Left message on both numbers stating the refill has been sent and patient needs to see Dr. Hdez on 4/2/25.     [employer] message also sent.  Attempt #1

## 2025-03-28 NOTE — TELEPHONE ENCOUNTER
Appears MyChart message was read.    Left voice message for patient to  call office back if any questions regarding prescription being sent to his pharmacy.  Office phone number provided with office telephone hours.

## 2025-03-28 NOTE — TELEPHONE ENCOUNTER
The patient called back, gave him the information from the my chart message. He has no further questions.

## 2025-04-02 ENCOUNTER — OFFICE VISIT (OUTPATIENT)
Dept: INTERNAL MEDICINE CLINIC | Facility: CLINIC | Age: 50
End: 2025-04-02
Payer: COMMERCIAL

## 2025-04-02 VITALS
HEART RATE: 116 BPM | DIASTOLIC BLOOD PRESSURE: 75 MMHG | BODY MASS INDEX: 21 KG/M2 | WEIGHT: 170.38 LBS | SYSTOLIC BLOOD PRESSURE: 140 MMHG

## 2025-04-02 DIAGNOSIS — K86.1 CHRONIC PANCREATITIS, UNSPECIFIED PANCREATITIS TYPE (HCC): Primary | ICD-10-CM

## 2025-04-02 PROCEDURE — 3077F SYST BP >= 140 MM HG: CPT | Performed by: INTERNAL MEDICINE

## 2025-04-02 PROCEDURE — 99213 OFFICE O/P EST LOW 20 MIN: CPT | Performed by: INTERNAL MEDICINE

## 2025-04-02 PROCEDURE — 3078F DIAST BP <80 MM HG: CPT | Performed by: INTERNAL MEDICINE

## 2025-04-02 RX ORDER — ZOLPIDEM TARTRATE 10 MG/1
10 TABLET ORAL NIGHTLY PRN
Qty: 15 TABLET | Refills: 0 | Status: SHIPPED | OUTPATIENT
Start: 2025-04-02

## 2025-04-02 NOTE — PROGRESS NOTES
Subjective:     Patient ID: Fredy Vogt is a 50 year old male.    Pt present today for ffup regarding pain management for his chronic pancreatitis, he was seen by pain specilaiist  but was told they are not prescribing oral pain meds and that they are procedure based pain treatmen instead. There was a mention of celiac plexus block but told pt that this may also be done by pt's GI.       History/Other:   Review of Systems   Respiratory: Negative.       Current Outpatient Medications   Medication Sig Dispense Refill    HYDROcodone-acetaminophen  MG Oral Tab Take 1 tablet by mouth 2 (two) times daily as needed for Pain. 30 tablet 0    lisinopril 40 MG Oral Tab Take 1 tablet (40 mg total) by mouth daily. 90 tablet 3    oxybutynin ER 5 MG Oral Tablet 24 Hr Take 1 tablet (5 mg total) by mouth daily.      DULoxetine 60 MG Oral Cap DR Particles Take 1 capsule (60 mg total) by mouth daily. 90 capsule 0    apixaban (ELIQUIS) 5 MG Oral Tab Take 1 tablet (5 mg total) by mouth 2 (two) times daily. 60 tablet 0    ondansetron (ZOFRAN) 4 mg tablet Take 1 tablet (4 mg total) by mouth every 8 (eight) hours as needed for Nausea. 20 tablet 0    tamsulosin 0.4 MG Oral Cap Take 2 capsules (0.8 mg total) by mouth daily. 180 capsule 6    finasteride 5 MG Oral Tab Take 1 tablet (5 mg total) by mouth daily. 90 tablet 5    ZENPEP [id number] units Oral Cap DR Particles Take 1 capsule by mouth 3 (three) times daily with meals. 300 capsule 3    dicyclomine 20 MG Oral Tab Take 1 tablet (20 mg total) by mouth 4 (four) times daily as needed. 20 tablet 0    Naloxone HCl 4 MG/0.1ML Nasal Liquid 4 mg by Nasal route as needed. If patient remains unresponsive, repeat dose in other nostril 2-5 minutes after first dose. (Patient not taking: Reported on 4/2/2025) 1 kit 0     Allergies:Allergies[1]    Past Medical History:    Alcohol abuse    Back problem    Colon adenoma    x1    High blood pressure    Microcytosis    Pancreatic cyst (HCC)     Pancreatitis (HCC)    Pancreatitis, alcoholic, acute (HCC)    Pulmonary nodule    Tobacco use disorder    Unspecified essential hypertension      Past Surgical History:   Procedure Laterality Date    Colonoscopy  07/12/2023    Egd  07/20/2016    Electrocardiogram, complete  12/26/2013    scanned to media tab    Lumbar discectomy      2023    Other surgical history      pancreatic cyst drainage by Dr Munoz. then saw [facility] had procedure done by Dr donohue for the cyst    Upper gi endoscopy,biopsy  07/2016      Family History   Problem Relation Age of Onset    Hypertension Father     Cancer Father     Diabetes Mother     Hypertension Mother     Heart Disorder Mother     Other (Other) Mother         copd    No Known Problems Daughter     Other (Other) Brother         motorbke accident      Social History:   Social History     Socioeconomic History    Marital status:    Tobacco Use    Smoking status: Former     Current packs/day: 0.50     Average packs/day: 0.5 packs/day for 20.0 years (10.0 ttl pk-yrs)     Types: Cigarettes     Passive exposure: Current    Smokeless tobacco: Never    Tobacco comments:     About 7 cigarettes a days   Vaping Use    Vaping status: Never Used   Substance and Sexual Activity    Alcohol use: Not Currently    Drug use: Not Currently   Other Topics Concern    Caffeine Concern Yes     Comment: 1 ice coffee daily    Exercise Yes     Social Drivers of Health     Food Insecurity: Patient Declined (1/22/2025)    Received from [facility]    Hunger Vital Sign     Worried About Running Out of Food in the Last Year: Patient declined     Ran Out of Food in the Last Year: Patient declined   Transportation Needs: Patient Declined (1/22/2025)    Received from [facility]    PRAPARE - Transportation     Lack of Transportation (Medical): Patient declined     Lack of Transportation (Non-Medical): Patient declined   Stress: Patient Declined (1/22/2025)     Received from [facility]    [place] [place] [employer] - Occupational Stress Questionnaire     Feeling of Stress : Patient declined   Housing Stability: Patient Declined (1/22/2025)    Received from [facility]    Housing Stability Vital Sign     Unable to Pay for Housing in the Last Year: Patient declined     Number of Times Moved in the Last Year: 0     Homeless in the Last Year: Patient declined        Objective:   Physical Exam  Constitutional:       General: He is not in acute distress.     Appearance: He is well-developed. He is not ill-appearing, toxic-appearing or diaphoretic.   HENT:      Head: Normocephalic and atraumatic.      Right Ear: Tympanic membrane, ear canal and external ear normal.      Left Ear: Tympanic membrane, ear canal and external ear normal.      Nose: Nose normal.      Mouth/Throat:      Pharynx: No oropharyngeal exudate.   Eyes:      General: No scleral icterus.        Right eye: No discharge.         Left eye: No discharge.      Conjunctiva/sclera: Conjunctivae normal.      Pupils: Pupils are equal, round, and reactive to light.   Neck:      Thyroid: No thyromegaly.      Vascular: No JVD.   Cardiovascular:      Rate and Rhythm: Regular rhythm.      Pulses: Normal pulses.      Heart sounds: Normal heart sounds. No murmur heard.     No friction rub. No gallop.   Pulmonary:      Effort: Pulmonary effort is normal. No respiratory distress.      Breath sounds: Normal breath sounds. No wheezing or rales.   Abdominal:      General: Abdomen is flat. Bowel sounds are normal. There is no distension.      Palpations: Abdomen is soft. There is no mass.      Tenderness: There is abdominal tenderness. There is no guarding or rebound.   Musculoskeletal:         General: Normal range of motion.      Cervical back: Normal range of motion and neck supple. No rigidity or tenderness.      Right lower leg: No edema.      Left lower leg: No edema.    Lymphadenopathy:      Cervical: No cervical adenopathy.   Skin:     General: Skin is warm and dry.      Coloration: Skin is not jaundiced or pale.      Findings: No rash.   Neurological:      Mental Status: He is alert and oriented to person, place, and time.   Psychiatric:         Mood and Affect: Mood normal.         Assessment & Plan:   (K86.1) Chronic pancreatitis, unspecified pancreatitis type (HCC)  (primary encounter diagnosis)  Plan: pt was seen by pain specialist but told will not prescribe pain meds ; there was an initial disicussion of celiac plexus block and was told then that GI specialist can do this procedure. I told pt to talk to his GI specialist regarding this. For the meantime. Pain med refilled.        No orders of the defined types were placed in this encounter.      Meds This Visit:  Requested Prescriptions     Pending Prescriptions Disp Refills    apixaban (ELIQUIS) 5 MG Oral Tab 60 tablet 1     Sig: Take 1 tablet (5 mg total) by mouth 2 (two) times daily.       Imaging & Referrals:  None          [1]   Allergies  Allergen Reactions    Morphine ITCHING

## 2025-04-03 NOTE — TELEPHONE ENCOUNTER
Please review pended refill request as unable to refill due to High / Very High drug interaction or warning copied here:  Duplicate Therapy: apixabanFACTOR Xa/DIRECT THROMBIN INHIBITOR. No Abuse/Dependency Potential.      Patient requesting pharmacy change to [facility]. New Script will be sent to [facility] per patient request on 04/03/2025.    Eliquis 5 mg : 60 tabs and 1 refill was sent to [facility] in [place] on 04/02/2025.      Outpatient Medication Detail     Disp Refills Start End    apixaban (ELIQUIS) 5 MG Oral Tab 60 tablet 1 4/2/2025 --    Sig - Route: Take 1 tablet (5 mg total) by mouth 2 (two) times daily. - Oral    Sent to pharmacy as: Apixaban 5 MG Oral Tablet (Eliquis)    E-Prescribing Status: Receipt confirmed by pharmacy (4/2/2025 10:35 AM CDT)      Pharmacy    [id number] [place], [place] - [address] [facility] [address] [facility] [address], [phone], [phone]

## 2025-04-09 DIAGNOSIS — K86.1 CHRONIC PANCREATITIS, UNSPECIFIED PANCREATITIS TYPE (HCC): ICD-10-CM

## 2025-04-09 DIAGNOSIS — G89.29 OTHER CHRONIC PAIN: ICD-10-CM

## 2025-04-14 RX ORDER — DULOXETIN HYDROCHLORIDE 60 MG/1
60 CAPSULE, DELAYED RELEASE ORAL DAILY
Qty: 90 CAPSULE | Refills: 1 | Status: SHIPPED | OUTPATIENT
Start: 2025-04-14

## 2025-04-14 NOTE — TELEPHONE ENCOUNTER
Please review: medication fails/has no protocol attached.    No future appointments with primary care medicine.    Recent fill dates: 3/29/25, 3/10/25 (prescribed by Dr. Michael Morales, Pain Management), and 2/26/25  Date of  last written prescription: 3/27/25   Last dispensed quantity: #30 each and processed as a 15 day supply  Last office visit: 4/2/2025  [x] Takes as needed  [] Takes scheduled daily

## 2025-04-15 RX ORDER — HYDROCODONE BITARTRATE AND ACETAMINOPHEN 10; 325 MG/1; MG/1
1 TABLET ORAL 2 TIMES DAILY PRN
Qty: 30 TABLET | Refills: 0 | Status: SHIPPED | OUTPATIENT
Start: 2025-04-15

## 2025-05-05 DIAGNOSIS — K86.1 CHRONIC PANCREATITIS, UNSPECIFIED PANCREATITIS TYPE (HCC): ICD-10-CM

## 2025-05-05 DIAGNOSIS — G89.29 OTHER CHRONIC PAIN: ICD-10-CM

## 2025-05-05 RX ORDER — DULOXETIN HYDROCHLORIDE 60 MG/1
60 CAPSULE, DELAYED RELEASE ORAL DAILY
Qty: 90 CAPSULE | Refills: 1 | Status: CANCELLED | OUTPATIENT
Start: 2025-05-05

## 2025-05-06 RX ORDER — HYDROCODONE BITARTRATE AND ACETAMINOPHEN 10; 325 MG/1; MG/1
1 TABLET ORAL 2 TIMES DAILY PRN
Qty: 30 TABLET | Refills: 0 | Status: SHIPPED | OUTPATIENT
Start: 2025-05-06

## 2025-05-06 NOTE — TELEPHONE ENCOUNTER
Left detailed message on wife Torey's phone.   Stated in message that pain medication was sent, but we have additional recommendations from Dr. Hdez.     Asked wife to call triage back, left number and hours.    Attempt #1

## 2025-05-06 NOTE — TELEPHONE ENCOUNTER
Please review; protocol failed/ has no protocol       Melodie Mendez, RN routed conversation to Norberto Hdez MD1 hour ago (10:02 AM)     Melodie Mendez RN1 hour ago (10:01 AM)     KW  Spouse Torey(on HIPAA) calling to follow up on medication. Advised that should still have active refills of the duloxetine at [facility] pharmacy. Patient is out of the hydrocodone.  Spouse stated that the pain management doctor could not help patient manage is pain. Wanted to know who he should see then to manage his pain? Please advise.                  Recent fills: 04/15/2025,03/29/2025,03/10/2025  Last Rx written: 04/15/2025  Last Office Visit: 04/02/2025    Recent Visits  Date Type Provider Dept   04/02/25 Office Visit Norberto Hdez MD [facility]

## 2025-05-06 NOTE — TELEPHONE ENCOUNTER
Spouse Torey(on HIPAA) calling to follow up on medication. Advised that should still have active refills of the duloxetine at [facility] pharmacy. Patient is out of the hydrocodone.  Spouse stated that the pain management doctor could not help patient manage is pain. Wanted to know who he should see then to manage his pain? Please advise.         Pharmacy  [id number] - [place], [place] - [address] AT [facility] [address], [phone], [phone]        Disp Refills Start End    DULoxetine 60 MG Oral Cap DR Particles 90 capsule 1 4/14/2025 --    Sig - Route: Take 1 capsule (60 mg total) by mouth daily. - Oral    Sent to pharmacy as: DULoxetine HCl 60 MG Oral Capsule Delayed Release Particles (Cymbalta)    E-Prescribing Status: Receipt confirmed by pharmacy (4/14/2025  9:23 AM CDT)

## 2025-05-06 NOTE — TELEPHONE ENCOUNTER
The pain specialist who had seen had recommended pt to see his gastro to do the celiac plexus block. Did Fredy call his GI already for this procedure.  Erx sent for his norco .

## 2025-05-07 NOTE — TELEPHONE ENCOUNTER
Patient wife  returned our call ( Identified name and date of birth )     Torey states she will check with the patient about the GI procedure  ( provider is   out of [facility] )   and if appointment  not yet made she will act in this     Requesting information to be sent to [employer] , sent as requested

## 2025-05-07 NOTE — TELEPHONE ENCOUNTER
Left Voicemail  for wife Torey to call back our office. Office phone number provided with office telephone hours.     2nd attempt

## 2025-05-08 ENCOUNTER — PATIENT MESSAGE (OUTPATIENT)
Dept: INTERNAL MEDICINE CLINIC | Facility: CLINIC | Age: 50
End: 2025-05-08

## 2025-05-08 DIAGNOSIS — K90.0 CELIAC DISEASE (HCC): ICD-10-CM

## 2025-05-08 DIAGNOSIS — K86.1 CHRONIC PANCREATITIS, UNSPECIFIED PANCREATITIS TYPE (HCC): Primary | ICD-10-CM

## 2025-05-09 ENCOUNTER — TELEPHONE (OUTPATIENT)
Dept: PAIN CLINIC | Facility: CLINIC | Age: 50
End: 2025-05-09

## 2025-05-09 NOTE — TELEPHONE ENCOUNTER
Manage care please fax Dr Mills at [facility] referral to the fax number provided by patient in his [employer] message. Thank you.    Please reply to pool: EM RN TRIAGE

## 2025-05-09 NOTE — TELEPHONE ENCOUNTER
Order Questions    Question Answer   Anesthesia Type Sedation   Provider Andrew   [facility] Procedure [facility]   CPT (Hit enter after each entry) INJECT NERV BLCK,CELIAC PLEXUS   Procedure Modifier ;BILATERAL PROCEDURE   Medications to hold apixiban   Medical clearance requested (will send to Pain Navigator) Yes   Patient has Medicare coverage? No   Comments (Please list entire procedure name here.) Bilateral celiac plexus block

## 2025-05-09 NOTE — TELEPHONE ENCOUNTER
Medical Clearance faxed to 's Office at Fax #: [phone];confirmation r'cvd.    25      RE: Fredy Vogt    : 2/3/1975    Dear Dr. Hdez,    Your patient is being scheduled for a pain management procedure at [facility].    Procedure:  Bilateral Celiac Plexus Block.  Date of Procedure: TBD -pending medical clearance.  Physician: Dr. Morales- Anesthesiologist     Your patient is currently taking Eliquis. Dr. Morales usually recommends this medication to be held for 3 days prior to injection.     Please verify patient is cleared to proceed with pain management procedures.

## 2025-05-13 ENCOUNTER — TELEPHONE (OUTPATIENT)
Dept: INTERNAL MEDICINE CLINIC | Facility: CLINIC | Age: 50
End: 2025-05-13

## 2025-05-14 NOTE — TELEPHONE ENCOUNTER
[employer] message sent to pt.       Future Appointments   Date Time Provider Department Center   5/16/2025  3:00 PM Audrey Easton, PhD [facility] [place]   5/23/2025  8:30 AM Tolu Villarreal MD [id number] [place]   5/30/2025  9:00 AM Audrey Easton, PhD [facility] [place]   6/6/2025  3:00 PM Audrey Easton, PhD [facility] [place]   6/16/2025 11:30 AM [facility]

## 2025-05-15 NOTE — TELEPHONE ENCOUNTER
Prior authorization request completed for: Bilateral Celiac Plexus Block   Authorization #No Prior Authorization Required.   Pre-D: Not Required.  Exclusions/Restrictions: -based on medical necessity/No Limitations on cpt code.  Covered Benefit: -based on medical necessity  Authorization dates: n/a  CPT codes approved: 14240  Number of visits/dates of service approved: 1  Physician: Andrew  Location: [facility]     Call Ref#: [id number]  Representative Name: Oralia  Insurance Carrier: [facility] (phone #: [phone])   Total Call Time : 20:00     Patient can be scheduled. Routed to Navigator.

## 2025-05-20 DIAGNOSIS — G89.29 OTHER CHRONIC PAIN: ICD-10-CM

## 2025-05-20 DIAGNOSIS — K86.1 CHRONIC PANCREATITIS, UNSPECIFIED PANCREATITIS TYPE (HCC): ICD-10-CM

## 2025-05-21 NOTE — TELEPHONE ENCOUNTER
Pended referral. Please review diagnosis and sign off if you agree.    Thank you,  Nida   [facility] [phone]

## 2025-05-23 DIAGNOSIS — G47.00 INSOMNIA, UNSPECIFIED TYPE: ICD-10-CM

## 2025-05-23 RX ORDER — ZOLPIDEM TARTRATE 10 MG/1
10 TABLET ORAL NIGHTLY PRN
Qty: 15 TABLET | Refills: 0 | Status: SHIPPED | OUTPATIENT
Start: 2025-05-23

## 2025-05-23 NOTE — TELEPHONE ENCOUNTER
Please review. Protocol Failed; No Protocol      Recent fills: 4/2/2025  Last Rx written: 4/2/2025  Last office visit: 4/2/2025        Future Appointments  Date Type Provider Dept   06/16/25 Appointment Norberto Hdez MD [facility]   Showing future appointments within next 150 days with a meds authorizing provider and meeting all other requirements

## 2025-05-24 DIAGNOSIS — K86.1 CHRONIC PANCREATITIS, UNSPECIFIED PANCREATITIS TYPE (HCC): ICD-10-CM

## 2025-05-24 DIAGNOSIS — G89.29 OTHER CHRONIC PAIN: ICD-10-CM

## 2025-05-25 RX ORDER — HYDROCODONE BITARTRATE AND ACETAMINOPHEN 10; 325 MG/1; MG/1
1 TABLET ORAL 2 TIMES DAILY PRN
Qty: 30 TABLET | Refills: 0 | OUTPATIENT
Start: 2025-05-25

## 2025-05-26 NOTE — TELEPHONE ENCOUNTER
Please review; protocol failed/No Protocol      Recent Fills: 03/29/2025, 04/15/2025, 05/06/2025    Last Rx Written: 05/06/2025    Last Office Visit: 04/02/2025

## 2025-05-29 DIAGNOSIS — K86.1 CHRONIC PANCREATITIS, UNSPECIFIED PANCREATITIS TYPE (HCC): ICD-10-CM

## 2025-05-29 DIAGNOSIS — G89.29 OTHER CHRONIC PAIN: ICD-10-CM

## 2025-05-29 RX ORDER — HYDROCODONE BITARTRATE AND ACETAMINOPHEN 10; 325 MG/1; MG/1
1 TABLET ORAL 2 TIMES DAILY PRN
Qty: 14 TABLET | Refills: 0 | Status: SHIPPED | OUTPATIENT
Start: 2025-05-29

## 2025-05-29 RX ORDER — HYDROCODONE BITARTRATE AND ACETAMINOPHEN 10; 325 MG/1; MG/1
1 TABLET ORAL 2 TIMES DAILY PRN
Qty: 30 TABLET | Refills: 0 | OUTPATIENT
Start: 2025-05-29

## 2025-05-29 NOTE — TELEPHONE ENCOUNTER
Limited quantity refill. Covering for Dr Hdez. Patient will need to follow up with Dr Hdez for further refills.

## 2025-05-30 RX ORDER — DICYCLOMINE HCL 20 MG
20 TABLET ORAL 4 TIMES DAILY PRN
Qty: 20 TABLET | Refills: 0 | Status: SHIPPED | OUTPATIENT
Start: 2025-05-30

## 2025-05-30 NOTE — TELEPHONE ENCOUNTER
Refill passed per [facility] protocol.      Last Office Visit: 04/02/2025      Please advise if refill is appropriate. Last written by Emergency Medicine Dr. Sulema Fernandez on 04/22/2024.    Requested Prescriptions     Pending Prescriptions Disp Refills    dicyclomine 20 MG Oral Tab 20 tablet 0     Sig: Take 1 tablet (20 mg total) by mouth 4 (four) times daily as needed.

## 2025-06-05 ENCOUNTER — OFFICE VISIT (OUTPATIENT)
Dept: SURGERY | Facility: CLINIC | Age: 50
End: 2025-06-05
Payer: COMMERCIAL

## 2025-06-05 DIAGNOSIS — R97.20 ELEVATED PSA: ICD-10-CM

## 2025-06-05 DIAGNOSIS — N13.8 BPH WITH OBSTRUCTION/LOWER URINARY TRACT SYMPTOMS: Primary | ICD-10-CM

## 2025-06-05 DIAGNOSIS — N40.1 BPH WITH OBSTRUCTION/LOWER URINARY TRACT SYMPTOMS: Primary | ICD-10-CM

## 2025-06-05 DIAGNOSIS — N32.81 OAB (OVERACTIVE BLADDER): ICD-10-CM

## 2025-06-05 LAB
APPEARANCE: CLEAR
BILIRUBIN: NEGATIVE
GLUCOSE (URINE DIPSTICK): NEGATIVE MG/DL
KETONES (URINE DIPSTICK): NEGATIVE MG/DL
LEUKOCYTES: NEGATIVE
MULTISTIX LOT#: NORMAL NUMERIC
NITRITE, URINE: NEGATIVE
OCCULT BLOOD: NEGATIVE
PH, URINE: 6.5 (ref 4.5–8)
PROTEIN (URINE DIPSTICK): NEGATIVE MG/DL
SPECIFIC GRAVITY: 1.02 (ref 1–1.03)
URINE-COLOR: YELLOW
UROBILINOGEN,SEMI-QN: 0.2 MG/DL (ref 0–1.9)

## 2025-06-05 PROCEDURE — G2211 COMPLEX E/M VISIT ADD ON: HCPCS | Performed by: SURGERY

## 2025-06-05 PROCEDURE — 51798 US URINE CAPACITY MEASURE: CPT | Performed by: SURGERY

## 2025-06-05 PROCEDURE — 99214 OFFICE O/P EST MOD 30 MIN: CPT | Performed by: SURGERY

## 2025-06-05 PROCEDURE — 81003 URINALYSIS AUTO W/O SCOPE: CPT | Performed by: SURGERY

## 2025-06-05 RX ORDER — OXYBUTYNIN CHLORIDE 10 MG/1
10 TABLET, EXTENDED RELEASE ORAL DAILY
Qty: 90 TABLET | Refills: 6 | Status: SHIPPED | OUTPATIENT
Start: 2025-06-05

## 2025-06-05 RX ORDER — FINASTERIDE 5 MG/1
5 TABLET, FILM COATED ORAL DAILY
Qty: 90 TABLET | Refills: 5 | Status: SHIPPED | OUTPATIENT
Start: 2025-06-05

## 2025-06-05 RX ORDER — TAMSULOSIN HYDROCHLORIDE 0.4 MG/1
0.8 CAPSULE ORAL DAILY
Qty: 180 CAPSULE | Refills: 6 | Status: SHIPPED | OUTPATIENT
Start: 2025-06-05

## 2025-06-05 NOTE — PROGRESS NOTES
Urology Clinic Note    Primary Care Provider:  Norberto Hdez MD     Chief Complaint:   Elevated PSA, BPH/LUTS     HPI:   Fredy Vogt is a 50 year old male active smoker with hx of HTN, pancreatitis s/p post exploratory laparotomy, caryn-en-Y lateral pancreaticojejunostomy with duodenum-preserving pancreatic head resection on 9/24/20 at [facility], BPH, referred for elevated PSA and prior gross hematuria.     Gross hematuria work-up is complete with normal cytology, negative CT (only IV contrast, not urogram), and cystoscopy but does have an enlarged prostate with significant trilobar hyperplasia and intravesical protrusion.      His lower urinary tract symptoms are both obstructive and overactive and most likely related to his significant BPH.  He is on tamsulosin 0.8 mg daily and oxybutynin.  His prostate measures approximately 105 g on CT.  His prostate appears obstructive with significant intravesical protrusion on cystoscopy.      4K score was 20.8, so I counseled him on prostate biopsy which she underwent with me on 12/29/2022.  TRUS volume was 52 g.  Pathology was benign.  He did have a recent CT scan and I measured his prostate at 90 mL with significant intravesical protrusion.     At his last visit with me on 6/29/2023 he was doing well with no further gross hematuria.  He denied weak urinary stream or straining to void.  He did endorse significant urgency and frequency despite oxybutynin.  He drinks 1 cup of coffee per day and occasional soda.     Today he says that if he misses his medications he notices trouble voiding and significant urgency and frequency.  However if he takes his medications he has a good urinary stream that is only mildly weak, minimal urgency and frequency.  He remains on oxybutynin and never switched over to mirabegron.     Repeat PSA on 10/18/2023 was stable at 7.06 up from 7.70 prior.     PSA on 4/17/2024 was 6.30.       He remains on tamsulosin, finasteride,  oxybutynin.  When he is taking his medications his symptoms are well-controlled and he is satisfied.  He is somewhat bothered by urinary urgency and frequency but has a good urinary stream.    Repeat PSA has not yet been performed.    AUA symptom score is 12/3 with LUTS.    Post-void residual bladder scan: 0 mL    Urinalysis: Negative    Plan:   - Continue tamsulosin and finasteride  - Increase oxybutynin dose to 10 mg extended release daily  - PSA  - Return to clinic in 3-4 months for symptom check       PSA:  Lab Results   Component Value Date    PSA 6.30 (H) 04/17/2024    PSA 7.06 (H) 10/18/2023    PSA 7.70 (H) 06/29/2023    PSA 10.00 (H) 09/09/2022    PSA 2.98 12/22/2020    PSA 1.6 05/16/2018    PSAS 3.96 08/24/2020        History:   Past Medical History[1]    Past Surgical History[2]    Family History[3]    Short Social Hx on File[4]    Medications (Active prior to today's visit):  Current Medications[5]    Allergies:  Allergies[6]    Review of Systems:   A comprehensive 10-point review of systems was completed.  Pertinent positives and negatives are noted in the the HPI.    Physical Exam:   CONSTITUTIONAL: Well developed, well nourished, in no acute distress  NEUROLOGIC: Alert and oriented  HEAD: Normocephalic, atraumatic  EYES: Sclera non-icteric  ENT: Hearing intact, moist mucous membranes  NECK: No obvious goiter or masses  RESPIRATORY: Normal respiratory effort  SKIN: No evident rashes  ABDOMEN: Soft, non-tender, non-distended      In total, 30 minutes were spent on this patient encounter (including chart review, patient history, physical, counseling, documentation, and communication).    Tolu Villarreal MD  Staff Urologist  [facility]  Office: [phone]             [1]   Past Medical History:   Alcohol abuse    Back problem    Colon adenoma    x1    High blood pressure    Microcytosis    Pancreatic cyst (HCC)    Pancreatitis (HCC)    Pancreatitis, alcoholic, acute (HCC)    Pulmonary nodule     Tobacco use disorder    Unspecified essential hypertension   [2]   Past Surgical History:  Procedure Laterality Date    Colonoscopy  07/12/2023    Egd  07/20/2016    Electrocardiogram, complete  12/26/2013    scanned to media tab    Lumbar discectomy      2023    Other surgical history      pancreatic cyst drainage by Dr Munoz. then saw [facility] had procedure done by Dr donohue for the cyst    Upper gi endoscopy,biopsy  07/2016   [3]   Family History  Problem Relation Age of Onset    Hypertension Father     Cancer Father     Diabetes Mother     Hypertension Mother     Heart Disorder Mother     Other (Other) Mother         copd    No Known Problems Daughter     Other (Other) Brother         motorbke accident   [4]   Social History  Socioeconomic History    Marital status:    Tobacco Use    Smoking status: Former     Current packs/day: 0.50     Average packs/day: 0.5 packs/day for 20.0 years (10.0 ttl pk-yrs)     Types: Cigarettes     Passive exposure: Current    Smokeless tobacco: Never    Tobacco comments:     About 7 cigarettes a days   Vaping Use    Vaping status: Never Used   Substance and Sexual Activity    Alcohol use: Not Currently    Drug use: Not Currently   Other Topics Concern    Caffeine Concern Yes     Comment: 1 ice coffee daily    Exercise Yes     Social Drivers of Health     Food Insecurity: Patient Declined (1/22/2025)    Received from [facility]    Hunger Vital Sign     Worried About Running Out of Food in the Last Year: Patient declined     Ran Out of Food in the Last Year: Patient declined   Transportation Needs: Patient Declined (1/22/2025)    Received from [facility]    PRAPARE - Transportation     Lack of Transportation (Medical): Patient declined     Lack of Transportation (Non-Medical): Patient declined   Stress: Patient Declined (1/22/2025)    Received from [facility]    [employer] -  Occupational Stress Questionnaire     Feeling of Stress : Patient declined   Housing Stability: Patient Declined (1/22/2025)    Received from [facility]    Housing Stability Vital Sign     Unable to Pay for Housing in the Last Year: Patient declined     Number of Times Moved in the Last Year: 0     Homeless in the Last Year: Patient declined   [5]   Current Outpatient Medications   Medication Sig Dispense Refill    dicyclomine 20 MG Oral Tab Take 1 tablet (20 mg total) by mouth 4 (four) times daily as needed. 20 tablet 0    HYDROcodone-acetaminophen  MG Oral Tab Take 1 tablet by mouth 2 (two) times daily as needed for Pain. 14 tablet 0    zolpidem 10 MG Oral Tab Take 1 tablet (10 mg total) by mouth nightly as needed for Sleep. 15 tablet 0    DULoxetine 60 MG Oral Cap DR Particles Take 1 capsule (60 mg total) by mouth daily. 90 capsule 1    apixaban (ELIQUIS) 5 MG Oral Tab Take 1 tablet (5 mg total) by mouth 2 (two) times daily. 60 tablet 1    apixaban (ELIQUIS) 5 MG Oral Tab Take 1 tablet (5 mg total) by mouth 2 (two) times daily. 60 tablet 1    lisinopril 40 MG Oral Tab Take 1 tablet (40 mg total) by mouth daily. 90 tablet 3    oxybutynin ER 5 MG Oral Tablet 24 Hr Take 1 tablet (5 mg total) by mouth daily.      ondansetron (ZOFRAN) 4 mg tablet Take 1 tablet (4 mg total) by mouth every 8 (eight) hours as needed for Nausea. 20 tablet 0    Naloxone HCl 4 MG/0.1ML Nasal Liquid 4 mg by Nasal route as needed. If patient remains unresponsive, repeat dose in other nostril 2-5 minutes after first dose. (Patient not taking: Reported on 4/2/2025) 1 kit 0    tamsulosin 0.4 MG Oral Cap Take 2 capsules (0.8 mg total) by mouth daily. 180 capsule 6    finasteride 5 MG Oral Tab Take 1 tablet (5 mg total) by mouth daily. 90 tablet 5    ZENPEP 08898-65141 units Oral Cap DR Particles Take 1 capsule by mouth 3 (three) times daily with meals. 300 capsule 3   [6]   Allergies  Allergen Reactions    Morphine  ITCHING

## 2025-06-16 ENCOUNTER — TELEPHONE (OUTPATIENT)
Dept: INTERNAL MEDICINE CLINIC | Facility: CLINIC | Age: 50
End: 2025-06-16

## 2025-06-16 ENCOUNTER — OFFICE VISIT (OUTPATIENT)
Dept: INTERNAL MEDICINE CLINIC | Facility: CLINIC | Age: 50
End: 2025-06-16

## 2025-06-16 VITALS
HEART RATE: 105 BPM | WEIGHT: 166.44 LBS | SYSTOLIC BLOOD PRESSURE: 120 MMHG | BODY MASS INDEX: 21 KG/M2 | DIASTOLIC BLOOD PRESSURE: 76 MMHG

## 2025-06-16 DIAGNOSIS — K86.1 CHRONIC PANCREATITIS, UNSPECIFIED PANCREATITIS TYPE (HCC): Primary | ICD-10-CM

## 2025-06-16 DIAGNOSIS — I81 PORTAL VEIN THROMBOSIS: ICD-10-CM

## 2025-06-16 DIAGNOSIS — N40.1 BENIGN PROSTATIC HYPERPLASIA WITH LOWER URINARY TRACT SYMPTOMS, SYMPTOM DETAILS UNSPECIFIED: ICD-10-CM

## 2025-06-16 DIAGNOSIS — I10 ESSENTIAL HYPERTENSION: ICD-10-CM

## 2025-06-16 PROCEDURE — 3074F SYST BP LT 130 MM HG: CPT | Performed by: INTERNAL MEDICINE

## 2025-06-16 PROCEDURE — 99214 OFFICE O/P EST MOD 30 MIN: CPT | Performed by: INTERNAL MEDICINE

## 2025-06-16 PROCEDURE — 3078F DIAST BP <80 MM HG: CPT | Performed by: INTERNAL MEDICINE

## 2025-06-16 RX ORDER — HYDROCODONE BITARTRATE AND ACETAMINOPHEN 10; 325 MG/1; MG/1
1 TABLET ORAL 2 TIMES DAILY PRN
Qty: 14 TABLET | Refills: 0 | Status: SHIPPED | OUTPATIENT
Start: 2025-06-16

## 2025-06-16 NOTE — PROGRESS NOTES
Subjective:     Patient ID: Fredy Vogt is a 50 year old male.    Pt presents today for preop medical clearance as requested by Dr Morales pain specialist for a planned celiac plexus block to be done at [facility]. This is being done to managed pt's abdominal pain from his chronic pancreatitis.   Pt had been on eliquis for PVT that was diagnosed 3mos ago and was treated with eliquis for a total of 3months duration which he has now completed .        History/Other:   Review of Systems   Constitutional: Negative.    Eyes: Negative.    Respiratory: Negative.     Cardiovascular: Negative.    Gastrointestinal:  Positive for abdominal pain. Negative for anal bleeding, blood in stool and vomiting.   Genitourinary: Negative.      Current Medications[1]  Allergies:Allergies[2]    Past Medical History[3]   Past Surgical History[4]   Family History[5]   Social History: Short Social Hx on File[6]     Objective:   Physical Exam  Constitutional:       General: He is not in acute distress.     Appearance: He is well-developed. He is not ill-appearing, toxic-appearing or diaphoretic.   HENT:      Head: Normocephalic and atraumatic.      Right Ear: Tympanic membrane, ear canal and external ear normal.      Left Ear: Tympanic membrane, ear canal and external ear normal.      Nose: Nose normal.      Mouth/Throat:      Pharynx: No oropharyngeal exudate.   Eyes:      General: No scleral icterus.        Right eye: No discharge.         Left eye: No discharge.      Conjunctiva/sclera: Conjunctivae normal.      Pupils: Pupils are equal, round, and reactive to light.   Neck:      Thyroid: No thyromegaly.      Vascular: No JVD.   Cardiovascular:      Rate and Rhythm: Normal rate and regular rhythm.      Pulses: Normal pulses.      Heart sounds: Normal heart sounds. No murmur heard.     No friction rub. No gallop.   Pulmonary:      Effort: Pulmonary effort is normal. No respiratory distress.      Breath sounds: Normal breath sounds.  No wheezing or rales.   Abdominal:      General: Abdomen is flat. Bowel sounds are normal. There is no distension.      Palpations: Abdomen is soft. There is no mass.      Tenderness: There is no abdominal tenderness. There is no guarding or rebound.   Musculoskeletal:         General: Normal range of motion.      Cervical back: Normal range of motion and neck supple. No rigidity or tenderness.      Right lower leg: No edema.      Left lower leg: No edema.   Lymphadenopathy:      Cervical: No cervical adenopathy.   Skin:     General: Skin is warm and dry.      Coloration: Skin is not jaundiced or pale.      Findings: No rash.   Neurological:      Mental Status: He is alert and oriented to person, place, and time.   Psychiatric:         Mood and Affect: Mood normal.         Assessment & Plan:       (K86.1) Chronic pancreatitis, unspecified pancreatitis type (HCC)  Plan: HYDROcodone-acetaminophen  MG Oral Tab        Pain med refilled. Pt may proceed with his celiac plexus block. Pt will stop his eliquis starting today since has completed already 3mos of treatment for his PVT. He may be able to do his celiac plexus block after 3 days or so from today.    (I10) Essential hypertension  Plan: bp controlled with current bp med. Cpm.     (N40.1) Benign prostatic hyperplasia with lower urinary tract symptoms, symptom details unspecified  Plan: stable on finasteride/flomax managed by his urologist.      (I81)Portal vein thrombosis  Plan:  pt has completed arleady  his 3mos treatment course and will stop eliquis today   No orders of the defined types were placed in this encounter.      Meds This Visit:  Requested Prescriptions     Pending Prescriptions Disp Refills    HYDROcodone-acetaminophen  MG Oral Tab 14 tablet 0     Sig: Take 1 tablet by mouth 2 (two) times daily as needed for Pain.       Imaging & Referrals:  None            [1]   Current Outpatient Medications   Medication Sig Dispense Refill    finasteride  5 MG Oral Tab Take 1 tablet (5 mg total) by mouth daily. 90 tablet 5    tamsulosin 0.4 MG Oral Cap Take 2 capsules (0.8 mg total) by mouth daily. 180 capsule 6    oxybutynin ER 10 MG Oral Tablet 24 Hr Take 1 tablet (10 mg total) by mouth daily. 90 tablet 6    dicyclomine 20 MG Oral Tab Take 1 tablet (20 mg total) by mouth 4 (four) times daily as needed. 20 tablet 0    HYDROcodone-acetaminophen  MG Oral Tab Take 1 tablet by mouth 2 (two) times daily as needed for Pain. 14 tablet 0    zolpidem 10 MG Oral Tab Take 1 tablet (10 mg total) by mouth nightly as needed for Sleep. 15 tablet 0    DULoxetine 60 MG Oral Cap DR Particles Take 1 capsule (60 mg total) by mouth daily. 90 capsule 1    lisinopril 40 MG Oral Tab Take 1 tablet (40 mg total) by mouth daily. 90 tablet 3    ondansetron (ZOFRAN) 4 mg tablet Take 1 tablet (4 mg total) by mouth every 8 (eight) hours as needed for Nausea. 20 tablet 0    Naloxone HCl 4 MG/0.1ML Nasal Liquid 4 mg by Nasal route as needed. If patient remains unresponsive, repeat dose in other nostril 2-5 minutes after first dose. 1 kit 0    ZENPEP 71256-71328 units Oral Cap DR Particles Take 1 capsule by mouth 3 (three) times daily with meals. 300 capsule 3   [2]   Allergies  Allergen Reactions    Morphine ITCHING   [3]   Past Medical History:   Alcohol abuse    Back problem    Colon adenoma    x1    High blood pressure    Microcytosis    Pancreatic cyst (HCC)    Pancreatitis (HCC)    Pancreatitis, alcoholic, acute (HCC)    Pulmonary nodule    Tobacco use disorder    Unspecified essential hypertension   [4]   Past Surgical History:  Procedure Laterality Date    Colonoscopy  07/12/2023    Egd  07/20/2016    Electrocardiogram, complete  12/26/2013    scanned to media tab    Lumbar discectomy      2023    Other surgical history      pancreatic cyst drainage by Dr Munoz. then [facility] had procedure done by Dr donohue for the cyst    Upper gi endoscopy,biopsy  07/2016   [5]   Family  History  Problem Relation Age of Onset    Hypertension Father     Cancer Father     Diabetes Mother     Hypertension Mother     Heart Disorder Mother     Other (Other) Mother         copd    No Known Problems Daughter     Other (Other) Brother         motorbke accident   [6]   Social History  Socioeconomic History    Marital status:    Tobacco Use    Smoking status: Former     Current packs/day: 0.50     Average packs/day: 0.5 packs/day for 20.0 years (10.0 ttl pk-yrs)     Types: Cigarettes     Passive exposure: Current    Smokeless tobacco: Never    Tobacco comments:     About 7 cigarettes a days   Vaping Use    Vaping status: Never Used   Substance and Sexual Activity    Alcohol use: Not Currently    Drug use: Not Currently   Other Topics Concern    Caffeine Concern Yes     Comment: 1 ice coffee daily    Exercise Yes     Social Drivers of Health     Food Insecurity: Patient Declined (1/22/2025)    Received from [facility]    Hunger Vital Sign     Worried About Running Out of Food in the Last Year: Patient declined     Ran Out of Food in the Last Year: Patient declined   Transportation Needs: Patient Declined (1/22/2025)    Received from [facility]    PRAPARE - Transportation     Lack of Transportation (Medical): Patient declined     Lack of Transportation (Non-Medical): Patient declined   Stress: Patient Declined (1/22/2025)    Received from [facility]    [employer] - Occupational Stress Questionnaire     Feeling of Stress : Patient declined   Housing Stability: Patient Declined (1/22/2025)    Received from [facility]    Housing Stability Vital Sign     Unable to Pay for Housing in the Last Year: Patient declined     Number of Times Moved in the Last Year: 0     Homeless in the Last Year: Patient declined

## 2025-06-16 NOTE — TELEPHONE ENCOUNTER
Faxed pre-op clearance to [facility] to fax number [phone]. Confirmation has been received that fax went through successfully.

## 2025-06-26 RX ORDER — PANCRELIPASE LIPASE, PANCRELIPASE PROTEASE, PANCRELIPASE AMYLASE 10000; 32000; 42000 [USP'U]/1; [USP'U]/1; [USP'U]/1
1 CAPSULE, DELAYED RELEASE ORAL
Qty: 270 CAPSULE | Refills: 3 | Status: SHIPPED | OUTPATIENT
Start: 2025-06-26

## 2025-07-01 ENCOUNTER — APPOINTMENT (OUTPATIENT)
Dept: GENERAL RADIOLOGY | Facility: HOSPITAL | Age: 50
End: 2025-07-01
Attending: ANESTHESIOLOGY
Payer: COMMERCIAL

## 2025-07-01 ENCOUNTER — HOSPITAL ENCOUNTER (OUTPATIENT)
Facility: HOSPITAL | Age: 50
Setting detail: HOSPITAL OUTPATIENT SURGERY
Discharge: HOME | End: 2025-07-01
Attending: ANESTHESIOLOGY | Admitting: ANESTHESIOLOGY
Payer: COMMERCIAL

## 2025-07-01 VITALS
TEMPERATURE: 98 F | HEART RATE: 100 BPM | OXYGEN SATURATION: 100 % | WEIGHT: 166 LBS | BODY MASS INDEX: 20.64 KG/M2 | SYSTOLIC BLOOD PRESSURE: 140 MMHG | DIASTOLIC BLOOD PRESSURE: 87 MMHG | RESPIRATION RATE: 18 BRPM | HEIGHT: 75 IN

## 2025-07-01 PROBLEM — K86.89 PANCREATIC PAIN (HCC): Status: ACTIVE | Noted: 2025-07-01

## 2025-07-01 PROCEDURE — 64530 N BLOCK INJ CELIAC PELUS: CPT | Performed by: ANESTHESIOLOGY

## 2025-07-01 PROCEDURE — 99152 MOD SED SAME PHYS/QHP 5/>YRS: CPT | Performed by: ANESTHESIOLOGY

## 2025-07-01 RX ORDER — DEXAMETHASONE SODIUM PHOSPHATE 10 MG/ML
INJECTION, SOLUTION INTRAMUSCULAR; INTRAVENOUS
Status: DISCONTINUED | OUTPATIENT
Start: 2025-07-01 | End: 2025-07-01

## 2025-07-01 RX ORDER — ONDANSETRON 2 MG/ML
4 INJECTION INTRAMUSCULAR; INTRAVENOUS ONCE AS NEEDED
Status: DISCONTINUED | OUTPATIENT
Start: 2025-07-01 | End: 2025-07-01

## 2025-07-01 RX ORDER — IOPAMIDOL 408 MG/ML
INJECTION, SOLUTION INTRATHECAL
Status: DISCONTINUED | OUTPATIENT
Start: 2025-07-01 | End: 2025-07-01

## 2025-07-01 RX ORDER — DIPHENHYDRAMINE HYDROCHLORIDE 50 MG/ML
50 INJECTION, SOLUTION INTRAMUSCULAR; INTRAVENOUS ONCE AS NEEDED
Status: DISCONTINUED | OUTPATIENT
Start: 2025-07-01 | End: 2025-07-01

## 2025-07-01 RX ORDER — NALOXONE HYDROCHLORIDE 0.4 MG/ML
0.08 INJECTION, SOLUTION INTRAMUSCULAR; INTRAVENOUS; SUBCUTANEOUS AS NEEDED
Status: DISCONTINUED | OUTPATIENT
Start: 2025-07-01 | End: 2025-07-01

## 2025-07-01 RX ORDER — SODIUM CHLORIDE, SODIUM LACTATE, POTASSIUM CHLORIDE, CALCIUM CHLORIDE 600; 310; 30; 20 MG/100ML; MG/100ML; MG/100ML; MG/100ML
100 INJECTION, SOLUTION INTRAVENOUS CONTINUOUS
Status: DISCONTINUED | OUTPATIENT
Start: 2025-07-01 | End: 2025-07-01

## 2025-07-01 RX ORDER — BUPIVACAINE HYDROCHLORIDE 5 MG/ML
INJECTION, SOLUTION EPIDURAL; INTRACAUDAL; PERINEURAL
Status: DISCONTINUED | OUTPATIENT
Start: 2025-07-01 | End: 2025-07-01

## 2025-07-01 RX ORDER — MIDAZOLAM HYDROCHLORIDE 1 MG/ML
INJECTION INTRAMUSCULAR; INTRAVENOUS
Status: DISCONTINUED | OUTPATIENT
Start: 2025-07-01 | End: 2025-07-01

## 2025-07-01 RX ORDER — LIDOCAINE HYDROCHLORIDE 10 MG/ML
INJECTION, SOLUTION EPIDURAL; INFILTRATION; INTRACAUDAL; PERINEURAL
Status: DISCONTINUED | OUTPATIENT
Start: 2025-07-01 | End: 2025-07-01

## 2025-07-01 NOTE — DISCHARGE INSTRUCTIONS
You have been given medication that makes you sleepy. This may have included both pain medication and sleeping medication. Most of the effects have worn off but you may still have some drowsiness for the next 6 to 8 hours.    Home Care  Follow these guidelines when you get home:    --Don't drink any alcohol for the next 24 hours.    --Don't drive, operate dangerous machinery, make important business or personal    decisions, or sign legal documents during the next 24 hours.    Follow Up Care  Follow up with your healthcare provider if you are not alert and back to your usual level of activity within 12 hours    When to seek medical advice  Call your healthcare provider right away if any of these occur:    --Drowsiness that gets worse  --Weakness or dizziness that gets worse  --Repeated vomiting  --You can not be awakened   Home Care Instructions Following Your Pain Procedure     Fredy,  It has been a pleasure to have you as our patient. To help you at home, you must follow these general discharge instructions. We will review these with you before you are discharged. It is our hope that you have a complete and uneventful recovery from our procedure.     General Instructions:  What to Expect:  Bandages from your procedure today can be removed when you get home.  Please avoid soaking and/or swimming for 24 hours.  Showering is okay  It is normal to have increased pain symptoms and/or pain at injection site for up to 3-5 days after procedure, you can use heat or ice (20 minutes on 20 minutes off) for comfort.  You may experience some temporary side effects which may include restlessness or insomnia, flushing of the face, or heart palpitations.  Please contact the provider if these symptoms do not resolve within 3-4 days.  Lightheadedness or nausea may occur and should resolve within 24 to 48 hours.  If you develop a headache after treatment, rest, drink fluids (with caffeine, if possible) and take mild over-the-counter  pain medication.  If the headache does not improve with the above treatment, contact the physician.  Home Medications:  Resume all previously prescribed medication.  Please avoid taking NSAIDs (Non-Steriodal Anti-Inflammatory Drugs) such as:  Ibuprofen ( Advil, Motrin) Aleve (Naproxen), Diclofenac, Meloxicam for 6 hours after procedure.   If you are on Coumadin (Warfarin) or any other anti-coagulant (or \"blood thinning\") medication such as Plavix (Clopidogrel), Xarelto (Rivaroxaban), Eliquis (Apixaban), Effient (Prasugrel) etc., restart on the following day from the procedure unless otherwise directed by your provider.  If you are a diabetic, please increase the frequency of your glucose monitoring after the procedure as steroids may cause a temporary (2-3 day) increase in your blood sugar.  Contact your primary care physician if your blood sugar remains elevated as you may require some medication adjustment.  Diet:  Resume your regular diet as tolerated.  Activity:  We recommend that you relax and rest during the rest of your procedure day.  If you feel weakness in your arms or legs do not drive.  Follow-up Appointment  Please schedule a follow-up visit within 3 to 4 weeks after your last procedure date.  Question or Concerns:  Feel free to call our office with any questions or concerns at [phone] (option #2)    Fredy  Thank you for coming to [facility] for your procedure.  The nurses try very hard to make sure you receive the best care possible.  Your trust in them as well as us is greatly appreciated.    Thanks so much,   Dr. Michael Morales

## 2025-07-01 NOTE — OPERATIVE REPORT
[facility]  Operative Report  2025     Fredy Vogt Patient Status:  [facility] Outpatient Surgery    2/3/1975 MRN [id number]   Location [facility] ENDOSCOPY [facility]IN [facility] Attending Michael Morales MD   Hosp Day # 0 PCP Norberto Hdez MD     Indication: Fredy is a 50 year old male with chronic pancreatitis    Preoperative Diagnosis:  Pancreatic pain (HCC) [K86.89]    Postoperative Diagnosis: Same as above.    Procedure performed: Bilateral CELIAC PLEXUS BLOCK with sedation    Anesthesia: Local and IV Sedation.    EBL: Less than 1 ml.    Procedure Description:  After reviewing the patient's history and performing a focused physical examination, the diagnosis and positive previous diagnostic tests were confirmed and contraindications such as infection and coagulopathy were ruled out.  Following review of allergies and potential side effects and complications, including but not necessarily limited to infection, allergic reaction, local tissue breakdown, nerve injury, hemothorax, pneumothorax and paresis, the patient consented for the procedure.    The patient was brought to the procedure room in prone position.  After comprehensive monitors were applied and IV access in the patient's arm, moderate intravenous sedation was given with versed and fentanyl. Moderate sedation was given for 15 minutes. After sterile prep of the lumbar spine, the L1 interspace was identified with the help of fluoroscopy.  The arm was then obliqued towards the right-hand side by approximately 25 degrees.  The inferior aspect of the L1 pedicle was identified and overlying soft tissue was then anesthetized with 10 cc 1% lidocaine.  A 22-gauge spinal needle was advanced imaging guidance to contact bone.  A true lateral was obtained and the needle was walked off anteriorly.  After negative aspiration for heme, 2 cc Omnipaque was injected which confirmed retrocrural placement and ruled out intravascular, intrathecal, epidural  spread.  Subsequently, 5 mL of 0.5% bupivacaine with 5 cc of Decadron was then injected.  The needle was in stylette and withdrawn tip intact.  The patient tolerated the procedure well.  This was repeated on the patient's contralateral side.           Complications: None.    Follow up: The patient was followed in the pain clinic as needed basis.          Michael Morales MD

## 2025-07-01 NOTE — H&P
History & Physical Examination    Patient Name: Fredy Vogt  MRN: [id number]  CSN: [id number]  YOB: 1975    Pre-Operative Diagnosis:  Pancreatic pain (HCC) [K86.89]    Present Illness: Periodic pain    ASA: 3   MP class: 1  Sedation:  IV sedation (anxiolysis)    Prescriptions Prior to Admission[1]  Current Hospital Medications[2]    Allergies: Allergies[3]    Past Medical History[4]  Past Surgical History[5]  Family History[6]  Social History     Tobacco Use    Smoking status: Former     Current packs/day: 0.50     Average packs/day: 0.5 packs/day for 20.0 years (10.0 ttl pk-yrs)     Types: Cigarettes     Passive exposure: Current    Smokeless tobacco: Never    Tobacco comments:     About 7 cigarettes a days   Substance Use Topics    Alcohol use: Not Currently       SYSTEM Check if Review is Normal Check if Physical Exam is Normal If not normal, please explain:   HEENT [x ] [x ]    NECK & BACK [x ] [x ]    HEART [x ] [x ]    LUNGS [x ] [x ]    ABDOMEN [x ] [x ]    UROGENITAL [x ] [x ]    EXTREMITIES [x ] [x ]    OTHER        [ x ] I have discussed the risks and benefits and alternatives with the patient/family.  They understand and agree to proceed with plan of care.  [ x ] I have reviewed the History and Physical done within the last 30 days.  Any changes noted above.    Michael Morales MD              [1]   Medications Prior to Admission   Medication Sig Dispense Refill Last Dose/Taking    HYDROcodone-acetaminophen  MG Oral Tab Take 1 tablet by mouth 2 (two) times daily as needed for Pain. 14 tablet 0 7/1/2025 at  3:00 AM    ZENPEP [id number] units Oral Cap DR Particles Take 1 capsule by mouth 3 (three) times daily with meals. 270 capsule 3     finasteride 5 MG Oral Tab Take 1 tablet (5 mg total) by mouth daily. 90 tablet 5     tamsulosin 0.4 MG Oral Cap Take 2 capsules (0.8 mg total) by mouth daily. 180 capsule 6     oxybutynin ER 10 MG Oral Tablet 24 Hr Take 1 tablet (10 mg total) by mouth  daily. 90 tablet 6     dicyclomine 20 MG Oral Tab Take 1 tablet (20 mg total) by mouth 4 (four) times daily as needed. 20 tablet 0     zolpidem 10 MG Oral Tab Take 1 tablet (10 mg total) by mouth nightly as needed for Sleep. 15 tablet 0     DULoxetine 60 MG Oral Cap DR Particles Take 1 capsule (60 mg total) by mouth daily. 90 capsule 1     lisinopril 40 MG Oral Tab Take 1 tablet (40 mg total) by mouth daily. 90 tablet 3     ondansetron (ZOFRAN) 4 mg tablet Take 1 tablet (4 mg total) by mouth every 8 (eight) hours as needed for Nausea. 20 tablet 0     Naloxone HCl 4 MG/0.1ML Nasal Liquid 4 mg by Nasal route as needed. If patient remains unresponsive, repeat dose in other nostril 2-5 minutes after first dose. 1 kit 0    [2]   Current Facility-Administered Medications   Medication Dose Route Frequency    lactated ringers infusion  100 mL/hr Intravenous Continuous    ondansetron (Zofran) 4 MG/2ML injection 4 mg  4 mg Intravenous Once PRN   [3]   Allergies  Allergen Reactions    Morphine ITCHING   [4]   Past Medical History:   Alcohol abuse    Back problem    Colon adenoma    x1    High blood pressure    Microcytosis    Pancreatic cyst (HCC)    Pancreatitis (HCC)    Pancreatitis, alcoholic, acute (HCC)    Pulmonary nodule    Tobacco use disorder    Unspecified essential hypertension   [5]   Past Surgical History:  Procedure Laterality Date    Colonoscopy  07/12/2023    Egd  07/20/2016    Electrocardiogram, complete  12/26/2013    scanned to media tab    Lumbar discectomy      2023    Other surgical history      pancreatic cyst drainage by Dr Munoz. then saw [facility] had procedure done by Dr donohue for the cyst    Upper gi endoscopy,biopsy  07/2016   [6]   Family History  Problem Relation Age of Onset    Hypertension Father     Cancer Father     Diabetes Mother     Hypertension Mother     Heart Disorder Mother     Other (Other) Mother         copd    No Known Problems Daughter     Other (Other) Brother          motorbke accident

## 2025-07-02 ENCOUNTER — TELEPHONE (OUTPATIENT)
Dept: PAIN CLINIC | Facility: CLINIC | Age: 50
End: 2025-07-02

## 2025-07-02 ENCOUNTER — MED REC SCAN ONLY (OUTPATIENT)
Dept: INTERNAL MEDICINE CLINIC | Facility: CLINIC | Age: 50
End: 2025-07-02

## 2025-07-02 RX ORDER — DULOXETIN HYDROCHLORIDE 60 MG/1
60 CAPSULE, DELAYED RELEASE ORAL DAILY
Qty: 90 CAPSULE | Refills: 1 | Status: CANCELLED | OUTPATIENT
Start: 2025-07-02

## 2025-07-02 NOTE — TELEPHONE ENCOUNTER
Poornima called placed to patient for post procedure follow up. Patient stated no questions nor concerns.  Pt verbalized understanding to call with any questions or concerns.      Procedure:   Bilateral CELIAC PLEXUS BLOCK with sedation Bilateral     Date: 7/1/2025  Follow up Visit Scheduled: 7/16/2025 with

## 2025-07-07 RX ORDER — APIXABAN 5 MG/1
5 TABLET, FILM COATED ORAL 2 TIMES DAILY
Qty: 120 TABLET | Refills: 5 | OUTPATIENT
Start: 2025-07-07

## 2025-07-09 ENCOUNTER — TELEPHONE (OUTPATIENT)
Dept: SURGERY | Facility: CLINIC | Age: 50
End: 2025-07-09

## 2025-07-09 RX ORDER — DULOXETIN HYDROCHLORIDE 60 MG/1
60 CAPSULE, DELAYED RELEASE ORAL DAILY
Qty: 90 CAPSULE | Refills: 1 | OUTPATIENT
Start: 2025-07-09

## 2025-07-10 NOTE — TELEPHONE ENCOUNTER
Duplicate Refill Request / Refill too soon    Requested Prescriptions     Pending Prescriptions Disp Refills    DULoxetine 60 MG Oral Cap DR Particles 90 capsule 1     Sig: Take 1 capsule (60 mg total) by mouth daily.       Last Office Visit with PCP: 6/16/2025

## 2025-07-16 ENCOUNTER — OFFICE VISIT (OUTPATIENT)
Dept: PAIN CLINIC | Facility: CLINIC | Age: 50
End: 2025-07-16
Payer: COMMERCIAL

## 2025-07-16 VITALS
OXYGEN SATURATION: 98 % | DIASTOLIC BLOOD PRESSURE: 62 MMHG | SYSTOLIC BLOOD PRESSURE: 142 MMHG | BODY MASS INDEX: 21 KG/M2 | HEART RATE: 96 BPM | WEIGHT: 165 LBS

## 2025-07-16 DIAGNOSIS — R10.10 UPPER ABDOMINAL PAIN: Primary | ICD-10-CM

## 2025-07-16 PROCEDURE — G2211 COMPLEX E/M VISIT ADD ON: HCPCS | Performed by: ANESTHESIOLOGY

## 2025-07-16 PROCEDURE — 3078F DIAST BP <80 MM HG: CPT | Performed by: ANESTHESIOLOGY

## 2025-07-16 PROCEDURE — 3077F SYST BP >= 140 MM HG: CPT | Performed by: ANESTHESIOLOGY

## 2025-07-16 PROCEDURE — 99214 OFFICE O/P EST MOD 30 MIN: CPT | Performed by: ANESTHESIOLOGY

## 2025-07-16 RX ORDER — ATORVASTATIN CALCIUM 80 MG/1
80 TABLET, FILM COATED ORAL DAILY
COMMUNITY
Start: 2025-07-13 | End: 2025-08-12

## 2025-07-16 NOTE — PROGRESS NOTES
Last procedure: Bilateral CELIAC PLEXUS BLOCK with sedation   Date: 7/1/25  Percentage of relief obtained: 25%  Duration of relief: unsure     Current Pain Score: 3/10    Narcotic Contract Exp: n/a

## 2025-07-16 NOTE — PROGRESS NOTES
Name: Fredy Vogt   : 2/3/1975   DOS: 2025     Pain Clinic Follow Up Visit:     Chief Complaint   Patient presents with    Procedure Follow Up     Bilateral CELIAC PLEXUS BLOCK with sedation from 25       Fredy Vogt is a 50 year old male with chronic abdominal pain due to pancreatitis who presents today for follow-up after bilateral celiac plexus block.  The patient is unsure if his to the amount of relief that he obtained.  He has chronic back chronic abdominal pain which he rates as 2 out of 10.  He states that he has been fairly stable after the celiac plexus block without flareups.  When he has a flareup of his pancreatic pain, this is rated as 8 out of 10.  Has been managing with diet.    Pt denies any chills, fever, or weakness. There is no bladder or bowel incontinence associated with the pain.    REVIEW OF SYSTEMS:  A ten point review of systems was performed with pertinent positives and negatives in the HPI.    Allergies[1]    Current Medications[2]      EXAM:   /62 (BP Location: Left arm, Patient Position: Sitting, Cuff Size: large)   Pulse 96   Wt 165 lb (74.8 kg)   SpO2 98%   BMI 20.62 kg/m²   General:  Patient is a(n) 50 year old year old male in no acute distress.  Neurologic:: WNL-Orientation to time, place and person, normal mood & affect, concentration & attention span intact.   Inspection:  Ambulates with well-coordinated, fluid, non-antalgic gait.  Gait is normal.  Neck: Full range of motion   Cranial nerve: Grossly intact   Respiratory: Nonlabored   Abdomen: Soft   back: Gait intact for injection site clear  IMAGES:     No new imaging    ASSESSMENT AND PLAN:     1. Upper abdominal pain      The patient is a pleasant 50-year-old gentleman with a history of chronic pancreatitis.  This leads to chronic abdominal pain.  The patient is managing symptoms successfully with diet.  I discussed with the patient that neurolysis of the celiac plexus is unlikely to be helpful  given that he has low level chronic abdominal pain.  Be treating his abdominal pain symptoms with possible side effects associated with celiac plexus neurolysis such as chronic diarrhea.  Encourage patient to manage symptoms with lifestyle change and diet modification.  Can reach out to PCP or GI team for short-term course of medications during times of flare.      Radiology orders and consultations:None  The patient indicates understanding of these issues and agrees to the plan.  No follow-ups on file.    Michael Morales MD, 7/16/2025, 10:11 AM              [1]   Allergies  Allergen Reactions    Morphine ITCHING   [2]   Current Outpatient Medications   Medication Sig Dispense Refill    atorvastatin 80 MG Oral Tab Take 1 tablet (80 mg total) by mouth daily.      ZENPEP [id number] units Oral Cap DR Particles Take 1 capsule by mouth 3 (three) times daily with meals. 270 capsule 3    HYDROcodone-acetaminophen  MG Oral Tab Take 1 tablet by mouth 2 (two) times daily as needed for Pain. 14 tablet 0    finasteride 5 MG Oral Tab Take 1 tablet (5 mg total) by mouth daily. 90 tablet 5    tamsulosin 0.4 MG Oral Cap Take 2 capsules (0.8 mg total) by mouth daily. 180 capsule 6    oxybutynin ER 10 MG Oral Tablet 24 Hr Take 1 tablet (10 mg total) by mouth daily. 90 tablet 6    dicyclomine 20 MG Oral Tab Take 1 tablet (20 mg total) by mouth 4 (four) times daily as needed. 20 tablet 0    zolpidem 10 MG Oral Tab Take 1 tablet (10 mg total) by mouth nightly as needed for Sleep. 15 tablet 0    DULoxetine 60 MG Oral Cap DR Particles Take 1 capsule (60 mg total) by mouth daily. 90 capsule 1    lisinopril 40 MG Oral Tab Take 1 tablet (40 mg total) by mouth daily. 90 tablet 3    ondansetron (ZOFRAN) 4 mg tablet Take 1 tablet (4 mg total) by mouth every 8 (eight) hours as needed for Nausea. 20 tablet 0    Naloxone HCl 4 MG/0.1ML Nasal Liquid 4 mg by Nasal route as needed. If patient remains unresponsive, repeat dose in other nostril  2-5 minutes after first dose. 1 kit 0

## 2025-07-16 NOTE — PATIENT INSTRUCTIONS
Refill policies:    Allow 2-3 business days for refills; controlled substances may take longer.  Contact your pharmacy at least 5 days prior to running out of medication and have them send an electronic request or submit request through the “request refill” option in your [employer] account.  Refills are not addressed on weekends; covering physicians do not authorize routine medications on weekends.  No narcotics or controlled substances are refilled after noon on Fridays or by on call physicians.  By law, narcotics must be electronically prescribed.  A 30 day supply with no refills is the maximum allowed.  If your prescription is due for a refill, you may be due for a follow up appointment.  To best provide you care, patients receiving routine medications need to be seen at least once a year.  Patients receiving narcotic/controlled substance medications need to be seen at least once every 3 months.  In the event that your preferred pharmacy does not have the requested medication in stock (e.g. Backordered), it is your responsibility to find another pharmacy that has the requested medication available.  We will gladly send a new prescription to that pharmacy at your request.    Scheduling Tests:    If your physician has ordered radiology tests such as MRI or CT scans, please contact Central Scheduling at [phone] right away to schedule the test.  Once scheduled, the [facility] Centralized Referral Team will work with your insurance carrier to obtain pre-certification or prior authorization.  Depending on your insurance carrier, approval may take 3-10 days.  It is highly recommended patients assure they have received an authorization before having a test performed.  If test is done without insurance authorization, patient may be responsible for the entire amount billed.      Precertification and Prior Authorizations:  If your physician has recommended that you have a procedure or additional testing performed the [facility]  Centralized Referral Team will contact your insurance carrier to obtain pre-certification or prior authorization.    You are strongly encouraged to contact your insurance carrier to verify that your procedure/test has been approved and is a COVERED benefit.  Although the [facility] Centralized Referral Team does its due diligence, the insurance carrier gives the disclaimer that \"Although the procedure is authorized, this does not guarantee payment.\"    Ultimately the patient is responsible for payment.   Thank you for your understanding in this matter.  Paperwork Completion:  If you require FMLA or disability paperwork for your recovery, please make sure to either drop it off or have it faxed to our office at [phone]. Be sure the form has your name and date of birth on it.  The form will be faxed to our Forms Department and they will complete it for you.  There is a 25$ fee for all forms that need to be filled out.  Please be aware there is a 10-14 day turnaround time.  You will need to sign a release of information (FABY) form if your paperwork does not come with one.  You may call the Forms Department with any questions at [phone].  Their fax number is [phone].

## 2025-07-18 DIAGNOSIS — K86.1 CHRONIC PANCREATITIS, UNSPECIFIED PANCREATITIS TYPE (HCC): ICD-10-CM

## 2025-07-19 NOTE — TELEPHONE ENCOUNTER
Dr Melo - request for Posen refill.     Please advise     Last office visit 6/16/25  Last Posen Prescription 6/16/25   Dispensed 6/16/25, 14 tablets. Posen 10-325mg. Tomas Tolbert)   Next Visit - None Scheduled at this time.

## 2025-07-21 RX ORDER — HYDROCODONE BITARTRATE AND ACETAMINOPHEN 10; 325 MG/1; MG/1
1 TABLET ORAL 2 TIMES DAILY PRN
Qty: 14 TABLET | Refills: 0 | Status: SHIPPED | OUTPATIENT
Start: 2025-07-21

## 2025-08-19 DIAGNOSIS — K85.80 OTHER ACUTE PANCREATITIS, UNSPECIFIED COMPLICATION STATUS (HCC): ICD-10-CM

## 2025-08-19 DIAGNOSIS — K86.1 CHRONIC PANCREATITIS, UNSPECIFIED PANCREATITIS TYPE (HCC): ICD-10-CM

## 2025-08-22 RX ORDER — HYDROCODONE BITARTRATE AND ACETAMINOPHEN 10; 325 MG/1; MG/1
1 TABLET ORAL 2 TIMES DAILY PRN
Qty: 14 TABLET | Refills: 0 | Status: SHIPPED | OUTPATIENT
Start: 2025-08-22

## 2025-08-22 RX ORDER — ONDANSETRON 4 MG/1
4 TABLET, FILM COATED ORAL EVERY 8 HOURS PRN
Qty: 20 TABLET | Refills: 0 | Status: SHIPPED | OUTPATIENT
Start: 2025-08-22

## 2025-08-26 ENCOUNTER — OFFICE VISIT (OUTPATIENT)
Dept: INTERNAL MEDICINE CLINIC | Facility: CLINIC | Age: 50
End: 2025-08-26

## 2025-08-26 VITALS
OXYGEN SATURATION: 96 % | BODY MASS INDEX: 21 KG/M2 | HEART RATE: 92 BPM | WEIGHT: 171.38 LBS | SYSTOLIC BLOOD PRESSURE: 128 MMHG | DIASTOLIC BLOOD PRESSURE: 70 MMHG

## 2025-08-26 DIAGNOSIS — H04.139: ICD-10-CM

## 2025-08-26 DIAGNOSIS — G45.9 TIA (TRANSIENT ISCHEMIC ATTACK): Primary | ICD-10-CM

## 2025-08-26 DIAGNOSIS — I67.1 CEREBRAL ANEURYSM (HCC): ICD-10-CM

## 2025-08-26 PROCEDURE — 3078F DIAST BP <80 MM HG: CPT | Performed by: INTERNAL MEDICINE

## 2025-08-26 PROCEDURE — 99214 OFFICE O/P EST MOD 30 MIN: CPT | Performed by: INTERNAL MEDICINE

## 2025-08-26 PROCEDURE — 3074F SYST BP LT 130 MM HG: CPT | Performed by: INTERNAL MEDICINE

## (undated) DIAGNOSIS — F17.200 TOBACCO USE DISORDER: ICD-10-CM

## (undated) DIAGNOSIS — K59.00 CONSTIPATION, UNSPECIFIED CONSTIPATION TYPE: ICD-10-CM

## (undated) DIAGNOSIS — Z12.11 COLON CANCER SCREENING: Primary | ICD-10-CM

## (undated) DIAGNOSIS — Z02.9 ENCOUNTERS FOR UNSPECIFIED ADMINISTRATIVE PURPOSE: ICD-10-CM

## (undated) DIAGNOSIS — K85.90 ACUTE PANCREATITIS, UNSPECIFIED COMPLICATION STATUS, UNSPECIFIED PANCREATITIS TYPE: ICD-10-CM

## (undated) DIAGNOSIS — K86.3 PANCREATIC PSEUDOCYST: ICD-10-CM

## (undated) DIAGNOSIS — R10.10 UPPER ABDOMINAL PAIN: ICD-10-CM

## (undated) DEVICE — STERILE POLYISOPRENE POWDER-FREE SURGICAL GLOVES WITH EMOLLIENT COATING: Brand: PROTEXIS

## (undated) DEVICE — APPLICATOR SKIN PREP 26ML HI LT ORNG 2% CHG

## (undated) DEVICE — OIL CARTRIDGE: Brand: CORE, MAESTRO

## (undated) DEVICE — SUTURE VCRL SZ 0 L27IN ABSRB VLT L26MM UR-6

## (undated) DEVICE — DIFFUSER: Brand: CORE, MAESTRO

## (undated) DEVICE — Device: Brand: INTELLICART™

## (undated) DEVICE — ECHOTIP ULTRA: Brand: ECHOTIP

## (undated) DEVICE — GLOVE SUR 7.5 SENSICARE PIP WHT PWD F

## (undated) DEVICE — BANDAGE,ADHESIVE,FABRIC,1"X3",STRL,LF: Brand: CURAD

## (undated) DEVICE — PAD N ADH W3XL8IN POLY COT SFT PERF FLM ABSRB

## (undated) DEVICE — WRAP THERAPEUTIC BACK WO GEL P

## (undated) DEVICE — DRAPE UTIL L W18XL24IN PLAS STR ADH REINF

## (undated) DEVICE — SUTURE VCRL SZ 2-0 L18IN ABSRB UD L26MM CP-2

## (undated) DEVICE — NEEDLE SPINAL 22X3-1/2 BLK

## (undated) DEVICE — SLEEVE COMPR M KNEE LEN SGL USE KENDALL SCD

## (undated) DEVICE — 3.0MM PRECISION NEURO (MATCH HEAD)

## (undated) DEVICE — Device: Brand: BALLOON3

## (undated) DEVICE — REMOVER PREP SOLUTION 4OZ

## (undated) DEVICE — SKIN REG/FINE DUAL MARKER, RULER, LABELS: Brand: MEDLINE

## (undated) DEVICE — CANNULA NASAL 02/C02 ADULT

## (undated) DEVICE — REMOVER LOT 4OZ N IRRIG UNSCNT SFT MOIST LIQ

## (undated) DEVICE — SOLUTION RUBBING 4OZ 70% ISO ALC CLR

## (undated) DEVICE — SUTURE MCRYL SZ 3-0 L27IN ABSRB UD L19MM PS-2

## (undated) DEVICE — FRAZIER SUCTION INSTRUMENT 12 FR W/CONTROL VENT & OBTURATOR: Brand: FRAZIER

## (undated) DEVICE — LIGHT HANDLE

## (undated) DEVICE — KIT HEMSTAT MTRX 8ML PORCINE GEL HUM THROM

## (undated) DEVICE — Device

## (undated) DEVICE — PAIN TRAY: Brand: MEDLINE INDUSTRIES, INC.

## (undated) DEVICE — GLOVE SURG SENSICARE SZ 7-1/2

## (undated) DEVICE — GLOVE,SURG,SENSICARE,ALOE,LF,PF,7: Brand: MEDLINE

## (undated) DEVICE — DRAPE,LAPAROTOMY,PCH,STERILE: Brand: MEDLINE

## (undated) DEVICE — C-ARM: Brand: UNBRANDED

## (undated) DEVICE — SOLUTION IRRIG 1000ML 0.9% NACL USP BTL

## (undated) DEVICE — STERILE SYNTHETIC POLYISOPRENE POWDER-FREE SURGICAL GLOVES WITH HYDROGEL COATING, SMOOTH FINISH, STRAIGHT FINGER: Brand: PROTEXIS

## (undated) DEVICE — ELECTRODE ES L2.75IN XLN STD BLDE MOD E-Z CLN

## (undated) DEVICE — SPONGE GZ 4XL4IN 100% COT 12 PLY TYP VII WVN

## (undated) DEVICE — ADHESIVE SKIN TOP FOR WND CLSR DERMBND ADV

## (undated) DEVICE — 3M™ TEGADERM™ TRANSPARENT FILM DRESSING, 1626W, 4 IN X 4-3/4 IN (10 CM X 12 CM), 50 EACH/CARTON, 4 CARTON/CASE: Brand: 3M™ TEGADERM™

## (undated) DEVICE — NEEDLE SPNL 22GA L3.5IN BLK QNCKE STYL DISP

## (undated) DEVICE — LAMINECTOMY CDS: Brand: MEDLINE INDUSTRIES, INC.

## (undated) DEVICE — STERILE POLYISOPRENE POWDER-FREE SURGICAL GLOVES: Brand: PROTEXIS

## (undated) DEVICE — ENDOSCOPY PACK UPPER: Brand: MEDLINE INDUSTRIES, INC.

## (undated) DEVICE — BANDAID CURAD 3IN X 1IN

## (undated) DEVICE — GLOVE SURG SENSICARE SZ 6-1/2

## (undated) NOTE — ED AVS SNAPSHOT
[facility] Emergency Department    [address]     [address] [place] [address]    Phone:  [phone]    Fax:  [phone]           Larrie Kayser   MRN: [id number]    Department:  [facility] Emergency [facility]   Date of Visit:  6/6/ and Class Registration line at [phone] or find a doctor online by visiting [email or web address].    IF THERE IS ANY CHANGE OR WORSENING OF YOUR CONDITION, CALL YOUR PRIMARY CARE PHYSICIAN AT ONCE OR RETURN IMMEDIATELY TO [address] [place] [address].     If

## (undated) NOTE — Clinical Note
PABLOI, TCM call made, see notes.  NCM confirmed with patient that he has a TCM HFU on 4/14/2022 at 2:00 pm.

## (undated) NOTE — ED AVS SNAPSHOT
Anna Lowery   MRN: [id number]    Department:  [facility] Emergency Department   Date of Visit:  10/31/2018           Disclosure     Insurance plans vary and the physician(s) referred by the ER may not be covered by your plan.  Please contact CARE PHYSICIAN AT ONCE OR RETURN IMMEDIATELY TO THE EMERGENCY DEPARTMENT. If you have been prescribed any medication(s), please fill your prescription right away and begin taking the medication(s) as directed.   If you believe that any of the medications

## (undated) NOTE — Clinical Note
Dear Cristine Crawley,    I had the opportunity to see your patient Emily Dang recently. I am sending you this update, and I appreciate your confidence in me to care for your patients.  Please feel free call me with any questions at [phone] or contact me

## (undated) NOTE — LETTER
25      RE: Fredy Vogt    : 2/3/1975    Dear Dr. Hdez,    Your patient is being scheduled for a pain management procedure at [facility].    Procedure:  Bilateral Celiac Plexus Block.  Date of Procedure: TBD -pending medical clearance.  Physician: Dr. Morales- Anesthesiologist     Your patient is currently taking Eliquis. Dr. Morales usually recommends this medication to be held for 3 days prior to injection.     Please verify patient is cleared to proceed with pain management procedures.      Clearance Approved   ____________    Clearance Denied       ____________      Comments: ______________________________________________________    Signature: ________________________________  Date: _________________       If you have any questions please feel free to contact our office at [phone], option # 3.    Please fax this clearance request to our office at fax # [phone] or send electronically.       Thank you,      [facility] Staff

## (undated) NOTE — Clinical Note
Dear Michele Worrell,    Thank you for sending Coby Farrell to see me for physiatry consultation. I appreciate your confidence in me to care for your patients. Please feel free call me with any questions at [phone] or contact me through [facility]     Sincerely

## (undated) NOTE — ED AVS SNAPSHOT
[facility] Emergency Department    [address]     [address] [place] [address]    Phone:  [phone]    Fax:  [phone]           Tg Daniel   MRN: [id number]    Department:  [facility] Emergency Department   Date of Visit:  6/4/ Discharge References/Attachments     DERMATITIS, CONTACT, UNDERSTANDING (ENGLISH)    CONTACT DERMATITIS (ENGLISH)      Disclosure     Insurance plans vary and the physician(s) referred by the ER may not be covered by your plan.  Please contact your insuranc CARE PHYSICIAN AT ONCE OR RETURN IMMEDIATELY TO THE EMERGENCY DEPARTMENT. If you have been prescribed any medication(s), please fill your prescription right away and begin taking the medication(s) as directed.   If you believe that any of the medications harming yourself, contact [facility] at [phone]. - If you don’t have insurance, Jennifer David has partnered with [facility] to help you get signed up for insurance coverage.   Patient Narcissa

## (undated) NOTE — ED AVS SNAPSHOT
Nguyen Walters   MRN: [id number]    Department:  [facility] Emergency Department   Date of Visit:  12/17/2017           Disclosure     Insurance plans vary and the physician(s) referred by the ER may not be covered by your plan.  Please contact CARE PHYSICIAN AT ONCE OR RETURN IMMEDIATELY TO THE EMERGENCY DEPARTMENT. If you have been prescribed any medication(s), please fill your prescription right away and begin taking the medication(s) as directed.   If you believe that any of the medications

## (undated) NOTE — LETTER
[address] [place], [place] [address]PHYSIATR   Date:   9/13/2021     Name:   Krystian Arora    YOB: 1975   MRN:   [id number]       WHERE IS YOUR PAIN NOW?   Tolu the areas on your body where you feel the described sens

## (undated) NOTE — ED AVS SNAPSHOT
[facility] Emergency Department  [address]   [address] [place] [address]  Phone: [phone]  Fax: [phone]          Jacob Knox   MRN: [id number]    Department: [facility] Emergency Department   Date of Visit: 5/5/2021 Emergency Department is dedicated to providing you with excellent care and a patient-centered experience.  Feedback is appreciated, and can be communicated via the electronic (email/text) assessment survey that you will receive within 24 hours of your visit

## (undated) NOTE — ED AVS SNAPSHOT
[facility]    [address] [address]    Phone:  [phone]    Fax:  [phone]           Janee Jasmine   MRN: [id number]    Department:  [facility]   Date of Visit:  6/6/ Medication Information       Follow the directions for taking your medications provided by your doctor.  Please ask your health care provider, pharmacist or nurse if you have any questions regarding your home medications, including potential side e service to you, we would appreciate any positive or negative feedback related to the care you received in our emergency department. Please call our [address] at [phone]. Your Emergency Department team is here to serve you.   You are our top priori I certified that I have received a copy of the aftercare instructions; that these instructions have been explained to me; all questions pertaining to these instructions have been answered in a satisfactory manner.         [email or web address] Visit [email or web address] to learn more.

## (undated) NOTE — LETTER
Date & Time: 8/25/2020, 1:26 PM  Patient: Gray Walton  Encounter Provider(s):    MD Roxanne Troy MD         This certifies that Jg Carrillo, a patient at an [address] [place] [address], am leaving the facility v

## (undated) NOTE — LETTER
9/15/2017          To Whom It May Concern:    Sindi Barnes is currently under my medical care and may return to work at this time. Please excuse Ayesha Rhoades from 9/12 - 9/15/17. He may return to work on 9/16/17. Activity is restricted as follows: none.

## (undated) NOTE — ED AVS SNAPSHOT
[facility] in [place] [address].  [place] [address]    Phone:  [phone]    Fax:  [phone]           Zackary Gu   MRN: [id number]    Department:  [facility] in [address] [place] [address]   Date of Visit: and physician's office to determine coverage and benefits available for follow-up care and referrals. It is our goal to assure that you are completely satisfied with every aspect of your visit today.   In an effort to constantly improve our service to y Any imaging studies and labs completed today can be reviewed in your MyChart account. You may have had testing done that requires us to contact you. Please make sure we have your correct phone number on file.      OUR CURRENT HOURS OF OPERATION:  MONDAY T and ask to get set up for an insurance coverage that is in-network with Jennifer David.         [employer]     Call the TapMyBack for assistance with your inactive [employer] account    If you have questions, you can call [phone] to talk to our [facility]

## (undated) NOTE — ED AVS SNAPSHOT
Jonel Jang   MRN: [id number]    Department:  [facility] Emergency Department   Date of Visit:  11/2/2018           Disclosure     Insurance plans vary and the physician(s) referred by the ER may not be covered by your plan.  Please contact CARE PHYSICIAN AT ONCE OR RETURN IMMEDIATELY TO THE EMERGENCY DEPARTMENT. If you have been prescribed any medication(s), please fill your prescription right away and begin taking the medication(s) as directed.   If you believe that any of the medications

## (undated) NOTE — LETTER
Patient Name: Nguyen Walters        : 2/3/1975       Medical Record #: [id number]    CONSENT FOR PROCEDURES/SEDATION    Date: 2020       Time: 8:58 AM        1.  I authorize the performance upon Nguyen Walters the following:  RIGHT SCAPULAR TRI

## (undated) NOTE — ED AVS SNAPSHOT
[facility] Emergency Department  [address]   [address] [place] [address]  Phone:  [phone]  Fax:  [phone]          Coby Farrell   MRN: [id number]    Department:  [facility] Emergency Department   Date of Visit:  7/6/2017 visiting [email or web address].    IF THERE IS ANY CHANGE OR WORSENING OF YOUR CONDITION, CALL YOUR PRIMARY CARE PHYSICIAN AT ONCE OR RETURN IMMEDIATELY TO THE EMERGENCY DEPARTMENT.     If you have been prescribed any medication(s), please fill your prescription

## (undated) NOTE — ED AVS SNAPSHOT
Gregorio Slider   MRN: [id number]    Department:  [facility] Emergency Department   Date of Visit:  10/25/2017           Disclosure     Insurance plans vary and the physician(s) referred by the ER may not be covered by your plan.  Please contact CARE PHYSICIAN AT ONCE OR RETURN IMMEDIATELY TO THE EMERGENCY DEPARTMENT. If you have been prescribed any medication(s), please fill your prescription right away and begin taking the medication(s) as directed.   If you believe that any of the medications

## (undated) NOTE — LETTER
Your patient was recently seen at the [facility] for a hospital follow-up visit. The visit note is attached. Please contact the clinic with any questions at [phone].     Thank you,  SHERITA Jean

## (undated) NOTE — LETTER
October 25, 2017    Patient: Jenny Huber   Date of Visit: 10/25/2017       To Whom It May Concern:    Kendall Tse was seen and treated in our emergency department on 10/25/2017. He should not return to work until cleared by GI. .    If you have an

## (undated) NOTE — LETTER
10/17/23  [facility]   Pre-Operative Clearance Request    Patient Name:   Bob Ramey             :   2/3/1975    Surgeon: Dr. Wei Sanders             Date of Surgery: 23     Surgical Procedure: L4-5 MICRODISCECTOMY. Please complete all of the following 2-3 weeks PRIOR TO your scheduled surgery to avoid potential cancellation. [x]  History and Physical        [x]  Medical  Clearance                     Required pre-op testing to be ordered:                        [x]  CBC W/Diff                                                                   [x]  BMP                                                                                      [x]  PT/INR    [x]  PTT     [x]  Type and Screen                      **Please fax test results, H&P, and clearance to [phone] and to [facility] at [phone]**

## (undated) NOTE — ED AVS SNAPSHOT
Zackary Diazedwina   MRN: [id number]    Department:  [facility] Emergency Department   Date of Visit:  7/14/2018           Disclosure     Insurance plans vary and the physician(s) referred by the ER may not be covered by your plan.  Please contact you tell this physician (or your personal doctor if your instructions are to return to your personal doctor) about any new or lasting problems. The primary care or specialist physician will see patients referred from the [facility] Emergency Department.  Abdiel Clemens

## (undated) NOTE — ED AVS SNAPSHOT
[facility] Emergency [facility]    [address]     [address] [place] [address]    Phone:  [phone]    Fax:  [phone]           Nguyen Walters   MRN: [id number]    Department:  [facility]   Date of Visit:  6/4/ and Class Registration line at [phone] or find a doctor online by visiting [email or web address].    IF THERE IS ANY CHANGE OR WORSENING OF YOUR CONDITION, CALL YOUR PRIMARY CARE PHYSICIAN AT ONCE OR RETURN IMMEDIATELY TO [address] [place] [address].     If

## (undated) NOTE — MR AVS SNAPSHOT
[address] [place] [address]  [address]  [id number] [phone]               Thank you for choosing us for your health care visit with Osiris Shaikh MD.  We are glad to serve you and happy to provide you with this summary of yo Where to Get Your Medications      These medications [id number] [place] [facility] [phone], [address], [address] [place] [address] Street     Phone:  [address] [employer] [place] track your progress   You don’t need to join a gym. Home exercises work great.  Put more priority on exercise in your life                    Visit [facility] online at  [email or web address]

## (undated) NOTE — Clinical Note
TCM call completed. A TCM-HFU appointment was scheduled for 4/14/2022. The patient c/o extreme central abdominal pain that returned last night. The patient has not been able to eat/drink since discharge. NCM advised the ED. A TE was sent to the office to provide this condition update. Thank you.

## (undated) NOTE — Clinical Note
WILY, TCM call made, see notes.  NCM confirmed with patient that he has a TCM HFU scheduled with SHERITA Rossi at the [facility] on 8/11/2022 at 8:30 am.

## (undated) NOTE — LETTER
Jac Underwood  [address] [address]  [address]: [phone]  FAX: [phone]      Dear Dr. Nunez Poster,    Thank you for this referral. I've attached my clinic note with recommendations. Please feel free to reach out with any additional questions. Thanks,    Lucrecia Gaytan.  Shannan Vaughan MD  Staff Urologist  [place] [address]  Office: [phone]      2022   NARINDER Zapata 2/3/1975

## (undated) NOTE — LETTER
4/25/2024          Fredy Vogt        [address]        [facility] [address]         Dear Fredy,    This letter is to inform you that our office has made several attempts to reach you by phone without success.  We were attempting to contact you by phone regarding illness or problem.    Please contact our office at the number listed below as soon as you receive this letter to discuss this issue and to make the necessary changes in our system to your contact information.  Thank you for your cooperation.        Sincerely,    Norberto Hdez MD  [address] [place], [place] [address]   [phone]        Document electronically generated by:  Lizette FERRARA RN

## (undated) NOTE — Clinical Note
WILY, TCM call made, see notes.  NCM confirmed with patient that he has a TCM HFU On 1/20/2022 at 2:00 pm.